# Patient Record
Sex: MALE | Race: WHITE | NOT HISPANIC OR LATINO | Employment: OTHER | URBAN - METROPOLITAN AREA
[De-identification: names, ages, dates, MRNs, and addresses within clinical notes are randomized per-mention and may not be internally consistent; named-entity substitution may affect disease eponyms.]

---

## 2019-01-01 ENCOUNTER — APPOINTMENT (OUTPATIENT)
Dept: GENERAL RADIOLOGY | Facility: HOSPITAL | Age: 84
End: 2019-01-01

## 2019-01-01 ENCOUNTER — ANESTHESIA EVENT (OUTPATIENT)
Dept: PERIOP | Facility: HOSPITAL | Age: 84
End: 2019-01-01

## 2019-01-01 ENCOUNTER — OFFICE VISIT (OUTPATIENT)
Dept: CARDIAC SURGERY | Facility: CLINIC | Age: 84
End: 2019-01-01

## 2019-01-01 ENCOUNTER — READMISSION MANAGEMENT (OUTPATIENT)
Dept: CALL CENTER | Facility: HOSPITAL | Age: 84
End: 2019-01-01

## 2019-01-01 ENCOUNTER — ANESTHESIA (OUTPATIENT)
Dept: CARDIOLOGY | Facility: HOSPITAL | Age: 84
End: 2019-01-01

## 2019-01-01 ENCOUNTER — OFFICE VISIT (OUTPATIENT)
Dept: CARDIOLOGY | Facility: CLINIC | Age: 84
End: 2019-01-01

## 2019-01-01 ENCOUNTER — APPOINTMENT (OUTPATIENT)
Dept: CT IMAGING | Facility: HOSPITAL | Age: 84
End: 2019-01-01

## 2019-01-01 ENCOUNTER — APPOINTMENT (OUTPATIENT)
Dept: CARDIOLOGY | Facility: HOSPITAL | Age: 84
End: 2019-01-01

## 2019-01-01 ENCOUNTER — ANESTHESIA (OUTPATIENT)
Dept: PERIOP | Facility: HOSPITAL | Age: 84
End: 2019-01-01

## 2019-01-01 ENCOUNTER — HOSPITAL ENCOUNTER (INPATIENT)
Facility: HOSPITAL | Age: 84
LOS: 6 days | Discharge: HOME HEALTH | End: 2019-10-02
Attending: EMERGENCY MEDICINE | Admitting: FAMILY MEDICINE

## 2019-01-01 ENCOUNTER — ANCILLARY PROCEDURE (OUTPATIENT)
Dept: SPEECH THERAPY | Facility: HOSPITAL | Age: 84
End: 2019-01-01

## 2019-01-01 ENCOUNTER — ANESTHESIA EVENT (OUTPATIENT)
Dept: CARDIOLOGY | Facility: HOSPITAL | Age: 84
End: 2019-01-01

## 2019-01-01 ENCOUNTER — ANESTHESIA EVENT (OUTPATIENT)
Dept: GASTROENTEROLOGY | Facility: HOSPITAL | Age: 84
End: 2019-01-01

## 2019-01-01 ENCOUNTER — TELEPHONE (OUTPATIENT)
Dept: GASTROENTEROLOGY | Facility: CLINIC | Age: 84
End: 2019-01-01

## 2019-01-01 ENCOUNTER — HOSPITAL ENCOUNTER (EMERGENCY)
Facility: HOSPITAL | Age: 84
Discharge: HOME | End: 2019-10-30
Attending: EMERGENCY MEDICINE | Admitting: EMERGENCY MEDICINE

## 2019-01-01 ENCOUNTER — ANESTHESIA (OUTPATIENT)
Dept: GASTROENTEROLOGY | Facility: HOSPITAL | Age: 84
End: 2019-01-01

## 2019-01-01 ENCOUNTER — TELEPHONE (OUTPATIENT)
Dept: CARDIOLOGY | Facility: CLINIC | Age: 84
End: 2019-01-01

## 2019-01-01 ENCOUNTER — HOSPITAL ENCOUNTER (INPATIENT)
Facility: HOSPITAL | Age: 84
LOS: 13 days | Discharge: HOME | End: 2019-06-20
Attending: EMERGENCY MEDICINE | Admitting: THORACIC SURGERY (CARDIOTHORACIC VASCULAR SURGERY)

## 2019-01-01 ENCOUNTER — LAB REQUISITION (OUTPATIENT)
Dept: LAB | Facility: HOSPITAL | Age: 84
End: 2019-01-01

## 2019-01-01 ENCOUNTER — HOSPITAL ENCOUNTER (INPATIENT)
Facility: HOSPITAL | Age: 84
LOS: 24 days | Discharge: NURSING, REHAB OR LONG-TERM CARE | End: 2019-07-26
Attending: EMERGENCY MEDICINE | Admitting: THORACIC SURGERY (CARDIOTHORACIC VASCULAR SURGERY)

## 2019-01-01 VITALS
OXYGEN SATURATION: 92 % | RESPIRATION RATE: 24 BRPM | WEIGHT: 175 LBS | HEART RATE: 64 BPM | SYSTOLIC BLOOD PRESSURE: 154 MMHG | TEMPERATURE: 97.7 F | DIASTOLIC BLOOD PRESSURE: 98 MMHG | BODY MASS INDEX: 23.7 KG/M2 | HEIGHT: 72 IN

## 2019-01-01 VITALS
OXYGEN SATURATION: 93 % | HEIGHT: 72 IN | DIASTOLIC BLOOD PRESSURE: 62 MMHG | BODY MASS INDEX: 22.67 KG/M2 | RESPIRATION RATE: 17 BRPM | SYSTOLIC BLOOD PRESSURE: 102 MMHG | WEIGHT: 167.4 LBS | TEMPERATURE: 97.5 F | HEART RATE: 82 BPM

## 2019-01-01 VITALS
RESPIRATION RATE: 20 BRPM | HEIGHT: 72 IN | SYSTOLIC BLOOD PRESSURE: 122 MMHG | OXYGEN SATURATION: 82 % | TEMPERATURE: 97.6 F | DIASTOLIC BLOOD PRESSURE: 68 MMHG | BODY MASS INDEX: 23.32 KG/M2 | WEIGHT: 172.2 LBS | HEART RATE: 91 BPM

## 2019-01-01 VITALS
OXYGEN SATURATION: 99 % | HEART RATE: 97 BPM | WEIGHT: 163 LBS | HEIGHT: 72 IN | BODY MASS INDEX: 22.08 KG/M2 | TEMPERATURE: 97.8 F | DIASTOLIC BLOOD PRESSURE: 76 MMHG | SYSTOLIC BLOOD PRESSURE: 116 MMHG

## 2019-01-01 VITALS
BODY MASS INDEX: 23.7 KG/M2 | OXYGEN SATURATION: 94 % | TEMPERATURE: 97.9 F | DIASTOLIC BLOOD PRESSURE: 61 MMHG | HEIGHT: 72 IN | WEIGHT: 175 LBS | SYSTOLIC BLOOD PRESSURE: 104 MMHG | HEART RATE: 62 BPM

## 2019-01-01 VITALS
HEART RATE: 101 BPM | WEIGHT: 167.6 LBS | BODY MASS INDEX: 22.7 KG/M2 | OXYGEN SATURATION: 96 % | SYSTOLIC BLOOD PRESSURE: 111 MMHG | HEIGHT: 72 IN | DIASTOLIC BLOOD PRESSURE: 66 MMHG | RESPIRATION RATE: 16 BRPM | TEMPERATURE: 97.3 F

## 2019-01-01 VITALS
HEART RATE: 82 BPM | WEIGHT: 155.4 LBS | SYSTOLIC BLOOD PRESSURE: 94 MMHG | HEIGHT: 72 IN | DIASTOLIC BLOOD PRESSURE: 58 MMHG | BODY MASS INDEX: 21.05 KG/M2

## 2019-01-01 DIAGNOSIS — I48.0 PAROXYSMAL ATRIAL FIBRILLATION (HCC): ICD-10-CM

## 2019-01-01 DIAGNOSIS — Z95.0 PRESENCE OF CARDIAC PACEMAKER: ICD-10-CM

## 2019-01-01 DIAGNOSIS — J90 PLEURAL EFFUSION: ICD-10-CM

## 2019-01-01 DIAGNOSIS — C34.90 NON-SMALL CELL LUNG CANCER, UNSPECIFIED LATERALITY (HCC): ICD-10-CM

## 2019-01-01 DIAGNOSIS — J18.9 PNEUMONIA OF RIGHT LOWER LOBE DUE TO INFECTIOUS ORGANISM: ICD-10-CM

## 2019-01-01 DIAGNOSIS — R13.12 OROPHARYNGEAL DYSPHAGIA: ICD-10-CM

## 2019-01-01 DIAGNOSIS — Z78.9 IMPAIRED MOBILITY AND ADLS: ICD-10-CM

## 2019-01-01 DIAGNOSIS — I48.4 ATYPICAL ATRIAL FLUTTER (HCC): ICD-10-CM

## 2019-01-01 DIAGNOSIS — I10 ESSENTIAL HYPERTENSION: ICD-10-CM

## 2019-01-01 DIAGNOSIS — T82.9XXA COMPLICATION ASSOCIATED WITH CARDIAC PACEMAKER LEAD, INITIAL ENCOUNTER: ICD-10-CM

## 2019-01-01 DIAGNOSIS — I25.10 CORONARY ARTERY DISEASE INVOLVING NATIVE CORONARY ARTERY OF NATIVE HEART WITHOUT ANGINA PECTORIS: ICD-10-CM

## 2019-01-01 DIAGNOSIS — C34.91 NON-SMALL CELL CANCER OF RIGHT LUNG (HCC): ICD-10-CM

## 2019-01-01 DIAGNOSIS — J98.19 TRAPPED LUNG: ICD-10-CM

## 2019-01-01 DIAGNOSIS — Z99.81 CHRONIC RESPIRATORY FAILURE WITH HYPOXIA, ON HOME OXYGEN THERAPY (HCC): ICD-10-CM

## 2019-01-01 DIAGNOSIS — I50.9 CONGESTIVE HEART FAILURE, UNSPECIFIED HF CHRONICITY, UNSPECIFIED HEART FAILURE TYPE (HCC): ICD-10-CM

## 2019-01-01 DIAGNOSIS — J96.11 CHRONIC RESPIRATORY FAILURE WITH HYPOXIA, ON HOME OXYGEN THERAPY (HCC): ICD-10-CM

## 2019-01-01 DIAGNOSIS — J18.9 PNEUMONIA, UNSPECIFIED ORGANISM: ICD-10-CM

## 2019-01-01 DIAGNOSIS — Z74.09 IMPAIRED FUNCTIONAL MOBILITY, BALANCE, GAIT, AND ENDURANCE: ICD-10-CM

## 2019-01-01 DIAGNOSIS — D64.9 SYMPTOMATIC ANEMIA: Primary | ICD-10-CM

## 2019-01-01 DIAGNOSIS — E86.0 DEHYDRATION: ICD-10-CM

## 2019-01-01 DIAGNOSIS — T82.7XXD INFECTION AND INFLAMMATORY REACTION DUE TO OTHER CARDIAC AND VASCULAR DEVICES, IMPLANTS AND GRAFTS, SUBSEQUENT ENCOUNTER: ICD-10-CM

## 2019-01-01 DIAGNOSIS — R00.0 TACHYCARDIA: ICD-10-CM

## 2019-01-01 DIAGNOSIS — R09.02 HYPOXIA: ICD-10-CM

## 2019-01-01 DIAGNOSIS — I25.10 CORONARY ARTERY DISEASE INVOLVING NATIVE CORONARY ARTERY OF NATIVE HEART WITHOUT ANGINA PECTORIS: Primary | ICD-10-CM

## 2019-01-01 DIAGNOSIS — R79.89 ELEVATED SERUM CREATININE: ICD-10-CM

## 2019-01-01 DIAGNOSIS — J98.19 TRAPPED LUNG: Primary | ICD-10-CM

## 2019-01-01 DIAGNOSIS — I48.91 ATRIAL FIBRILLATION, UNSPECIFIED TYPE (HCC): ICD-10-CM

## 2019-01-01 DIAGNOSIS — Z74.09 IMPAIRED MOBILITY AND ADLS: ICD-10-CM
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
## 2019-01-01 DIAGNOSIS — T85.698A MALFUNCTION OF DEVICE, INITIAL ENCOUNTER: ICD-10-CM

## 2019-01-01 DIAGNOSIS — J90 PLEURAL EFFUSION, NOT ELSEWHERE CLASSIFIED: ICD-10-CM

## 2019-01-01 DIAGNOSIS — R93.89 ABNORMAL CXR: ICD-10-CM

## 2019-01-01 DIAGNOSIS — Z95.0 PRESENCE OF CARDIAC PACEMAKER: Primary | ICD-10-CM

## 2019-01-01 DIAGNOSIS — E78.00 HYPERCHOLESTEROLEMIA: ICD-10-CM

## 2019-01-01 DIAGNOSIS — N28.9 ACUTE RENAL INSUFFICIENCY: ICD-10-CM

## 2019-01-01 DIAGNOSIS — J43.1 PANLOBULAR EMPHYSEMA (HCC): ICD-10-CM

## 2019-01-01 DIAGNOSIS — Z00.00 ROUTINE GENERAL MEDICAL EXAMINATION AT A HEALTH CARE FACILITY: ICD-10-CM

## 2019-01-01 DIAGNOSIS — K26.9 DUODENAL ULCER: ICD-10-CM

## 2019-01-01 DIAGNOSIS — R09.02 HYPOXEMIA: Primary | ICD-10-CM

## 2019-01-01 DIAGNOSIS — D50.0 CHRONIC BLOOD LOSS ANEMIA: ICD-10-CM

## 2019-01-01 DIAGNOSIS — R13.10 DYSPHAGIA, UNSPECIFIED TYPE: ICD-10-CM

## 2019-01-01 DIAGNOSIS — J96.21 ACUTE ON CHRONIC RESPIRATORY FAILURE WITH HYPOXIA (HCC): ICD-10-CM

## 2019-01-01 DIAGNOSIS — D64.9 ANEMIA, UNSPECIFIED TYPE: ICD-10-CM

## 2019-01-01 DIAGNOSIS — I48.91 ATRIAL FIBRILLATION, UNSPECIFIED TYPE (HCC): Primary | ICD-10-CM

## 2019-01-01 DIAGNOSIS — K92.2 UPPER GI BLEED: ICD-10-CM

## 2019-01-01 LAB
ABO + RH BLD: NORMAL
ABO GROUP BLD: NORMAL
ACT BLD: 142 SECONDS (ref 82–152)
ACT BLD: 142 SECONDS (ref 82–152)
ALBUMIN SERPL-MCNC: 2.5 G/DL (ref 3.5–5.2)
ALBUMIN SERPL-MCNC: 2.5 G/DL (ref 3.5–5.2)
ALBUMIN SERPL-MCNC: 2.6 G/DL (ref 3.5–5.2)
ALBUMIN SERPL-MCNC: 2.7 G/DL (ref 3.5–5.2)
ALBUMIN SERPL-MCNC: 2.8 G/DL (ref 3.5–5.2)
ALBUMIN SERPL-MCNC: 2.9 G/DL (ref 3.5–5.2)
ALBUMIN SERPL-MCNC: 2.9 G/DL (ref 3.5–5.2)
ALBUMIN SERPL-MCNC: 3 G/DL (ref 3.5–5.2)
ALBUMIN SERPL-MCNC: 3.1 G/DL (ref 3.5–5.2)
ALBUMIN SERPL-MCNC: 3.1 G/DL (ref 3.5–5.2)
ALBUMIN SERPL-MCNC: 3.6 G/DL (ref 3.5–5.2)
ALBUMIN/GLOB SERPL: 0.8 G/DL
ALBUMIN/GLOB SERPL: 0.9 G/DL
ALBUMIN/GLOB SERPL: 0.9 G/DL
ALBUMIN/GLOB SERPL: 1 G/DL
ALBUMIN/GLOB SERPL: 1.1 G/DL
ALP SERPL-CCNC: 119 U/L (ref 39–117)
ALP SERPL-CCNC: 123 U/L (ref 39–117)
ALP SERPL-CCNC: 125 U/L (ref 39–117)
ALP SERPL-CCNC: 127 U/L (ref 39–117)
ALP SERPL-CCNC: 144 U/L (ref 39–117)
ALP SERPL-CCNC: 77 U/L (ref 39–117)
ALP SERPL-CCNC: 83 U/L (ref 39–117)
ALP SERPL-CCNC: 88 U/L (ref 39–117)
ALP SERPL-CCNC: 92 U/L (ref 39–117)
ALT SERPL W P-5'-P-CCNC: 12 U/L (ref 1–41)
ALT SERPL W P-5'-P-CCNC: 13 U/L (ref 1–41)
ALT SERPL W P-5'-P-CCNC: 16 U/L (ref 1–41)
ALT SERPL W P-5'-P-CCNC: 17 U/L (ref 1–41)
ALT SERPL W P-5'-P-CCNC: 19 U/L (ref 1–41)
ALT SERPL W P-5'-P-CCNC: 19 U/L (ref 1–41)
ALT SERPL W P-5'-P-CCNC: 21 U/L (ref 1–41)
ALT SERPL W P-5'-P-CCNC: 22 U/L (ref 1–41)
ALT SERPL W P-5'-P-CCNC: 5 U/L (ref 1–41)
ANION GAP SERPL CALCULATED.3IONS-SCNC: 10 MMOL/L
ANION GAP SERPL CALCULATED.3IONS-SCNC: 10 MMOL/L
ANION GAP SERPL CALCULATED.3IONS-SCNC: 10 MMOL/L (ref 5–15)
ANION GAP SERPL CALCULATED.3IONS-SCNC: 11 MMOL/L
ANION GAP SERPL CALCULATED.3IONS-SCNC: 11 MMOL/L (ref 5–15)
ANION GAP SERPL CALCULATED.3IONS-SCNC: 12 MMOL/L
ANION GAP SERPL CALCULATED.3IONS-SCNC: 12 MMOL/L
ANION GAP SERPL CALCULATED.3IONS-SCNC: 12 MMOL/L (ref 5–15)
ANION GAP SERPL CALCULATED.3IONS-SCNC: 13 MMOL/L
ANION GAP SERPL CALCULATED.3IONS-SCNC: 13 MMOL/L (ref 5–15)
ANION GAP SERPL CALCULATED.3IONS-SCNC: 14 MMOL/L (ref 5–15)
ANION GAP SERPL CALCULATED.3IONS-SCNC: 7 MMOL/L (ref 5–15)
ANION GAP SERPL CALCULATED.3IONS-SCNC: 7 MMOL/L (ref 5–15)
ANION GAP SERPL CALCULATED.3IONS-SCNC: 8 MMOL/L
ANION GAP SERPL CALCULATED.3IONS-SCNC: 8 MMOL/L (ref 5–15)
ANION GAP SERPL CALCULATED.3IONS-SCNC: 9 MMOL/L (ref 5–15)
APTT PPP: 41.2 SECONDS (ref 24–37)
ARTERIAL PATENCY WRIST A: ABNORMAL
AST SERPL-CCNC: 14 U/L (ref 1–40)
AST SERPL-CCNC: 15 U/L (ref 1–40)
AST SERPL-CCNC: 16 U/L (ref 1–40)
AST SERPL-CCNC: 19 U/L (ref 1–40)
AST SERPL-CCNC: 20 U/L (ref 1–40)
AST SERPL-CCNC: 21 U/L (ref 1–40)
AST SERPL-CCNC: 24 U/L (ref 1–40)
AST SERPL-CCNC: 24 U/L (ref 1–40)
AST SERPL-CCNC: 29 U/L (ref 1–40)
ATMOSPHERIC PRESS: ABNORMAL MMHG
BACTERIA FLD CULT: NORMAL
BACTERIA SPEC AEROBE CULT: NORMAL
BACTERIA SPEC ANAEROBE CULT: NORMAL
BACTERIA SPEC RESP CULT: NORMAL
BACTERIA UR QL AUTO: ABNORMAL /HPF
BASE EXCESS BLDA CALC-SCNC: -2 MMOL/L (ref 0–2)
BASE EXCESS BLDA CALC-SCNC: -3.6 MMOL/L (ref 0–2)
BASE EXCESS BLDA CALC-SCNC: -4 MMOL/L (ref -5–5)
BASE EXCESS BLDA CALC-SCNC: 1.7 MMOL/L (ref 0–2)
BASE EXCESS BLDA CALC-SCNC: 2.4 MMOL/L (ref 0–2)
BASOPHILS # BLD AUTO: 0 10*3/MM3 (ref 0–0.2)
BASOPHILS # BLD AUTO: 0.02 10*3/MM3 (ref 0–0.2)
BASOPHILS # BLD AUTO: 0.03 10*3/MM3 (ref 0–0.2)
BASOPHILS # BLD AUTO: 0.04 10*3/MM3 (ref 0–0.2)
BASOPHILS # BLD AUTO: 0.06 10*3/MM3 (ref 0–0.2)
BASOPHILS # BLD AUTO: 0.08 10*3/MM3 (ref 0–0.2)
BASOPHILS # BLD AUTO: 0.1 10*3/MM3 (ref 0–0.2)
BASOPHILS NFR BLD AUTO: 0 % (ref 0–1.5)
BASOPHILS NFR BLD AUTO: 0.3 % (ref 0–1.5)
BASOPHILS NFR BLD AUTO: 0.3 % (ref 0–1.5)
BASOPHILS NFR BLD AUTO: 0.4 % (ref 0–1.5)
BASOPHILS NFR BLD AUTO: 0.6 % (ref 0–1.5)
BASOPHILS NFR BLD AUTO: 0.7 % (ref 0–1.5)
BASOPHILS NFR BLD AUTO: 0.9 % (ref 0–1.5)
BASOPHILS NFR BLD AUTO: 0.9 % (ref 0–1.5)
BASOPHILS NFR BLD AUTO: 1 % (ref 0–1.5)
BASOPHILS NFR BLD AUTO: 1.1 % (ref 0–1.5)
BASOPHILS NFR BLD AUTO: 1.2 % (ref 0–1.5)
BDY SITE: ABNORMAL
BH BB BLOOD EXPIRATION DATE: NORMAL
BH BB BLOOD TYPE BARCODE: 5100
BH BB DISPENSE STATUS: NORMAL
BH BB PRODUCT CODE: NORMAL
BH BB UNIT NUMBER: NORMAL
BH CV ECHO MEAS - AO MAX PG (FULL): 7.9 MMHG
BH CV ECHO MEAS - AO MAX PG: 9.5 MMHG
BH CV ECHO MEAS - AO MEAN PG (FULL): 4.7 MMHG
BH CV ECHO MEAS - AO MEAN PG: 5.3 MMHG
BH CV ECHO MEAS - AO V2 MAX: 154.4 CM/SEC
BH CV ECHO MEAS - AO V2 MEAN: 105.1 CM/SEC
BH CV ECHO MEAS - AO V2 VTI: 29.2 CM
BH CV ECHO MEAS - BSA(HAYCOCK): 2 M^2
BH CV ECHO MEAS - BSA: 2 M^2
BH CV ECHO MEAS - BZI_BMI: 23.6 KILOGRAMS/M^2
BH CV ECHO MEAS - BZI_METRIC_HEIGHT: 182.9 CM
BH CV ECHO MEAS - BZI_METRIC_WEIGHT: 78.9 KG
BH CV ECHO MEAS - EDV(CUBED): 78.3 ML
BH CV ECHO MEAS - EDV(TEICH): 82.1 ML
BH CV ECHO MEAS - EF(CUBED): 56.5 %
BH CV ECHO MEAS - EF(TEICH): 48.5 %
BH CV ECHO MEAS - ESV(CUBED): 34.1 ML
BH CV ECHO MEAS - ESV(TEICH): 42.3 ML
BH CV ECHO MEAS - FS: 24.2 %
BH CV ECHO MEAS - IVS/LVPW: 0.93
BH CV ECHO MEAS - IVSD: 1 CM
BH CV ECHO MEAS - LA DIMENSION: 4.7 CM
BH CV ECHO MEAS - LAD MAJOR: 6.5 CM
BH CV ECHO MEAS - LAT PEAK E' VEL: 9.4 CM/SEC
BH CV ECHO MEAS - LATERAL E/E' RATIO: 10.5
BH CV ECHO MEAS - LV MASS(C)D: 153 GRAMS
BH CV ECHO MEAS - LV MASS(C)DI: 76.2 GRAMS/M^2
BH CV ECHO MEAS - LV MAX PG: 1.6 MMHG
BH CV ECHO MEAS - LV MEAN PG: 0.64 MMHG
BH CV ECHO MEAS - LV V1 MAX: 63.2 CM/SEC
BH CV ECHO MEAS - LV V1 MEAN: 34.7 CM/SEC
BH CV ECHO MEAS - LV V1 VTI: 10.7 CM
BH CV ECHO MEAS - LVIDD: 4.3 CM
BH CV ECHO MEAS - LVIDS: 3.2 CM
BH CV ECHO MEAS - LVPWD: 1.1 CM
BH CV ECHO MEAS - MED PEAK E' VEL: 11 CM/SEC
BH CV ECHO MEAS - MEDIAL E/E' RATIO: 9
BH CV ECHO MEAS - MV DEC TIME: 0.16 SEC
BH CV ECHO MEAS - MV E MAX VEL: 100.2 CM/SEC
BH CV ECHO MEAS - RAP SYSTOLE: 15 MMHG
BH CV ECHO MEAS - RVSP: 69 MMHG
BH CV ECHO MEAS - SI(CUBED): 22 ML/M^2
BH CV ECHO MEAS - SI(TEICH): 19.8 ML/M^2
BH CV ECHO MEAS - SV(CUBED): 44.2 ML
BH CV ECHO MEAS - SV(TEICH): 39.8 ML
BH CV ECHO MEAS - TAPSE (>1.6): 2.5 CM2
BH CV ECHO MEAS - TR MAX PG: 54 MMHG
BH CV ECHO MEAS - TR MAX VEL: 340.6 CM/SEC
BH CV ECHO MEASUREMENTS AVERAGE E/E' RATIO: 9.82
BH CV UPPER VENOUS LEFT AXILLARY AUGMENT: NORMAL
BH CV UPPER VENOUS LEFT AXILLARY COMPRESS: NORMAL
BH CV UPPER VENOUS LEFT AXILLARY PHASIC: NORMAL
BH CV UPPER VENOUS LEFT AXILLARY SPONT: NORMAL
BH CV UPPER VENOUS LEFT BASILIC FOREARM COMPRESS: NORMAL
BH CV UPPER VENOUS LEFT BASILIC UPPER COMPRESS: NORMAL
BH CV UPPER VENOUS LEFT BRACHIAL COMPRESS: NORMAL
BH CV UPPER VENOUS LEFT CEPHALIC FOREARM COMPRESS: NORMAL
BH CV UPPER VENOUS LEFT CEPHALIC UPPER COMPRESS: NORMAL
BH CV UPPER VENOUS LEFT INTERNAL JUGULAR AUGMENT: NORMAL
BH CV UPPER VENOUS LEFT INTERNAL JUGULAR COMPRESS: NORMAL
BH CV UPPER VENOUS LEFT INTERNAL JUGULAR PHASIC: NORMAL
BH CV UPPER VENOUS LEFT INTERNAL JUGULAR SPONT: NORMAL
BH CV UPPER VENOUS LEFT RADIAL COMPRESS: NORMAL
BH CV UPPER VENOUS LEFT SUBCLAVIAN AUGMENT: NORMAL
BH CV UPPER VENOUS LEFT SUBCLAVIAN COMPRESS: NORMAL
BH CV UPPER VENOUS LEFT SUBCLAVIAN PHASIC: NORMAL
BH CV UPPER VENOUS LEFT SUBCLAVIAN SPONT: NORMAL
BH CV UPPER VENOUS LEFT ULNAR COMPRESS: NORMAL
BH CV XLRA - RV BASE: 4.1 CM
BH CV XLRA - RV LENGTH: 7.1 CM
BH CV XLRA - RV MID: 3.9 CM
BH CV XLRA - TDI S': 14.4 CM/SEC
BILIRUB SERPL-MCNC: 0.4 MG/DL (ref 0.2–1.2)
BILIRUB SERPL-MCNC: 0.5 MG/DL (ref 0.2–1.2)
BILIRUB SERPL-MCNC: 0.6 MG/DL (ref 0.2–1.2)
BILIRUB SERPL-MCNC: 0.7 MG/DL (ref 0.2–1.2)
BILIRUB UR QL STRIP: NEGATIVE
BLD GP AB SCN SERPL QL: NEGATIVE
BODY TEMPERATURE: 37 C
BUN BLD-MCNC: 18 MG/DL (ref 8–23)
BUN BLD-MCNC: 18 MG/DL (ref 8–23)
BUN BLD-MCNC: 19 MG/DL (ref 8–23)
BUN BLD-MCNC: 19 MG/DL (ref 8–23)
BUN BLD-MCNC: 20 MG/DL (ref 8–23)
BUN BLD-MCNC: 21 MG/DL (ref 8–23)
BUN BLD-MCNC: 22 MG/DL (ref 8–23)
BUN BLD-MCNC: 22 MG/DL (ref 8–23)
BUN BLD-MCNC: 24 MG/DL (ref 8–23)
BUN BLD-MCNC: 27 MG/DL (ref 8–23)
BUN BLD-MCNC: 28 MG/DL (ref 8–23)
BUN BLD-MCNC: 29 MG/DL (ref 8–23)
BUN BLD-MCNC: 29 MG/DL (ref 8–23)
BUN BLD-MCNC: 30 MG/DL (ref 8–23)
BUN BLD-MCNC: 31 MG/DL (ref 8–23)
BUN BLD-MCNC: 31 MG/DL (ref 8–23)
BUN BLD-MCNC: 33 MG/DL (ref 8–23)
BUN BLD-MCNC: 33 MG/DL (ref 8–23)
BUN BLD-MCNC: 35 MG/DL (ref 8–23)
BUN BLD-MCNC: 35 MG/DL (ref 8–23)
BUN BLD-MCNC: 36 MG/DL (ref 8–23)
BUN BLD-MCNC: 37 MG/DL (ref 8–23)
BUN BLD-MCNC: 39 MG/DL (ref 8–23)
BUN BLD-MCNC: 39 MG/DL (ref 8–23)
BUN BLD-MCNC: 40 MG/DL (ref 8–23)
BUN BLD-MCNC: 41 MG/DL (ref 8–23)
BUN BLD-MCNC: 42 MG/DL (ref 8–23)
BUN BLD-MCNC: 46 MG/DL (ref 8–23)
BUN BLD-MCNC: 48 MG/DL (ref 8–23)
BUN BLD-MCNC: 49 MG/DL (ref 8–23)
BUN BLD-MCNC: 51 MG/DL (ref 8–23)
BUN/CREAT SERPL: 11.4 (ref 7–25)
BUN/CREAT SERPL: 11.5 (ref 7–25)
BUN/CREAT SERPL: 11.8 (ref 7–25)
BUN/CREAT SERPL: 14.6 (ref 7–25)
BUN/CREAT SERPL: 15 (ref 7–25)
BUN/CREAT SERPL: 15.1 (ref 7–25)
BUN/CREAT SERPL: 15.7 (ref 7–25)
BUN/CREAT SERPL: 15.9 (ref 7–25)
BUN/CREAT SERPL: 16.3 (ref 7–25)
BUN/CREAT SERPL: 16.4 (ref 7–25)
BUN/CREAT SERPL: 16.4 (ref 7–25)
BUN/CREAT SERPL: 16.5 (ref 7–25)
BUN/CREAT SERPL: 16.5 (ref 7–25)
BUN/CREAT SERPL: 16.8 (ref 7–25)
BUN/CREAT SERPL: 18.4 (ref 7–25)
BUN/CREAT SERPL: 19 (ref 7–25)
BUN/CREAT SERPL: 19.2 (ref 7–25)
BUN/CREAT SERPL: 19.2 (ref 7–25)
BUN/CREAT SERPL: 19.7 (ref 7–25)
BUN/CREAT SERPL: 20.3 (ref 7–25)
BUN/CREAT SERPL: 20.5 (ref 7–25)
BUN/CREAT SERPL: 20.8 (ref 7–25)
BUN/CREAT SERPL: 22.2 (ref 7–25)
BUN/CREAT SERPL: 23.3 (ref 7–25)
BUN/CREAT SERPL: 23.9 (ref 7–25)
BUN/CREAT SERPL: 25.5 (ref 7–25)
BUN/CREAT SERPL: 26.3 (ref 7–25)
BUN/CREAT SERPL: 26.4 (ref 7–25)
BUN/CREAT SERPL: 26.5 (ref 7–25)
BUN/CREAT SERPL: 27.4 (ref 7–25)
BUN/CREAT SERPL: 28.4 (ref 7–25)
BUN/CREAT SERPL: 29.1 (ref 7–25)
BUN/CREAT SERPL: 30.4 (ref 7–25)
BUN/CREAT SERPL: 30.6 (ref 7–25)
BUN/CREAT SERPL: 31.5 (ref 7–25)
CA-I BLDA-SCNC: 1.22 MMOL/L (ref 1.2–1.32)
CA-I SERPL ISE-MCNC: 1.28 MMOL/L (ref 1.12–1.32)
CALCIUM SPEC-SCNC: 10 MG/DL (ref 8.6–10.5)
CALCIUM SPEC-SCNC: 8.6 MG/DL (ref 8.6–10.5)
CALCIUM SPEC-SCNC: 8.7 MG/DL (ref 8.6–10.5)
CALCIUM SPEC-SCNC: 8.7 MG/DL (ref 8.6–10.5)
CALCIUM SPEC-SCNC: 8.8 MG/DL (ref 8.6–10.5)
CALCIUM SPEC-SCNC: 8.8 MG/DL (ref 8.6–10.5)
CALCIUM SPEC-SCNC: 8.9 MG/DL (ref 8.6–10.5)
CALCIUM SPEC-SCNC: 9 MG/DL (ref 8.6–10.5)
CALCIUM SPEC-SCNC: 9.1 MG/DL (ref 8.6–10.5)
CALCIUM SPEC-SCNC: 9.2 MG/DL (ref 8.6–10.5)
CALCIUM SPEC-SCNC: 9.3 MG/DL (ref 8.6–10.5)
CALCIUM SPEC-SCNC: 9.3 MG/DL (ref 8.6–10.5)
CALCIUM SPEC-SCNC: 9.4 MG/DL (ref 8.6–10.5)
CALCIUM SPEC-SCNC: 9.5 MG/DL (ref 8.6–10.5)
CALCIUM SPEC-SCNC: 9.6 MG/DL (ref 8.6–10.5)
CALCIUM SPEC-SCNC: 9.7 MG/DL (ref 8.6–10.5)
CALCIUM SPEC-SCNC: 9.9 MG/DL (ref 8.6–10.5)
CHLORIDE SERPL-SCNC: 100 MMOL/L (ref 98–107)
CHLORIDE SERPL-SCNC: 101 MMOL/L (ref 98–107)
CHLORIDE SERPL-SCNC: 102 MMOL/L (ref 98–107)
CHLORIDE SERPL-SCNC: 103 MMOL/L (ref 98–107)
CHLORIDE SERPL-SCNC: 104 MMOL/L (ref 98–107)
CHLORIDE SERPL-SCNC: 105 MMOL/L (ref 98–107)
CHLORIDE SERPL-SCNC: 106 MMOL/L (ref 98–107)
CHLORIDE SERPL-SCNC: 98 MMOL/L (ref 98–107)
CHLORIDE SERPL-SCNC: 99 MMOL/L (ref 98–107)
CK SERPL-CCNC: 41 U/L (ref 20–200)
CLARITY UR: CLEAR
CO2 BLDA-SCNC: 21.6 MMOL/L (ref 23–27)
CO2 BLDA-SCNC: 23 MMOL/L (ref 23–27)
CO2 BLDA-SCNC: 23 MMOL/L (ref 24–29)
CO2 BLDA-SCNC: 26.3 MMOL/L (ref 23–27)
CO2 BLDA-SCNC: 26.9 MMOL/L (ref 23–27)
CO2 SERPL-SCNC: 19 MMOL/L (ref 22–29)
CO2 SERPL-SCNC: 20 MMOL/L (ref 22–29)
CO2 SERPL-SCNC: 20 MMOL/L (ref 22–29)
CO2 SERPL-SCNC: 21 MMOL/L (ref 22–29)
CO2 SERPL-SCNC: 22 MMOL/L (ref 22–29)
CO2 SERPL-SCNC: 23 MMOL/L (ref 22–29)
CO2 SERPL-SCNC: 24 MMOL/L (ref 22–29)
CO2 SERPL-SCNC: 25 MMOL/L (ref 22–29)
CO2 SERPL-SCNC: 26 MMOL/L (ref 22–29)
CO2 SERPL-SCNC: 27 MMOL/L (ref 22–29)
CO2 SERPL-SCNC: 28 MMOL/L (ref 22–29)
CO2 SERPL-SCNC: 28 MMOL/L (ref 22–29)
COHGB MFR BLD: 2.2 % (ref 0–2)
COHGB MFR BLD: 2.2 % (ref 0–2)
COHGB MFR BLD: 2.4 % (ref 0–2)
COHGB MFR BLD: 2.4 % (ref 0–2)
COLOR UR: YELLOW
CORTIS SERPL-MCNC: 22.63 MCG/DL
CREAT BLD-MCNC: 1.08 MG/DL (ref 0.76–1.27)
CREAT BLD-MCNC: 1.1 MG/DL (ref 0.76–1.27)
CREAT BLD-MCNC: 1.13 MG/DL (ref 0.76–1.27)
CREAT BLD-MCNC: 1.13 MG/DL (ref 0.76–1.27)
CREAT BLD-MCNC: 1.22 MG/DL (ref 0.76–1.27)
CREAT BLD-MCNC: 1.26 MG/DL (ref 0.76–1.27)
CREAT BLD-MCNC: 1.3 MG/DL (ref 0.76–1.27)
CREAT BLD-MCNC: 1.32 MG/DL (ref 0.76–1.27)
CREAT BLD-MCNC: 1.33 MG/DL (ref 0.76–1.27)
CREAT BLD-MCNC: 1.34 MG/DL (ref 0.76–1.27)
CREAT BLD-MCNC: 1.35 MG/DL (ref 0.76–1.27)
CREAT BLD-MCNC: 1.4 MG/DL (ref 0.76–1.27)
CREAT BLD-MCNC: 1.41 MG/DL (ref 0.76–1.27)
CREAT BLD-MCNC: 1.42 MG/DL (ref 0.76–1.27)
CREAT BLD-MCNC: 1.42 MG/DL (ref 0.76–1.27)
CREAT BLD-MCNC: 1.43 MG/DL (ref 0.76–1.27)
CREAT BLD-MCNC: 1.45 MG/DL (ref 0.76–1.27)
CREAT BLD-MCNC: 1.46 MG/DL (ref 0.76–1.27)
CREAT BLD-MCNC: 1.51 MG/DL (ref 0.76–1.27)
CREAT BLD-MCNC: 1.56 MG/DL (ref 0.76–1.27)
CREAT BLD-MCNC: 1.58 MG/DL (ref 0.76–1.27)
CREAT BLD-MCNC: 1.59 MG/DL (ref 0.76–1.27)
CREAT BLD-MCNC: 1.6 MG/DL (ref 0.76–1.27)
CREAT BLD-MCNC: 1.62 MG/DL (ref 0.76–1.27)
CREAT BLD-MCNC: 1.63 MG/DL (ref 0.76–1.27)
CREAT BLD-MCNC: 1.74 MG/DL (ref 0.76–1.27)
CREAT BLD-MCNC: 1.76 MG/DL (ref 0.76–1.27)
CREAT BLD-MCNC: 1.77 MG/DL (ref 0.76–1.27)
CREAT BLD-MCNC: 1.79 MG/DL (ref 0.76–1.27)
CREAT BLD-MCNC: 1.8 MG/DL (ref 0.76–1.27)
CREAT BLD-MCNC: 1.82 MG/DL (ref 0.76–1.27)
CREAT BLD-MCNC: 1.83 MG/DL (ref 0.76–1.27)
CREAT BLD-MCNC: 1.85 MG/DL (ref 0.76–1.27)
CREAT BLD-MCNC: 1.98 MG/DL (ref 0.76–1.27)
CREAT BLD-MCNC: 2.04 MG/DL (ref 0.76–1.27)
CRP SERPL-MCNC: 0.5 MG/DL (ref 0–0.5)
CRP SERPL-MCNC: 3.52 MG/DL (ref 0–0.5)
CYTO UR: NORMAL
D-LACTATE SERPL-SCNC: 1.6 MMOL/L (ref 0.5–2)
D-LACTATE SERPL-SCNC: 1.7 MMOL/L (ref 0.5–2)
D-LACTATE SERPL-SCNC: 1.9 MMOL/L (ref 0.5–2)
DEPRECATED RDW RBC AUTO: 61.3 FL (ref 37–54)
DEPRECATED RDW RBC AUTO: 61.4 FL (ref 37–54)
DEPRECATED RDW RBC AUTO: 61.6 FL (ref 37–54)
DEPRECATED RDW RBC AUTO: 62.5 FL (ref 37–54)
DEPRECATED RDW RBC AUTO: 62.7 FL (ref 37–54)
DEPRECATED RDW RBC AUTO: 62.8 FL (ref 37–54)
DEPRECATED RDW RBC AUTO: 63.3 FL (ref 37–54)
DEPRECATED RDW RBC AUTO: 63.4 FL (ref 37–54)
DEPRECATED RDW RBC AUTO: 64.1 FL (ref 37–54)
DEPRECATED RDW RBC AUTO: 64.4 FL (ref 37–54)
DEPRECATED RDW RBC AUTO: 67.2 FL (ref 37–54)
DEPRECATED RDW RBC AUTO: 67.6 FL (ref 37–54)
DEPRECATED RDW RBC AUTO: 69.9 FL (ref 37–54)
DEPRECATED RDW RBC AUTO: 69.9 FL (ref 37–54)
DEPRECATED RDW RBC AUTO: 71 FL (ref 37–54)
DEPRECATED RDW RBC AUTO: 72.7 FL (ref 37–54)
DEPRECATED RDW RBC AUTO: 73.1 FL (ref 37–54)
DEPRECATED RDW RBC AUTO: 74.3 FL (ref 37–54)
DEPRECATED RDW RBC AUTO: 76.9 FL (ref 37–54)
DEPRECATED RDW RBC AUTO: 78.7 FL (ref 37–54)
DEPRECATED RDW RBC AUTO: 82.9 FL (ref 37–54)
DEPRECATED RDW RBC AUTO: 83.1 FL (ref 37–54)
DEPRECATED RDW RBC AUTO: 84.6 FL (ref 37–54)
DEPRECATED RDW RBC AUTO: 85.1 FL (ref 37–54)
DEPRECATED RDW RBC AUTO: 86.3 FL (ref 37–54)
DEPRECATED RDW RBC AUTO: 86.6 FL (ref 37–54)
DEPRECATED RDW RBC AUTO: 86.8 FL (ref 37–54)
DEPRECATED RDW RBC AUTO: 87.1 FL (ref 37–54)
DEPRECATED RDW RBC AUTO: 87.4 FL (ref 37–54)
DEPRECATED RDW RBC AUTO: 88.4 FL (ref 37–54)
DEPRECATED RDW RBC AUTO: 89.1 FL (ref 37–54)
DEPRECATED RDW RBC AUTO: 90.1 FL (ref 37–54)
DEPRECATED RDW RBC AUTO: 90.3 FL (ref 37–54)
DEPRECATED RDW RBC AUTO: 90.5 FL (ref 37–54)
DEVELOPER EXPIRATION DATE: ABNORMAL
DEVELOPER LOT NUMBER: ABNORMAL
DIGOXIN SERPL-MCNC: 0.53 NG/ML (ref 0.6–1.2)
DIGOXIN SERPL-MCNC: 1.93 NG/ML (ref 0.6–1.2)
EOSINOPHIL # BLD AUTO: 0 10*3/MM3 (ref 0–0.4)
EOSINOPHIL # BLD AUTO: 0.03 10*3/MM3 (ref 0–0.4)
EOSINOPHIL # BLD AUTO: 0.06 10*3/MM3 (ref 0–0.4)
EOSINOPHIL # BLD AUTO: 0.11 10*3/MM3 (ref 0–0.4)
EOSINOPHIL # BLD AUTO: 0.12 10*3/MM3 (ref 0–0.4)
EOSINOPHIL # BLD AUTO: 0.13 10*3/MM3 (ref 0–0.4)
EOSINOPHIL # BLD AUTO: 0.16 10*3/MM3 (ref 0–0.4)
EOSINOPHIL # BLD AUTO: 0.19 10*3/MM3 (ref 0–0.4)
EOSINOPHIL # BLD AUTO: 0.23 10*3/MM3 (ref 0–0.4)
EOSINOPHIL # BLD AUTO: 0.25 10*3/MM3 (ref 0–0.4)
EOSINOPHIL # BLD AUTO: 0.35 10*3/MM3 (ref 0–0.4)
EOSINOPHIL NFR BLD AUTO: 0 % (ref 0.3–6.2)
EOSINOPHIL NFR BLD AUTO: 0.3 % (ref 0.3–6.2)
EOSINOPHIL NFR BLD AUTO: 1 % (ref 0.3–6.2)
EOSINOPHIL NFR BLD AUTO: 1.2 % (ref 0.3–6.2)
EOSINOPHIL NFR BLD AUTO: 1.2 % (ref 0.3–6.2)
EOSINOPHIL NFR BLD AUTO: 1.5 % (ref 0.3–6.2)
EOSINOPHIL NFR BLD AUTO: 2 % (ref 0.3–6.2)
EOSINOPHIL NFR BLD AUTO: 2.3 % (ref 0.3–6.2)
EOSINOPHIL NFR BLD AUTO: 2.7 % (ref 0.3–6.2)
EOSINOPHIL NFR BLD AUTO: 3.8 % (ref 0.3–6.2)
EOSINOPHIL NFR BLD AUTO: 3.8 % (ref 0.3–6.2)
EOSINOPHIL NFR BLD AUTO: 4.6 % (ref 0.3–6.2)
EOSINOPHIL NFR BLD AUTO: 5.2 % (ref 0.3–6.2)
EOSINOPHIL SPEC QL MICRO: 0 % EOS/100 CELLS (ref 0–0)
ERYTHROCYTE [DISTWIDTH] IN BLOOD BY AUTOMATED COUNT: 17.1 % (ref 12.3–15.4)
ERYTHROCYTE [DISTWIDTH] IN BLOOD BY AUTOMATED COUNT: 17.3 % (ref 12.3–15.4)
ERYTHROCYTE [DISTWIDTH] IN BLOOD BY AUTOMATED COUNT: 17.8 % (ref 12.3–15.4)
ERYTHROCYTE [DISTWIDTH] IN BLOOD BY AUTOMATED COUNT: 17.8 % (ref 12.3–15.4)
ERYTHROCYTE [DISTWIDTH] IN BLOOD BY AUTOMATED COUNT: 17.9 % (ref 12.3–15.4)
ERYTHROCYTE [DISTWIDTH] IN BLOOD BY AUTOMATED COUNT: 18 % (ref 12.3–15.4)
ERYTHROCYTE [DISTWIDTH] IN BLOOD BY AUTOMATED COUNT: 18 % (ref 12.3–15.4)
ERYTHROCYTE [DISTWIDTH] IN BLOOD BY AUTOMATED COUNT: 18.6 % (ref 12.3–15.4)
ERYTHROCYTE [DISTWIDTH] IN BLOOD BY AUTOMATED COUNT: 19 % (ref 12.3–15.4)
ERYTHROCYTE [DISTWIDTH] IN BLOOD BY AUTOMATED COUNT: 19 % (ref 12.3–15.4)
ERYTHROCYTE [DISTWIDTH] IN BLOOD BY AUTOMATED COUNT: 19.2 % (ref 12.3–15.4)
ERYTHROCYTE [DISTWIDTH] IN BLOOD BY AUTOMATED COUNT: 19.5 % (ref 12.3–15.4)
ERYTHROCYTE [DISTWIDTH] IN BLOOD BY AUTOMATED COUNT: 19.6 % (ref 12.3–15.4)
ERYTHROCYTE [DISTWIDTH] IN BLOOD BY AUTOMATED COUNT: 19.7 % (ref 12.3–15.4)
ERYTHROCYTE [DISTWIDTH] IN BLOOD BY AUTOMATED COUNT: 20.2 % (ref 12.3–15.4)
ERYTHROCYTE [DISTWIDTH] IN BLOOD BY AUTOMATED COUNT: 20.2 % (ref 12.3–15.4)
ERYTHROCYTE [DISTWIDTH] IN BLOOD BY AUTOMATED COUNT: 20.6 % (ref 12.3–15.4)
ERYTHROCYTE [DISTWIDTH] IN BLOOD BY AUTOMATED COUNT: 21.2 % (ref 12.3–15.4)
ERYTHROCYTE [DISTWIDTH] IN BLOOD BY AUTOMATED COUNT: 21.5 % (ref 12.3–15.4)
ERYTHROCYTE [DISTWIDTH] IN BLOOD BY AUTOMATED COUNT: 21.5 % (ref 12.3–15.4)
ERYTHROCYTE [DISTWIDTH] IN BLOOD BY AUTOMATED COUNT: 21.6 % (ref 12.3–15.4)
ERYTHROCYTE [DISTWIDTH] IN BLOOD BY AUTOMATED COUNT: 21.9 % (ref 12.3–15.4)
ERYTHROCYTE [DISTWIDTH] IN BLOOD BY AUTOMATED COUNT: 22.4 % (ref 12.3–15.4)
ERYTHROCYTE [DISTWIDTH] IN BLOOD BY AUTOMATED COUNT: 22.6 % (ref 12.3–15.4)
ERYTHROCYTE [DISTWIDTH] IN BLOOD BY AUTOMATED COUNT: 22.6 % (ref 12.3–15.4)
ERYTHROCYTE [DISTWIDTH] IN BLOOD BY AUTOMATED COUNT: 22.7 % (ref 12.3–15.4)
ERYTHROCYTE [DISTWIDTH] IN BLOOD BY AUTOMATED COUNT: 22.7 % (ref 12.3–15.4)
ERYTHROCYTE [DISTWIDTH] IN BLOOD BY AUTOMATED COUNT: 22.9 % (ref 12.3–15.4)
ERYTHROCYTE [DISTWIDTH] IN BLOOD BY AUTOMATED COUNT: 23 % (ref 12.3–15.4)
ERYTHROCYTE [DISTWIDTH] IN BLOOD BY AUTOMATED COUNT: 23 % (ref 12.3–15.4)
ERYTHROCYTE [DISTWIDTH] IN BLOOD BY AUTOMATED COUNT: 23.1 % (ref 12.3–15.4)
ERYTHROCYTE [DISTWIDTH] IN BLOOD BY AUTOMATED COUNT: 23.2 % (ref 12.3–15.4)
ERYTHROCYTE [SEDIMENTATION RATE] IN BLOOD: 30 MM/HR (ref 0–20)
ERYTHROCYTE [SEDIMENTATION RATE] IN BLOOD: 36 MM/HR (ref 0–20)
EXPIRATION DATE: ABNORMAL
FECAL OCCULT BLOOD SCREEN, POC: POSITIVE
FERRITIN SERPL-MCNC: 63.94 NG/ML (ref 30–400)
FUNGUS WND CULT: NORMAL
GALACTOMANNAN AG SPEC IA-ACNC: 0.05 INDEX (ref 0–0.49)
GFR SERPL CREATININE-BSD FRML MDRD: 31 ML/MIN/1.73
GFR SERPL CREATININE-BSD FRML MDRD: 32 ML/MIN/1.73
GFR SERPL CREATININE-BSD FRML MDRD: 35 ML/MIN/1.73
GFR SERPL CREATININE-BSD FRML MDRD: 35 ML/MIN/1.73
GFR SERPL CREATININE-BSD FRML MDRD: 36 ML/MIN/1.73
GFR SERPL CREATININE-BSD FRML MDRD: 37 ML/MIN/1.73
GFR SERPL CREATININE-BSD FRML MDRD: 37 ML/MIN/1.73
GFR SERPL CREATININE-BSD FRML MDRD: 38 ML/MIN/1.73
GFR SERPL CREATININE-BSD FRML MDRD: 41 ML/MIN/1.73
GFR SERPL CREATININE-BSD FRML MDRD: 42 ML/MIN/1.73
GFR SERPL CREATININE-BSD FRML MDRD: 42 ML/MIN/1.73
GFR SERPL CREATININE-BSD FRML MDRD: 43 ML/MIN/1.73
GFR SERPL CREATININE-BSD FRML MDRD: 44 ML/MIN/1.73
GFR SERPL CREATININE-BSD FRML MDRD: 46 ML/MIN/1.73
GFR SERPL CREATININE-BSD FRML MDRD: 46 ML/MIN/1.73
GFR SERPL CREATININE-BSD FRML MDRD: 47 ML/MIN/1.73
GFR SERPL CREATININE-BSD FRML MDRD: 48 ML/MIN/1.73
GFR SERPL CREATININE-BSD FRML MDRD: 48 ML/MIN/1.73
GFR SERPL CREATININE-BSD FRML MDRD: 50 ML/MIN/1.73
GFR SERPL CREATININE-BSD FRML MDRD: 51 ML/MIN/1.73
GFR SERPL CREATININE-BSD FRML MDRD: 51 ML/MIN/1.73
GFR SERPL CREATININE-BSD FRML MDRD: 52 ML/MIN/1.73
GFR SERPL CREATININE-BSD FRML MDRD: 53 ML/MIN/1.73
GFR SERPL CREATININE-BSD FRML MDRD: 55 ML/MIN/1.73
GFR SERPL CREATININE-BSD FRML MDRD: 57 ML/MIN/1.73
GFR SERPL CREATININE-BSD FRML MDRD: 62 ML/MIN/1.73
GFR SERPL CREATININE-BSD FRML MDRD: 62 ML/MIN/1.73
GFR SERPL CREATININE-BSD FRML MDRD: 64 ML/MIN/1.73
GFR SERPL CREATININE-BSD FRML MDRD: 65 ML/MIN/1.73
GLOBULIN UR ELPH-MCNC: 2.7 GM/DL
GLOBULIN UR ELPH-MCNC: 2.8 GM/DL
GLOBULIN UR ELPH-MCNC: 2.8 GM/DL
GLOBULIN UR ELPH-MCNC: 3 GM/DL
GLOBULIN UR ELPH-MCNC: 3.1 GM/DL
GLOBULIN UR ELPH-MCNC: 3.1 GM/DL
GLOBULIN UR ELPH-MCNC: 3.2 GM/DL
GLOBULIN UR ELPH-MCNC: 3.4 GM/DL
GLOBULIN UR ELPH-MCNC: 3.6 GM/DL
GLUCOSE BLD-MCNC: 101 MG/DL (ref 65–99)
GLUCOSE BLD-MCNC: 103 MG/DL (ref 65–99)
GLUCOSE BLD-MCNC: 103 MG/DL (ref 65–99)
GLUCOSE BLD-MCNC: 104 MG/DL (ref 65–99)
GLUCOSE BLD-MCNC: 106 MG/DL (ref 65–99)
GLUCOSE BLD-MCNC: 108 MG/DL (ref 65–99)
GLUCOSE BLD-MCNC: 109 MG/DL (ref 65–99)
GLUCOSE BLD-MCNC: 114 MG/DL (ref 65–99)
GLUCOSE BLD-MCNC: 115 MG/DL (ref 65–99)
GLUCOSE BLD-MCNC: 116 MG/DL (ref 65–99)
GLUCOSE BLD-MCNC: 124 MG/DL (ref 65–99)
GLUCOSE BLD-MCNC: 127 MG/DL (ref 65–99)
GLUCOSE BLD-MCNC: 129 MG/DL (ref 65–99)
GLUCOSE BLD-MCNC: 133 MG/DL (ref 65–99)
GLUCOSE BLD-MCNC: 82 MG/DL (ref 65–99)
GLUCOSE BLD-MCNC: 82 MG/DL (ref 65–99)
GLUCOSE BLD-MCNC: 83 MG/DL (ref 65–99)
GLUCOSE BLD-MCNC: 85 MG/DL (ref 65–99)
GLUCOSE BLD-MCNC: 86 MG/DL (ref 65–99)
GLUCOSE BLD-MCNC: 88 MG/DL (ref 65–99)
GLUCOSE BLD-MCNC: 89 MG/DL (ref 65–99)
GLUCOSE BLD-MCNC: 91 MG/DL (ref 65–99)
GLUCOSE BLD-MCNC: 92 MG/DL (ref 65–99)
GLUCOSE BLD-MCNC: 93 MG/DL (ref 65–99)
GLUCOSE BLD-MCNC: 94 MG/DL (ref 65–99)
GLUCOSE BLD-MCNC: 98 MG/DL (ref 65–99)
GLUCOSE BLDC GLUCOMTR-MCNC: 100 MG/DL (ref 70–130)
GLUCOSE BLDC GLUCOMTR-MCNC: 107 MG/DL (ref 70–130)
GLUCOSE BLDC GLUCOMTR-MCNC: 115 MG/DL (ref 70–130)
GLUCOSE BLDC GLUCOMTR-MCNC: 120 MG/DL (ref 70–130)
GLUCOSE BLDC GLUCOMTR-MCNC: 128 MG/DL (ref 70–130)
GLUCOSE BLDC GLUCOMTR-MCNC: 134 MG/DL (ref 70–130)
GLUCOSE BLDC GLUCOMTR-MCNC: 134 MG/DL (ref 70–130)
GLUCOSE BLDC GLUCOMTR-MCNC: 142 MG/DL (ref 70–130)
GLUCOSE BLDC GLUCOMTR-MCNC: 142 MG/DL (ref 70–130)
GLUCOSE BLDC GLUCOMTR-MCNC: 143 MG/DL (ref 70–130)
GLUCOSE BLDC GLUCOMTR-MCNC: 144 MG/DL (ref 70–130)
GLUCOSE BLDC GLUCOMTR-MCNC: 153 MG/DL (ref 70–130)
GLUCOSE BLDC GLUCOMTR-MCNC: 61 MG/DL (ref 70–130)
GLUCOSE BLDC GLUCOMTR-MCNC: 70 MG/DL (ref 70–130)
GLUCOSE BLDC GLUCOMTR-MCNC: 87 MG/DL (ref 70–130)
GLUCOSE BLDC GLUCOMTR-MCNC: 91 MG/DL (ref 70–130)
GLUCOSE BLDC GLUCOMTR-MCNC: 96 MG/DL (ref 70–130)
GLUCOSE UR STRIP-MCNC: NEGATIVE MG/DL
GRAM STN SPEC: NORMAL
H CAPSUL AG UR-MCNC: <0.5 NG/ML
H CAPSUL AG UR-MCNC: <0.5 NG/ML
HCO3 BLDA-SCNC: 20.6 MMOL/L (ref 20–26)
HCO3 BLDA-SCNC: 22 MMOL/L (ref 20–26)
HCO3 BLDA-SCNC: 22.1 MMOL/L (ref 22–26)
HCO3 BLDA-SCNC: 25.3 MMOL/L (ref 20–26)
HCO3 BLDA-SCNC: 25.9 MMOL/L (ref 20–26)
HCT VFR BLD AUTO: 22.5 % (ref 37.5–51)
HCT VFR BLD AUTO: 23.2 % (ref 37.5–51)
HCT VFR BLD AUTO: 23.5 % (ref 37.5–51)
HCT VFR BLD AUTO: 23.5 % (ref 37.5–51)
HCT VFR BLD AUTO: 25.4 % (ref 37.5–51)
HCT VFR BLD AUTO: 25.9 % (ref 37.5–51)
HCT VFR BLD AUTO: 25.9 % (ref 37.5–51)
HCT VFR BLD AUTO: 26.1 % (ref 37.5–51)
HCT VFR BLD AUTO: 26.1 % (ref 37.5–51)
HCT VFR BLD AUTO: 26.2 % (ref 37.5–51)
HCT VFR BLD AUTO: 26.3 % (ref 37.5–51)
HCT VFR BLD AUTO: 26.4 % (ref 37.5–51)
HCT VFR BLD AUTO: 26.8 % (ref 37.5–51)
HCT VFR BLD AUTO: 26.9 % (ref 37.5–51)
HCT VFR BLD AUTO: 26.9 % (ref 37.5–51)
HCT VFR BLD AUTO: 27 % (ref 37.5–51)
HCT VFR BLD AUTO: 27.3 % (ref 37.5–51)
HCT VFR BLD AUTO: 27.3 % (ref 37.5–51)
HCT VFR BLD AUTO: 27.4 % (ref 37.5–51)
HCT VFR BLD AUTO: 27.5 % (ref 37.5–51)
HCT VFR BLD AUTO: 27.7 % (ref 37.5–51)
HCT VFR BLD AUTO: 27.9 % (ref 37.5–51)
HCT VFR BLD AUTO: 28 % (ref 37.5–51)
HCT VFR BLD AUTO: 28 % (ref 37.5–51)
HCT VFR BLD AUTO: 28.1 % (ref 37.5–51)
HCT VFR BLD AUTO: 28.2 % (ref 37.5–51)
HCT VFR BLD AUTO: 28.3 % (ref 37.5–51)
HCT VFR BLD AUTO: 28.4 % (ref 37.5–51)
HCT VFR BLD AUTO: 28.9 % (ref 37.5–51)
HCT VFR BLD AUTO: 29 % (ref 37.5–51)
HCT VFR BLD AUTO: 29 % (ref 37.5–51)
HCT VFR BLD AUTO: 29.2 % (ref 37.5–51)
HCT VFR BLD AUTO: 29.3 % (ref 37.5–51)
HCT VFR BLD AUTO: 29.4 % (ref 37.5–51)
HCT VFR BLD AUTO: 29.6 % (ref 37.5–51)
HCT VFR BLD AUTO: 29.8 % (ref 37.5–51)
HCT VFR BLD AUTO: 30.1 % (ref 37.5–51)
HCT VFR BLD AUTO: 30.8 % (ref 37.5–51)
HCT VFR BLD AUTO: 31.1 % (ref 37.5–51)
HCT VFR BLD AUTO: 31.6 % (ref 37.5–51)
HCT VFR BLD AUTO: 32.2 % (ref 37.5–51)
HCT VFR BLD AUTO: 32.6 % (ref 37.5–51)
HCT VFR BLD AUTO: 33.4 % (ref 37.5–51)
HCT VFR BLD CALC: 24.7 %
HCT VFR BLD CALC: 26.7 %
HCT VFR BLD CALC: 27.2 %
HCT VFR BLD CALC: 30.6 %
HCT VFR BLDA CALC: 30 % (ref 38–51)
HEMOCCULT STL QL: POSITIVE
HEMOCCULT STL QL: POSITIVE
HGB BLD-MCNC: 6.7 G/DL (ref 13–17.7)
HGB BLD-MCNC: 6.9 G/DL (ref 13–17.7)
HGB BLD-MCNC: 7 G/DL (ref 13–17.7)
HGB BLD-MCNC: 7 G/DL (ref 13–17.7)
HGB BLD-MCNC: 7.5 G/DL (ref 13–17.7)
HGB BLD-MCNC: 7.5 G/DL (ref 13–17.7)
HGB BLD-MCNC: 7.6 G/DL (ref 13–17.7)
HGB BLD-MCNC: 7.8 G/DL (ref 13–17.7)
HGB BLD-MCNC: 7.8 G/DL (ref 13–17.7)
HGB BLD-MCNC: 7.9 G/DL (ref 13–17.7)
HGB BLD-MCNC: 8 G/DL (ref 13–17.7)
HGB BLD-MCNC: 8.1 G/DL (ref 13–17.7)
HGB BLD-MCNC: 8.2 G/DL (ref 13–17.7)
HGB BLD-MCNC: 8.3 G/DL (ref 13–17.7)
HGB BLD-MCNC: 8.4 G/DL (ref 13–17.7)
HGB BLD-MCNC: 8.5 G/DL (ref 13–17.7)
HGB BLD-MCNC: 8.5 G/DL (ref 13–17.7)
HGB BLD-MCNC: 8.6 G/DL (ref 13–17.7)
HGB BLD-MCNC: 8.7 G/DL (ref 13–17.7)
HGB BLD-MCNC: 9 G/DL (ref 13–17.7)
HGB BLD-MCNC: 9.2 G/DL (ref 13–17.7)
HGB BLD-MCNC: 9.2 G/DL (ref 13–17.7)
HGB BLD-MCNC: 9.5 G/DL (ref 13–17.7)
HGB BLD-MCNC: 9.8 G/DL (ref 13–17.7)
HGB BLD-MCNC: 9.8 G/DL (ref 13–17.7)
HGB BLD-MCNC: 9.9 G/DL (ref 13–17.7)
HGB BLDA-MCNC: 10 G/DL (ref 13.5–17.5)
HGB BLDA-MCNC: 10.2 G/DL (ref 12–17)
HGB BLDA-MCNC: 8.1 G/DL (ref 13.5–17.5)
HGB BLDA-MCNC: 8.7 G/DL (ref 13.5–17.5)
HGB BLDA-MCNC: 8.9 G/DL (ref 13.5–17.5)
HGB UR QL STRIP.AUTO: NEGATIVE
HOLD SPECIMEN: NORMAL
HOROWITZ INDEX BLD+IHG-RTO: 100 %
HOROWITZ INDEX BLD+IHG-RTO: 28 %
HOROWITZ INDEX BLD+IHG-RTO: 40 %
HOROWITZ INDEX BLD+IHG-RTO: 95 %
HYALINE CASTS UR QL AUTO: ABNORMAL /LPF
IMM GRANULOCYTES # BLD AUTO: 0.01 10*3/MM3 (ref 0–0.05)
IMM GRANULOCYTES # BLD AUTO: 0.02 10*3/MM3 (ref 0–0.05)
IMM GRANULOCYTES # BLD AUTO: 0.04 10*3/MM3 (ref 0–0.05)
IMM GRANULOCYTES # BLD AUTO: 0.04 10*3/MM3 (ref 0–0.05)
IMM GRANULOCYTES # BLD AUTO: 0.05 10*3/MM3 (ref 0–0.05)
IMM GRANULOCYTES # BLD AUTO: 0.06 10*3/MM3 (ref 0–0.05)
IMM GRANULOCYTES # BLD AUTO: 0.06 10*3/MM3 (ref 0–0.05)
IMM GRANULOCYTES # BLD AUTO: 0.1 10*3/MM3 (ref 0–0.05)
IMM GRANULOCYTES # BLD AUTO: 0.12 10*3/MM3 (ref 0–0.05)
IMM GRANULOCYTES NFR BLD AUTO: 0.2 % (ref 0–0.5)
IMM GRANULOCYTES NFR BLD AUTO: 0.3 % (ref 0–0.5)
IMM GRANULOCYTES NFR BLD AUTO: 0.5 % (ref 0–0.5)
IMM GRANULOCYTES NFR BLD AUTO: 0.6 % (ref 0–0.5)
IMM GRANULOCYTES NFR BLD AUTO: 0.7 % (ref 0–0.5)
IMM GRANULOCYTES NFR BLD AUTO: 0.7 % (ref 0–0.5)
IMM GRANULOCYTES NFR BLD AUTO: 0.8 % (ref 0–0.5)
IMM GRANULOCYTES NFR BLD AUTO: 1 % (ref 0–0.5)
IMM GRANULOCYTES NFR BLD AUTO: 1.1 % (ref 0–0.5)
IMM GRANULOCYTES NFR BLD AUTO: 1.3 % (ref 0–0.5)
IMM GRANULOCYTES NFR BLD AUTO: 1.4 % (ref 0–0.5)
IMM GRANULOCYTES NFR BLD AUTO: 1.5 % (ref 0–0.5)
IMM GRANULOCYTES NFR BLD AUTO: 1.8 % (ref 0–0.5)
INR PPP: 1.29 (ref 0.85–1.16)
INR PPP: 1.4 (ref 0.85–1.16)
INR PPP: 1.41 (ref 0.85–1.16)
INR PPP: 1.43 (ref 0.85–1.16)
INR PPP: 1.73 (ref 0.85–1.16)
INR PPP: 2.33 (ref 0.85–1.16)
IRON 24H UR-MRATE: 18 MCG/DL (ref 59–158)
IRON SATN MFR SERPL: 5 % (ref 20–50)
KETONES UR QL STRIP: NEGATIVE
L PNEUMO1 AG UR QL IA: NEGATIVE
L PNEUMO1 AG UR QL IA: NEGATIVE
LAB AP CASE REPORT: NORMAL
LAB AP CLINICAL INFORMATION: NORMAL
LAB AP DIAGNOSIS COMMENT: NORMAL
LEUKOCYTE ESTERASE UR QL STRIP.AUTO: NEGATIVE
LYMPHOCYTES # BLD AUTO: 0.68 10*3/MM3 (ref 0.7–3.1)
LYMPHOCYTES # BLD AUTO: 0.76 10*3/MM3 (ref 0.7–3.1)
LYMPHOCYTES # BLD AUTO: 0.79 10*3/MM3 (ref 0.7–3.1)
LYMPHOCYTES # BLD AUTO: 0.79 10*3/MM3 (ref 0.7–3.1)
LYMPHOCYTES # BLD AUTO: 0.91 10*3/MM3 (ref 0.7–3.1)
LYMPHOCYTES # BLD AUTO: 0.94 10*3/MM3 (ref 0.7–3.1)
LYMPHOCYTES # BLD AUTO: 0.96 10*3/MM3 (ref 0.7–3.1)
LYMPHOCYTES # BLD AUTO: 0.96 10*3/MM3 (ref 0.7–3.1)
LYMPHOCYTES # BLD AUTO: 0.97 10*3/MM3 (ref 0.7–3.1)
LYMPHOCYTES # BLD AUTO: 1.17 10*3/MM3 (ref 0.7–3.1)
LYMPHOCYTES # BLD AUTO: 1.18 10*3/MM3 (ref 0.7–3.1)
LYMPHOCYTES # BLD AUTO: 1.2 10*3/MM3 (ref 0.7–3.1)
LYMPHOCYTES # BLD AUTO: 1.28 10*3/MM3 (ref 0.7–3.1)
LYMPHOCYTES NFR BLD AUTO: 10.6 % (ref 19.6–45.3)
LYMPHOCYTES NFR BLD AUTO: 12.6 % (ref 19.6–45.3)
LYMPHOCYTES NFR BLD AUTO: 13 % (ref 19.6–45.3)
LYMPHOCYTES NFR BLD AUTO: 13.8 % (ref 19.6–45.3)
LYMPHOCYTES NFR BLD AUTO: 13.9 % (ref 19.6–45.3)
LYMPHOCYTES NFR BLD AUTO: 15.4 % (ref 19.6–45.3)
LYMPHOCYTES NFR BLD AUTO: 15.8 % (ref 19.6–45.3)
LYMPHOCYTES NFR BLD AUTO: 17.6 % (ref 19.6–45.3)
LYMPHOCYTES NFR BLD AUTO: 18.1 % (ref 19.6–45.3)
LYMPHOCYTES NFR BLD AUTO: 19.1 % (ref 19.6–45.3)
LYMPHOCYTES NFR BLD AUTO: 26.7 % (ref 19.6–45.3)
LYMPHOCYTES NFR BLD AUTO: 8.1 % (ref 19.6–45.3)
LYMPHOCYTES NFR BLD AUTO: 9.3 % (ref 19.6–45.3)
Lab: ABNORMAL
Lab: NORMAL
Lab: NORMAL
MAGNESIUM SERPL-MCNC: 1.7 MG/DL (ref 1.6–2.4)
MAGNESIUM SERPL-MCNC: 1.8 MG/DL (ref 1.6–2.4)
MAGNESIUM SERPL-MCNC: 1.9 MG/DL (ref 1.6–2.4)
MAGNESIUM SERPL-MCNC: 2.2 MG/DL (ref 1.6–2.4)
MAGNESIUM SERPL-MCNC: 2.6 MG/DL (ref 1.6–2.4)
MCH RBC QN AUTO: 27.5 PG (ref 26.6–33)
MCH RBC QN AUTO: 27.6 PG (ref 26.6–33)
MCH RBC QN AUTO: 27.7 PG (ref 26.6–33)
MCH RBC QN AUTO: 28.1 PG (ref 26.6–33)
MCH RBC QN AUTO: 28.1 PG (ref 26.6–33)
MCH RBC QN AUTO: 28.2 PG (ref 26.6–33)
MCH RBC QN AUTO: 28.5 PG (ref 26.6–33)
MCH RBC QN AUTO: 28.7 PG (ref 26.6–33)
MCH RBC QN AUTO: 28.8 PG (ref 26.6–33)
MCH RBC QN AUTO: 29 PG (ref 26.6–33)
MCH RBC QN AUTO: 29.1 PG (ref 26.6–33)
MCH RBC QN AUTO: 29.3 PG (ref 26.6–33)
MCH RBC QN AUTO: 30.2 PG (ref 26.6–33)
MCH RBC QN AUTO: 30.2 PG (ref 26.6–33)
MCH RBC QN AUTO: 30.4 PG (ref 26.6–33)
MCH RBC QN AUTO: 30.4 PG (ref 26.6–33)
MCH RBC QN AUTO: 30.7 PG (ref 26.6–33)
MCH RBC QN AUTO: 30.8 PG (ref 26.6–33)
MCH RBC QN AUTO: 30.8 PG (ref 26.6–33)
MCH RBC QN AUTO: 31 PG (ref 26.6–33)
MCH RBC QN AUTO: 31 PG (ref 26.6–33)
MCH RBC QN AUTO: 31.3 PG (ref 26.6–33)
MCH RBC QN AUTO: 31.4 PG (ref 26.6–33)
MCH RBC QN AUTO: 31.5 PG (ref 26.6–33)
MCH RBC QN AUTO: 31.6 PG (ref 26.6–33)
MCH RBC QN AUTO: 31.6 PG (ref 26.6–33)
MCH RBC QN AUTO: 31.8 PG (ref 26.6–33)
MCH RBC QN AUTO: 31.9 PG (ref 26.6–33)
MCHC RBC AUTO-ENTMCNC: 28.5 G/DL (ref 31.5–35.7)
MCHC RBC AUTO-ENTMCNC: 28.7 G/DL (ref 31.5–35.7)
MCHC RBC AUTO-ENTMCNC: 28.8 G/DL (ref 31.5–35.7)
MCHC RBC AUTO-ENTMCNC: 28.9 G/DL (ref 31.5–35.7)
MCHC RBC AUTO-ENTMCNC: 28.9 G/DL (ref 31.5–35.7)
MCHC RBC AUTO-ENTMCNC: 29 G/DL (ref 31.5–35.7)
MCHC RBC AUTO-ENTMCNC: 29 G/DL (ref 31.5–35.7)
MCHC RBC AUTO-ENTMCNC: 29.1 G/DL (ref 31.5–35.7)
MCHC RBC AUTO-ENTMCNC: 29.2 G/DL (ref 31.5–35.7)
MCHC RBC AUTO-ENTMCNC: 29.3 G/DL (ref 31.5–35.7)
MCHC RBC AUTO-ENTMCNC: 29.5 G/DL (ref 31.5–35.7)
MCHC RBC AUTO-ENTMCNC: 29.6 G/DL (ref 31.5–35.7)
MCHC RBC AUTO-ENTMCNC: 29.9 G/DL (ref 31.5–35.7)
MCHC RBC AUTO-ENTMCNC: 30 G/DL (ref 31.5–35.7)
MCHC RBC AUTO-ENTMCNC: 30.1 G/DL (ref 31.5–35.7)
MCHC RBC AUTO-ENTMCNC: 30.2 G/DL (ref 31.5–35.7)
MCHC RBC AUTO-ENTMCNC: 30.4 G/DL (ref 31.5–35.7)
MCHC RBC AUTO-ENTMCNC: 30.6 G/DL (ref 31.5–35.7)
MCHC RBC AUTO-ENTMCNC: 30.7 G/DL (ref 31.5–35.7)
MCHC RBC AUTO-ENTMCNC: 30.8 G/DL (ref 31.5–35.7)
MCHC RBC AUTO-ENTMCNC: 31.8 G/DL (ref 31.5–35.7)
MCV RBC AUTO: 100.4 FL (ref 79–97)
MCV RBC AUTO: 102.2 FL (ref 79–97)
MCV RBC AUTO: 102.2 FL (ref 79–97)
MCV RBC AUTO: 102.6 FL (ref 79–97)
MCV RBC AUTO: 102.7 FL (ref 79–97)
MCV RBC AUTO: 103.7 FL (ref 79–97)
MCV RBC AUTO: 104.2 FL (ref 79–97)
MCV RBC AUTO: 104.9 FL (ref 79–97)
MCV RBC AUTO: 105 FL (ref 79–97)
MCV RBC AUTO: 105 FL (ref 79–97)
MCV RBC AUTO: 105.2 FL (ref 79–97)
MCV RBC AUTO: 105.6 FL (ref 79–97)
MCV RBC AUTO: 105.8 FL (ref 79–97)
MCV RBC AUTO: 105.8 FL (ref 79–97)
MCV RBC AUTO: 106.2 FL (ref 79–97)
MCV RBC AUTO: 106.6 FL (ref 79–97)
MCV RBC AUTO: 107.7 FL (ref 79–97)
MCV RBC AUTO: 94.3 FL (ref 79–97)
MCV RBC AUTO: 95 FL (ref 79–97)
MCV RBC AUTO: 95.3 FL (ref 79–97)
MCV RBC AUTO: 95.4 FL (ref 79–97)
MCV RBC AUTO: 95.5 FL (ref 79–97)
MCV RBC AUTO: 95.8 FL (ref 79–97)
MCV RBC AUTO: 96.6 FL (ref 79–97)
MCV RBC AUTO: 96.9 FL (ref 79–97)
MCV RBC AUTO: 96.9 FL (ref 79–97)
MCV RBC AUTO: 97.2 FL (ref 79–97)
MCV RBC AUTO: 97.6 FL (ref 79–97)
MCV RBC AUTO: 97.7 FL (ref 79–97)
MCV RBC AUTO: 97.8 FL (ref 79–97)
MCV RBC AUTO: 98.2 FL (ref 79–97)
MCV RBC AUTO: 98.3 FL (ref 79–97)
MCV RBC AUTO: 98.4 FL (ref 79–97)
MCV RBC AUTO: 98.7 FL (ref 79–97)
METHGB BLD QL: 0 % (ref 0–1.5)
METHGB BLD QL: 0.6 % (ref 0–1.5)
METHGB BLD QL: 0.7 % (ref 0–1.5)
METHGB BLD QL: 0.9 % (ref 0–1.5)
MODALITY: ABNORMAL
MONOCYTES # BLD AUTO: 0.22 10*3/MM3 (ref 0.1–0.9)
MONOCYTES # BLD AUTO: 0.3 10*3/MM3 (ref 0.1–0.9)
MONOCYTES # BLD AUTO: 0.31 10*3/MM3 (ref 0.1–0.9)
MONOCYTES # BLD AUTO: 0.34 10*3/MM3 (ref 0.1–0.9)
MONOCYTES # BLD AUTO: 0.44 10*3/MM3 (ref 0.1–0.9)
MONOCYTES # BLD AUTO: 0.48 10*3/MM3 (ref 0.1–0.9)
MONOCYTES # BLD AUTO: 0.54 10*3/MM3 (ref 0.1–0.9)
MONOCYTES # BLD AUTO: 0.57 10*3/MM3 (ref 0.1–0.9)
MONOCYTES # BLD AUTO: 0.59 10*3/MM3 (ref 0.1–0.9)
MONOCYTES # BLD AUTO: 0.66 10*3/MM3 (ref 0.1–0.9)
MONOCYTES # BLD AUTO: 0.72 10*3/MM3 (ref 0.1–0.9)
MONOCYTES NFR BLD AUTO: 4.1 % (ref 5–12)
MONOCYTES NFR BLD AUTO: 6.2 % (ref 5–12)
MONOCYTES NFR BLD AUTO: 6.4 % (ref 5–12)
MONOCYTES NFR BLD AUTO: 6.4 % (ref 5–12)
MONOCYTES NFR BLD AUTO: 6.7 % (ref 5–12)
MONOCYTES NFR BLD AUTO: 6.7 % (ref 5–12)
MONOCYTES NFR BLD AUTO: 6.9 % (ref 5–12)
MONOCYTES NFR BLD AUTO: 6.9 % (ref 5–12)
MONOCYTES NFR BLD AUTO: 7 % (ref 5–12)
MONOCYTES NFR BLD AUTO: 7.2 % (ref 5–12)
MONOCYTES NFR BLD AUTO: 8.1 % (ref 5–12)
MONOCYTES NFR BLD AUTO: 8.8 % (ref 5–12)
MONOCYTES NFR BLD AUTO: 8.9 % (ref 5–12)
MVISTA(R) BLASTOMYCES AG: NORMAL NG/ML
MVISTA(R) BLASTOMYCES AG: NORMAL NG/ML
MYCOBACTERIUM SPEC CULT: NORMAL
NEGATIVE CONTROL: NEGATIVE
NEUTROPHILS # BLD AUTO: 2.82 10*3/MM3 (ref 1.7–7)
NEUTROPHILS # BLD AUTO: 3.53 10*3/MM3 (ref 1.7–7)
NEUTROPHILS # BLD AUTO: 3.58 10*3/MM3 (ref 1.7–7)
NEUTROPHILS # BLD AUTO: 4.31 10*3/MM3 (ref 1.7–7)
NEUTROPHILS # BLD AUTO: 4.45 10*3/MM3 (ref 1.7–7)
NEUTROPHILS # BLD AUTO: 4.55 10*3/MM3 (ref 1.7–7)
NEUTROPHILS # BLD AUTO: 4.55 10*3/MM3 (ref 1.7–7)
NEUTROPHILS # BLD AUTO: 4.62 10*3/MM3 (ref 1.7–7)
NEUTROPHILS # BLD AUTO: 5.24 10*3/MM3 (ref 1.7–7)
NEUTROPHILS # BLD AUTO: 6.4 10*3/MM3 (ref 1.7–7)
NEUTROPHILS # BLD AUTO: 7.1 10*3/MM3 (ref 1.7–7)
NEUTROPHILS # BLD AUTO: 8.23 10*3/MM3 (ref 1.7–7)
NEUTROPHILS # BLD AUTO: 8.41 10*3/MM3 (ref 1.7–7)
NEUTROPHILS NFR BLD AUTO: 62.8 % (ref 42.7–76)
NEUTROPHILS NFR BLD AUTO: 64.2 % (ref 42.7–76)
NEUTROPHILS NFR BLD AUTO: 68.4 % (ref 42.7–76)
NEUTROPHILS NFR BLD AUTO: 69.9 % (ref 42.7–76)
NEUTROPHILS NFR BLD AUTO: 72.5 % (ref 42.7–76)
NEUTROPHILS NFR BLD AUTO: 74.4 % (ref 42.7–76)
NEUTROPHILS NFR BLD AUTO: 75.2 % (ref 42.7–76)
NEUTROPHILS NFR BLD AUTO: 75.4 % (ref 42.7–76)
NEUTROPHILS NFR BLD AUTO: 75.6 % (ref 42.7–76)
NEUTROPHILS NFR BLD AUTO: 79.8 % (ref 42.7–76)
NEUTROPHILS NFR BLD AUTO: 81.5 % (ref 42.7–76)
NEUTROPHILS NFR BLD AUTO: 82.2 % (ref 42.7–76)
NEUTROPHILS NFR BLD AUTO: 84.1 % (ref 42.7–76)
NIGHT BLUE STAIN TISS: NORMAL
NITRITE UR QL STRIP: NEGATIVE
NOTE: ABNORMAL
NRBC BLD AUTO-RTO: 0 /100 WBC (ref 0–0.2)
NRBC BLD AUTO-RTO: 0.3 /100 WBC (ref 0–0.2)
NRBC BLD AUTO-RTO: 0.3 /100 WBC (ref 0–0.2)
NRBC BLD AUTO-RTO: 0.5 /100 WBC (ref 0–0.2)
NT-PROBNP SERPL-MCNC: 1641 PG/ML (ref 5–1800)
NT-PROBNP SERPL-MCNC: 1923 PG/ML (ref 5–1800)
NT-PROBNP SERPL-MCNC: 2107 PG/ML (ref 5–1800)
NT-PROBNP SERPL-MCNC: 2501 PG/ML (ref 5–1800)
NT-PROBNP SERPL-MCNC: 4154 PG/ML (ref 5–1800)
NT-PROBNP SERPL-MCNC: 4375 PG/ML (ref 5–1800)
NT-PROBNP SERPL-MCNC: 4564 PG/ML (ref 5–1800)
OXYHGB MFR BLDV: 83 % (ref 94–99)
OXYHGB MFR BLDV: 85 % (ref 94–99)
OXYHGB MFR BLDV: 93.2 % (ref 94–99)
OXYHGB MFR BLDV: 97.8 % (ref 94–99)
PATH REPORT.ADDENDUM SPEC: NORMAL
PATH REPORT.FINAL DX SPEC: NORMAL
PATH REPORT.GROSS SPEC: NORMAL
PCO2 BLDA: 32.8 MM HG
PCO2 BLDA: 33.2 MM HG
PCO2 BLDA: 34.5 MM HG
PCO2 BLDA: 34.8 MM HG
PCO2 BLDA: 44.4 MM HG (ref 35–45)
PCO2 TEMP ADJ BLD: 32.8 MM HG (ref 35–48)
PCO2 TEMP ADJ BLD: 33.2 MM HG (ref 35–48)
PCO2 TEMP ADJ BLD: 34.5 MM HG (ref 35–48)
PCO2 TEMP ADJ BLD: 34.8 MM HG (ref 35–48)
PH BLDA: 7.3 PH UNITS (ref 7.35–7.6)
PH BLDA: 7.41 PH UNITS (ref 7.35–7.45)
PH BLDA: 7.43 PH UNITS (ref 7.35–7.45)
PH BLDA: 7.47 PH UNITS (ref 7.35–7.45)
PH BLDA: 7.48 PH UNITS (ref 7.35–7.45)
PH UR STRIP.AUTO: <=5 [PH] (ref 5–8)
PH, TEMP CORRECTED: 7.41 PH UNITS
PH, TEMP CORRECTED: 7.43 PH UNITS
PH, TEMP CORRECTED: 7.47 PH UNITS
PH, TEMP CORRECTED: 7.48 PH UNITS
PHOSPHATE SERPL-MCNC: 2.7 MG/DL (ref 2.5–4.5)
PHOSPHATE SERPL-MCNC: 2.9 MG/DL (ref 2.5–4.5)
PHOSPHATE SERPL-MCNC: 2.9 MG/DL (ref 2.5–4.5)
PHOSPHATE SERPL-MCNC: 3 MG/DL (ref 2.5–4.5)
PLATELET # BLD AUTO: 105 10*3/MM3 (ref 140–450)
PLATELET # BLD AUTO: 107 10*3/MM3 (ref 140–450)
PLATELET # BLD AUTO: 123 10*3/MM3 (ref 140–450)
PLATELET # BLD AUTO: 124 10*3/MM3 (ref 140–450)
PLATELET # BLD AUTO: 131 10*3/MM3 (ref 140–450)
PLATELET # BLD AUTO: 131 10*3/MM3 (ref 140–450)
PLATELET # BLD AUTO: 138 10*3/MM3 (ref 140–450)
PLATELET # BLD AUTO: 139 10*3/MM3 (ref 140–450)
PLATELET # BLD AUTO: 142 10*3/MM3 (ref 140–450)
PLATELET # BLD AUTO: 152 10*3/MM3 (ref 140–450)
PLATELET # BLD AUTO: 153 10*3/MM3 (ref 140–450)
PLATELET # BLD AUTO: 156 10*3/MM3 (ref 140–450)
PLATELET # BLD AUTO: 158 10*3/MM3 (ref 140–450)
PLATELET # BLD AUTO: 160 10*3/MM3 (ref 140–450)
PLATELET # BLD AUTO: 160 10*3/MM3 (ref 140–450)
PLATELET # BLD AUTO: 161 10*3/MM3 (ref 140–450)
PLATELET # BLD AUTO: 161 10*3/MM3 (ref 140–450)
PLATELET # BLD AUTO: 163 10*3/MM3 (ref 140–450)
PLATELET # BLD AUTO: 166 10*3/MM3 (ref 140–450)
PLATELET # BLD AUTO: 166 10*3/MM3 (ref 140–450)
PLATELET # BLD AUTO: 190 10*3/MM3 (ref 140–450)
PLATELET # BLD AUTO: 195 10*3/MM3 (ref 140–450)
PLATELET # BLD AUTO: 198 10*3/MM3 (ref 140–450)
PLATELET # BLD AUTO: 214 10*3/MM3 (ref 140–450)
PLATELET # BLD AUTO: 219 10*3/MM3 (ref 140–450)
PLATELET # BLD AUTO: 225 10*3/MM3 (ref 140–450)
PLATELET # BLD AUTO: 246 10*3/MM3 (ref 140–450)
PLATELET # BLD AUTO: 247 10*3/MM3 (ref 140–450)
PLATELET # BLD AUTO: 252 10*3/MM3 (ref 140–450)
PLATELET # BLD AUTO: 282 10*3/MM3 (ref 140–450)
PLATELET # BLD AUTO: 79 10*3/MM3 (ref 140–450)
PLATELET # BLD AUTO: 82 10*3/MM3 (ref 140–450)
PLATELET # BLD AUTO: 87 10*3/MM3 (ref 140–450)
PLATELET # BLD AUTO: 94 10*3/MM3 (ref 140–450)
PMV BLD AUTO: 10 FL (ref 6–12)
PMV BLD AUTO: 10.1 FL (ref 6–12)
PMV BLD AUTO: 10.2 FL (ref 6–12)
PMV BLD AUTO: 10.3 FL (ref 6–12)
PMV BLD AUTO: 10.4 FL (ref 6–12)
PMV BLD AUTO: 10.5 FL (ref 6–12)
PMV BLD AUTO: 10.6 FL (ref 6–12)
PMV BLD AUTO: 10.7 FL (ref 6–12)
PMV BLD AUTO: 10.8 FL (ref 6–12)
PMV BLD AUTO: 10.8 FL (ref 6–12)
PMV BLD AUTO: 11 FL (ref 6–12)
PMV BLD AUTO: 11.4 FL (ref 6–12)
PMV BLD AUTO: 11.5 FL (ref 6–12)
PMV BLD AUTO: 9.4 FL (ref 6–12)
PMV BLD AUTO: 9.4 FL (ref 6–12)
PMV BLD AUTO: 9.5 FL (ref 6–12)
PMV BLD AUTO: 9.5 FL (ref 6–12)
PMV BLD AUTO: 9.6 FL (ref 6–12)
PMV BLD AUTO: 9.6 FL (ref 6–12)
PMV BLD AUTO: 9.7 FL (ref 6–12)
PMV BLD AUTO: 9.8 FL (ref 6–12)
PMV BLD AUTO: 9.9 FL (ref 6–12)
PO2 BLDA: 349 MMHG (ref 80–105)
PO2 BLDA: 393 MM HG (ref 83–108)
PO2 BLDA: 51.2 MM HG (ref 83–108)
PO2 BLDA: 53.2 MM HG (ref 83–108)
PO2 BLDA: 74.3 MM HG (ref 83–108)
PO2 TEMP ADJ BLD: 393 MM HG (ref 83–108)
PO2 TEMP ADJ BLD: 51.2 MM HG (ref 83–108)
PO2 TEMP ADJ BLD: 53.2 MM HG (ref 83–108)
PO2 TEMP ADJ BLD: 74.3 MM HG (ref 83–108)
POSITIVE CONTROL: POSITIVE
POTASSIUM BLD-SCNC: 4.3 MMOL/L (ref 3.5–5.2)
POTASSIUM BLD-SCNC: 4.4 MMOL/L (ref 3.5–5.2)
POTASSIUM BLD-SCNC: 4.4 MMOL/L (ref 3.5–5.2)
POTASSIUM BLD-SCNC: 4.5 MMOL/L (ref 3.5–5.2)
POTASSIUM BLD-SCNC: 4.6 MMOL/L (ref 3.5–5.2)
POTASSIUM BLD-SCNC: 4.7 MMOL/L (ref 3.5–5.2)
POTASSIUM BLD-SCNC: 4.8 MMOL/L (ref 3.5–5.2)
POTASSIUM BLD-SCNC: 4.9 MMOL/L (ref 3.5–5.2)
POTASSIUM BLD-SCNC: 5 MMOL/L (ref 3.5–5.2)
POTASSIUM BLD-SCNC: 5.1 MMOL/L (ref 3.5–5.2)
POTASSIUM BLD-SCNC: 5.2 MMOL/L (ref 3.5–5.2)
POTASSIUM BLD-SCNC: 5.6 MMOL/L (ref 3.5–5.2)
POTASSIUM BLD-SCNC: 5.7 MMOL/L (ref 3.5–5.2)
POTASSIUM BLD-SCNC: 5.9 MMOL/L (ref 3.5–5.2)
POTASSIUM BLD-SCNC: 6 MMOL/L (ref 3.5–5.2)
POTASSIUM BLDA-SCNC: 4.5 MMOL/L (ref 3.5–4.9)
PROCALCITONIN SERPL-MCNC: 0.08 NG/ML (ref 0.1–0.25)
PROCALCITONIN SERPL-MCNC: 0.14 NG/ML (ref 0.1–0.25)
PROT SERPL-MCNC: 5.3 G/DL (ref 6–8.5)
PROT SERPL-MCNC: 5.4 G/DL (ref 6–8.5)
PROT SERPL-MCNC: 5.6 G/DL (ref 6–8.5)
PROT SERPL-MCNC: 5.7 G/DL (ref 6–8.5)
PROT SERPL-MCNC: 5.8 G/DL (ref 6–8.5)
PROT SERPL-MCNC: 6.1 G/DL (ref 6–8.5)
PROT SERPL-MCNC: 6.1 G/DL (ref 6–8.5)
PROT SERPL-MCNC: 6.2 G/DL (ref 6–8.5)
PROT SERPL-MCNC: 7.2 G/DL (ref 6–8.5)
PROT UR QL STRIP: ABNORMAL
PROTHROMBIN TIME: 15.5 SECONDS (ref 11.2–14.3)
PROTHROMBIN TIME: 16.5 SECONDS (ref 11.2–14.3)
PROTHROMBIN TIME: 16.6 SECONDS (ref 11.2–14.3)
PROTHROMBIN TIME: 16.8 SECONDS (ref 11.2–14.3)
PROTHROMBIN TIME: 19.5 SECONDS (ref 11.2–14.3)
PROTHROMBIN TIME: 24.6 SECONDS (ref 11.2–14.3)
RBC # BLD AUTO: 2.27 10*6/MM3 (ref 4.14–5.8)
RBC # BLD AUTO: 2.29 10*6/MM3 (ref 4.14–5.8)
RBC # BLD AUTO: 2.47 10*6/MM3 (ref 4.14–5.8)
RBC # BLD AUTO: 2.57 10*6/MM3 (ref 4.14–5.8)
RBC # BLD AUTO: 2.58 10*6/MM3 (ref 4.14–5.8)
RBC # BLD AUTO: 2.58 10*6/MM3 (ref 4.14–5.8)
RBC # BLD AUTO: 2.6 10*6/MM3 (ref 4.14–5.8)
RBC # BLD AUTO: 2.6 10*6/MM3 (ref 4.14–5.8)
RBC # BLD AUTO: 2.66 10*6/MM3 (ref 4.14–5.8)
RBC # BLD AUTO: 2.7 10*6/MM3 (ref 4.14–5.8)
RBC # BLD AUTO: 2.71 10*6/MM3 (ref 4.14–5.8)
RBC # BLD AUTO: 2.72 10*6/MM3 (ref 4.14–5.8)
RBC # BLD AUTO: 2.74 10*6/MM3 (ref 4.14–5.8)
RBC # BLD AUTO: 2.85 10*6/MM3 (ref 4.14–5.8)
RBC # BLD AUTO: 2.86 10*6/MM3 (ref 4.14–5.8)
RBC # BLD AUTO: 2.86 10*6/MM3 (ref 4.14–5.8)
RBC # BLD AUTO: 2.87 10*6/MM3 (ref 4.14–5.8)
RBC # BLD AUTO: 2.88 10*6/MM3 (ref 4.14–5.8)
RBC # BLD AUTO: 2.89 10*6/MM3 (ref 4.14–5.8)
RBC # BLD AUTO: 2.98 10*6/MM3 (ref 4.14–5.8)
RBC # BLD AUTO: 2.98 10*6/MM3 (ref 4.14–5.8)
RBC # BLD AUTO: 3 10*6/MM3 (ref 4.14–5.8)
RBC # BLD AUTO: 3.03 10*6/MM3 (ref 4.14–5.8)
RBC # BLD AUTO: 3.03 10*6/MM3 (ref 4.14–5.8)
RBC # BLD AUTO: 3.06 10*6/MM3 (ref 4.14–5.8)
RBC # BLD AUTO: 3.12 10*6/MM3 (ref 4.14–5.8)
RBC # BLD AUTO: 3.13 10*6/MM3 (ref 4.14–5.8)
RBC # BLD AUTO: 3.13 10*6/MM3 (ref 4.14–5.8)
RBC # BLD AUTO: 3.15 10*6/MM3 (ref 4.14–5.8)
RBC # BLD AUTO: 3.18 10*6/MM3 (ref 4.14–5.8)
RBC # BLD AUTO: 3.19 10*6/MM3 (ref 4.14–5.8)
RBC # BLD AUTO: 3.35 10*6/MM3 (ref 4.14–5.8)
RBC # UR: ABNORMAL /HPF
REF LAB TEST METHOD: ABNORMAL
RESULT: <31 PG/ML
RH BLD: POSITIVE
SAO2 % BLDA: 100 % (ref 95–98)
SODIUM BLD-SCNC: 130 MMOL/L (ref 136–145)
SODIUM BLD-SCNC: 132 MMOL/L (ref 136–145)
SODIUM BLD-SCNC: 133 MMOL/L (ref 136–145)
SODIUM BLD-SCNC: 133 MMOL/L (ref 136–145)
SODIUM BLD-SCNC: 134 MMOL/L (ref 136–145)
SODIUM BLD-SCNC: 135 MMOL/L (ref 136–145)
SODIUM BLD-SCNC: 136 MMOL/L (ref 136–145)
SODIUM BLD-SCNC: 137 MMOL/L (ref 136–145)
SODIUM BLD-SCNC: 138 MMOL/L (ref 136–145)
SODIUM BLD-SCNC: 139 MMOL/L (ref 136–145)
SODIUM BLD-SCNC: 140 MMOL/L (ref 136–145)
SODIUM BLD-SCNC: 140 MMOL/L (ref 136–145)
SODIUM BLDA-SCNC: 136 MMOL/L (ref 138–146)
SODIUM UR-SCNC: 96 MMOL/L
SP GR UR STRIP: 1.02 (ref 1–1.03)
SPECIMEN SOURCE: NORMAL
SPECIMEN SOURCE: NORMAL
SQUAMOUS #/AREA URNS HPF: ABNORMAL /HPF
T&S EXPIRATION DATE: NORMAL
TIBC SERPL-MCNC: 377 MCG/DL (ref 298–536)
TRANSFERRIN SERPL-MCNC: 253 MG/DL (ref 200–360)
TROPONIN T SERPL-MCNC: 0.01 NG/ML (ref 0–0.03)
TROPONIN T SERPL-MCNC: <0.01 NG/ML (ref 0–0.03)
TSH SERPL DL<=0.05 MIU/L-ACNC: 13.61 MIU/ML (ref 0.27–4.2)
UNIT  ABO: NORMAL
UNIT  RH: NORMAL
UROBILINOGEN UR QL STRIP: ABNORMAL
VANCOMYCIN SERPL-MCNC: 14.4 MCG/ML (ref 5–40)
VENTILATOR MODE: ABNORMAL
WBC NRBC COR # BLD: 10.22 10*3/MM3 (ref 3.4–10.8)
WBC NRBC COR # BLD: 10.31 10*3/MM3 (ref 3.4–10.8)
WBC NRBC COR # BLD: 4.23 10*3/MM3 (ref 3.4–10.8)
WBC NRBC COR # BLD: 4.49 10*3/MM3 (ref 3.4–10.8)
WBC NRBC COR # BLD: 4.85 10*3/MM3 (ref 3.4–10.8)
WBC NRBC COR # BLD: 4.92 10*3/MM3 (ref 3.4–10.8)
WBC NRBC COR # BLD: 4.94 10*3/MM3 (ref 3.4–10.8)
WBC NRBC COR # BLD: 5.04 10*3/MM3 (ref 3.4–10.8)
WBC NRBC COR # BLD: 5.11 10*3/MM3 (ref 3.4–10.8)
WBC NRBC COR # BLD: 5.41 10*3/MM3 (ref 3.4–10.8)
WBC NRBC COR # BLD: 5.55 10*3/MM3 (ref 3.4–10.8)
WBC NRBC COR # BLD: 5.78 10*3/MM3 (ref 3.4–10.8)
WBC NRBC COR # BLD: 5.9 10*3/MM3 (ref 3.4–10.8)
WBC NRBC COR # BLD: 5.97 10*3/MM3 (ref 3.4–10.8)
WBC NRBC COR # BLD: 5.98 10*3/MM3 (ref 3.4–10.8)
WBC NRBC COR # BLD: 6.1 10*3/MM3 (ref 3.4–10.8)
WBC NRBC COR # BLD: 6.1 10*3/MM3 (ref 3.4–10.8)
WBC NRBC COR # BLD: 6.11 10*3/MM3 (ref 3.4–10.8)
WBC NRBC COR # BLD: 6.13 10*3/MM3 (ref 3.4–10.8)
WBC NRBC COR # BLD: 6.32 10*3/MM3 (ref 3.4–10.8)
WBC NRBC COR # BLD: 6.41 10*3/MM3 (ref 3.4–10.8)
WBC NRBC COR # BLD: 6.52 10*3/MM3 (ref 3.4–10.8)
WBC NRBC COR # BLD: 6.65 10*3/MM3 (ref 3.4–10.8)
WBC NRBC COR # BLD: 6.66 10*3/MM3 (ref 3.4–10.8)
WBC NRBC COR # BLD: 6.71 10*3/MM3 (ref 3.4–10.8)
WBC NRBC COR # BLD: 6.94 10*3/MM3 (ref 3.4–10.8)
WBC NRBC COR # BLD: 6.97 10*3/MM3 (ref 3.4–10.8)
WBC NRBC COR # BLD: 7.86 10*3/MM3 (ref 3.4–10.8)
WBC NRBC COR # BLD: 8.47 10*3/MM3 (ref 3.4–10.8)
WBC NRBC COR # BLD: 8.65 10*3/MM3 (ref 3.4–10.8)
WBC NRBC COR # BLD: 8.9 10*3/MM3 (ref 3.4–10.8)
WBC NRBC COR # BLD: 8.92 10*3/MM3 (ref 3.4–10.8)
WBC NRBC COR # BLD: 9.51 10*3/MM3 (ref 3.4–10.8)
WBC NRBC COR # BLD: 9.79 10*3/MM3 (ref 3.4–10.8)
WBC UR QL AUTO: ABNORMAL /HPF
WHOLE BLOOD HOLD SPECIMEN: NORMAL

## 2019-01-01 PROCEDURE — 87385 HISTOPLASMA CAPSUL AG IA: CPT | Performed by: INTERNAL MEDICINE

## 2019-01-01 PROCEDURE — 25010000002 INFLUENZA VAC SUBUNIT QUAD 0.5 ML SUSPENSION PREFILLED SYRINGE: Performed by: INTERNAL MEDICINE

## 2019-01-01 PROCEDURE — 25010000002 HEPARIN (PORCINE) PER 1000 UNITS: Performed by: PHYSICIAN ASSISTANT

## 2019-01-01 PROCEDURE — 99232 SBSQ HOSP IP/OBS MODERATE 35: CPT | Performed by: INTERNAL MEDICINE

## 2019-01-01 PROCEDURE — 33233 REMOVAL OF PM GENERATOR: CPT | Performed by: INTERNAL MEDICINE

## 2019-01-01 PROCEDURE — 71046 X-RAY EXAM CHEST 2 VIEWS: CPT

## 2019-01-01 PROCEDURE — 25010000002 PROPOFOL 10 MG/ML EMULSION: Performed by: ANESTHESIOLOGY

## 2019-01-01 PROCEDURE — 85027 COMPLETE CBC AUTOMATED: CPT | Performed by: PHYSICIAN ASSISTANT

## 2019-01-01 PROCEDURE — 71045 X-RAY EXAM CHEST 1 VIEW: CPT

## 2019-01-01 PROCEDURE — 84484 ASSAY OF TROPONIN QUANT: CPT | Performed by: PHYSICIAN ASSISTANT

## 2019-01-01 PROCEDURE — 97530 THERAPEUTIC ACTIVITIES: CPT

## 2019-01-01 PROCEDURE — 0DB68ZX EXCISION OF STOMACH, VIA NATURAL OR ARTIFICIAL OPENING ENDOSCOPIC, DIAGNOSTIC: ICD-10-PCS | Performed by: INTERNAL MEDICINE

## 2019-01-01 PROCEDURE — 31622 DX BRONCHOSCOPE/WASH: CPT | Performed by: THORACIC SURGERY (CARDIOTHORACIC VASCULAR SURGERY)

## 2019-01-01 PROCEDURE — 25010000002 ERTAPENEM 1 GM/100ML SOLUTION: Performed by: INTERNAL MEDICINE

## 2019-01-01 PROCEDURE — 25010000002 MEROPENEM: Performed by: INTERNAL MEDICINE

## 2019-01-01 PROCEDURE — 80048 BASIC METABOLIC PNL TOTAL CA: CPT | Performed by: PHYSICIAN ASSISTANT

## 2019-01-01 PROCEDURE — 70450 CT HEAD/BRAIN W/O DYE: CPT

## 2019-01-01 PROCEDURE — 99231 SBSQ HOSP IP/OBS SF/LOW 25: CPT | Performed by: THORACIC SURGERY (CARDIOTHORACIC VASCULAR SURGERY)

## 2019-01-01 PROCEDURE — 99232 SBSQ HOSP IP/OBS MODERATE 35: CPT | Performed by: NURSE PRACTITIONER

## 2019-01-01 PROCEDURE — 85652 RBC SED RATE AUTOMATED: CPT | Performed by: EMERGENCY MEDICINE

## 2019-01-01 PROCEDURE — 25010000003 LIDOCAINE 1 % SOLUTION: Performed by: NURSE ANESTHETIST, CERTIFIED REGISTERED

## 2019-01-01 PROCEDURE — 25010000002 ERTAPENEM PER 500 MG: Performed by: INTERNAL MEDICINE

## 2019-01-01 PROCEDURE — 86900 BLOOD TYPING SEROLOGIC ABO: CPT | Performed by: PHYSICIAN ASSISTANT

## 2019-01-01 PROCEDURE — 83880 ASSAY OF NATRIURETIC PEPTIDE: CPT | Performed by: HOSPITALIST

## 2019-01-01 PROCEDURE — 25010000002 DAPTOMYCIN PER 1 MG: Performed by: INTERNAL MEDICINE

## 2019-01-01 PROCEDURE — 93005 ELECTROCARDIOGRAM TRACING: CPT | Performed by: EMERGENCY MEDICINE

## 2019-01-01 PROCEDURE — 99024 POSTOP FOLLOW-UP VISIT: CPT | Performed by: THORACIC SURGERY (CARDIOTHORACIC VASCULAR SURGERY)

## 2019-01-01 PROCEDURE — 99232 SBSQ HOSP IP/OBS MODERATE 35: CPT | Performed by: FAMILY MEDICINE

## 2019-01-01 PROCEDURE — 25010000002 HEPARIN (PORCINE) PER 1000 UNITS: Performed by: INTERNAL MEDICINE

## 2019-01-01 PROCEDURE — 99233 SBSQ HOSP IP/OBS HIGH 50: CPT | Performed by: INTERNAL MEDICINE

## 2019-01-01 PROCEDURE — 92526 ORAL FUNCTION THERAPY: CPT

## 2019-01-01 PROCEDURE — 97110 THERAPEUTIC EXERCISES: CPT

## 2019-01-01 PROCEDURE — 88305 TISSUE EXAM BY PATHOLOGIST: CPT | Performed by: INTERNAL MEDICINE

## 2019-01-01 PROCEDURE — 86923 COMPATIBILITY TEST ELECTRIC: CPT

## 2019-01-01 PROCEDURE — 85027 COMPLETE CBC AUTOMATED: CPT | Performed by: FAMILY MEDICINE

## 2019-01-01 PROCEDURE — A9270 NON-COVERED ITEM OR SERVICE: HCPCS | Performed by: HOSPITALIST

## 2019-01-01 PROCEDURE — 85027 COMPLETE CBC AUTOMATED: CPT | Performed by: INTERNAL MEDICINE

## 2019-01-01 PROCEDURE — 99284 EMERGENCY DEPT VISIT MOD MDM: CPT

## 2019-01-01 PROCEDURE — 33234 REMOVAL OF PACEMAKER SYSTEM: CPT | Performed by: INTERNAL MEDICINE

## 2019-01-01 PROCEDURE — 99285 EMERGENCY DEPT VISIT HI MDM: CPT

## 2019-01-01 PROCEDURE — 83735 ASSAY OF MAGNESIUM: CPT | Performed by: INTERNAL MEDICINE

## 2019-01-01 PROCEDURE — 63710000001 VITAMIN C 500 MG TABLET: Performed by: HOSPITALIST

## 2019-01-01 PROCEDURE — 94799 UNLISTED PULMONARY SVC/PX: CPT

## 2019-01-01 PROCEDURE — 83880 ASSAY OF NATRIURETIC PEPTIDE: CPT | Performed by: NURSE PRACTITIONER

## 2019-01-01 PROCEDURE — 99233 SBSQ HOSP IP/OBS HIGH 50: CPT | Performed by: HOSPITALIST

## 2019-01-01 PROCEDURE — 25010000002 MEROPENEM: Performed by: NURSE PRACTITIONER

## 2019-01-01 PROCEDURE — 25010000002 VITAMIN K1 PER 1 MG: Performed by: THORACIC SURGERY (CARDIOTHORACIC VASCULAR SURGERY)

## 2019-01-01 PROCEDURE — 80048 BASIC METABOLIC PNL TOTAL CA: CPT | Performed by: INTERNAL MEDICINE

## 2019-01-01 PROCEDURE — 93010 ELECTROCARDIOGRAM REPORT: CPT | Performed by: INTERNAL MEDICINE

## 2019-01-01 PROCEDURE — C1729 CATH, DRAINAGE: HCPCS | Performed by: THORACIC SURGERY (CARDIOTHORACIC VASCULAR SURGERY)

## 2019-01-01 PROCEDURE — 0DD68ZX EXTRACTION OF STOMACH, VIA NATURAL OR ARTIFICIAL OPENING ENDOSCOPIC, DIAGNOSTIC: ICD-10-PCS | Performed by: INTERNAL MEDICINE

## 2019-01-01 PROCEDURE — 97164 PT RE-EVAL EST PLAN CARE: CPT

## 2019-01-01 PROCEDURE — 85027 COMPLETE CBC AUTOMATED: CPT | Performed by: NURSE PRACTITIONER

## 2019-01-01 PROCEDURE — 82330 ASSAY OF CALCIUM: CPT | Performed by: PHYSICIAN ASSISTANT

## 2019-01-01 PROCEDURE — 80048 BASIC METABOLIC PNL TOTAL CA: CPT | Performed by: FAMILY MEDICINE

## 2019-01-01 PROCEDURE — 82962 GLUCOSE BLOOD TEST: CPT

## 2019-01-01 PROCEDURE — 80048 BASIC METABOLIC PNL TOTAL CA: CPT | Performed by: NURSE PRACTITIONER

## 2019-01-01 PROCEDURE — 80048 BASIC METABOLIC PNL TOTAL CA: CPT | Performed by: HOSPITALIST

## 2019-01-01 PROCEDURE — 63710000001 DOCUSATE SODIUM 100 MG CAPSULE: Performed by: INTERNAL MEDICINE

## 2019-01-01 PROCEDURE — 25010000002 PROPOFOL 10 MG/ML EMULSION: Performed by: NURSE ANESTHETIST, CERTIFIED REGISTERED

## 2019-01-01 PROCEDURE — 93005 ELECTROCARDIOGRAM TRACING: CPT | Performed by: PHYSICIAN ASSISTANT

## 2019-01-01 PROCEDURE — 97535 SELF CARE MNGMENT TRAINING: CPT

## 2019-01-01 PROCEDURE — 80069 RENAL FUNCTION PANEL: CPT | Performed by: PHYSICIAN ASSISTANT

## 2019-01-01 PROCEDURE — 82272 OCCULT BLD FECES 1-3 TESTS: CPT | Performed by: INTERNAL MEDICINE

## 2019-01-01 PROCEDURE — 99239 HOSP IP/OBS DSCHRG MGMT >30: CPT | Performed by: FAMILY MEDICINE

## 2019-01-01 PROCEDURE — 25010000002 PHENYLEPHRINE PER 1 ML: Performed by: NURSE ANESTHETIST, CERTIFIED REGISTERED

## 2019-01-01 PROCEDURE — 25010000002 MAGNESIUM SULFATE 2 GM/50ML SOLUTION: Performed by: INTERNAL MEDICINE

## 2019-01-01 PROCEDURE — A9270 NON-COVERED ITEM OR SERVICE: HCPCS | Performed by: INTERNAL MEDICINE

## 2019-01-01 PROCEDURE — 99231 SBSQ HOSP IP/OBS SF/LOW 25: CPT | Performed by: PHYSICIAN ASSISTANT

## 2019-01-01 PROCEDURE — 85018 HEMOGLOBIN: CPT | Performed by: PHYSICIAN ASSISTANT

## 2019-01-01 PROCEDURE — 25010000002 VANCOMYCIN

## 2019-01-01 PROCEDURE — 92610 EVALUATE SWALLOWING FUNCTION: CPT

## 2019-01-01 PROCEDURE — 87070 CULTURE OTHR SPECIMN AEROBIC: CPT | Performed by: THORACIC SURGERY (CARDIOTHORACIC VASCULAR SURGERY)

## 2019-01-01 PROCEDURE — 97162 PT EVAL MOD COMPLEX 30 MIN: CPT | Performed by: PHYSICAL THERAPIST

## 2019-01-01 PROCEDURE — 88304 TISSUE EXAM BY PATHOLOGIST: CPT | Performed by: THORACIC SURGERY (CARDIOTHORACIC VASCULAR SURGERY)

## 2019-01-01 PROCEDURE — C2629 INTRO/SHEATH, LASER: HCPCS | Performed by: INTERNAL MEDICINE

## 2019-01-01 PROCEDURE — 33238 REMOVE ELECTRODE/THORACOTOMY: CPT | Performed by: THORACIC SURGERY (CARDIOTHORACIC VASCULAR SURGERY)

## 2019-01-01 PROCEDURE — 92526 ORAL FUNCTION THERAPY: CPT | Performed by: SPEECH-LANGUAGE PATHOLOGIST

## 2019-01-01 PROCEDURE — 63710000001 FERROUS SULFATE 325 (65 FE) MG TABLET: Performed by: HOSPITALIST

## 2019-01-01 PROCEDURE — 94003 VENT MGMT INPAT SUBQ DAY: CPT

## 2019-01-01 PROCEDURE — 87205 SMEAR GRAM STAIN: CPT | Performed by: THORACIC SURGERY (CARDIOTHORACIC VASCULAR SURGERY)

## 2019-01-01 PROCEDURE — 80053 COMPREHEN METABOLIC PANEL: CPT | Performed by: PHYSICIAN ASSISTANT

## 2019-01-01 PROCEDURE — 85610 PROTHROMBIN TIME: CPT | Performed by: INTERNAL MEDICINE

## 2019-01-01 PROCEDURE — 86901 BLOOD TYPING SEROLOGIC RH(D): CPT

## 2019-01-01 PROCEDURE — 87305 ASPERGILLUS AG IA: CPT | Performed by: INTERNAL MEDICINE

## 2019-01-01 PROCEDURE — 86900 BLOOD TYPING SEROLOGIC ABO: CPT

## 2019-01-01 PROCEDURE — 99221 1ST HOSP IP/OBS SF/LOW 40: CPT | Performed by: PHYSICIAN ASSISTANT

## 2019-01-01 PROCEDURE — 71046 X-RAY EXAM CHEST 2 VIEWS: CPT | Performed by: THORACIC SURGERY (CARDIOTHORACIC VASCULAR SURGERY)

## 2019-01-01 PROCEDURE — 92612 ENDOSCOPY SWALLOW (FEES) VID: CPT

## 2019-01-01 PROCEDURE — 25010000002 CEFUROXIME PER 750 MG: Performed by: ANESTHESIOLOGY

## 2019-01-01 PROCEDURE — 71250 CT THORAX DX C-: CPT

## 2019-01-01 PROCEDURE — 86850 RBC ANTIBODY SCREEN: CPT | Performed by: INTERNAL MEDICINE

## 2019-01-01 PROCEDURE — 02PA3MZ REMOVAL OF CARDIAC LEAD FROM HEART, PERCUTANEOUS APPROACH: ICD-10-PCS | Performed by: INTERNAL MEDICINE

## 2019-01-01 PROCEDURE — 97116 GAIT TRAINING THERAPY: CPT

## 2019-01-01 PROCEDURE — 99024 POSTOP FOLLOW-UP VISIT: CPT | Performed by: NURSE PRACTITIONER

## 2019-01-01 PROCEDURE — 36430 TRANSFUSION BLD/BLD COMPNT: CPT

## 2019-01-01 PROCEDURE — 36620 INSERTION CATHETER ARTERY: CPT | Performed by: INTERNAL MEDICINE

## 2019-01-01 PROCEDURE — 63710000001 MELATONIN 5 MG TABLET: Performed by: INTERNAL MEDICINE

## 2019-01-01 PROCEDURE — 93971 EXTREMITY STUDY: CPT | Performed by: INTERNAL MEDICINE

## 2019-01-01 PROCEDURE — 25010000002 ONDANSETRON PER 1 MG: Performed by: INTERNAL MEDICINE

## 2019-01-01 PROCEDURE — 25010000002 FUROSEMIDE PER 20 MG: Performed by: INTERNAL MEDICINE

## 2019-01-01 PROCEDURE — 63710000001 METOPROLOL TARTRATE 12.5 MG TABLET: Performed by: HOSPITALIST

## 2019-01-01 PROCEDURE — 82805 BLOOD GASES W/O2 SATURATION: CPT

## 2019-01-01 PROCEDURE — 99283 EMERGENCY DEPT VISIT LOW MDM: CPT

## 2019-01-01 PROCEDURE — 25010000003 CEFUROXIME SODIUM 1.5 G RECONSTITUTED SOLUTION: Performed by: PHYSICIAN ASSISTANT

## 2019-01-01 PROCEDURE — 87449 NOS EACH ORGANISM AG IA: CPT | Performed by: INTERNAL MEDICINE

## 2019-01-01 PROCEDURE — 80202 ASSAY OF VANCOMYCIN: CPT | Performed by: EMERGENCY MEDICINE

## 2019-01-01 PROCEDURE — 36600 WITHDRAWAL OF ARTERIAL BLOOD: CPT

## 2019-01-01 PROCEDURE — 99232 SBSQ HOSP IP/OBS MODERATE 35: CPT | Performed by: PHYSICIAN ASSISTANT

## 2019-01-01 PROCEDURE — 85014 HEMATOCRIT: CPT | Performed by: PHYSICIAN ASSISTANT

## 2019-01-01 PROCEDURE — 84466 ASSAY OF TRANSFERRIN: CPT | Performed by: HOSPITALIST

## 2019-01-01 PROCEDURE — 80162 ASSAY OF DIGOXIN TOTAL: CPT | Performed by: NURSE PRACTITIONER

## 2019-01-01 PROCEDURE — 25010000002 MEROPENEM: Performed by: EMERGENCY MEDICINE

## 2019-01-01 PROCEDURE — 99223 1ST HOSP IP/OBS HIGH 75: CPT | Performed by: INTERNAL MEDICINE

## 2019-01-01 PROCEDURE — 83880 ASSAY OF NATRIURETIC PEPTIDE: CPT | Performed by: INTERNAL MEDICINE

## 2019-01-01 PROCEDURE — 25010000002 PROPOFOL 1000 MG/ML EMULSION: Performed by: THORACIC SURGERY (CARDIOTHORACIC VASCULAR SURGERY)

## 2019-01-01 PROCEDURE — 86901 BLOOD TYPING SEROLOGIC RH(D): CPT | Performed by: PHYSICIAN ASSISTANT

## 2019-01-01 PROCEDURE — 85652 RBC SED RATE AUTOMATED: CPT | Performed by: INTERNAL MEDICINE

## 2019-01-01 PROCEDURE — 85025 COMPLETE CBC W/AUTO DIFF WBC: CPT | Performed by: PHYSICIAN ASSISTANT

## 2019-01-01 PROCEDURE — 82947 ASSAY GLUCOSE BLOOD QUANT: CPT

## 2019-01-01 PROCEDURE — 84484 ASSAY OF TROPONIN QUANT: CPT | Performed by: EMERGENCY MEDICINE

## 2019-01-01 PROCEDURE — 87205 SMEAR GRAM STAIN: CPT | Performed by: INTERNAL MEDICINE

## 2019-01-01 PROCEDURE — 25010000002 NA FERRIC GLUC CPLX PER 12.5 MG: Performed by: INTERNAL MEDICINE

## 2019-01-01 PROCEDURE — 87116 MYCOBACTERIA CULTURE: CPT | Performed by: THORACIC SURGERY (CARDIOTHORACIC VASCULAR SURGERY)

## 2019-01-01 PROCEDURE — 82803 BLOOD GASES ANY COMBINATION: CPT

## 2019-01-01 PROCEDURE — 84145 PROCALCITONIN (PCT): CPT | Performed by: NURSE PRACTITIONER

## 2019-01-01 PROCEDURE — 84132 ASSAY OF SERUM POTASSIUM: CPT | Performed by: ANESTHESIOLOGY

## 2019-01-01 PROCEDURE — 83605 ASSAY OF LACTIC ACID: CPT | Performed by: EMERGENCY MEDICINE

## 2019-01-01 PROCEDURE — 81001 URINALYSIS AUTO W/SCOPE: CPT | Performed by: HOSPITALIST

## 2019-01-01 PROCEDURE — S0260 H&P FOR SURGERY: HCPCS | Performed by: INTERNAL MEDICINE

## 2019-01-01 PROCEDURE — 84484 ASSAY OF TROPONIN QUANT: CPT | Performed by: NURSE PRACTITIONER

## 2019-01-01 PROCEDURE — 97116 GAIT TRAINING THERAPY: CPT | Performed by: PHYSICAL THERAPIST

## 2019-01-01 PROCEDURE — 25010000002 DIGOXIN PER 500 MCG: Performed by: NURSE PRACTITIONER

## 2019-01-01 PROCEDURE — 92611 MOTION FLUOROSCOPY/SWALLOW: CPT

## 2019-01-01 PROCEDURE — G0463 HOSPITAL OUTPT CLINIC VISIT: HCPCS | Performed by: THORACIC SURGERY (CARDIOTHORACIC VASCULAR SURGERY)

## 2019-01-01 PROCEDURE — 85027 COMPLETE CBC AUTOMATED: CPT | Performed by: HOSPITALIST

## 2019-01-01 PROCEDURE — 25010000002 MIDAZOLAM PER 1 MG: Performed by: ANESTHESIOLOGY

## 2019-01-01 PROCEDURE — 88112 CYTOPATH CELL ENHANCE TECH: CPT | Performed by: THORACIC SURGERY (CARDIOTHORACIC VASCULAR SURGERY)

## 2019-01-01 PROCEDURE — P9041 ALBUMIN (HUMAN),5%, 50ML: HCPCS | Performed by: PHYSICIAN ASSISTANT

## 2019-01-01 PROCEDURE — 99214 OFFICE O/P EST MOD 30 MIN: CPT | Performed by: INTERNAL MEDICINE

## 2019-01-01 PROCEDURE — 87206 SMEAR FLUORESCENT/ACID STAI: CPT | Performed by: THORACIC SURGERY (CARDIOTHORACIC VASCULAR SURGERY)

## 2019-01-01 PROCEDURE — 0W980ZX DRAINAGE OF CHEST WALL, OPEN APPROACH, DIAGNOSTIC: ICD-10-PCS | Performed by: THORACIC SURGERY (CARDIOTHORACIC VASCULAR SURGERY)

## 2019-01-01 PROCEDURE — 83605 ASSAY OF LACTIC ACID: CPT | Performed by: PHYSICIAN ASSISTANT

## 2019-01-01 PROCEDURE — 82270 OCCULT BLOOD FECES: CPT | Performed by: EMERGENCY MEDICINE

## 2019-01-01 PROCEDURE — 99231 SBSQ HOSP IP/OBS SF/LOW 25: CPT | Performed by: INTERNAL MEDICINE

## 2019-01-01 PROCEDURE — 99024 POSTOP FOLLOW-UP VISIT: CPT | Performed by: PHYSICIAN ASSISTANT

## 2019-01-01 PROCEDURE — 85025 COMPLETE CBC W/AUTO DIFF WBC: CPT | Performed by: EMERGENCY MEDICINE

## 2019-01-01 PROCEDURE — 97162 PT EVAL MOD COMPLEX 30 MIN: CPT

## 2019-01-01 PROCEDURE — 86901 BLOOD TYPING SEROLOGIC RH(D): CPT | Performed by: EMERGENCY MEDICINE

## 2019-01-01 PROCEDURE — 94002 VENT MGMT INPAT INIT DAY: CPT

## 2019-01-01 PROCEDURE — 25010000002 FUROSEMIDE PER 20 MG: Performed by: NURSE PRACTITIONER

## 2019-01-01 PROCEDURE — 63710000001 ASPIRIN 81 MG TABLET DELAYED-RELEASE: Performed by: HOSPITALIST

## 2019-01-01 PROCEDURE — 99222 1ST HOSP IP/OBS MODERATE 55: CPT | Performed by: INTERNAL MEDICINE

## 2019-01-01 PROCEDURE — P9016 RBC LEUKOCYTES REDUCED: HCPCS

## 2019-01-01 PROCEDURE — 25010000002 NEOSTIGMINE 10 MG/10ML SOLUTION: Performed by: NURSE ANESTHETIST, CERTIFIED REGISTERED

## 2019-01-01 PROCEDURE — 85025 COMPLETE CBC W/AUTO DIFF WBC: CPT | Performed by: INTERNAL MEDICINE

## 2019-01-01 PROCEDURE — 25010000002 ONDANSETRON PER 1 MG: Performed by: NURSE ANESTHETIST, CERTIFIED REGISTERED

## 2019-01-01 PROCEDURE — 80053 COMPREHEN METABOLIC PANEL: CPT | Performed by: EMERGENCY MEDICINE

## 2019-01-01 PROCEDURE — 86140 C-REACTIVE PROTEIN: CPT | Performed by: EMERGENCY MEDICINE

## 2019-01-01 PROCEDURE — 0B9C8ZZ DRAINAGE OF RIGHT UPPER LUNG LOBE, VIA NATURAL OR ARTIFICIAL OPENING ENDOSCOPIC: ICD-10-PCS | Performed by: THORACIC SURGERY (CARDIOTHORACIC VASCULAR SURGERY)

## 2019-01-01 PROCEDURE — 94660 CPAP INITIATION&MGMT: CPT

## 2019-01-01 PROCEDURE — 88305 TISSUE EXAM BY PATHOLOGIST: CPT | Performed by: THORACIC SURGERY (CARDIOTHORACIC VASCULAR SURGERY)

## 2019-01-01 PROCEDURE — 93971 EXTREMITY STUDY: CPT

## 2019-01-01 PROCEDURE — 85025 COMPLETE CBC W/AUTO DIFF WBC: CPT | Performed by: NURSE PRACTITIONER

## 2019-01-01 PROCEDURE — 83540 ASSAY OF IRON: CPT | Performed by: HOSPITALIST

## 2019-01-01 PROCEDURE — 88342 IMHCHEM/IMCYTCHM 1ST ANTB: CPT | Performed by: INTERNAL MEDICINE

## 2019-01-01 PROCEDURE — 85610 PROTHROMBIN TIME: CPT | Performed by: PHYSICIAN ASSISTANT

## 2019-01-01 PROCEDURE — 86900 BLOOD TYPING SEROLOGIC ABO: CPT | Performed by: EMERGENCY MEDICINE

## 2019-01-01 PROCEDURE — 84145 PROCALCITONIN (PCT): CPT | Performed by: EMERGENCY MEDICINE

## 2019-01-01 PROCEDURE — 25010000002 PHENYLEPHRINE PER 1 ML: Performed by: ANESTHESIOLOGY

## 2019-01-01 PROCEDURE — 86140 C-REACTIVE PROTEIN: CPT | Performed by: INTERNAL MEDICINE

## 2019-01-01 PROCEDURE — 86900 BLOOD TYPING SEROLOGIC ABO: CPT | Performed by: INTERNAL MEDICINE

## 2019-01-01 PROCEDURE — 84443 ASSAY THYROID STIM HORMONE: CPT | Performed by: INTERNAL MEDICINE

## 2019-01-01 PROCEDURE — 87075 CULTR BACTERIA EXCEPT BLOOD: CPT | Performed by: THORACIC SURGERY (CARDIOTHORACIC VASCULAR SURGERY)

## 2019-01-01 PROCEDURE — 25010000002 FENTANYL CITRATE (PF) 100 MCG/2ML SOLUTION: Performed by: ANESTHESIOLOGY

## 2019-01-01 PROCEDURE — 93005 ELECTROCARDIOGRAM TRACING: CPT

## 2019-01-01 PROCEDURE — C1730 CATH, EP, 19 OR FEW ELECT: HCPCS | Performed by: INTERNAL MEDICINE

## 2019-01-01 PROCEDURE — 25010000002 ALBUMIN HUMAN 5% PER 50 ML

## 2019-01-01 PROCEDURE — 80053 COMPREHEN METABOLIC PANEL: CPT | Performed by: INTERNAL MEDICINE

## 2019-01-01 PROCEDURE — 80069 RENAL FUNCTION PANEL: CPT | Performed by: FAMILY MEDICINE

## 2019-01-01 PROCEDURE — 86901 BLOOD TYPING SEROLOGIC RH(D): CPT | Performed by: INTERNAL MEDICINE

## 2019-01-01 PROCEDURE — 25010000002 ALBUMIN HUMAN 5% PER 50 ML: Performed by: PHYSICIAN ASSISTANT

## 2019-01-01 PROCEDURE — 85347 COAGULATION TIME ACTIVATED: CPT

## 2019-01-01 PROCEDURE — 85025 COMPLETE CBC W/AUTO DIFF WBC: CPT

## 2019-01-01 PROCEDURE — 25010000002 DIGOXIN PER 500 MCG: Performed by: INTERNAL MEDICINE

## 2019-01-01 PROCEDURE — 25810000003 DEXTROSE 5 % WITH KCL 20 MEQ 20-5 MEQ/L-% SOLUTION: Performed by: PHYSICIAN ASSISTANT

## 2019-01-01 PROCEDURE — 87899 AGENT NOS ASSAY W/OPTIC: CPT | Performed by: INTERNAL MEDICINE

## 2019-01-01 PROCEDURE — 99239 HOSP IP/OBS DSCHRG MGMT >30: CPT | Performed by: INTERNAL MEDICINE

## 2019-01-01 PROCEDURE — 97165 OT EVAL LOW COMPLEX 30 MIN: CPT

## 2019-01-01 PROCEDURE — 0W9940Z DRAINAGE OF RIGHT PLEURAL CAVITY WITH DRAINAGE DEVICE, PERCUTANEOUS ENDOSCOPIC APPROACH: ICD-10-PCS | Performed by: THORACIC SURGERY (CARDIOTHORACIC VASCULAR SURGERY)

## 2019-01-01 PROCEDURE — 0JPT0PZ REMOVAL OF CARDIAC RHYTHM RELATED DEVICE FROM TRUNK SUBCUTANEOUS TISSUE AND FASCIA, OPEN APPROACH: ICD-10-PCS | Performed by: INTERNAL MEDICINE

## 2019-01-01 PROCEDURE — 25010000002 VANCOMYCIN PER 500 MG: Performed by: NURSE PRACTITIONER

## 2019-01-01 PROCEDURE — 99024 POSTOP FOLLOW-UP VISIT: CPT | Performed by: INTERNAL MEDICINE

## 2019-01-01 PROCEDURE — 85014 HEMATOCRIT: CPT | Performed by: INTERNAL MEDICINE

## 2019-01-01 PROCEDURE — 90674 CCIIV4 VAC NO PRSV 0.5 ML IM: CPT | Performed by: INTERNAL MEDICINE

## 2019-01-01 PROCEDURE — 25010000002 NA FERRIC GLUC CPLX PER 12.5 MG: Performed by: PHYSICIAN ASSISTANT

## 2019-01-01 PROCEDURE — 84300 ASSAY OF URINE SODIUM: CPT | Performed by: INTERNAL MEDICINE

## 2019-01-01 PROCEDURE — 84295 ASSAY OF SERUM SODIUM: CPT

## 2019-01-01 PROCEDURE — 25010000002 DEXAMETHASONE PER 1 MG: Performed by: NURSE ANESTHETIST, CERTIFIED REGISTERED

## 2019-01-01 PROCEDURE — 84484 ASSAY OF TROPONIN QUANT: CPT

## 2019-01-01 PROCEDURE — 87102 FUNGUS ISOLATION CULTURE: CPT | Performed by: THORACIC SURGERY (CARDIOTHORACIC VASCULAR SURGERY)

## 2019-01-01 PROCEDURE — 97166 OT EVAL MOD COMPLEX 45 MIN: CPT | Performed by: OCCUPATIONAL THERAPIST

## 2019-01-01 PROCEDURE — 99239 HOSP IP/OBS DSCHRG MGMT >30: CPT | Performed by: NURSE PRACTITIONER

## 2019-01-01 PROCEDURE — 82533 TOTAL CORTISOL: CPT | Performed by: INTERNAL MEDICINE

## 2019-01-01 PROCEDURE — 63710000001 FAMOTIDINE 20 MG TABLET: Performed by: INTERNAL MEDICINE

## 2019-01-01 PROCEDURE — 83735 ASSAY OF MAGNESIUM: CPT | Performed by: HOSPITALIST

## 2019-01-01 PROCEDURE — P9041 ALBUMIN (HUMAN),5%, 50ML: HCPCS

## 2019-01-01 PROCEDURE — 85025 COMPLETE CBC W/AUTO DIFF WBC: CPT | Performed by: THORACIC SURGERY (CARDIOTHORACIC VASCULAR SURGERY)

## 2019-01-01 PROCEDURE — 83880 ASSAY OF NATRIURETIC PEPTIDE: CPT | Performed by: PHYSICIAN ASSISTANT

## 2019-01-01 PROCEDURE — C1894 INTRO/SHEATH, NON-LASER: HCPCS | Performed by: INTERNAL MEDICINE

## 2019-01-01 PROCEDURE — 82330 ASSAY OF CALCIUM: CPT

## 2019-01-01 PROCEDURE — 87070 CULTURE OTHR SPECIMN AEROBIC: CPT | Performed by: INTERNAL MEDICINE

## 2019-01-01 PROCEDURE — 99214 OFFICE O/P EST MOD 30 MIN: CPT | Performed by: THORACIC SURGERY (CARDIOTHORACIC VASCULAR SURGERY)

## 2019-01-01 PROCEDURE — 86850 RBC ANTIBODY SCREEN: CPT | Performed by: PHYSICIAN ASSISTANT

## 2019-01-01 PROCEDURE — 85018 HEMOGLOBIN: CPT | Performed by: INTERNAL MEDICINE

## 2019-01-01 PROCEDURE — 82550 ASSAY OF CK (CPK): CPT | Performed by: INTERNAL MEDICINE

## 2019-01-01 PROCEDURE — 85018 HEMOGLOBIN: CPT | Performed by: NURSE PRACTITIONER

## 2019-01-01 PROCEDURE — 25010000002 MORPHINE PER 10 MG: Performed by: THORACIC SURGERY (CARDIOTHORACIC VASCULAR SURGERY)

## 2019-01-01 PROCEDURE — 99233 SBSQ HOSP IP/OBS HIGH 50: CPT | Performed by: NURSE PRACTITIONER

## 2019-01-01 PROCEDURE — 83735 ASSAY OF MAGNESIUM: CPT | Performed by: PHYSICIAN ASSISTANT

## 2019-01-01 PROCEDURE — 83880 ASSAY OF NATRIURETIC PEPTIDE: CPT

## 2019-01-01 PROCEDURE — G0008 ADMIN INFLUENZA VIRUS VAC: HCPCS | Performed by: INTERNAL MEDICINE

## 2019-01-01 PROCEDURE — 85730 THROMBOPLASTIN TIME PARTIAL: CPT | Performed by: PHYSICIAN ASSISTANT

## 2019-01-01 PROCEDURE — 87205 SMEAR GRAM STAIN: CPT | Performed by: HOSPITALIST

## 2019-01-01 PROCEDURE — 87176 TISSUE HOMOGENIZATION CULTR: CPT | Performed by: THORACIC SURGERY (CARDIOTHORACIC VASCULAR SURGERY)

## 2019-01-01 PROCEDURE — 84100 ASSAY OF PHOSPHORUS: CPT

## 2019-01-01 PROCEDURE — 84100 ASSAY OF PHOSPHORUS: CPT | Performed by: INTERNAL MEDICINE

## 2019-01-01 PROCEDURE — C1773 RET DEV, INSERTABLE: HCPCS | Performed by: INTERNAL MEDICINE

## 2019-01-01 PROCEDURE — 82728 ASSAY OF FERRITIN: CPT | Performed by: HOSPITALIST

## 2019-01-01 PROCEDURE — 86850 RBC ANTIBODY SCREEN: CPT | Performed by: EMERGENCY MEDICINE

## 2019-01-01 PROCEDURE — 97166 OT EVAL MOD COMPLEX 45 MIN: CPT

## 2019-01-01 PROCEDURE — 82272 OCCULT BLD FECES 1-3 TESTS: CPT | Performed by: NURSE PRACTITIONER

## 2019-01-01 PROCEDURE — 74230 X-RAY XM SWLNG FUNCJ C+: CPT

## 2019-01-01 PROCEDURE — 80053 COMPREHEN METABOLIC PANEL: CPT

## 2019-01-01 PROCEDURE — 99291 CRITICAL CARE FIRST HOUR: CPT | Performed by: PHYSICIAN ASSISTANT

## 2019-01-01 PROCEDURE — 0B9F8ZZ DRAINAGE OF RIGHT LOWER LUNG LOBE, VIA NATURAL OR ARTIFICIAL OPENING ENDOSCOPIC: ICD-10-PCS | Performed by: THORACIC SURGERY (CARDIOTHORACIC VASCULAR SURGERY)

## 2019-01-01 PROCEDURE — 84132 ASSAY OF SERUM POTASSIUM: CPT

## 2019-01-01 PROCEDURE — 99291 CRITICAL CARE FIRST HOUR: CPT | Performed by: INTERNAL MEDICINE

## 2019-01-01 PROCEDURE — 10140 I&D HMTMA SEROMA/FLUID COLLJ: CPT | Performed by: THORACIC SURGERY (CARDIOTHORACIC VASCULAR SURGERY)

## 2019-01-01 PROCEDURE — 97110 THERAPEUTIC EXERCISES: CPT | Performed by: PHYSICAL THERAPIST

## 2019-01-01 PROCEDURE — 87040 BLOOD CULTURE FOR BACTERIA: CPT | Performed by: EMERGENCY MEDICINE

## 2019-01-01 PROCEDURE — 93005 ELECTROCARDIOGRAM TRACING: CPT | Performed by: INTERNAL MEDICINE

## 2019-01-01 PROCEDURE — 97161 PT EVAL LOW COMPLEX 20 MIN: CPT

## 2019-01-01 PROCEDURE — 80162 ASSAY OF DIGOXIN TOTAL: CPT | Performed by: INTERNAL MEDICINE

## 2019-01-01 PROCEDURE — 63710000001 DIPHENHYDRAMINE PER 50 MG: Performed by: PHYSICIAN ASSISTANT

## 2019-01-01 PROCEDURE — 99222 1ST HOSP IP/OBS MODERATE 55: CPT | Performed by: PHYSICIAN ASSISTANT

## 2019-01-01 PROCEDURE — 02PA0MZ REMOVAL OF CARDIAC LEAD FROM HEART, OPEN APPROACH: ICD-10-PCS | Performed by: THORACIC SURGERY (CARDIOTHORACIC VASCULAR SURGERY)

## 2019-01-01 PROCEDURE — 93306 TTE W/DOPPLER COMPLETE: CPT

## 2019-01-01 PROCEDURE — 25010000002 FENTANYL CITRATE (PF) 100 MCG/2ML SOLUTION: Performed by: NURSE ANESTHETIST, CERTIFIED REGISTERED

## 2019-01-01 PROCEDURE — 88300 SURGICAL PATH GROSS: CPT | Performed by: THORACIC SURGERY (CARDIOTHORACIC VASCULAR SURGERY)

## 2019-01-01 PROCEDURE — 85014 HEMATOCRIT: CPT | Performed by: NURSE PRACTITIONER

## 2019-01-01 PROCEDURE — 32653 THORACOSCOPY REMOV FB/FIBRIN: CPT | Performed by: THORACIC SURGERY (CARDIOTHORACIC VASCULAR SURGERY)

## 2019-01-01 PROCEDURE — 93306 TTE W/DOPPLER COMPLETE: CPT | Performed by: INTERNAL MEDICINE

## 2019-01-01 PROCEDURE — 85014 HEMATOCRIT: CPT

## 2019-01-01 PROCEDURE — 87015 SPECIMEN INFECT AGNT CONCNTJ: CPT | Performed by: THORACIC SURGERY (CARDIOTHORACIC VASCULAR SURGERY)

## 2019-01-01 PROCEDURE — 25010000002 CEFUROXIME: Performed by: PHYSICIAN ASSISTANT

## 2019-01-01 PROCEDURE — 83880 ASSAY OF NATRIURETIC PEPTIDE: CPT | Performed by: EMERGENCY MEDICINE

## 2019-01-01 PROCEDURE — 94640 AIRWAY INHALATION TREATMENT: CPT

## 2019-01-01 RX ORDER — METOPROLOL TARTRATE 50 MG/1
50 TABLET, FILM COATED ORAL EVERY 12 HOURS SCHEDULED
Qty: 60 TABLET | Refills: 1 | Status: SHIPPED | OUTPATIENT
Start: 2019-01-01 | End: 2019-01-01 | Stop reason: HOSPADM

## 2019-01-01 RX ORDER — SODIUM CHLORIDE 0.9 % (FLUSH) 0.9 %
3 SYRINGE (ML) INJECTION EVERY 12 HOURS SCHEDULED
Status: DISCONTINUED | OUTPATIENT
Start: 2019-01-01 | End: 2019-01-01 | Stop reason: HOSPADM

## 2019-01-01 RX ORDER — HEPARIN SODIUM 5000 [USP'U]/ML
5000 INJECTION, SOLUTION INTRAVENOUS; SUBCUTANEOUS EVERY 8 HOURS SCHEDULED
Status: COMPLETED | OUTPATIENT
Start: 2019-01-01 | End: 2019-01-01

## 2019-01-01 RX ORDER — MIDAZOLAM HYDROCHLORIDE 1 MG/ML
INJECTION INTRAMUSCULAR; INTRAVENOUS AS NEEDED
Status: DISCONTINUED | OUTPATIENT
Start: 2019-01-01 | End: 2019-01-01 | Stop reason: SURG

## 2019-01-01 RX ORDER — IPRATROPIUM BROMIDE AND ALBUTEROL SULFATE 2.5; .5 MG/3ML; MG/3ML
3 SOLUTION RESPIRATORY (INHALATION) ONCE AS NEEDED
Status: DISCONTINUED | OUTPATIENT
Start: 2019-01-01 | End: 2019-01-01

## 2019-01-01 RX ORDER — RANOLAZINE 500 MG/1
500 TABLET, EXTENDED RELEASE ORAL NIGHTLY
Status: DISCONTINUED | OUTPATIENT
Start: 2019-01-01 | End: 2019-01-01 | Stop reason: HOSPADM

## 2019-01-01 RX ORDER — LIDOCAINE HYDROCHLORIDE 10 MG/ML
0.5 INJECTION, SOLUTION EPIDURAL; INFILTRATION; INTRACAUDAL; PERINEURAL ONCE AS NEEDED
Status: DISCONTINUED | OUTPATIENT
Start: 2019-01-01 | End: 2019-01-01 | Stop reason: HOSPADM

## 2019-01-01 RX ORDER — HYDROMORPHONE HYDROCHLORIDE 1 MG/ML
0.5 INJECTION, SOLUTION INTRAMUSCULAR; INTRAVENOUS; SUBCUTANEOUS
Status: DISCONTINUED | OUTPATIENT
Start: 2019-01-01 | End: 2019-01-01

## 2019-01-01 RX ORDER — ACETAMINOPHEN 325 MG/1
650 TABLET ORAL EVERY 4 HOURS PRN
Status: DISCONTINUED | OUTPATIENT
Start: 2019-01-01 | End: 2019-01-01 | Stop reason: HOSPADM

## 2019-01-01 RX ORDER — ONDANSETRON 2 MG/ML
4 INJECTION INTRAMUSCULAR; INTRAVENOUS ONCE AS NEEDED
Status: DISCONTINUED | OUTPATIENT
Start: 2019-01-01 | End: 2019-01-01 | Stop reason: HOSPADM

## 2019-01-01 RX ORDER — LIDOCAINE HYDROCHLORIDE AND EPINEPHRINE 10; 10 MG/ML; UG/ML
INJECTION, SOLUTION INFILTRATION; PERINEURAL AS NEEDED
Status: DISCONTINUED | OUTPATIENT
Start: 2019-01-01 | End: 2019-01-01 | Stop reason: HOSPADM

## 2019-01-01 RX ORDER — IPRATROPIUM BROMIDE AND ALBUTEROL SULFATE 2.5; .5 MG/3ML; MG/3ML
3 SOLUTION RESPIRATORY (INHALATION) EVERY 4 HOURS PRN
Qty: 360 ML
Start: 2019-01-01 | End: 2019-01-01

## 2019-01-01 RX ORDER — SODIUM CHLORIDE 9 MG/ML
INJECTION, SOLUTION INTRAVENOUS AS NEEDED
Status: DISCONTINUED | OUTPATIENT
Start: 2019-01-01 | End: 2019-01-01 | Stop reason: HOSPADM

## 2019-01-01 RX ORDER — ASCORBIC ACID 500 MG
500 TABLET ORAL 2 TIMES DAILY
Qty: 60 TABLET | Refills: 1 | Status: SHIPPED | OUTPATIENT
Start: 2019-01-01 | End: 2019-01-01

## 2019-01-01 RX ORDER — FAMOTIDINE 10 MG/ML
20 INJECTION, SOLUTION INTRAVENOUS ONCE
Status: COMPLETED | OUTPATIENT
Start: 2019-01-01 | End: 2019-01-01

## 2019-01-01 RX ORDER — MEPERIDINE HYDROCHLORIDE 25 MG/ML
25 INJECTION INTRAMUSCULAR; INTRAVENOUS; SUBCUTANEOUS EVERY 4 HOURS PRN
Status: DISCONTINUED | OUTPATIENT
Start: 2019-01-01 | End: 2019-01-01

## 2019-01-01 RX ORDER — HEPARIN SODIUM 5000 [USP'U]/ML
5000 INJECTION, SOLUTION INTRAVENOUS; SUBCUTANEOUS EVERY 8 HOURS SCHEDULED
Status: DISCONTINUED | OUTPATIENT
Start: 2019-01-01 | End: 2019-01-01

## 2019-01-01 RX ORDER — POTASSIUM CHLORIDE 29.8 MG/ML
20 INJECTION INTRAVENOUS
Status: DISCONTINUED | OUTPATIENT
Start: 2019-01-01 | End: 2019-01-01 | Stop reason: HOSPADM

## 2019-01-01 RX ORDER — TAMSULOSIN HYDROCHLORIDE 0.4 MG/1
0.4 CAPSULE ORAL NIGHTLY
Status: DISCONTINUED | OUTPATIENT
Start: 2019-01-01 | End: 2019-01-01 | Stop reason: HOSPADM

## 2019-01-01 RX ORDER — LIDOCAINE HYDROCHLORIDE 10 MG/ML
0.5 INJECTION, SOLUTION EPIDURAL; INFILTRATION; INTRACAUDAL; PERINEURAL ONCE AS NEEDED
Status: CANCELLED | OUTPATIENT
Start: 2019-01-01

## 2019-01-01 RX ORDER — ATORVASTATIN CALCIUM 20 MG/1
20 TABLET, FILM COATED ORAL DAILY
Status: DISCONTINUED | OUTPATIENT
Start: 2019-01-01 | End: 2019-01-01 | Stop reason: HOSPADM

## 2019-01-01 RX ORDER — SODIUM CHLORIDE 9 MG/ML
30 INJECTION, SOLUTION INTRAVENOUS CONTINUOUS PRN
Status: DISCONTINUED | OUTPATIENT
Start: 2019-01-01 | End: 2019-01-01

## 2019-01-01 RX ORDER — FOLIC ACID 1 MG/1
1 TABLET ORAL NIGHTLY
Status: DISCONTINUED | OUTPATIENT
Start: 2019-01-01 | End: 2019-01-01 | Stop reason: HOSPADM

## 2019-01-01 RX ORDER — FENTANYL CITRATE 50 UG/ML
50 INJECTION, SOLUTION INTRAMUSCULAR; INTRAVENOUS
Status: DISCONTINUED | OUTPATIENT
Start: 2019-01-01 | End: 2019-01-01 | Stop reason: HOSPADM

## 2019-01-01 RX ORDER — PANTOPRAZOLE SODIUM 40 MG/1
40 TABLET, DELAYED RELEASE ORAL
Start: 2019-01-01 | End: 2019-01-01 | Stop reason: ALTCHOICE

## 2019-01-01 RX ORDER — TAMSULOSIN HYDROCHLORIDE 0.4 MG/1
0.4 CAPSULE ORAL DAILY
Status: DISCONTINUED | OUTPATIENT
Start: 2019-01-01 | End: 2019-01-01 | Stop reason: HOSPADM

## 2019-01-01 RX ORDER — ECHINACEA PURPUREA EXTRACT 125 MG
1 TABLET ORAL AS NEEDED
Qty: 44 ML | Refills: 0 | Status: SHIPPED | OUTPATIENT
Start: 2019-01-01 | End: 2019-01-01

## 2019-01-01 RX ORDER — MAGNESIUM SULFATE HEPTAHYDRATE 40 MG/ML
4 INJECTION, SOLUTION INTRAVENOUS AS NEEDED
Status: DISCONTINUED | OUTPATIENT
Start: 2019-01-01 | End: 2019-01-01 | Stop reason: HOSPADM

## 2019-01-01 RX ORDER — FERROUS SULFATE 325(65) MG
325 TABLET ORAL 2 TIMES DAILY
Status: DISCONTINUED | OUTPATIENT
Start: 2019-01-01 | End: 2019-01-01 | Stop reason: HOSPADM

## 2019-01-01 RX ORDER — RANOLAZINE 500 MG/1
1 TABLET, EXTENDED RELEASE ORAL DAILY
COMMUNITY

## 2019-01-01 RX ORDER — SODIUM CHLORIDE 9 MG/ML
100 INJECTION, SOLUTION INTRAVENOUS CONTINUOUS
Status: DISCONTINUED | OUTPATIENT
Start: 2019-01-01 | End: 2019-01-01

## 2019-01-01 RX ORDER — HYDROCODONE BITARTRATE AND ACETAMINOPHEN 7.5; 325 MG/1; MG/1
1 TABLET ORAL EVERY 6 HOURS PRN
Qty: 30 TABLET | Refills: 0 | Status: SHIPPED | OUTPATIENT
Start: 2019-01-01 | End: 2019-01-01

## 2019-01-01 RX ORDER — PHENYLEPHRINE HCL IN 0.9% NACL 0.5 MG/5ML
.5-3 SYRINGE (ML) INTRAVENOUS CONTINUOUS PRN
Status: DISCONTINUED | OUTPATIENT
Start: 2019-01-01 | End: 2019-01-01

## 2019-01-01 RX ORDER — FINASTERIDE 5 MG/1
5 TABLET, FILM COATED ORAL DAILY
Status: DISCONTINUED | OUTPATIENT
Start: 2019-01-01 | End: 2019-01-01 | Stop reason: HOSPADM

## 2019-01-01 RX ORDER — SODIUM CHLORIDE 0.9 % (FLUSH) 0.9 %
3-10 SYRINGE (ML) INJECTION AS NEEDED
Status: CANCELLED | OUTPATIENT
Start: 2019-01-01

## 2019-01-01 RX ORDER — DOXYCYCLINE 100 MG/1
100 CAPSULE ORAL EVERY 12 HOURS SCHEDULED
Status: DISCONTINUED | OUTPATIENT
Start: 2019-01-01 | End: 2019-01-01

## 2019-01-01 RX ORDER — FERROUS SULFATE 325(65) MG
325 TABLET ORAL 2 TIMES DAILY
COMMUNITY
End: 2019-01-01

## 2019-01-01 RX ORDER — ASCORBIC ACID 500 MG
500 TABLET ORAL 2 TIMES DAILY
Status: DISCONTINUED | OUTPATIENT
Start: 2019-01-01 | End: 2019-01-01 | Stop reason: HOSPADM

## 2019-01-01 RX ORDER — SODIUM CHLORIDE 0.9 % (FLUSH) 0.9 %
10 SYRINGE (ML) INJECTION AS NEEDED
Status: DISCONTINUED | OUTPATIENT
Start: 2019-01-01 | End: 2019-01-01

## 2019-01-01 RX ORDER — DUTASTERIDE 0.5 MG/1
0.5 CAPSULE, LIQUID FILLED ORAL NIGHTLY
COMMUNITY

## 2019-01-01 RX ORDER — ACETAMINOPHEN 325 MG/1
650 TABLET ORAL EVERY 4 HOURS PRN
Start: 2019-01-01 | End: 2019-01-01

## 2019-01-01 RX ORDER — IPRATROPIUM BROMIDE AND ALBUTEROL SULFATE 2.5; .5 MG/3ML; MG/3ML
3 SOLUTION RESPIRATORY (INHALATION) EVERY 4 HOURS PRN
Status: DISCONTINUED | OUTPATIENT
Start: 2019-01-01 | End: 2019-01-01 | Stop reason: HOSPADM

## 2019-01-01 RX ORDER — MORPHINE SULFATE 2 MG/ML
2 INJECTION, SOLUTION INTRAMUSCULAR; INTRAVENOUS
Status: DISCONTINUED | OUTPATIENT
Start: 2019-01-01 | End: 2019-01-01 | Stop reason: HOSPADM

## 2019-01-01 RX ORDER — FAMOTIDINE 20 MG/1
20 TABLET, FILM COATED ORAL 2 TIMES DAILY
Status: DISCONTINUED | OUTPATIENT
Start: 2019-01-01 | End: 2019-01-01

## 2019-01-01 RX ORDER — BISACODYL 5 MG/1
10 TABLET, DELAYED RELEASE ORAL DAILY PRN
Status: DISCONTINUED | OUTPATIENT
Start: 2019-01-01 | End: 2019-01-01 | Stop reason: HOSPADM

## 2019-01-01 RX ORDER — METOPROLOL TARTRATE 50 MG/1
50 TABLET, FILM COATED ORAL EVERY 12 HOURS SCHEDULED
Status: DISCONTINUED | OUTPATIENT
Start: 2019-01-01 | End: 2019-01-01

## 2019-01-01 RX ORDER — SODIUM CHLORIDE 0.9 % (FLUSH) 0.9 %
10 SYRINGE (ML) INJECTION EVERY 12 HOURS SCHEDULED
Status: DISCONTINUED | OUTPATIENT
Start: 2019-01-01 | End: 2019-01-01 | Stop reason: HOSPADM

## 2019-01-01 RX ORDER — NOREPINEPHRINE BIT/0.9 % NACL 8 MG/250ML
.02-.3 INFUSION BOTTLE (ML) INTRAVENOUS CONTINUOUS PRN
Status: DISCONTINUED | OUTPATIENT
Start: 2019-01-01 | End: 2019-01-01

## 2019-01-01 RX ORDER — CHLORHEXIDINE GLUCONATE 0.12 MG/ML
15 RINSE ORAL EVERY 12 HOURS SCHEDULED
Status: DISCONTINUED | OUTPATIENT
Start: 2019-01-01 | End: 2019-01-01

## 2019-01-01 RX ORDER — SODIUM CHLORIDE 9 MG/ML
INJECTION, SOLUTION INTRAVENOUS CONTINUOUS PRN
Status: DISCONTINUED | OUTPATIENT
Start: 2019-01-01 | End: 2019-01-01 | Stop reason: SURG

## 2019-01-01 RX ORDER — SODIUM CHLORIDE 0.9 % (FLUSH) 0.9 %
3 SYRINGE (ML) INJECTION EVERY 12 HOURS SCHEDULED
Status: CANCELLED | OUTPATIENT
Start: 2019-01-01

## 2019-01-01 RX ORDER — SODIUM FLUORIDE 0.1 MG/ML
RINSE ORAL 2 TIMES DAILY
COMMUNITY
End: 2019-01-01

## 2019-01-01 RX ORDER — ASCORBIC ACID 500 MG
500 TABLET ORAL DAILY
Status: DISCONTINUED | OUTPATIENT
Start: 2019-01-01 | End: 2019-01-01 | Stop reason: HOSPADM

## 2019-01-01 RX ORDER — SODIUM CHLORIDE, SODIUM LACTATE, POTASSIUM CHLORIDE, CALCIUM CHLORIDE 600; 310; 30; 20 MG/100ML; MG/100ML; MG/100ML; MG/100ML
INJECTION, SOLUTION INTRAVENOUS CONTINUOUS PRN
Status: DISCONTINUED | OUTPATIENT
Start: 2019-01-01 | End: 2019-01-01 | Stop reason: SURG

## 2019-01-01 RX ORDER — MEPERIDINE HYDROCHLORIDE 25 MG/ML
12.5 INJECTION INTRAMUSCULAR; INTRAVENOUS; SUBCUTANEOUS
Status: DISCONTINUED | OUTPATIENT
Start: 2019-01-01 | End: 2019-01-01

## 2019-01-01 RX ORDER — METOPROLOL TARTRATE 50 MG/1
1 TABLET, FILM COATED ORAL 2 TIMES DAILY
COMMUNITY
End: 2020-01-01 | Stop reason: HOSPADM

## 2019-01-01 RX ORDER — PROPOFOL 10 MG/ML
VIAL (ML) INTRAVENOUS AS NEEDED
Status: DISCONTINUED | OUTPATIENT
Start: 2019-01-01 | End: 2019-01-01 | Stop reason: SURG

## 2019-01-01 RX ORDER — DIPHENHYDRAMINE HCL 25 MG
25 CAPSULE ORAL ONCE AS NEEDED
Status: COMPLETED | OUTPATIENT
Start: 2019-01-01 | End: 2019-01-01

## 2019-01-01 RX ORDER — FENTANYL CITRATE 50 UG/ML
25 INJECTION, SOLUTION INTRAMUSCULAR; INTRAVENOUS
Status: DISCONTINUED | OUTPATIENT
Start: 2019-01-01 | End: 2019-01-01

## 2019-01-01 RX ORDER — SODIUM CHLORIDE 0.9 % (FLUSH) 0.9 %
3 SYRINGE (ML) INJECTION EVERY 12 HOURS SCHEDULED
Status: DISCONTINUED | OUTPATIENT
Start: 2019-01-01 | End: 2019-01-01

## 2019-01-01 RX ORDER — RANOLAZINE 500 MG/1
500 TABLET, EXTENDED RELEASE ORAL NIGHTLY
Status: DISCONTINUED | OUTPATIENT
Start: 2019-01-01 | End: 2019-01-01

## 2019-01-01 RX ORDER — DOPAMINE HYDROCHLORIDE 160 MG/100ML
2-20 INJECTION, SOLUTION INTRAVENOUS CONTINUOUS PRN
Status: DISCONTINUED | OUTPATIENT
Start: 2019-01-01 | End: 2019-01-01

## 2019-01-01 RX ORDER — ALBUMIN, HUMAN INJ 5% 5 %
500 SOLUTION INTRAVENOUS AS NEEDED
Status: COMPLETED | OUTPATIENT
Start: 2019-01-01 | End: 2019-01-01

## 2019-01-01 RX ORDER — METOPROLOL TARTRATE 50 MG/1
50 TABLET, FILM COATED ORAL EVERY 12 HOURS SCHEDULED
Status: DISCONTINUED | OUTPATIENT
Start: 2019-01-01 | End: 2019-01-01 | Stop reason: HOSPADM

## 2019-01-01 RX ORDER — UBIDECARENONE 75 MG
100 CAPSULE ORAL DAILY
COMMUNITY

## 2019-01-01 RX ORDER — RANOLAZINE 500 MG/1
500 TABLET, EXTENDED RELEASE ORAL DAILY
COMMUNITY
End: 2019-01-01 | Stop reason: HOSPADM

## 2019-01-01 RX ORDER — FENTANYL CITRATE 50 UG/ML
50 INJECTION, SOLUTION INTRAMUSCULAR; INTRAVENOUS
Status: DISCONTINUED | OUTPATIENT
Start: 2019-01-01 | End: 2019-01-01 | Stop reason: SDUPTHER

## 2019-01-01 RX ORDER — LIDOCAINE HYDROCHLORIDE 10 MG/ML
INJECTION, SOLUTION EPIDURAL; INFILTRATION; INTRACAUDAL; PERINEURAL AS NEEDED
Status: DISCONTINUED | OUTPATIENT
Start: 2019-01-01 | End: 2019-01-01 | Stop reason: SURG

## 2019-01-01 RX ORDER — SODIUM CHLORIDE 9 MG/ML
125 INJECTION, SOLUTION INTRAVENOUS CONTINUOUS
Status: DISCONTINUED | OUTPATIENT
Start: 2019-01-01 | End: 2019-01-01

## 2019-01-01 RX ORDER — POTASSIUM CHLORIDE 1.5 G/1.77G
40 POWDER, FOR SOLUTION ORAL AS NEEDED
Status: DISCONTINUED | OUTPATIENT
Start: 2019-01-01 | End: 2019-01-01 | Stop reason: HOSPADM

## 2019-01-01 RX ORDER — FENTANYL CITRATE 50 UG/ML
INJECTION, SOLUTION INTRAMUSCULAR; INTRAVENOUS AS NEEDED
Status: DISCONTINUED | OUTPATIENT
Start: 2019-01-01 | End: 2019-01-01 | Stop reason: SURG

## 2019-01-01 RX ORDER — CHOLECALCIFEROL (VITAMIN D3) 125 MCG
5 CAPSULE ORAL NIGHTLY
Status: DISCONTINUED | OUTPATIENT
Start: 2019-01-01 | End: 2019-01-01 | Stop reason: HOSPADM

## 2019-01-01 RX ORDER — DIGOXIN 125 MCG
125 TABLET ORAL DAILY
Status: DISCONTINUED | OUTPATIENT
Start: 2019-01-01 | End: 2019-01-01 | Stop reason: HOSPADM

## 2019-01-01 RX ORDER — SENNA AND DOCUSATE SODIUM 50; 8.6 MG/1; MG/1
2 TABLET, FILM COATED ORAL 2 TIMES DAILY
Status: DISCONTINUED | OUTPATIENT
Start: 2019-01-01 | End: 2019-01-01 | Stop reason: HOSPADM

## 2019-01-01 RX ORDER — PANTOPRAZOLE SODIUM 40 MG/1
40 TABLET, DELAYED RELEASE ORAL DAILY
Qty: 30 TABLET | Refills: 0 | Status: SHIPPED | OUTPATIENT
Start: 2019-01-01 | End: 2019-01-01

## 2019-01-01 RX ORDER — OXYCODONE HYDROCHLORIDE AND ACETAMINOPHEN 5; 325 MG/1; MG/1
2 TABLET ORAL EVERY 4 HOURS PRN
Status: DISCONTINUED | OUTPATIENT
Start: 2019-01-01 | End: 2019-01-01 | Stop reason: HOSPADM

## 2019-01-01 RX ORDER — MEPERIDINE HYDROCHLORIDE 50 MG/ML
12.5 INJECTION INTRAMUSCULAR; INTRAVENOUS; SUBCUTANEOUS
Status: ACTIVE | OUTPATIENT
Start: 2019-01-01 | End: 2019-01-01

## 2019-01-01 RX ORDER — FERROUS SULFATE 325(65) MG
325 TABLET ORAL 2 TIMES DAILY WITH MEALS
Status: DISCONTINUED | OUTPATIENT
Start: 2019-01-01 | End: 2019-01-01

## 2019-01-01 RX ORDER — UBIDECARENONE 75 MG
100 CAPSULE ORAL DAILY
Status: DISCONTINUED | OUTPATIENT
Start: 2019-01-01 | End: 2019-01-01 | Stop reason: HOSPADM

## 2019-01-01 RX ORDER — MAGNESIUM SULFATE HEPTAHYDRATE 40 MG/ML
2 INJECTION, SOLUTION INTRAVENOUS AS NEEDED
Status: DISCONTINUED | OUTPATIENT
Start: 2019-01-01 | End: 2019-01-01 | Stop reason: HOSPADM

## 2019-01-01 RX ORDER — PROTAMINE SULFATE 10 MG/ML
50 INJECTION, SOLUTION INTRAVENOUS ONCE
Status: DISCONTINUED | OUTPATIENT
Start: 2019-01-01 | End: 2019-01-01

## 2019-01-01 RX ORDER — SODIUM CHLORIDE, SODIUM LACTATE, POTASSIUM CHLORIDE, CALCIUM CHLORIDE 600; 310; 30; 20 MG/100ML; MG/100ML; MG/100ML; MG/100ML
9 INJECTION, SOLUTION INTRAVENOUS CONTINUOUS
Status: CANCELLED | OUTPATIENT
Start: 2019-01-01

## 2019-01-01 RX ORDER — FAMOTIDINE 20 MG/1
20 TABLET, FILM COATED ORAL 2 TIMES DAILY
COMMUNITY
End: 2019-01-01 | Stop reason: HOSPADM

## 2019-01-01 RX ORDER — MELATONIN
1000 DAILY
Status: DISCONTINUED | OUTPATIENT
Start: 2019-01-01 | End: 2019-01-01 | Stop reason: HOSPADM

## 2019-01-01 RX ORDER — PRAVASTATIN SODIUM 80 MG/1
80 TABLET ORAL DAILY
COMMUNITY

## 2019-01-01 RX ORDER — GLYCOPYRROLATE 0.2 MG/ML
INJECTION INTRAMUSCULAR; INTRAVENOUS AS NEEDED
Status: DISCONTINUED | OUTPATIENT
Start: 2019-01-01 | End: 2019-01-01 | Stop reason: SURG

## 2019-01-01 RX ORDER — FOLIC ACID 1 MG/1
1 TABLET ORAL NIGHTLY
COMMUNITY

## 2019-01-01 RX ORDER — DIGOXIN 0.25 MG/ML
250 INJECTION INTRAMUSCULAR; INTRAVENOUS ONCE
Status: COMPLETED | OUTPATIENT
Start: 2019-01-01 | End: 2019-01-01

## 2019-01-01 RX ORDER — ASPIRIN 81 MG/1
81 TABLET ORAL DAILY
COMMUNITY
End: 2019-01-01 | Stop reason: HOSPADM

## 2019-01-01 RX ORDER — POTASSIUM CHLORIDE 750 MG/1
40 CAPSULE, EXTENDED RELEASE ORAL AS NEEDED
Status: DISCONTINUED | OUTPATIENT
Start: 2019-01-01 | End: 2019-01-01 | Stop reason: HOSPADM

## 2019-01-01 RX ORDER — ATRACURIUM BESYLATE 10 MG/ML
INJECTION, SOLUTION INTRAVENOUS AS NEEDED
Status: DISCONTINUED | OUTPATIENT
Start: 2019-01-01 | End: 2019-01-01 | Stop reason: SURG

## 2019-01-01 RX ORDER — NALOXONE HYDROCHLORIDE 0.4 MG/ML
0.2 INJECTION, SOLUTION INTRAMUSCULAR; INTRAVENOUS; SUBCUTANEOUS AS NEEDED
Status: DISCONTINUED | OUTPATIENT
Start: 2019-01-01 | End: 2019-01-01 | Stop reason: HOSPADM

## 2019-01-01 RX ORDER — PANTOPRAZOLE SODIUM 40 MG/1
40 TABLET, DELAYED RELEASE ORAL DAILY
COMMUNITY
End: 2020-01-01 | Stop reason: HOSPADM

## 2019-01-01 RX ORDER — HYDROCODONE BITARTRATE AND ACETAMINOPHEN 7.5; 325 MG/1; MG/1
1 TABLET ORAL EVERY 6 HOURS PRN
Status: DISCONTINUED | OUTPATIENT
Start: 2019-01-01 | End: 2019-01-01 | Stop reason: HOSPADM

## 2019-01-01 RX ORDER — NOREPINEPHRINE BIT/0.9 % NACL 8 MG/250ML
.02-.3 INFUSION BOTTLE (ML) INTRAVENOUS ONCE
Status: DISCONTINUED | OUTPATIENT
Start: 2019-01-01 | End: 2019-01-01

## 2019-01-01 RX ORDER — MAGNESIUM CARB/ALUMINUM HYDROX 105-160MG
150 TABLET,CHEWABLE ORAL ONCE
Status: COMPLETED | OUTPATIENT
Start: 2019-01-01 | End: 2019-01-01

## 2019-01-01 RX ORDER — PROPOFOL 10 MG/ML
VIAL (ML) INTRAVENOUS CONTINUOUS PRN
Status: DISCONTINUED | OUTPATIENT
Start: 2019-01-01 | End: 2019-01-01 | Stop reason: SURG

## 2019-01-01 RX ORDER — ONDANSETRON 2 MG/ML
4 INJECTION INTRAMUSCULAR; INTRAVENOUS EVERY 6 HOURS PRN
Status: DISCONTINUED | OUTPATIENT
Start: 2019-01-01 | End: 2019-01-01 | Stop reason: HOSPADM

## 2019-01-01 RX ORDER — PANTOPRAZOLE SODIUM 40 MG/1
40 TABLET, DELAYED RELEASE ORAL
Status: DISCONTINUED | OUTPATIENT
Start: 2019-01-01 | End: 2019-01-01 | Stop reason: HOSPADM

## 2019-01-01 RX ORDER — DOCUSATE SODIUM 100 MG/1
100 CAPSULE, LIQUID FILLED ORAL 2 TIMES DAILY PRN
Status: DISCONTINUED | OUTPATIENT
Start: 2019-01-01 | End: 2019-01-01 | Stop reason: HOSPADM

## 2019-01-01 RX ORDER — SODIUM CHLORIDE, SODIUM LACTATE, POTASSIUM CHLORIDE, CALCIUM CHLORIDE 600; 310; 30; 20 MG/100ML; MG/100ML; MG/100ML; MG/100ML
9 INJECTION, SOLUTION INTRAVENOUS CONTINUOUS
Status: DISCONTINUED | OUTPATIENT
Start: 2019-01-01 | End: 2019-01-01

## 2019-01-01 RX ORDER — SODIUM CHLORIDE 0.9 % (FLUSH) 0.9 %
30 SYRINGE (ML) INJECTION ONCE AS NEEDED
Status: DISCONTINUED | OUTPATIENT
Start: 2019-01-01 | End: 2019-01-01

## 2019-01-01 RX ORDER — RANOLAZINE 500 MG/1
500 TABLET, EXTENDED RELEASE ORAL DAILY
COMMUNITY
End: 2019-01-01

## 2019-01-01 RX ORDER — AMIODARONE HYDROCHLORIDE 200 MG/1
200 TABLET ORAL
Status: DISCONTINUED | OUTPATIENT
Start: 2019-01-01 | End: 2019-01-01

## 2019-01-01 RX ORDER — CASTOR OIL AND BALSAM, PERU 788; 87 MG/G; MG/G
OINTMENT TOPICAL EVERY 12 HOURS SCHEDULED
Status: DISCONTINUED | OUTPATIENT
Start: 2019-01-01 | End: 2019-01-01 | Stop reason: HOSPADM

## 2019-01-01 RX ORDER — LIDOCAINE HYDROCHLORIDE 10 MG/ML
INJECTION, SOLUTION INFILTRATION; PERINEURAL AS NEEDED
Status: DISCONTINUED | OUTPATIENT
Start: 2019-01-01 | End: 2019-01-01 | Stop reason: SURG

## 2019-01-01 RX ORDER — SODIUM CHLORIDE 0.9 % (FLUSH) 0.9 %
3-10 SYRINGE (ML) INJECTION AS NEEDED
Status: DISCONTINUED | OUTPATIENT
Start: 2019-01-01 | End: 2019-01-01

## 2019-01-01 RX ORDER — RANOLAZINE 500 MG/1
500 TABLET, EXTENDED RELEASE ORAL DAILY
Status: DISCONTINUED | OUTPATIENT
Start: 2019-01-01 | End: 2019-01-01

## 2019-01-01 RX ORDER — NITROGLYCERIN 20 MG/100ML
5-200 INJECTION INTRAVENOUS CONTINUOUS PRN
Status: DISCONTINUED | OUTPATIENT
Start: 2019-01-01 | End: 2019-01-01 | Stop reason: HOSPADM

## 2019-01-01 RX ORDER — ACETAMINOPHEN 160 MG/5ML
650 SOLUTION ORAL EVERY 4 HOURS PRN
Status: DISCONTINUED | OUTPATIENT
Start: 2019-01-01 | End: 2019-01-01 | Stop reason: HOSPADM

## 2019-01-01 RX ORDER — DOCUSATE SODIUM 100 MG/1
100 CAPSULE, LIQUID FILLED ORAL 2 TIMES DAILY PRN
Status: DISCONTINUED | OUTPATIENT
Start: 2019-01-01 | End: 2019-01-01

## 2019-01-01 RX ORDER — DOBUTAMINE HYDROCHLORIDE 100 MG/100ML
2-20 INJECTION INTRAVENOUS CONTINUOUS PRN
Status: DISCONTINUED | OUTPATIENT
Start: 2019-01-01 | End: 2019-01-01

## 2019-01-01 RX ORDER — SODIUM CHLORIDE 0.9 % (FLUSH) 0.9 %
3-10 SYRINGE (ML) INJECTION AS NEEDED
Status: DISCONTINUED | OUTPATIENT
Start: 2019-01-01 | End: 2019-01-01 | Stop reason: HOSPADM

## 2019-01-01 RX ORDER — IPRATROPIUM BROMIDE AND ALBUTEROL SULFATE 2.5; .5 MG/3ML; MG/3ML
3 SOLUTION RESPIRATORY (INHALATION)
Status: DISCONTINUED | OUTPATIENT
Start: 2019-01-01 | End: 2019-01-01

## 2019-01-01 RX ORDER — SODIUM CHLORIDE 0.9 % (FLUSH) 0.9 %
10 SYRINGE (ML) INJECTION AS NEEDED
Status: DISCONTINUED | OUTPATIENT
Start: 2019-01-01 | End: 2019-01-01 | Stop reason: HOSPADM

## 2019-01-01 RX ORDER — NYSTATIN 100000 [USP'U]/G
POWDER TOPICAL EVERY 12 HOURS SCHEDULED
Status: DISCONTINUED | OUTPATIENT
Start: 2019-01-01 | End: 2019-01-01 | Stop reason: HOSPADM

## 2019-01-01 RX ORDER — IPRATROPIUM BROMIDE AND ALBUTEROL SULFATE 2.5; .5 MG/3ML; MG/3ML
3 SOLUTION RESPIRATORY (INHALATION) ONCE
Status: COMPLETED | OUTPATIENT
Start: 2019-01-01 | End: 2019-01-01

## 2019-01-01 RX ORDER — ONDANSETRON 2 MG/ML
4 INJECTION INTRAMUSCULAR; INTRAVENOUS EVERY 6 HOURS PRN
Status: DISCONTINUED | OUTPATIENT
Start: 2019-01-01 | End: 2019-01-01

## 2019-01-01 RX ORDER — MAGNESIUM HYDROXIDE 1200 MG/15ML
LIQUID ORAL AS NEEDED
Status: DISCONTINUED | OUTPATIENT
Start: 2019-01-01 | End: 2019-01-01 | Stop reason: HOSPADM

## 2019-01-01 RX ORDER — FAMOTIDINE 20 MG/1
20 TABLET, FILM COATED ORAL 2 TIMES DAILY
Status: DISCONTINUED | OUTPATIENT
Start: 2019-01-01 | End: 2019-01-01 | Stop reason: HOSPADM

## 2019-01-01 RX ORDER — PANTOPRAZOLE SODIUM 40 MG/10ML
40 INJECTION, POWDER, LYOPHILIZED, FOR SOLUTION INTRAVENOUS EVERY 12 HOURS SCHEDULED
Status: DISCONTINUED | OUTPATIENT
Start: 2019-01-01 | End: 2019-01-01

## 2019-01-01 RX ORDER — PROMETHAZINE HYDROCHLORIDE 25 MG/1
25 TABLET ORAL ONCE AS NEEDED
Status: DISCONTINUED | OUTPATIENT
Start: 2019-01-01 | End: 2019-01-01

## 2019-01-01 RX ORDER — IPRATROPIUM BROMIDE AND ALBUTEROL SULFATE 2.5; .5 MG/3ML; MG/3ML
3 SOLUTION RESPIRATORY (INHALATION) EVERY 4 HOURS PRN
COMMUNITY

## 2019-01-01 RX ORDER — ESMOLOL HYDROCHLORIDE 10 MG/ML
INJECTION INTRAVENOUS AS NEEDED
Status: DISCONTINUED | OUTPATIENT
Start: 2019-01-01 | End: 2019-01-01 | Stop reason: SURG

## 2019-01-01 RX ORDER — BUPIVACAINE HYDROCHLORIDE 5 MG/ML
INJECTION, SOLUTION EPIDURAL; INTRACAUDAL AS NEEDED
Status: DISCONTINUED | OUTPATIENT
Start: 2019-01-01 | End: 2019-01-01 | Stop reason: HOSPADM

## 2019-01-01 RX ORDER — IPRATROPIUM BROMIDE AND ALBUTEROL SULFATE 2.5; .5 MG/3ML; MG/3ML
3 SOLUTION RESPIRATORY (INHALATION) ONCE AS NEEDED
Status: DISCONTINUED | OUTPATIENT
Start: 2019-01-01 | End: 2019-01-01 | Stop reason: HOSPADM

## 2019-01-01 RX ORDER — LINEZOLID 100 MG/5ML
600 SUSPENSION ORAL EVERY 12 HOURS SCHEDULED
Status: DISCONTINUED | OUTPATIENT
Start: 2019-01-01 | End: 2019-01-01

## 2019-01-01 RX ORDER — TRAMADOL HYDROCHLORIDE 50 MG/1
50 TABLET ORAL EVERY 6 HOURS PRN
Status: ACTIVE | OUTPATIENT
Start: 2019-01-01 | End: 2019-01-01

## 2019-01-01 RX ORDER — FAMOTIDINE 20 MG/1
1 TABLET, FILM COATED ORAL 2 TIMES DAILY
COMMUNITY
End: 2020-01-01

## 2019-01-01 RX ORDER — METOPROLOL SUCCINATE 50 MG/1
100 TABLET, EXTENDED RELEASE ORAL NIGHTLY
COMMUNITY
End: 2019-01-01 | Stop reason: HOSPADM

## 2019-01-01 RX ORDER — ACETAMINOPHEN 650 MG/1
650 SUPPOSITORY RECTAL EVERY 4 HOURS PRN
Status: DISCONTINUED | OUTPATIENT
Start: 2019-01-01 | End: 2019-01-01 | Stop reason: HOSPADM

## 2019-01-01 RX ORDER — FUROSEMIDE 10 MG/ML
40 INJECTION INTRAMUSCULAR; INTRAVENOUS ONCE
Status: COMPLETED | OUTPATIENT
Start: 2019-01-01 | End: 2019-01-01

## 2019-01-01 RX ORDER — ROCURONIUM BROMIDE 10 MG/ML
INJECTION, SOLUTION INTRAVENOUS AS NEEDED
Status: DISCONTINUED | OUTPATIENT
Start: 2019-01-01 | End: 2019-01-01 | Stop reason: SURG

## 2019-01-01 RX ORDER — FAMOTIDINE 20 MG/1
20 TABLET, FILM COATED ORAL
Status: CANCELLED | OUTPATIENT
Start: 2019-01-01

## 2019-01-01 RX ORDER — SENNA AND DOCUSATE SODIUM 50; 8.6 MG/1; MG/1
2 TABLET, FILM COATED ORAL NIGHTLY
Status: DISCONTINUED | OUTPATIENT
Start: 2019-01-01 | End: 2019-01-01 | Stop reason: HOSPADM

## 2019-01-01 RX ORDER — CHOLECALCIFEROL (VITAMIN D3) 125 MCG
5 CAPSULE ORAL NIGHTLY PRN
Status: DISCONTINUED | OUTPATIENT
Start: 2019-01-01 | End: 2019-01-01 | Stop reason: HOSPADM

## 2019-01-01 RX ORDER — HYDRALAZINE HYDROCHLORIDE 20 MG/ML
5 INJECTION INTRAMUSCULAR; INTRAVENOUS
Status: DISCONTINUED | OUTPATIENT
Start: 2019-01-01 | End: 2019-01-01 | Stop reason: HOSPADM

## 2019-01-01 RX ORDER — NOREPINEPHRINE BITARTRATE 1 MG/ML
INJECTION, SOLUTION INTRAVENOUS CONTINUOUS PRN
Status: DISCONTINUED | OUTPATIENT
Start: 2019-01-01 | End: 2019-01-01 | Stop reason: SURG

## 2019-01-01 RX ORDER — ATORVASTATIN CALCIUM 40 MG/1
40 TABLET, FILM COATED ORAL NIGHTLY
Status: DISCONTINUED | OUTPATIENT
Start: 2019-01-01 | End: 2019-01-01 | Stop reason: HOSPADM

## 2019-01-01 RX ORDER — SODIUM CHLORIDE 9 MG/ML
75 INJECTION, SOLUTION INTRAVENOUS ONCE
Status: COMPLETED | OUTPATIENT
Start: 2019-01-01 | End: 2019-01-01

## 2019-01-01 RX ORDER — AMIODARONE HYDROCHLORIDE 200 MG/1
200 TABLET ORAL DAILY
Status: ON HOLD | COMMUNITY
Start: 2019-01-01 | End: 2019-01-01

## 2019-01-01 RX ORDER — PROMETHAZINE HYDROCHLORIDE 25 MG/ML
6.25 INJECTION, SOLUTION INTRAMUSCULAR; INTRAVENOUS ONCE AS NEEDED
Status: DISCONTINUED | OUTPATIENT
Start: 2019-01-01 | End: 2019-01-01

## 2019-01-01 RX ORDER — FAMOTIDINE 20 MG/1
20 TABLET, FILM COATED ORAL ONCE
Status: CANCELLED | OUTPATIENT
Start: 2019-01-01 | End: 2019-01-01

## 2019-01-01 RX ORDER — LANOLIN ALCOHOL/MO/W.PET/CERES
1 CREAM (GRAM) TOPICAL DAILY
Refills: 0 | COMMUNITY
Start: 2019-01-01

## 2019-01-01 RX ORDER — DEXAMETHASONE SODIUM PHOSPHATE 4 MG/ML
INJECTION, SOLUTION INTRA-ARTICULAR; INTRALESIONAL; INTRAMUSCULAR; INTRAVENOUS; SOFT TISSUE AS NEEDED
Status: DISCONTINUED | OUTPATIENT
Start: 2019-01-01 | End: 2019-01-01 | Stop reason: SURG

## 2019-01-01 RX ORDER — ASPIRIN 81 MG/1
81 TABLET ORAL DAILY
Status: DISCONTINUED | OUTPATIENT
Start: 2019-01-01 | End: 2019-01-01 | Stop reason: HOSPADM

## 2019-01-01 RX ORDER — ASCORBIC ACID 500 MG
500 TABLET ORAL DAILY
COMMUNITY

## 2019-01-01 RX ORDER — PHYTONADIONE 2 MG/ML
5 INJECTION, EMULSION INTRAMUSCULAR; INTRAVENOUS; SUBCUTANEOUS ONCE
Status: COMPLETED | OUTPATIENT
Start: 2019-01-01 | End: 2019-01-01

## 2019-01-01 RX ORDER — ONDANSETRON 4 MG/1
4 TABLET, FILM COATED ORAL EVERY 6 HOURS PRN
Status: DISCONTINUED | OUTPATIENT
Start: 2019-01-01 | End: 2019-01-01 | Stop reason: HOSPADM

## 2019-01-01 RX ORDER — METOPROLOL TARTRATE 5 MG/5ML
2.5 INJECTION INTRAVENOUS EVERY 6 HOURS SCHEDULED
Status: DISCONTINUED | OUTPATIENT
Start: 2019-01-01 | End: 2019-01-01

## 2019-01-01 RX ORDER — ALBUTEROL SULFATE 2.5 MG/3ML
2.5 SOLUTION RESPIRATORY (INHALATION) EVERY 4 HOURS PRN
Status: ACTIVE | OUTPATIENT
Start: 2019-01-01 | End: 2019-01-01

## 2019-01-01 RX ORDER — FENTANYL CITRATE 50 UG/ML
50 INJECTION, SOLUTION INTRAMUSCULAR; INTRAVENOUS
Status: DISCONTINUED | OUTPATIENT
Start: 2019-01-01 | End: 2019-01-01

## 2019-01-01 RX ORDER — CHOLECALCIFEROL (VITAMIN D3) 125 MCG
5 CAPSULE ORAL NIGHTLY PRN
Status: DISCONTINUED | OUTPATIENT
Start: 2019-01-01 | End: 2019-01-01

## 2019-01-01 RX ORDER — CALCIUM CARBONATE 500(1250)
500 TABLET ORAL DAILY
Status: DISCONTINUED | OUTPATIENT
Start: 2019-01-01 | End: 2019-01-01 | Stop reason: HOSPADM

## 2019-01-01 RX ORDER — SODIUM CHLORIDE, SODIUM LACTATE, POTASSIUM CHLORIDE, CALCIUM CHLORIDE 600; 310; 30; 20 MG/100ML; MG/100ML; MG/100ML; MG/100ML
9 INJECTION, SOLUTION INTRAVENOUS CONTINUOUS PRN
Status: DISCONTINUED | OUTPATIENT
Start: 2019-01-01 | End: 2019-01-01

## 2019-01-01 RX ORDER — ECHINACEA PURPUREA EXTRACT 125 MG
1 TABLET ORAL AS NEEDED
Status: DISCONTINUED | OUTPATIENT
Start: 2019-01-01 | End: 2019-01-01 | Stop reason: HOSPADM

## 2019-01-01 RX ORDER — TAMSULOSIN HYDROCHLORIDE 0.4 MG/1
1 CAPSULE ORAL NIGHTLY
COMMUNITY

## 2019-01-01 RX ORDER — PROMETHAZINE HYDROCHLORIDE 25 MG/1
25 SUPPOSITORY RECTAL ONCE AS NEEDED
Status: DISCONTINUED | OUTPATIENT
Start: 2019-01-01 | End: 2019-01-01

## 2019-01-01 RX ORDER — POLYETHYLENE GLYCOL 3350 17 G/17G
17 POWDER, FOR SOLUTION ORAL DAILY
COMMUNITY

## 2019-01-01 RX ORDER — RANOLAZINE 500 MG/1
500 TABLET, EXTENDED RELEASE ORAL NIGHTLY
COMMUNITY
End: 2019-01-01 | Stop reason: HOSPADM

## 2019-01-01 RX ORDER — DIGOXIN 125 MCG
125 TABLET ORAL
COMMUNITY
End: 2020-01-01 | Stop reason: HOSPADM

## 2019-01-01 RX ORDER — BISACODYL 10 MG
10 SUPPOSITORY, RECTAL RECTAL DAILY PRN
Status: DISCONTINUED | OUTPATIENT
Start: 2019-01-01 | End: 2019-01-01 | Stop reason: HOSPADM

## 2019-01-01 RX ORDER — HYDROCODONE BITARTRATE AND ACETAMINOPHEN 7.5; 325 MG/1; MG/1
1 TABLET ORAL EVERY 4 HOURS PRN
Status: DISCONTINUED | OUTPATIENT
Start: 2019-01-01 | End: 2019-01-01 | Stop reason: HOSPADM

## 2019-01-01 RX ORDER — ONDANSETRON 2 MG/ML
INJECTION INTRAMUSCULAR; INTRAVENOUS AS NEEDED
Status: DISCONTINUED | OUTPATIENT
Start: 2019-01-01 | End: 2019-01-01 | Stop reason: SURG

## 2019-01-01 RX ORDER — HYDRALAZINE HYDROCHLORIDE 20 MG/ML
5 INJECTION INTRAMUSCULAR; INTRAVENOUS
Status: DISCONTINUED | OUTPATIENT
Start: 2019-01-01 | End: 2019-01-01

## 2019-01-01 RX ORDER — PRAVASTATIN SODIUM 80 MG/1
80 TABLET ORAL NIGHTLY
COMMUNITY
End: 2019-01-01

## 2019-01-01 RX ORDER — POTASSIUM CHLORIDE, DEXTROSE MONOHYDRATE 150; 5 MG/100ML; G/100ML
30 INJECTION, SOLUTION INTRAVENOUS CONTINUOUS
Status: DISCONTINUED | OUTPATIENT
Start: 2019-01-01 | End: 2019-01-01

## 2019-01-01 RX ORDER — LIDOCAINE HYDROCHLORIDE 10 MG/ML
INJECTION, SOLUTION INFILTRATION; PERINEURAL AS NEEDED
Status: DISCONTINUED | OUTPATIENT
Start: 2019-01-01 | End: 2019-01-01 | Stop reason: HOSPADM

## 2019-01-01 RX ORDER — FAMOTIDINE 20 MG/1
20 TABLET, FILM COATED ORAL 2 TIMES DAILY PRN
Status: DISCONTINUED | OUTPATIENT
Start: 2019-01-01 | End: 2019-01-01 | Stop reason: HOSPADM

## 2019-01-01 RX ORDER — MELATONIN
500 DAILY
COMMUNITY

## 2019-01-01 RX ORDER — METOPROLOL TARTRATE 50 MG/1
50 TABLET, FILM COATED ORAL EVERY 12 HOURS SCHEDULED
Qty: 60 TABLET | Refills: 0 | Status: SHIPPED | OUTPATIENT
Start: 2019-01-01 | End: 2019-01-01

## 2019-01-01 RX ORDER — MELATONIN
500 DAILY
Status: DISCONTINUED | OUTPATIENT
Start: 2019-01-01 | End: 2019-01-01 | Stop reason: HOSPADM

## 2019-01-01 RX ORDER — DIGOXIN 0.25 MG/ML
250 INJECTION INTRAMUSCULAR; INTRAVENOUS
Status: DISCONTINUED | OUTPATIENT
Start: 2019-01-01 | End: 2019-01-01

## 2019-01-01 RX ORDER — HYDROMORPHONE HYDROCHLORIDE 1 MG/ML
0.5 INJECTION, SOLUTION INTRAMUSCULAR; INTRAVENOUS; SUBCUTANEOUS
Status: DISCONTINUED | OUTPATIENT
Start: 2019-01-01 | End: 2019-01-01 | Stop reason: HOSPADM

## 2019-01-01 RX ORDER — FAMOTIDINE 20 MG/1
20 TABLET, FILM COATED ORAL DAILY
Status: DISCONTINUED | OUTPATIENT
Start: 2019-01-01 | End: 2019-01-01

## 2019-01-01 RX ORDER — ACETAMINOPHEN 325 MG/1
650 TABLET ORAL EVERY 4 HOURS PRN
Status: DISCONTINUED | OUTPATIENT
Start: 2019-01-01 | End: 2019-01-01

## 2019-01-01 RX ORDER — SODIUM CHLORIDE 9 MG/ML
9 INJECTION, SOLUTION INTRAVENOUS CONTINUOUS
Status: DISCONTINUED | OUTPATIENT
Start: 2019-01-01 | End: 2019-01-01

## 2019-01-01 RX ORDER — LABETALOL HYDROCHLORIDE 5 MG/ML
5 INJECTION, SOLUTION INTRAVENOUS
Status: DISCONTINUED | OUTPATIENT
Start: 2019-01-01 | End: 2019-01-01

## 2019-01-01 RX ORDER — DIGOXIN 125 MCG
125 TABLET ORAL DAILY
Qty: 30 TABLET | Refills: 0 | Status: SHIPPED | OUTPATIENT
Start: 2019-01-01 | End: 2019-01-01

## 2019-01-01 RX ORDER — NEOSTIGMINE METHYLSULFATE 1 MG/ML
INJECTION, SOLUTION INTRAVENOUS AS NEEDED
Status: DISCONTINUED | OUTPATIENT
Start: 2019-01-01 | End: 2019-01-01 | Stop reason: SURG

## 2019-01-01 RX ORDER — PANTOPRAZOLE SODIUM 40 MG/10ML
80 INJECTION, POWDER, LYOPHILIZED, FOR SOLUTION INTRAVENOUS ONCE
Status: COMPLETED | OUTPATIENT
Start: 2019-01-01 | End: 2019-01-01

## 2019-01-01 RX ORDER — ONDANSETRON 4 MG/1
4 TABLET, FILM COATED ORAL EVERY 6 HOURS PRN
Status: DISCONTINUED | OUTPATIENT
Start: 2019-01-01 | End: 2019-01-01

## 2019-01-01 RX ORDER — ALBUMIN, HUMAN INJ 5% 5 %
SOLUTION INTRAVENOUS
Status: COMPLETED
Start: 2019-01-01 | End: 2019-01-01

## 2019-01-01 RX ORDER — SODIUM CHLORIDE 9 MG/ML
30 INJECTION, SOLUTION INTRAVENOUS CONTINUOUS
Status: DISCONTINUED | OUTPATIENT
Start: 2019-01-01 | End: 2019-01-01

## 2019-01-01 RX ORDER — LABETALOL HYDROCHLORIDE 5 MG/ML
5 INJECTION, SOLUTION INTRAVENOUS
Status: DISCONTINUED | OUTPATIENT
Start: 2019-01-01 | End: 2019-01-01 | Stop reason: HOSPADM

## 2019-01-01 RX ORDER — MAGNESIUM SULFATE 1 G/100ML
1 INJECTION INTRAVENOUS AS NEEDED
Status: DISCONTINUED | OUTPATIENT
Start: 2019-01-01 | End: 2019-01-01 | Stop reason: HOSPADM

## 2019-01-01 RX ADMIN — CHLORHEXIDINE GLUCONATE 15 ML: 1.2 RINSE ORAL at 20:00

## 2019-01-01 RX ADMIN — SODIUM CHLORIDE, PRESERVATIVE FREE 3 ML: 5 INJECTION INTRAVENOUS at 09:27

## 2019-01-01 RX ADMIN — OXYCODONE HYDROCHLORIDE AND ACETAMINOPHEN 1 TABLET: 5; 325 TABLET ORAL at 20:58

## 2019-01-01 RX ADMIN — LINEZOLID 600 MG: 100 SUSPENSION ORAL at 12:37

## 2019-01-01 RX ADMIN — METOPROLOL TARTRATE 12.5 MG: 25 TABLET, FILM COATED ORAL at 21:07

## 2019-01-01 RX ADMIN — LINEZOLID 600 MG: 100 SUSPENSION ORAL at 20:27

## 2019-01-01 RX ADMIN — METOPROLOL TARTRATE 12.5 MG: 25 TABLET, FILM COATED ORAL at 21:03

## 2019-01-01 RX ADMIN — FAMOTIDINE 20 MG: 20 TABLET, FILM COATED ORAL at 09:09

## 2019-01-01 RX ADMIN — FAMOTIDINE 20 MG: 20 TABLET, FILM COATED ORAL at 09:31

## 2019-01-01 RX ADMIN — ASPIRIN 81 MG: 81 TABLET, COATED ORAL at 09:21

## 2019-01-01 RX ADMIN — POLYETHYLENE GLYCOL 3350 17 G: 17 POWDER, FOR SOLUTION ORAL at 09:21

## 2019-01-01 RX ADMIN — FAMOTIDINE 20 MG: 20 TABLET ORAL at 20:11

## 2019-01-01 RX ADMIN — MEROPENEM 1 G: 1 INJECTION, POWDER, FOR SOLUTION INTRAVENOUS at 08:58

## 2019-01-01 RX ADMIN — SODIUM CHLORIDE, PRESERVATIVE FREE 3 ML: 5 INJECTION INTRAVENOUS at 21:04

## 2019-01-01 RX ADMIN — CASTOR OIL AND BALSAM, PERU: 788; 87 OINTMENT TOPICAL at 20:09

## 2019-01-01 RX ADMIN — ASPIRIN 81 MG: 81 TABLET, COATED ORAL at 08:31

## 2019-01-01 RX ADMIN — ATORVASTATIN CALCIUM 20 MG: 20 TABLET, FILM COATED ORAL at 21:58

## 2019-01-01 RX ADMIN — HYDROCODONE BITARTRATE AND ACETAMINOPHEN 1 TABLET: 7.5; 325 TABLET ORAL at 21:13

## 2019-01-01 RX ADMIN — MEROPENEM 1 G: 1 INJECTION, POWDER, FOR SOLUTION INTRAVENOUS at 15:49

## 2019-01-01 RX ADMIN — NYSTATIN: 100000 POWDER TOPICAL at 21:44

## 2019-01-01 RX ADMIN — FERROUS SULFATE TAB 325 MG (65 MG ELEMENTAL FE) 325 MG: 325 (65 FE) TAB at 09:50

## 2019-01-01 RX ADMIN — FAMOTIDINE 20 MG: 20 TABLET, FILM COATED ORAL at 20:46

## 2019-01-01 RX ADMIN — FOLIC ACID 1 MG: 1 TABLET ORAL at 21:07

## 2019-01-01 RX ADMIN — MEROPENEM 1 G: 1 INJECTION, POWDER, FOR SOLUTION INTRAVENOUS at 14:17

## 2019-01-01 RX ADMIN — TAMSULOSIN HYDROCHLORIDE 0.4 MG: 0.4 CAPSULE ORAL at 21:35

## 2019-01-01 RX ADMIN — SODIUM CHLORIDE, PRESERVATIVE FREE 3 ML: 5 INJECTION INTRAVENOUS at 20:34

## 2019-01-01 RX ADMIN — HEPARIN SODIUM 5000 UNITS: 5000 INJECTION INTRAVENOUS; SUBCUTANEOUS at 13:06

## 2019-01-01 RX ADMIN — ALBUMIN HUMAN 500 ML: 0.05 INJECTION, SOLUTION INTRAVENOUS at 14:20

## 2019-01-01 RX ADMIN — FOLIC ACID 1 MG: 1 TABLET ORAL at 20:53

## 2019-01-01 RX ADMIN — ATORVASTATIN CALCIUM 20 MG: 20 TABLET, FILM COATED ORAL at 08:47

## 2019-01-01 RX ADMIN — FAMOTIDINE 20 MG: 20 TABLET ORAL at 08:59

## 2019-01-01 RX ADMIN — ERTAPENEM SODIUM 1 G: 1 INJECTION, POWDER, LYOPHILIZED, FOR SOLUTION INTRAMUSCULAR; INTRAVENOUS at 09:43

## 2019-01-01 RX ADMIN — MEROPENEM 1 G: 1 INJECTION, POWDER, FOR SOLUTION INTRAVENOUS at 09:11

## 2019-01-01 RX ADMIN — VITAMIN D, TAB 1000IU (100/BT) 1000 UNITS: 25 TAB at 08:17

## 2019-01-01 RX ADMIN — ERTAPENEM SODIUM 1 G: 1 INJECTION, POWDER, LYOPHILIZED, FOR SOLUTION INTRAMUSCULAR; INTRAVENOUS at 15:48

## 2019-01-01 RX ADMIN — OXYCODONE HYDROCHLORIDE AND ACETAMINOPHEN 500 MG: 500 TABLET ORAL at 08:59

## 2019-01-01 RX ADMIN — DOCUSATE SODIUM AND SENNOSIDES 2 TABLET: 50; 8.6 TABLET ORAL at 08:43

## 2019-01-01 RX ADMIN — SODIUM CHLORIDE 500 ML: 9 INJECTION, SOLUTION INTRAVENOUS at 15:36

## 2019-01-01 RX ADMIN — TAMSULOSIN HYDROCHLORIDE 0.4 MG: 0.4 CAPSULE ORAL at 23:58

## 2019-01-01 RX ADMIN — VITAM B12 100 MCG: 100 TAB at 08:25

## 2019-01-01 RX ADMIN — METOPROLOL TARTRATE 50 MG: 50 TABLET ORAL at 20:41

## 2019-01-01 RX ADMIN — MEROPENEM 1 G: 1 INJECTION, POWDER, FOR SOLUTION INTRAVENOUS at 22:11

## 2019-01-01 RX ADMIN — NYSTATIN: 100000 POWDER TOPICAL at 20:43

## 2019-01-01 RX ADMIN — CASTOR OIL AND BALSAM, PERU: 788; 87 OINTMENT TOPICAL at 21:04

## 2019-01-01 RX ADMIN — VITAM B12 100 MCG: 100 TAB at 09:12

## 2019-01-01 RX ADMIN — SENNOSIDES, DOCUSATE SODIUM 2 TABLET: 50; 8.6 TABLET, FILM COATED ORAL at 21:05

## 2019-01-01 RX ADMIN — VANCOMYCIN HYDROCHLORIDE 500 MG: 500 INJECTION, POWDER, LYOPHILIZED, FOR SOLUTION INTRAVENOUS at 18:11

## 2019-01-01 RX ADMIN — VITAMIN D, TAB 1000IU (100/BT) 500 UNITS: 25 TAB at 08:25

## 2019-01-01 RX ADMIN — FOLIC ACID 1 MG: 1 TABLET ORAL at 20:08

## 2019-01-01 RX ADMIN — FERROUS SULFATE TAB 325 MG (65 MG ELEMENTAL FE) 325 MG: 325 (65 FE) TAB at 20:18

## 2019-01-01 RX ADMIN — CASTOR OIL AND BALSAM, PERU: 788; 87 OINTMENT TOPICAL at 20:34

## 2019-01-01 RX ADMIN — TAMSULOSIN HYDROCHLORIDE 0.4 MG: 0.4 CAPSULE ORAL at 09:08

## 2019-01-01 RX ADMIN — FAMOTIDINE 20 MG: 20 TABLET ORAL at 08:47

## 2019-01-01 RX ADMIN — METOPROLOL TARTRATE 50 MG: 50 TABLET ORAL at 21:13

## 2019-01-01 RX ADMIN — FAMOTIDINE 20 MG: 20 TABLET ORAL at 09:27

## 2019-01-01 RX ADMIN — PHENYLEPHRINE HYDROCHLORIDE 200 MCG: 10 INJECTION INTRAVENOUS at 11:48

## 2019-01-01 RX ADMIN — FERROUS SULFATE TAB 325 MG (65 MG ELEMENTAL FE) 325 MG: 325 (65 FE) TAB at 21:05

## 2019-01-01 RX ADMIN — SODIUM CHLORIDE, PRESERVATIVE FREE 3 ML: 5 INJECTION INTRAVENOUS at 09:51

## 2019-01-01 RX ADMIN — OXYCODONE HYDROCHLORIDE AND ACETAMINOPHEN 500 MG: 500 TABLET ORAL at 21:04

## 2019-01-01 RX ADMIN — TAMSULOSIN HYDROCHLORIDE 0.4 MG: 0.4 CAPSULE ORAL at 21:05

## 2019-01-01 RX ADMIN — FINASTERIDE 5 MG: 5 TABLET, FILM COATED ORAL at 10:04

## 2019-01-01 RX ADMIN — NYSTATIN: 100000 POWDER TOPICAL at 21:07

## 2019-01-01 RX ADMIN — SODIUM CHLORIDE, PRESERVATIVE FREE 3 ML: 5 INJECTION INTRAVENOUS at 08:21

## 2019-01-01 RX ADMIN — SODIUM CHLORIDE, PRESERVATIVE FREE 3 ML: 5 INJECTION INTRAVENOUS at 08:48

## 2019-01-01 RX ADMIN — POLYETHYLENE GLYCOL 3350 17 G: 17 POWDER, FOR SOLUTION ORAL at 09:18

## 2019-01-01 RX ADMIN — FINASTERIDE 5 MG: 5 TABLET, FILM COATED ORAL at 09:07

## 2019-01-01 RX ADMIN — ERTAPENEM SODIUM 1 G: 1 INJECTION, POWDER, LYOPHILIZED, FOR SOLUTION INTRAMUSCULAR; INTRAVENOUS at 15:38

## 2019-01-01 RX ADMIN — FINASTERIDE 5 MG: 5 TABLET, FILM COATED ORAL at 21:44

## 2019-01-01 RX ADMIN — DOCUSATE SODIUM 100 MG: 100 CAPSULE, LIQUID FILLED ORAL at 08:58

## 2019-01-01 RX ADMIN — DAPTOMYCIN 500 MG: 500 INJECTION, POWDER, LYOPHILIZED, FOR SOLUTION INTRAVENOUS at 16:04

## 2019-01-01 RX ADMIN — PROPOFOL 50 MG: 10 INJECTION, EMULSION INTRAVENOUS at 10:19

## 2019-01-01 RX ADMIN — FERROUS SULFATE TAB 325 MG (65 MG ELEMENTAL FE) 325 MG: 325 (65 FE) TAB at 08:18

## 2019-01-01 RX ADMIN — OXYCODONE HYDROCHLORIDE AND ACETAMINOPHEN 500 MG: 500 TABLET ORAL at 08:43

## 2019-01-01 RX ADMIN — DOCUSATE SODIUM AND SENNOSIDES 2 TABLET: 50; 8.6 TABLET ORAL at 08:09

## 2019-01-01 RX ADMIN — Medication 100 MCG: at 09:09

## 2019-01-01 RX ADMIN — OXYCODONE HYDROCHLORIDE AND ACETAMINOPHEN 500 MG: 500 TABLET ORAL at 09:08

## 2019-01-01 RX ADMIN — IPRATROPIUM BROMIDE AND ALBUTEROL SULFATE 3 ML: 2.5; .5 SOLUTION RESPIRATORY (INHALATION) at 19:59

## 2019-01-01 RX ADMIN — DOCUSATE SODIUM 100 MG: 100 CAPSULE, LIQUID FILLED ORAL at 20:11

## 2019-01-01 RX ADMIN — IPRATROPIUM BROMIDE AND ALBUTEROL SULFATE 3 ML: 2.5; .5 SOLUTION RESPIRATORY (INHALATION) at 13:29

## 2019-01-01 RX ADMIN — ASPIRIN 81 MG: 81 TABLET, COATED ORAL at 09:43

## 2019-01-01 RX ADMIN — METOPROLOL TARTRATE 25 MG: 25 TABLET ORAL at 09:21

## 2019-01-01 RX ADMIN — ATORVASTATIN CALCIUM 40 MG: 40 TABLET, FILM COATED ORAL at 20:18

## 2019-01-01 RX ADMIN — LIDOCAINE HYDROCHLORIDE 100 MG: 10 INJECTION, SOLUTION INFILTRATION; PERINEURAL at 10:16

## 2019-01-01 RX ADMIN — FINASTERIDE 5 MG: 5 TABLET, FILM COATED ORAL at 23:58

## 2019-01-01 RX ADMIN — DOXYCYCLINE 100 MG: 100 CAPSULE ORAL at 20:58

## 2019-01-01 RX ADMIN — HYDROCODONE BITARTRATE AND ACETAMINOPHEN 1 TABLET: 7.5; 325 TABLET ORAL at 11:52

## 2019-01-01 RX ADMIN — CASTOR OIL AND BALSAM, PERU: 788; 87 OINTMENT TOPICAL at 21:35

## 2019-01-01 RX ADMIN — PANTOPRAZOLE SODIUM 40 MG: 40 INJECTION, POWDER, FOR SOLUTION INTRAVENOUS at 08:54

## 2019-01-01 RX ADMIN — VITAMIN D, TAB 1000IU (100/BT) 1000 UNITS: 25 TAB at 08:31

## 2019-01-01 RX ADMIN — FENTANYL CITRATE 100 MCG: 50 INJECTION, SOLUTION INTRAMUSCULAR; INTRAVENOUS at 09:35

## 2019-01-01 RX ADMIN — IPRATROPIUM BROMIDE AND ALBUTEROL SULFATE 3 ML: 2.5; .5 SOLUTION RESPIRATORY (INHALATION) at 16:50

## 2019-01-01 RX ADMIN — ATORVASTATIN CALCIUM 20 MG: 20 TABLET, FILM COATED ORAL at 08:46

## 2019-01-01 RX ADMIN — VITAMIN D, TAB 1000IU (100/BT) 1000 UNITS: 25 TAB at 09:08

## 2019-01-01 RX ADMIN — FAMOTIDINE 20 MG: 20 TABLET ORAL at 21:53

## 2019-01-01 RX ADMIN — PROPOFOL 50 MG: 10 INJECTION, EMULSION INTRAVENOUS at 10:16

## 2019-01-01 RX ADMIN — SODIUM CHLORIDE: 9 INJECTION, SOLUTION INTRAVENOUS at 12:14

## 2019-01-01 RX ADMIN — MEROPENEM 1 G: 1 INJECTION, POWDER, FOR SOLUTION INTRAVENOUS at 22:01

## 2019-01-01 RX ADMIN — NYSTATIN: 100000 POWDER TOPICAL at 09:15

## 2019-01-01 RX ADMIN — BISACODYL 10 MG: 5 TABLET, COATED ORAL at 20:42

## 2019-01-01 RX ADMIN — VITAMIN D, TAB 1000IU (100/BT) 1000 UNITS: 25 TAB at 09:09

## 2019-01-01 RX ADMIN — SODIUM CHLORIDE, PRESERVATIVE FREE 3 ML: 5 INJECTION INTRAVENOUS at 09:58

## 2019-01-01 RX ADMIN — FINASTERIDE 5 MG: 5 TABLET, FILM COATED ORAL at 21:34

## 2019-01-01 RX ADMIN — POLYETHYLENE GLYCOL 3350 17 G: 17 POWDER, FOR SOLUTION ORAL at 08:18

## 2019-01-01 RX ADMIN — ERTAPENEM SODIUM 1 G: 1 INJECTION, POWDER, LYOPHILIZED, FOR SOLUTION INTRAMUSCULAR; INTRAVENOUS at 14:00

## 2019-01-01 RX ADMIN — FINASTERIDE 5 MG: 5 TABLET, FILM COATED ORAL at 22:01

## 2019-01-01 RX ADMIN — SODIUM CHLORIDE, PRESERVATIVE FREE 3 ML: 5 INJECTION INTRAVENOUS at 09:01

## 2019-01-01 RX ADMIN — TAMSULOSIN HYDROCHLORIDE 0.4 MG: 0.4 CAPSULE ORAL at 08:43

## 2019-01-01 RX ADMIN — NYSTATIN: 100000 POWDER TOPICAL at 11:59

## 2019-01-01 RX ADMIN — FERROUS SULFATE TAB 325 MG (65 MG ELEMENTAL FE) 325 MG: 325 (65 FE) TAB at 22:19

## 2019-01-01 RX ADMIN — FOLIC ACID 1 MG: 1 TABLET ORAL at 21:05

## 2019-01-01 RX ADMIN — FAMOTIDINE 20 MG: 20 TABLET ORAL at 08:46

## 2019-01-01 RX ADMIN — OXYCODONE HYDROCHLORIDE AND ACETAMINOPHEN 500 MG: 500 TABLET ORAL at 09:12

## 2019-01-01 RX ADMIN — FOLIC ACID 1 MG: 1 TABLET ORAL at 20:44

## 2019-01-01 RX ADMIN — VITAMIN D, TAB 1000IU (100/BT) 1000 UNITS: 25 TAB at 08:55

## 2019-01-01 RX ADMIN — CASTOR OIL AND BALSAM, PERU: 788; 87 OINTMENT TOPICAL at 08:46

## 2019-01-01 RX ADMIN — OXYCODONE HYDROCHLORIDE AND ACETAMINOPHEN 500 MG: 500 TABLET ORAL at 11:58

## 2019-01-01 RX ADMIN — ASPIRIN 81 MG: 81 TABLET, COATED ORAL at 08:43

## 2019-01-01 RX ADMIN — PROPOFOL 100 MG: 10 INJECTION, EMULSION INTRAVENOUS at 09:00

## 2019-01-01 RX ADMIN — VITAM B12 100 MCG: 100 TAB at 08:18

## 2019-01-01 RX ADMIN — ASPIRIN 81 MG: 81 TABLET, COATED ORAL at 08:59

## 2019-01-01 RX ADMIN — OXYCODONE HYDROCHLORIDE AND ACETAMINOPHEN 2 TABLET: 5; 325 TABLET ORAL at 20:47

## 2019-01-01 RX ADMIN — VITAMIN D, TAB 1000IU (100/BT) 1000 UNITS: 25 TAB at 21:57

## 2019-01-01 RX ADMIN — PROPOFOL 50 MCG/KG/MIN: 10 INJECTION, EMULSION INTRAVENOUS at 17:41

## 2019-01-01 RX ADMIN — MEROPENEM 1 G: 1 INJECTION, POWDER, FOR SOLUTION INTRAVENOUS at 09:15

## 2019-01-01 RX ADMIN — TAMSULOSIN HYDROCHLORIDE 0.4 MG: 0.4 CAPSULE ORAL at 21:15

## 2019-01-01 RX ADMIN — METOPROLOL TARTRATE 12.5 MG: 25 TABLET, FILM COATED ORAL at 21:35

## 2019-01-01 RX ADMIN — METOPROLOL TARTRATE 12.5 MG: 25 TABLET, FILM COATED ORAL at 08:59

## 2019-01-01 RX ADMIN — LINEZOLID 600 MG: 100 SUSPENSION ORAL at 21:05

## 2019-01-01 RX ADMIN — FERROUS SULFATE TAB 325 MG (65 MG ELEMENTAL FE) 325 MG: 325 (65 FE) TAB at 08:08

## 2019-01-01 RX ADMIN — DOXYCYCLINE 100 MG: 100 CAPSULE ORAL at 09:20

## 2019-01-01 RX ADMIN — MELATONIN TAB 5 MG 5 MG: 5 TAB at 21:58

## 2019-01-01 RX ADMIN — HYDROCODONE BITARTRATE AND ACETAMINOPHEN 1 TABLET: 7.5; 325 TABLET ORAL at 19:49

## 2019-01-01 RX ADMIN — METOPROLOL TARTRATE 12.5 MG: 25 TABLET, FILM COATED ORAL at 21:30

## 2019-01-01 RX ADMIN — CASTOR OIL AND BALSAM, PERU: 788; 87 OINTMENT TOPICAL at 17:13

## 2019-01-01 RX ADMIN — TAMSULOSIN HYDROCHLORIDE 0.4 MG: 0.4 CAPSULE ORAL at 21:42

## 2019-01-01 RX ADMIN — METOPROLOL TARTRATE 25 MG: 25 TABLET ORAL at 08:46

## 2019-01-01 RX ADMIN — MEROPENEM 1 G: 1 INJECTION, POWDER, FOR SOLUTION INTRAVENOUS at 20:52

## 2019-01-01 RX ADMIN — FAMOTIDINE 20 MG: 20 TABLET ORAL at 22:20

## 2019-01-01 RX ADMIN — TAMSULOSIN HYDROCHLORIDE 0.4 MG: 0.4 CAPSULE ORAL at 08:09

## 2019-01-01 RX ADMIN — METOPROLOL TARTRATE 12.5 MG: 25 TABLET, FILM COATED ORAL at 21:05

## 2019-01-01 RX ADMIN — NYSTATIN: 100000 POWDER TOPICAL at 09:01

## 2019-01-01 RX ADMIN — NYSTATIN: 100000 POWDER TOPICAL at 21:05

## 2019-01-01 RX ADMIN — HYDROCODONE BITARTRATE AND ACETAMINOPHEN 1 TABLET: 7.5; 325 TABLET ORAL at 17:05

## 2019-01-01 RX ADMIN — VITAMIN D, TAB 1000IU (100/BT) 1000 UNITS: 25 TAB at 09:27

## 2019-01-01 RX ADMIN — ASPIRIN 81 MG: 81 TABLET, COATED ORAL at 11:58

## 2019-01-01 RX ADMIN — VITAMIN D, TAB 1000IU (100/BT) 1000 UNITS: 25 TAB at 08:54

## 2019-01-01 RX ADMIN — SODIUM CHLORIDE, PRESERVATIVE FREE 3 ML: 5 INJECTION INTRAVENOUS at 21:05

## 2019-01-01 RX ADMIN — SODIUM CHLORIDE, PRESERVATIVE FREE 3 ML: 5 INJECTION INTRAVENOUS at 21:37

## 2019-01-01 RX ADMIN — FINASTERIDE 5 MG: 5 TABLET, FILM COATED ORAL at 21:36

## 2019-01-01 RX ADMIN — MELATONIN TAB 5 MG 5 MG: 5 TAB at 21:37

## 2019-01-01 RX ADMIN — FERROUS SULFATE TAB 325 MG (65 MG ELEMENTAL FE) 325 MG: 325 (65 FE) TAB at 08:17

## 2019-01-01 RX ADMIN — ERTAPENEM SODIUM 1 G: 1 INJECTION, POWDER, LYOPHILIZED, FOR SOLUTION INTRAMUSCULAR; INTRAVENOUS at 10:15

## 2019-01-01 RX ADMIN — RIVAROXABAN 15 MG: 15 TABLET, FILM COATED ORAL at 22:25

## 2019-01-01 RX ADMIN — FAMOTIDINE 20 MG: 20 TABLET ORAL at 08:51

## 2019-01-01 RX ADMIN — METOPROLOL TARTRATE 25 MG: 25 TABLET ORAL at 23:57

## 2019-01-01 RX ADMIN — OXYCODONE HYDROCHLORIDE AND ACETAMINOPHEN 500 MG: 500 TABLET ORAL at 20:43

## 2019-01-01 RX ADMIN — METOPROLOL TARTRATE 50 MG: 50 TABLET ORAL at 09:11

## 2019-01-01 RX ADMIN — FERROUS SULFATE TAB 325 MG (65 MG ELEMENTAL FE) 325 MG: 325 (65 FE) TAB at 20:41

## 2019-01-01 RX ADMIN — PANTOPRAZOLE SODIUM 40 MG: 40 TABLET, DELAYED RELEASE ORAL at 17:18

## 2019-01-01 RX ADMIN — SODIUM CHLORIDE 75 ML/HR: 9 INJECTION, SOLUTION INTRAVENOUS at 12:16

## 2019-01-01 RX ADMIN — ATORVASTATIN CALCIUM 20 MG: 20 TABLET, FILM COATED ORAL at 08:31

## 2019-01-01 RX ADMIN — Medication 100 MCG: at 08:08

## 2019-01-01 RX ADMIN — VITAM B12 100 MCG: 100 TAB at 09:01

## 2019-01-01 RX ADMIN — IPRATROPIUM BROMIDE AND ALBUTEROL SULFATE 3 ML: 2.5; .5 SOLUTION RESPIRATORY (INHALATION) at 16:39

## 2019-01-01 RX ADMIN — SENNOSIDES, DOCUSATE SODIUM 2 TABLET: 50; 8.6 TABLET, FILM COATED ORAL at 20:44

## 2019-01-01 RX ADMIN — SODIUM CHLORIDE, PRESERVATIVE FREE 10 ML: 5 INJECTION INTRAVENOUS at 20:26

## 2019-01-01 RX ADMIN — SENNOSIDES, DOCUSATE SODIUM 2 TABLET: 50; 8.6 TABLET, FILM COATED ORAL at 20:53

## 2019-01-01 RX ADMIN — MEROPENEM 1 G: 1 INJECTION, POWDER, FOR SOLUTION INTRAVENOUS at 08:59

## 2019-01-01 RX ADMIN — MEROPENEM 1 G: 1 INJECTION, POWDER, FOR SOLUTION INTRAVENOUS at 08:46

## 2019-01-01 RX ADMIN — HEPARIN SODIUM 5000 UNITS: 5000 INJECTION INTRAVENOUS; SUBCUTANEOUS at 16:49

## 2019-01-01 RX ADMIN — ATORVASTATIN CALCIUM 20 MG: 20 TABLET, FILM COATED ORAL at 08:59

## 2019-01-01 RX ADMIN — FOLIC ACID 1 MG: 1 TABLET ORAL at 23:42

## 2019-01-01 RX ADMIN — METOPROLOL TARTRATE 50 MG: 50 TABLET ORAL at 20:17

## 2019-01-01 RX ADMIN — IPRATROPIUM BROMIDE AND ALBUTEROL SULFATE 3 ML: 2.5; .5 SOLUTION RESPIRATORY (INHALATION) at 14:02

## 2019-01-01 RX ADMIN — METOPROLOL TARTRATE 12.5 MG: 25 TABLET, FILM COATED ORAL at 09:16

## 2019-01-01 RX ADMIN — PANTOPRAZOLE SODIUM 40 MG: 40 TABLET, DELAYED RELEASE ORAL at 08:17

## 2019-01-01 RX ADMIN — FINASTERIDE 5 MG: 5 TABLET, FILM COATED ORAL at 08:37

## 2019-01-01 RX ADMIN — FERROUS SULFATE TAB 325 MG (65 MG ELEMENTAL FE) 325 MG: 325 (65 FE) TAB at 20:46

## 2019-01-01 RX ADMIN — ASPIRIN 81 MG: 81 TABLET, COATED ORAL at 21:57

## 2019-01-01 RX ADMIN — TAMSULOSIN HYDROCHLORIDE 0.4 MG: 0.4 CAPSULE ORAL at 22:00

## 2019-01-01 RX ADMIN — FERROUS SULFATE TAB 325 MG (65 MG ELEMENTAL FE) 325 MG: 325 (65 FE) TAB at 21:06

## 2019-01-01 RX ADMIN — FERROUS SULFATE TAB 325 MG (65 MG ELEMENTAL FE) 325 MG: 325 (65 FE) TAB at 08:55

## 2019-01-01 RX ADMIN — METOPROLOL TARTRATE 12.5 MG: 25 TABLET, FILM COATED ORAL at 21:37

## 2019-01-01 RX ADMIN — NYSTATIN: 100000 POWDER TOPICAL at 09:58

## 2019-01-01 RX ADMIN — LINEZOLID 600 MG: 100 SUSPENSION ORAL at 20:57

## 2019-01-01 RX ADMIN — DOCUSATE SODIUM AND SENNOSIDES 2 TABLET: 50; 8.6 TABLET ORAL at 21:04

## 2019-01-01 RX ADMIN — MEROPENEM 1 G: 1 INJECTION, POWDER, FOR SOLUTION INTRAVENOUS at 09:13

## 2019-01-01 RX ADMIN — VITAMIN D, TAB 1000IU (100/BT) 1000 UNITS: 25 TAB at 09:20

## 2019-01-01 RX ADMIN — OXYCODONE HYDROCHLORIDE AND ACETAMINOPHEN 500 MG: 500 TABLET ORAL at 09:57

## 2019-01-01 RX ADMIN — HEPARIN SODIUM 5000 UNITS: 5000 INJECTION INTRAVENOUS; SUBCUTANEOUS at 05:30

## 2019-01-01 RX ADMIN — METOPROLOL TARTRATE 50 MG: 50 TABLET ORAL at 10:09

## 2019-01-01 RX ADMIN — RIVAROXABAN 15 MG: 15 TABLET, FILM COATED ORAL at 21:04

## 2019-01-01 RX ADMIN — FERROUS SULFATE TAB 325 MG (65 MG ELEMENTAL FE) 325 MG: 325 (65 FE) TAB at 20:59

## 2019-01-01 RX ADMIN — RANOLAZINE 500 MG: 500 TABLET, FILM COATED, EXTENDED RELEASE ORAL at 23:58

## 2019-01-01 RX ADMIN — MEROPENEM 1 G: 1 INJECTION, POWDER, FOR SOLUTION INTRAVENOUS at 20:10

## 2019-01-01 RX ADMIN — MEROPENEM 1 G: 1 INJECTION, POWDER, FOR SOLUTION INTRAVENOUS at 08:48

## 2019-01-01 RX ADMIN — VITAM B12 100 MCG: 100 TAB at 13:31

## 2019-01-01 RX ADMIN — MEROPENEM 1 G: 1 INJECTION, POWDER, FOR SOLUTION INTRAVENOUS at 13:06

## 2019-01-01 RX ADMIN — NYSTATIN: 100000 POWDER TOPICAL at 20:51

## 2019-01-01 RX ADMIN — OXYCODONE HYDROCHLORIDE AND ACETAMINOPHEN 500 MG: 500 TABLET ORAL at 08:15

## 2019-01-01 RX ADMIN — NYSTATIN: 100000 POWDER TOPICAL at 09:27

## 2019-01-01 RX ADMIN — METOPROLOL TARTRATE 25 MG: 25 TABLET ORAL at 20:29

## 2019-01-01 RX ADMIN — FAMOTIDINE 20 MG: 20 TABLET ORAL at 21:13

## 2019-01-01 RX ADMIN — PANTOPRAZOLE SODIUM 40 MG: 40 INJECTION, POWDER, FOR SOLUTION INTRAVENOUS at 10:01

## 2019-01-01 RX ADMIN — TAMSULOSIN HYDROCHLORIDE 0.4 MG: 0.4 CAPSULE ORAL at 22:19

## 2019-01-01 RX ADMIN — HYDROCODONE BITARTRATE AND ACETAMINOPHEN 1 TABLET: 7.5; 325 TABLET ORAL at 00:33

## 2019-01-01 RX ADMIN — METOPROLOL TARTRATE 50 MG: 50 TABLET ORAL at 09:00

## 2019-01-01 RX ADMIN — FOLIC ACID 1 MG: 1 TABLET ORAL at 20:51

## 2019-01-01 RX ADMIN — METOPROLOL TARTRATE 25 MG: 25 TABLET ORAL at 08:15

## 2019-01-01 RX ADMIN — CASTOR OIL AND BALSAM, PERU: 788; 87 OINTMENT TOPICAL at 09:13

## 2019-01-01 RX ADMIN — METOPROLOL TARTRATE 12.5 MG: 25 TABLET, FILM COATED ORAL at 08:21

## 2019-01-01 RX ADMIN — OXYCODONE HYDROCHLORIDE AND ACETAMINOPHEN 500 MG: 500 TABLET ORAL at 09:11

## 2019-01-01 RX ADMIN — SODIUM CHLORIDE, PRESERVATIVE FREE 3 ML: 5 INJECTION INTRAVENOUS at 21:14

## 2019-01-01 RX ADMIN — FERROUS SULFATE TAB 325 MG (65 MG ELEMENTAL FE) 325 MG: 325 (65 FE) TAB at 09:57

## 2019-01-01 RX ADMIN — ASPIRIN 81 MG: 81 TABLET, COATED ORAL at 09:02

## 2019-01-01 RX ADMIN — DOXYCYCLINE 100 MG: 100 CAPSULE ORAL at 21:14

## 2019-01-01 RX ADMIN — CASTOR OIL AND BALSAM, PERU: 788; 87 OINTMENT TOPICAL at 20:51

## 2019-01-01 RX ADMIN — PHENYLEPHRINE HYDROCHLORIDE 0.5 MCG/KG/MIN: 10 INJECTION INTRAVENOUS at 13:16

## 2019-01-01 RX ADMIN — MEROPENEM 1 G: 1 INJECTION, POWDER, FOR SOLUTION INTRAVENOUS at 05:48

## 2019-01-01 RX ADMIN — FAMOTIDINE 20 MG: 20 TABLET, FILM COATED ORAL at 09:08

## 2019-01-01 RX ADMIN — Medication 100 MCG: at 15:38

## 2019-01-01 RX ADMIN — FERROUS SULFATE TAB 325 MG (65 MG ELEMENTAL FE) 325 MG: 325 (65 FE) TAB at 09:20

## 2019-01-01 RX ADMIN — TAMSULOSIN HYDROCHLORIDE 0.4 MG: 0.4 CAPSULE ORAL at 21:06

## 2019-01-01 RX ADMIN — METOPROLOL TARTRATE 50 MG: 50 TABLET ORAL at 21:04

## 2019-01-01 RX ADMIN — DOCUSATE SODIUM AND SENNOSIDES 2 TABLET: 50; 8.6 TABLET ORAL at 20:19

## 2019-01-01 RX ADMIN — SODIUM CHLORIDE, PRESERVATIVE FREE 3 ML: 5 INJECTION INTRAVENOUS at 23:45

## 2019-01-01 RX ADMIN — HEPARIN SODIUM 5000 UNITS: 5000 INJECTION INTRAVENOUS; SUBCUTANEOUS at 23:57

## 2019-01-01 RX ADMIN — OXYCODONE HYDROCHLORIDE AND ACETAMINOPHEN 500 MG: 500 TABLET ORAL at 09:51

## 2019-01-01 RX ADMIN — DIPHENHYDRAMINE HYDROCHLORIDE 25 MG: 25 CAPSULE ORAL at 03:13

## 2019-01-01 RX ADMIN — SODIUM CHLORIDE, PRESERVATIVE FREE 3 ML: 5 INJECTION INTRAVENOUS at 09:13

## 2019-01-01 RX ADMIN — NYSTATIN: 100000 POWDER TOPICAL at 21:35

## 2019-01-01 RX ADMIN — SODIUM CHLORIDE, PRESERVATIVE FREE 3 ML: 5 INJECTION INTRAVENOUS at 21:06

## 2019-01-01 RX ADMIN — PHENYLEPHRINE HYDROCHLORIDE 160 MCG: 10 INJECTION INTRAVENOUS at 12:31

## 2019-01-01 RX ADMIN — FERROUS SULFATE TAB 325 MG (65 MG ELEMENTAL FE) 325 MG: 325 (65 FE) TAB at 21:34

## 2019-01-01 RX ADMIN — SODIUM CHLORIDE, PRESERVATIVE FREE 10 ML: 5 INJECTION INTRAVENOUS at 21:54

## 2019-01-01 RX ADMIN — ATORVASTATIN CALCIUM 40 MG: 40 TABLET, FILM COATED ORAL at 20:19

## 2019-01-01 RX ADMIN — METOPROLOL TARTRATE 12.5 MG: 25 TABLET, FILM COATED ORAL at 20:11

## 2019-01-01 RX ADMIN — HEPARIN SODIUM 5000 UNITS: 5000 INJECTION INTRAVENOUS; SUBCUTANEOUS at 20:33

## 2019-01-01 RX ADMIN — ASPIRIN 81 MG: 81 TABLET, COATED ORAL at 08:17

## 2019-01-01 RX ADMIN — FERROUS SULFATE TAB 325 MG (65 MG ELEMENTAL FE) 325 MG: 325 (65 FE) TAB at 09:08

## 2019-01-01 RX ADMIN — ATORVASTATIN CALCIUM 20 MG: 20 TABLET, FILM COATED ORAL at 08:55

## 2019-01-01 RX ADMIN — CASTOR OIL AND BALSAM, PERU: 788; 87 OINTMENT TOPICAL at 09:02

## 2019-01-01 RX ADMIN — HEPARIN SODIUM 5000 UNITS: 5000 INJECTION INTRAVENOUS; SUBCUTANEOUS at 16:12

## 2019-01-01 RX ADMIN — FERROUS SULFATE TAB 325 MG (65 MG ELEMENTAL FE) 325 MG: 325 (65 FE) TAB at 23:39

## 2019-01-01 RX ADMIN — NYSTATIN 1 APPLICATION: 100000 POWDER TOPICAL at 18:29

## 2019-01-01 RX ADMIN — FAMOTIDINE 20 MG: 20 TABLET, FILM COATED ORAL at 20:17

## 2019-01-01 RX ADMIN — FINASTERIDE 5 MG: 5 TABLET, FILM COATED ORAL at 20:53

## 2019-01-01 RX ADMIN — TAMSULOSIN HYDROCHLORIDE 0.4 MG: 0.4 CAPSULE ORAL at 21:52

## 2019-01-01 RX ADMIN — DOXYCYCLINE 100 MG: 100 CAPSULE ORAL at 08:46

## 2019-01-01 RX ADMIN — FUROSEMIDE 40 MG: 10 INJECTION, SOLUTION INTRAMUSCULAR; INTRAVENOUS at 09:56

## 2019-01-01 RX ADMIN — ERTAPENEM SODIUM 1 G: 1 INJECTION, POWDER, LYOPHILIZED, FOR SOLUTION INTRAMUSCULAR; INTRAVENOUS at 16:58

## 2019-01-01 RX ADMIN — ERTAPENEM SODIUM 1 G: 1 INJECTION, POWDER, LYOPHILIZED, FOR SOLUTION INTRAMUSCULAR; INTRAVENOUS at 08:54

## 2019-01-01 RX ADMIN — TAMSULOSIN HYDROCHLORIDE 0.4 MG: 0.4 CAPSULE ORAL at 21:44

## 2019-01-01 RX ADMIN — OXYCODONE HYDROCHLORIDE AND ACETAMINOPHEN 2 TABLET: 5; 325 TABLET ORAL at 11:23

## 2019-01-01 RX ADMIN — PHENYLEPHRINE HYDROCHLORIDE 80 MCG: 10 INJECTION INTRAVENOUS at 08:53

## 2019-01-01 RX ADMIN — SODIUM CHLORIDE 500 ML: 9 INJECTION, SOLUTION INTRAVENOUS at 18:00

## 2019-01-01 RX ADMIN — RIVAROXABAN 15 MG: 15 TABLET, FILM COATED ORAL at 20:41

## 2019-01-01 RX ADMIN — DOCUSATE SODIUM 100 MG: 100 CAPSULE, LIQUID FILLED ORAL at 08:21

## 2019-01-01 RX ADMIN — METOPROLOL TARTRATE 25 MG: 25 TABLET ORAL at 08:26

## 2019-01-01 RX ADMIN — VITAMIN D, TAB 1000IU (100/BT) 1000 UNITS: 25 TAB at 09:50

## 2019-01-01 RX ADMIN — DOCUSATE SODIUM AND SENNOSIDES 2 TABLET: 50; 8.6 TABLET ORAL at 20:00

## 2019-01-01 RX ADMIN — FERROUS SULFATE TAB 325 MG (65 MG ELEMENTAL FE) 325 MG: 325 (65 FE) TAB at 21:13

## 2019-01-01 RX ADMIN — TAMSULOSIN HYDROCHLORIDE 0.4 MG: 0.4 CAPSULE ORAL at 20:58

## 2019-01-01 RX ADMIN — METOPROLOL TARTRATE 12.5 MG: 25 TABLET, FILM COATED ORAL at 20:51

## 2019-01-01 RX ADMIN — OXYCODONE HYDROCHLORIDE AND ACETAMINOPHEN 500 MG: 500 TABLET ORAL at 10:05

## 2019-01-01 RX ADMIN — LINEZOLID 600 MG: 100 SUSPENSION ORAL at 08:58

## 2019-01-01 RX ADMIN — DOCUSATE SODIUM AND SENNOSIDES 2 TABLET: 50; 8.6 TABLET ORAL at 20:56

## 2019-01-01 RX ADMIN — SODIUM CHLORIDE, PRESERVATIVE FREE 10 ML: 5 INJECTION INTRAVENOUS at 08:25

## 2019-01-01 RX ADMIN — MEROPENEM 1 G: 1 INJECTION, POWDER, FOR SOLUTION INTRAVENOUS at 23:41

## 2019-01-01 RX ADMIN — PANTOPRAZOLE SODIUM 40 MG: 40 INJECTION, POWDER, FOR SOLUTION INTRAVENOUS at 21:35

## 2019-01-01 RX ADMIN — TAMSULOSIN HYDROCHLORIDE 0.4 MG: 0.4 CAPSULE ORAL at 20:44

## 2019-01-01 RX ADMIN — MAGNESIUM SULFATE HEPTAHYDRATE 2 G: 40 INJECTION, SOLUTION INTRAVENOUS at 08:31

## 2019-01-01 RX ADMIN — CASTOR OIL AND BALSAM, PERU: 788; 87 OINTMENT TOPICAL at 21:34

## 2019-01-01 RX ADMIN — FOLIC ACID 1 MG: 1 TABLET ORAL at 22:19

## 2019-01-01 RX ADMIN — ATORVASTATIN CALCIUM 20 MG: 20 TABLET, FILM COATED ORAL at 09:01

## 2019-01-01 RX ADMIN — MAGNESIUM SULFATE HEPTAHYDRATE 4 G: 40 INJECTION, SOLUTION INTRAVENOUS at 11:23

## 2019-01-01 RX ADMIN — SODIUM CHLORIDE, PRESERVATIVE FREE 3 ML: 5 INJECTION INTRAVENOUS at 09:22

## 2019-01-01 RX ADMIN — SODIUM CHLORIDE, PRESERVATIVE FREE 3 ML: 5 INJECTION INTRAVENOUS at 08:18

## 2019-01-01 RX ADMIN — SODIUM CHLORIDE, POTASSIUM CHLORIDE, SODIUM LACTATE AND CALCIUM CHLORIDE: 600; 310; 30; 20 INJECTION, SOLUTION INTRAVENOUS at 11:29

## 2019-01-01 RX ADMIN — ATRACURIUM BESYLATE 10 MG: 10 INJECTION, SOLUTION INTRAVENOUS at 09:43

## 2019-01-01 RX ADMIN — SENNOSIDES, DOCUSATE SODIUM 2 TABLET: 50; 8.6 TABLET, FILM COATED ORAL at 21:35

## 2019-01-01 RX ADMIN — OXYCODONE HYDROCHLORIDE AND ACETAMINOPHEN 500 MG: 500 TABLET ORAL at 08:58

## 2019-01-01 RX ADMIN — NEOSTIGMINE METHYLSULFATE 2 MG: 1 INJECTION, SOLUTION INTRAVENOUS at 09:45

## 2019-01-01 RX ADMIN — FINASTERIDE 5 MG: 5 TABLET, FILM COATED ORAL at 21:07

## 2019-01-01 RX ADMIN — FERROUS SULFATE TAB 325 MG (65 MG ELEMENTAL FE) 325 MG: 325 (65 FE) TAB at 21:15

## 2019-01-01 RX ADMIN — RANOLAZINE 500 MG: 500 TABLET, FILM COATED, EXTENDED RELEASE ORAL at 22:13

## 2019-01-01 RX ADMIN — MEROPENEM 1 G: 1 INJECTION, POWDER, FOR SOLUTION INTRAVENOUS at 21:51

## 2019-01-01 RX ADMIN — LINEZOLID 600 MG: 100 SUSPENSION ORAL at 22:01

## 2019-01-01 RX ADMIN — SENNOSIDES, DOCUSATE SODIUM 2 TABLET: 50; 8.6 TABLET, FILM COATED ORAL at 23:58

## 2019-01-01 RX ADMIN — FAMOTIDINE 20 MG: 20 TABLET ORAL at 08:31

## 2019-01-01 RX ADMIN — HEPARIN SODIUM 5000 UNITS: 5000 INJECTION INTRAVENOUS; SUBCUTANEOUS at 06:05

## 2019-01-01 RX ADMIN — METOPROLOL TARTRATE 25 MG: 25 TABLET ORAL at 16:03

## 2019-01-01 RX ADMIN — Medication 100 MCG: at 09:31

## 2019-01-01 RX ADMIN — FOLIC ACID 1 MG: 1 TABLET ORAL at 23:58

## 2019-01-01 RX ADMIN — TAMSULOSIN HYDROCHLORIDE 0.4 MG: 0.4 CAPSULE ORAL at 21:47

## 2019-01-01 RX ADMIN — DOCUSATE SODIUM AND SENNOSIDES 2 TABLET: 50; 8.6 TABLET ORAL at 08:54

## 2019-01-01 RX ADMIN — FAMOTIDINE 20 MG: 20 TABLET ORAL at 09:20

## 2019-01-01 RX ADMIN — RANOLAZINE 500 MG: 500 TABLET, FILM COATED, EXTENDED RELEASE ORAL at 20:17

## 2019-01-01 RX ADMIN — METOPROLOL TARTRATE 12.5 MG: 25 TABLET, FILM COATED ORAL at 08:57

## 2019-01-01 RX ADMIN — BISACODYL 10 MG: 5 TABLET, COATED ORAL at 11:48

## 2019-01-01 RX ADMIN — METOPROLOL TARTRATE 25 MG: 25 TABLET ORAL at 05:41

## 2019-01-01 RX ADMIN — VITAMIN D, TAB 1000IU (100/BT) 1000 UNITS: 25 TAB at 09:14

## 2019-01-01 RX ADMIN — ASPIRIN 81 MG: 81 TABLET, COATED ORAL at 09:14

## 2019-01-01 RX ADMIN — TAMSULOSIN HYDROCHLORIDE 0.4 MG: 0.4 CAPSULE ORAL at 20:09

## 2019-01-01 RX ADMIN — RANOLAZINE 500 MG: 500 TABLET, FILM COATED, EXTENDED RELEASE ORAL at 20:41

## 2019-01-01 RX ADMIN — ERTAPENEM SODIUM 1 G: 1 INJECTION, POWDER, LYOPHILIZED, FOR SOLUTION INTRAMUSCULAR; INTRAVENOUS at 09:36

## 2019-01-01 RX ADMIN — HEPARIN SODIUM 5000 UNITS: 5000 INJECTION INTRAVENOUS; SUBCUTANEOUS at 15:12

## 2019-01-01 RX ADMIN — CASTOR OIL AND BALSAM, PERU: 788; 87 OINTMENT TOPICAL at 21:15

## 2019-01-01 RX ADMIN — FAMOTIDINE 20 MG: 20 TABLET ORAL at 11:59

## 2019-01-01 RX ADMIN — RANOLAZINE 500 MG: 500 TABLET, FILM COATED, EXTENDED RELEASE ORAL at 21:04

## 2019-01-01 RX ADMIN — MEROPENEM 1 G: 1 INJECTION, POWDER, FOR SOLUTION INTRAVENOUS at 21:44

## 2019-01-01 RX ADMIN — VITAM B12 100 MCG: 100 TAB at 09:43

## 2019-01-01 RX ADMIN — NYSTATIN: 100000 POWDER TOPICAL at 20:34

## 2019-01-01 RX ADMIN — POLYETHYLENE GLYCOL 3350 17 G: 17 POWDER, FOR SOLUTION ORAL at 09:57

## 2019-01-01 RX ADMIN — FOLIC ACID 1 MG: 1 TABLET ORAL at 22:00

## 2019-01-01 RX ADMIN — PANTOPRAZOLE SODIUM 40 MG: 40 INJECTION, POWDER, FOR SOLUTION INTRAVENOUS at 21:47

## 2019-01-01 RX ADMIN — NYSTATIN: 100000 POWDER TOPICAL at 21:34

## 2019-01-01 RX ADMIN — CASTOR OIL AND BALSAM, PERU: 788; 87 OINTMENT TOPICAL at 23:59

## 2019-01-01 RX ADMIN — FOLIC ACID 1 MG: 1 TABLET ORAL at 20:01

## 2019-01-01 RX ADMIN — OXYCODONE HYDROCHLORIDE AND ACETAMINOPHEN 500 MG: 500 TABLET ORAL at 09:09

## 2019-01-01 RX ADMIN — SODIUM CHLORIDE, PRESERVATIVE FREE 3 ML: 5 INJECTION INTRAVENOUS at 18:30

## 2019-01-01 RX ADMIN — PROPOFOL 50 MG: 10 INJECTION, EMULSION INTRAVENOUS at 11:35

## 2019-01-01 RX ADMIN — METOPROLOL TARTRATE 2.5 MG: 5 INJECTION INTRAVENOUS at 07:59

## 2019-01-01 RX ADMIN — PHYTONADIONE 5 MG: 10 INJECTION, EMULSION INTRAMUSCULAR; INTRAVENOUS; SUBCUTANEOUS at 11:04

## 2019-01-01 RX ADMIN — FERROUS SULFATE TAB 325 MG (65 MG ELEMENTAL FE) 325 MG: 325 (65 FE) TAB at 09:43

## 2019-01-01 RX ADMIN — VITAM B12 100 MCG: 100 TAB at 08:59

## 2019-01-01 RX ADMIN — MEROPENEM 1 G: 1 INJECTION, POWDER, FOR SOLUTION INTRAVENOUS at 14:35

## 2019-01-01 RX ADMIN — FOLIC ACID 1 MG: 1 TABLET ORAL at 20:29

## 2019-01-01 RX ADMIN — IPRATROPIUM BROMIDE AND ALBUTEROL SULFATE 3 ML: 2.5; .5 SOLUTION RESPIRATORY (INHALATION) at 08:40

## 2019-01-01 RX ADMIN — TAMSULOSIN HYDROCHLORIDE 0.4 MG: 0.4 CAPSULE ORAL at 20:59

## 2019-01-01 RX ADMIN — SODIUM CHLORIDE, PRESERVATIVE FREE 3 ML: 5 INJECTION INTRAVENOUS at 21:53

## 2019-01-01 RX ADMIN — CASTOR OIL AND BALSAM, PERU: 788; 87 OINTMENT TOPICAL at 16:00

## 2019-01-01 RX ADMIN — PANTOPRAZOLE SODIUM 40 MG: 40 INJECTION, POWDER, FOR SOLUTION INTRAVENOUS at 21:15

## 2019-01-01 RX ADMIN — ASPIRIN 81 MG: 81 TABLET, COATED ORAL at 08:21

## 2019-01-01 RX ADMIN — VITAMIN D, TAB 1000IU (100/BT) 1000 UNITS: 25 TAB at 08:59

## 2019-01-01 RX ADMIN — FINASTERIDE 5 MG: 5 TABLET, FILM COATED ORAL at 08:08

## 2019-01-01 RX ADMIN — RIVAROXABAN 15 MG: 15 TABLET, FILM COATED ORAL at 21:13

## 2019-01-01 RX ADMIN — FERROUS SULFATE TAB 325 MG (65 MG ELEMENTAL FE) 325 MG: 325 (65 FE) TAB at 21:42

## 2019-01-01 RX ADMIN — MEROPENEM 1 G: 1 INJECTION, POWDER, FOR SOLUTION INTRAVENOUS at 21:04

## 2019-01-01 RX ADMIN — VITAM B12 100 MCG: 100 TAB at 09:00

## 2019-01-01 RX ADMIN — SODIUM CHLORIDE, PRESERVATIVE FREE 10 ML: 5 INJECTION INTRAVENOUS at 10:09

## 2019-01-01 RX ADMIN — SODIUM CHLORIDE, PRESERVATIVE FREE 3 ML: 5 INJECTION INTRAVENOUS at 09:10

## 2019-01-01 RX ADMIN — Medication 150 ML: at 17:48

## 2019-01-01 RX ADMIN — OXYCODONE HYDROCHLORIDE AND ACETAMINOPHEN 500 MG: 500 TABLET ORAL at 09:14

## 2019-01-01 RX ADMIN — FERROUS SULFATE TAB 325 MG (65 MG ELEMENTAL FE) 325 MG: 325 (65 FE) TAB at 09:16

## 2019-01-01 RX ADMIN — ATORVASTATIN CALCIUM 20 MG: 20 TABLET, FILM COATED ORAL at 08:52

## 2019-01-01 RX ADMIN — SODIUM CHLORIDE, PRESERVATIVE FREE 10 ML: 5 INJECTION INTRAVENOUS at 20:01

## 2019-01-01 RX ADMIN — VITAM B12 100 MCG: 100 TAB at 08:14

## 2019-01-01 RX ADMIN — CASTOR OIL AND BALSAM, PERU: 788; 87 OINTMENT TOPICAL at 23:41

## 2019-01-01 RX ADMIN — NYSTATIN: 100000 POWDER TOPICAL at 21:15

## 2019-01-01 RX ADMIN — VITAM B12 100 MCG: 100 TAB at 09:20

## 2019-01-01 RX ADMIN — SODIUM CHLORIDE, PRESERVATIVE FREE 3 ML: 5 INJECTION INTRAVENOUS at 22:11

## 2019-01-01 RX ADMIN — CASTOR OIL AND BALSAM, PERU: 788; 87 OINTMENT TOPICAL at 22:16

## 2019-01-01 RX ADMIN — CHLORHEXIDINE GLUCONATE 15 ML: 1.2 RINSE ORAL at 20:19

## 2019-01-01 RX ADMIN — ASPIRIN 81 MG: 81 TABLET, COATED ORAL at 08:58

## 2019-01-01 RX ADMIN — FOLIC ACID 1 MG: 1 TABLET ORAL at 21:47

## 2019-01-01 RX ADMIN — LINEZOLID 600 MG: 100 SUSPENSION ORAL at 09:21

## 2019-01-01 RX ADMIN — VITAMIN D, TAB 1000IU (100/BT) 1000 UNITS: 25 TAB at 09:43

## 2019-01-01 RX ADMIN — SALINE NASAL SPRAY 1 SPRAY: 1.5 SOLUTION NASAL at 04:23

## 2019-01-01 RX ADMIN — ATRACURIUM BESYLATE 10 MG: 10 INJECTION, SOLUTION INTRAVENOUS at 10:09

## 2019-01-01 RX ADMIN — ONDANSETRON 4 MG: 2 INJECTION INTRAMUSCULAR; INTRAVENOUS at 09:40

## 2019-01-01 RX ADMIN — FINASTERIDE 5 MG: 5 TABLET, FILM COATED ORAL at 20:34

## 2019-01-01 RX ADMIN — IPRATROPIUM BROMIDE AND ALBUTEROL SULFATE 3 ML: 2.5; .5 SOLUTION RESPIRATORY (INHALATION) at 13:36

## 2019-01-01 RX ADMIN — SENNOSIDES, DOCUSATE SODIUM 2 TABLET: 50; 8.6 TABLET, FILM COATED ORAL at 20:29

## 2019-01-01 RX ADMIN — ATRACURIUM BESYLATE 50 MG: 10 INJECTION, SOLUTION INTRAVENOUS at 09:00

## 2019-01-01 RX ADMIN — MEROPENEM 1 G: 1 INJECTION, POWDER, FOR SOLUTION INTRAVENOUS at 09:14

## 2019-01-01 RX ADMIN — FINASTERIDE 5 MG: 5 TABLET, FILM COATED ORAL at 21:59

## 2019-01-01 RX ADMIN — HEPARIN SODIUM 5000 UNITS: 5000 INJECTION INTRAVENOUS; SUBCUTANEOUS at 21:09

## 2019-01-01 RX ADMIN — FENTANYL CITRATE 100 MCG: 50 INJECTION, SOLUTION INTRAMUSCULAR; INTRAVENOUS at 09:00

## 2019-01-01 RX ADMIN — PHENYLEPHRINE HYDROCHLORIDE 100 MCG: 10 INJECTION INTRAVENOUS at 11:44

## 2019-01-01 RX ADMIN — DEXAMETHASONE SODIUM PHOSPHATE 4 MG: 4 INJECTION, SOLUTION INTRAMUSCULAR; INTRAVENOUS at 09:00

## 2019-01-01 RX ADMIN — METOPROLOL TARTRATE 12.5 MG: 25 TABLET, FILM COATED ORAL at 20:57

## 2019-01-01 RX ADMIN — MEROPENEM 1 G: 1 INJECTION, POWDER, FOR SOLUTION INTRAVENOUS at 05:14

## 2019-01-01 RX ADMIN — LIDOCAINE HYDROCHLORIDE 100 MG: 10 INJECTION, SOLUTION EPIDURAL; INFILTRATION; INTRACAUDAL; PERINEURAL at 11:35

## 2019-01-01 RX ADMIN — ASPIRIN 81 MG: 81 TABLET, COATED ORAL at 08:55

## 2019-01-01 RX ADMIN — PANTOPRAZOLE SODIUM 40 MG: 40 INJECTION, POWDER, FOR SOLUTION INTRAVENOUS at 09:11

## 2019-01-01 RX ADMIN — METOPROLOL TARTRATE 12.5 MG: 25 TABLET, FILM COATED ORAL at 09:14

## 2019-01-01 RX ADMIN — LINEZOLID 600 MG: 100 SUSPENSION ORAL at 09:11

## 2019-01-01 RX ADMIN — PANTOPRAZOLE SODIUM 40 MG: 40 TABLET, DELAYED RELEASE ORAL at 05:17

## 2019-01-01 RX ADMIN — FAMOTIDINE 20 MG: 20 TABLET ORAL at 08:54

## 2019-01-01 RX ADMIN — MEROPENEM 1 G: 1 INJECTION, POWDER, FOR SOLUTION INTRAVENOUS at 21:07

## 2019-01-01 RX ADMIN — ASPIRIN 81 MG: 81 TABLET, COATED ORAL at 08:47

## 2019-01-01 RX ADMIN — FOLIC ACID 1 MG: 1 TABLET ORAL at 21:57

## 2019-01-01 RX ADMIN — FENTANYL CITRATE 250 MCG: 50 INJECTION, SOLUTION INTRAMUSCULAR; INTRAVENOUS at 13:06

## 2019-01-01 RX ADMIN — METOPROLOL TARTRATE 50 MG: 50 TABLET ORAL at 08:08

## 2019-01-01 RX ADMIN — SODIUM CHLORIDE, PRESERVATIVE FREE 3 ML: 5 INJECTION INTRAVENOUS at 08:52

## 2019-01-01 RX ADMIN — FERROUS SULFATE TAB 325 MG (65 MG ELEMENTAL FE) 325 MG: 325 (65 FE) TAB at 09:27

## 2019-01-01 RX ADMIN — SODIUM CHLORIDE 500 ML: 9 INJECTION, SOLUTION INTRAVENOUS at 15:46

## 2019-01-01 RX ADMIN — FINASTERIDE 5 MG: 5 TABLET, FILM COATED ORAL at 20:51

## 2019-01-01 RX ADMIN — FUROSEMIDE 40 MG: 10 INJECTION, SOLUTION INTRAMUSCULAR; INTRAVENOUS at 13:33

## 2019-01-01 RX ADMIN — VITAMIN D, TAB 1000IU (100/BT) 1000 UNITS: 25 TAB at 08:18

## 2019-01-01 RX ADMIN — VITAM B12 100 MCG: 100 TAB at 08:47

## 2019-01-01 RX ADMIN — CASTOR OIL AND BALSAM, PERU: 788; 87 OINTMENT TOPICAL at 08:16

## 2019-01-01 RX ADMIN — METOPROLOL TARTRATE 12.5 MG: 25 TABLET, FILM COATED ORAL at 08:18

## 2019-01-01 RX ADMIN — MEROPENEM 1 G: 1 INJECTION, POWDER, FOR SOLUTION INTRAVENOUS at 21:34

## 2019-01-01 RX ADMIN — ATRACURIUM BESYLATE 10 MG: 10 INJECTION, SOLUTION INTRAVENOUS at 11:31

## 2019-01-01 RX ADMIN — OXYCODONE HYDROCHLORIDE AND ACETAMINOPHEN 500 MG: 500 TABLET ORAL at 21:37

## 2019-01-01 RX ADMIN — PHENYLEPHRINE HYDROCHLORIDE 200 MCG: 10 INJECTION INTRAVENOUS at 11:40

## 2019-01-01 RX ADMIN — METOPROLOL TARTRATE 25 MG: 25 TABLET ORAL at 14:18

## 2019-01-01 RX ADMIN — HYDROCODONE BITARTRATE AND ACETAMINOPHEN 1 TABLET: 7.5; 325 TABLET ORAL at 01:21

## 2019-01-01 RX ADMIN — SALINE NASAL SPRAY 1 SPRAY: 1.5 SOLUTION NASAL at 08:26

## 2019-01-01 RX ADMIN — SODIUM CHLORIDE 100 ML/HR: 9 INJECTION, SOLUTION INTRAVENOUS at 08:46

## 2019-01-01 RX ADMIN — DOXYCYCLINE 100 MG: 100 CAPSULE ORAL at 23:58

## 2019-01-01 RX ADMIN — SODIUM CHLORIDE: 9 INJECTION, SOLUTION INTRAVENOUS at 08:39

## 2019-01-01 RX ADMIN — CASTOR OIL AND BALSAM, PERU: 788; 87 OINTMENT TOPICAL at 21:14

## 2019-01-01 RX ADMIN — NYSTATIN: 100000 POWDER TOPICAL at 09:09

## 2019-01-01 RX ADMIN — FINASTERIDE 5 MG: 5 TABLET, FILM COATED ORAL at 20:09

## 2019-01-01 RX ADMIN — HEPARIN SODIUM 5000 UNITS: 5000 INJECTION INTRAVENOUS; SUBCUTANEOUS at 05:14

## 2019-01-01 RX ADMIN — PATIROMER 1 PACKET: 8.4 POWDER, FOR SUSPENSION ORAL at 17:27

## 2019-01-01 RX ADMIN — FOLIC ACID 1 MG: 1 TABLET ORAL at 20:22

## 2019-01-01 RX ADMIN — HYDROCODONE BITARTRATE AND ACETAMINOPHEN 1 TABLET: 7.5; 325 TABLET ORAL at 00:50

## 2019-01-01 RX ADMIN — SODIUM CHLORIDE, PRESERVATIVE FREE 10 ML: 5 INJECTION INTRAVENOUS at 21:16

## 2019-01-01 RX ADMIN — SENNOSIDES, DOCUSATE SODIUM 2 TABLET: 50; 8.6 TABLET, FILM COATED ORAL at 21:43

## 2019-01-01 RX ADMIN — TAMSULOSIN HYDROCHLORIDE 0.4 MG: 0.4 CAPSULE ORAL at 09:31

## 2019-01-01 RX ADMIN — EPHEDRINE SULFATE 5 MG: 50 INJECTION INTRAMUSCULAR; INTRAVENOUS; SUBCUTANEOUS at 12:31

## 2019-01-01 RX ADMIN — DOCUSATE SODIUM AND SENNOSIDES 2 TABLET: 50; 8.6 TABLET ORAL at 20:17

## 2019-01-01 RX ADMIN — SENNOSIDES, DOCUSATE SODIUM 2 TABLET: 50; 8.6 TABLET, FILM COATED ORAL at 21:04

## 2019-01-01 RX ADMIN — FINASTERIDE 5 MG: 5 TABLET, FILM COATED ORAL at 20:30

## 2019-01-01 RX ADMIN — METOPROLOL TARTRATE 25 MG: 25 TABLET ORAL at 21:52

## 2019-01-01 RX ADMIN — VITAMIN D, TAB 1000IU (100/BT) 1000 UNITS: 25 TAB at 09:07

## 2019-01-01 RX ADMIN — VITAM B12 100 MCG: 100 TAB at 08:17

## 2019-01-01 RX ADMIN — CASTOR OIL AND BALSAM, PERU: 788; 87 OINTMENT TOPICAL at 09:17

## 2019-01-01 RX ADMIN — FERROUS SULFATE TAB 325 MG (65 MG ELEMENTAL FE) 325 MG: 325 (65 FE) TAB at 20:51

## 2019-01-01 RX ADMIN — FERROUS SULFATE TAB 325 MG (65 MG ELEMENTAL FE) 325 MG: 325 (65 FE) TAB at 08:51

## 2019-01-01 RX ADMIN — METOPROLOL TARTRATE 50 MG: 50 TABLET ORAL at 08:37

## 2019-01-01 RX ADMIN — METOPROLOL TARTRATE 12.5 MG: 25 TABLET, FILM COATED ORAL at 09:09

## 2019-01-01 RX ADMIN — PANTOPRAZOLE SODIUM 40 MG: 40 TABLET, DELAYED RELEASE ORAL at 17:15

## 2019-01-01 RX ADMIN — PROPOFOL 50 MCG/KG/MIN: 10 INJECTION, EMULSION INTRAVENOUS at 14:20

## 2019-01-01 RX ADMIN — METOPROLOL TARTRATE 25 MG: 25 TABLET ORAL at 20:00

## 2019-01-01 RX ADMIN — DEXAMETHASONE SODIUM PHOSPHATE 4 MG: 4 INJECTION, SOLUTION INTRAMUSCULAR; INTRAVENOUS at 09:23

## 2019-01-01 RX ADMIN — BISACODYL 10 MG: 10 SUPPOSITORY RECTAL at 15:41

## 2019-01-01 RX ADMIN — CASTOR OIL AND BALSAM, PERU: 788; 87 OINTMENT TOPICAL at 09:01

## 2019-01-01 RX ADMIN — OXYCODONE HYDROCHLORIDE AND ACETAMINOPHEN 500 MG: 500 TABLET ORAL at 08:17

## 2019-01-01 RX ADMIN — SENNOSIDES, DOCUSATE SODIUM 2 TABLET: 50; 8.6 TABLET, FILM COATED ORAL at 21:06

## 2019-01-01 RX ADMIN — FINASTERIDE 5 MG: 5 TABLET, FILM COATED ORAL at 08:15

## 2019-01-01 RX ADMIN — MELATONIN TAB 5 MG 5 MG: 5 TAB at 20:11

## 2019-01-01 RX ADMIN — TAMSULOSIN HYDROCHLORIDE 0.4 MG: 0.4 CAPSULE ORAL at 08:58

## 2019-01-01 RX ADMIN — FERROUS SULFATE TAB 325 MG (65 MG ELEMENTAL FE) 325 MG: 325 (65 FE) TAB at 22:01

## 2019-01-01 RX ADMIN — METOPROLOL TARTRATE 50 MG: 50 TABLET ORAL at 20:59

## 2019-01-01 RX ADMIN — METOPROLOL TARTRATE 50 MG: 50 TABLET ORAL at 08:51

## 2019-01-01 RX ADMIN — RANOLAZINE 500 MG: 500 TABLET, FILM COATED, EXTENDED RELEASE ORAL at 08:14

## 2019-01-01 RX ADMIN — NYSTATIN: 100000 POWDER TOPICAL at 08:18

## 2019-01-01 RX ADMIN — ASPIRIN 81 MG: 81 TABLET, COATED ORAL at 09:50

## 2019-01-01 RX ADMIN — FERROUS SULFATE TAB 325 MG (65 MG ELEMENTAL FE) 325 MG: 325 (65 FE) TAB at 09:09

## 2019-01-01 RX ADMIN — METOPROLOL TARTRATE 12.5 MG: 25 TABLET, FILM COATED ORAL at 21:36

## 2019-01-01 RX ADMIN — MEROPENEM 1 G: 1 INJECTION, POWDER, FOR SOLUTION INTRAVENOUS at 06:37

## 2019-01-01 RX ADMIN — OXYCODONE HYDROCHLORIDE AND ACETAMINOPHEN 500 MG: 500 TABLET ORAL at 08:18

## 2019-01-01 RX ADMIN — VITAMIN D, TAB 1000IU (100/BT) 500 UNITS: 25 TAB at 08:15

## 2019-01-01 RX ADMIN — POLYETHYLENE GLYCOL 3350 17 G: 17 POWDER, FOR SOLUTION ORAL at 09:02

## 2019-01-01 RX ADMIN — MELATONIN TAB 5 MG 5 MG: 5 TAB at 20:22

## 2019-01-01 RX ADMIN — FAMOTIDINE 20 MG: 20 TABLET ORAL at 09:57

## 2019-01-01 RX ADMIN — SODIUM CHLORIDE, PRESERVATIVE FREE 3 ML: 5 INJECTION INTRAVENOUS at 21:19

## 2019-01-01 RX ADMIN — ATORVASTATIN CALCIUM 20 MG: 20 TABLET, FILM COATED ORAL at 09:20

## 2019-01-01 RX ADMIN — CASTOR OIL AND BALSAM, PERU: 788; 87 OINTMENT TOPICAL at 20:43

## 2019-01-01 RX ADMIN — GLYCOPYRROLATE 0.2 MG: 0.2 INJECTION, SOLUTION INTRAMUSCULAR; INTRAVENOUS at 09:45

## 2019-01-01 RX ADMIN — OXYCODONE HYDROCHLORIDE AND ACETAMINOPHEN 500 MG: 500 TABLET ORAL at 08:51

## 2019-01-01 RX ADMIN — FERROUS SULFATE TAB 325 MG (65 MG ELEMENTAL FE) 325 MG: 325 (65 FE) TAB at 21:36

## 2019-01-01 RX ADMIN — FOLIC ACID 1 MG: 1 TABLET ORAL at 21:35

## 2019-01-01 RX ADMIN — VITAM B12 100 MCG: 100 TAB at 09:14

## 2019-01-01 RX ADMIN — SODIUM CHLORIDE 250 MG: 9 INJECTION, SOLUTION INTRAVENOUS at 08:59

## 2019-01-01 RX ADMIN — RIVAROXABAN 15 MG: 15 TABLET, FILM COATED ORAL at 17:20

## 2019-01-01 RX ADMIN — METOPROLOL TARTRATE 12.5 MG: 25 TABLET, FILM COATED ORAL at 10:46

## 2019-01-01 RX ADMIN — FERROUS SULFATE TAB 325 MG (65 MG ELEMENTAL FE) 325 MG: 325 (65 FE) TAB at 08:58

## 2019-01-01 RX ADMIN — CEFUROXIME 1.5 G: 1.5 INJECTION, POWDER, FOR SOLUTION INTRAVENOUS at 09:10

## 2019-01-01 RX ADMIN — MEROPENEM 1 G: 1 INJECTION, POWDER, FOR SOLUTION INTRAVENOUS at 23:57

## 2019-01-01 RX ADMIN — FAMOTIDINE 20 MG: 20 TABLET ORAL at 20:59

## 2019-01-01 RX ADMIN — TAMSULOSIN HYDROCHLORIDE 0.4 MG: 0.4 CAPSULE ORAL at 21:14

## 2019-01-01 RX ADMIN — PANTOPRAZOLE SODIUM 40 MG: 40 INJECTION, POWDER, FOR SOLUTION INTRAVENOUS at 10:04

## 2019-01-01 RX ADMIN — SODIUM CHLORIDE 250 MG: 9 INJECTION, SOLUTION INTRAVENOUS at 15:13

## 2019-01-01 RX ADMIN — METOPROLOL TARTRATE 12.5 MG: 25 TABLET, FILM COATED ORAL at 11:58

## 2019-01-01 RX ADMIN — SODIUM CHLORIDE, POTASSIUM CHLORIDE, SODIUM LACTATE AND CALCIUM CHLORIDE 9 ML/HR: 600; 310; 30; 20 INJECTION, SOLUTION INTRAVENOUS at 10:05

## 2019-01-01 RX ADMIN — HEPARIN SODIUM 5000 UNITS: 5000 INJECTION INTRAVENOUS; SUBCUTANEOUS at 05:22

## 2019-01-01 RX ADMIN — BISACODYL 10 MG: 5 TABLET, COATED ORAL at 10:40

## 2019-01-01 RX ADMIN — CASTOR OIL AND BALSAM, PERU: 788; 87 OINTMENT TOPICAL at 09:09

## 2019-01-01 RX ADMIN — POLYETHYLENE GLYCOL 3350 17 G: 17 POWDER, FOR SOLUTION ORAL at 09:09

## 2019-01-01 RX ADMIN — VITAM B12 100 MCG: 100 TAB at 09:50

## 2019-01-01 RX ADMIN — FERROUS SULFATE TAB 325 MG (65 MG ELEMENTAL FE) 325 MG: 325 (65 FE) TAB at 08:59

## 2019-01-01 RX ADMIN — FERROUS SULFATE TAB 325 MG (65 MG ELEMENTAL FE) 325 MG: 325 (65 FE) TAB at 16:13

## 2019-01-01 RX ADMIN — MEROPENEM 1 G: 1 INJECTION, POWDER, FOR SOLUTION INTRAVENOUS at 08:39

## 2019-01-01 RX ADMIN — LINEZOLID 600 MG: 100 SUSPENSION ORAL at 08:46

## 2019-01-01 RX ADMIN — OXYCODONE HYDROCHLORIDE AND ACETAMINOPHEN 500 MG: 500 TABLET ORAL at 20:11

## 2019-01-01 RX ADMIN — OXYCODONE HYDROCHLORIDE AND ACETAMINOPHEN 500 MG: 500 TABLET ORAL at 09:07

## 2019-01-01 RX ADMIN — SODIUM CHLORIDE, PRESERVATIVE FREE 3 ML: 5 INJECTION INTRAVENOUS at 21:34

## 2019-01-01 RX ADMIN — OXYCODONE HYDROCHLORIDE AND ACETAMINOPHEN 500 MG: 500 TABLET ORAL at 09:43

## 2019-01-01 RX ADMIN — FAMOTIDINE 20 MG: 20 TABLET ORAL at 21:57

## 2019-01-01 RX ADMIN — FERROUS SULFATE TAB 325 MG (65 MG ELEMENTAL FE) 325 MG: 325 (65 FE) TAB at 20:29

## 2019-01-01 RX ADMIN — ESMOLOL HYDROCHLORIDE 20 MG: 10 INJECTION, SOLUTION INTRAVENOUS at 08:53

## 2019-01-01 RX ADMIN — MELATONIN TAB 5 MG 5 MG: 5 TAB at 20:33

## 2019-01-01 RX ADMIN — SODIUM CHLORIDE, PRESERVATIVE FREE 3 ML: 5 INJECTION INTRAVENOUS at 21:35

## 2019-01-01 RX ADMIN — RIVAROXABAN 15 MG: 15 TABLET, FILM COATED ORAL at 20:46

## 2019-01-01 RX ADMIN — HYDROCODONE BITARTRATE AND ACETAMINOPHEN 1 TABLET: 7.5; 325 TABLET ORAL at 20:58

## 2019-01-01 RX ADMIN — ASPIRIN 81 MG: 81 TABLET, COATED ORAL at 09:07

## 2019-01-01 RX ADMIN — HYDROCODONE BITARTRATE AND ACETAMINOPHEN 1 TABLET: 7.5; 325 TABLET ORAL at 23:59

## 2019-01-01 RX ADMIN — OXYCODONE HYDROCHLORIDE AND ACETAMINOPHEN 500 MG: 500 TABLET ORAL at 13:30

## 2019-01-01 RX ADMIN — Medication 100 MCG: at 09:08

## 2019-01-01 RX ADMIN — FINASTERIDE 5 MG: 5 TABLET, FILM COATED ORAL at 13:32

## 2019-01-01 RX ADMIN — CALCIUM 500 MG: 500 TABLET ORAL at 09:14

## 2019-01-01 RX ADMIN — VITAM B12 100 MCG: 100 TAB at 08:51

## 2019-01-01 RX ADMIN — ASPIRIN 81 MG: 81 TABLET, COATED ORAL at 08:54

## 2019-01-01 RX ADMIN — METOPROLOL TARTRATE 12.5 MG: 25 TABLET, FILM COATED ORAL at 09:12

## 2019-01-01 RX ADMIN — ASPIRIN 81 MG: 81 TABLET, COATED ORAL at 08:08

## 2019-01-01 RX ADMIN — FERROUS SULFATE TAB 325 MG (65 MG ELEMENTAL FE) 325 MG: 325 (65 FE) TAB at 23:59

## 2019-01-01 RX ADMIN — OXYCODONE HYDROCHLORIDE AND ACETAMINOPHEN 500 MG: 500 TABLET ORAL at 08:47

## 2019-01-01 RX ADMIN — IPRATROPIUM BROMIDE AND ALBUTEROL SULFATE 3 ML: 2.5; .5 SOLUTION RESPIRATORY (INHALATION) at 08:25

## 2019-01-01 RX ADMIN — METOPROLOL TARTRATE 50 MG: 50 TABLET ORAL at 09:07

## 2019-01-01 RX ADMIN — HEPARIN SODIUM 5000 UNITS: 5000 INJECTION INTRAVENOUS; SUBCUTANEOUS at 21:38

## 2019-01-01 RX ADMIN — IPRATROPIUM BROMIDE AND ALBUTEROL SULFATE 3 ML: 2.5; .5 SOLUTION RESPIRATORY (INHALATION) at 16:45

## 2019-01-01 RX ADMIN — FOLIC ACID 1 MG: 1 TABLET ORAL at 20:41

## 2019-01-01 RX ADMIN — METOPROLOL TARTRATE 25 MG: 25 TABLET ORAL at 08:47

## 2019-01-01 RX ADMIN — ATORVASTATIN CALCIUM 20 MG: 20 TABLET, FILM COATED ORAL at 09:14

## 2019-01-01 RX ADMIN — FAMOTIDINE 20 MG: 20 TABLET ORAL at 09:09

## 2019-01-01 RX ADMIN — SENNOSIDES, DOCUSATE SODIUM 2 TABLET: 50; 8.6 TABLET, FILM COATED ORAL at 20:08

## 2019-01-01 RX ADMIN — PANTOPRAZOLE SODIUM 40 MG: 40 TABLET, DELAYED RELEASE ORAL at 17:16

## 2019-01-01 RX ADMIN — HYDROCODONE BITARTRATE AND ACETAMINOPHEN 1 TABLET: 7.5; 325 TABLET ORAL at 23:39

## 2019-01-01 RX ADMIN — RANOLAZINE 500 MG: 500 TABLET, FILM COATED, EXTENDED RELEASE ORAL at 21:14

## 2019-01-01 RX ADMIN — FOLIC ACID 1 MG: 1 TABLET ORAL at 21:04

## 2019-01-01 RX ADMIN — SODIUM CHLORIDE, PRESERVATIVE FREE 10 ML: 5 INJECTION INTRAVENOUS at 21:08

## 2019-01-01 RX ADMIN — PANTOPRAZOLE SODIUM 40 MG: 40 INJECTION, POWDER, FOR SOLUTION INTRAVENOUS at 20:33

## 2019-01-01 RX ADMIN — MEROPENEM 1 G: 1 INJECTION, POWDER, FOR SOLUTION INTRAVENOUS at 08:21

## 2019-01-01 RX ADMIN — DIGOXIN 125 MCG: 125 TABLET ORAL at 08:26

## 2019-01-01 RX ADMIN — FINASTERIDE 5 MG: 5 TABLET, FILM COATED ORAL at 21:05

## 2019-01-01 RX ADMIN — SODIUM CHLORIDE, PRESERVATIVE FREE 3 ML: 5 INJECTION INTRAVENOUS at 12:00

## 2019-01-01 RX ADMIN — SODIUM CHLORIDE, PRESERVATIVE FREE 3 ML: 5 INJECTION INTRAVENOUS at 21:44

## 2019-01-01 RX ADMIN — OXYCODONE HYDROCHLORIDE AND ACETAMINOPHEN 500 MG: 500 TABLET ORAL at 20:46

## 2019-01-01 RX ADMIN — PROPOFOL 50 MG: 10 INJECTION, EMULSION INTRAVENOUS at 11:37

## 2019-01-01 RX ADMIN — LINEZOLID 600 MG: 100 SUSPENSION ORAL at 22:29

## 2019-01-01 RX ADMIN — NOREPINEPHRINE BITARTRATE 0.02 MCG/KG/MIN: 1 INJECTION, SOLUTION, CONCENTRATE INTRAVENOUS at 13:37

## 2019-01-01 RX ADMIN — OXYCODONE HYDROCHLORIDE AND ACETAMINOPHEN 500 MG: 500 TABLET ORAL at 08:54

## 2019-01-01 RX ADMIN — RIVAROXABAN 15 MG: 15 TABLET, FILM COATED ORAL at 21:58

## 2019-01-01 RX ADMIN — METOPROLOL TARTRATE 50 MG: 50 TABLET ORAL at 09:31

## 2019-01-01 RX ADMIN — NYSTATIN: 100000 POWDER TOPICAL at 09:43

## 2019-01-01 RX ADMIN — CASTOR OIL AND BALSAM, PERU: 788; 87 OINTMENT TOPICAL at 09:22

## 2019-01-01 RX ADMIN — CALCIUM 500 MG: 500 TABLET ORAL at 11:58

## 2019-01-01 RX ADMIN — OXYCODONE HYDROCHLORIDE AND ACETAMINOPHEN 500 MG: 500 TABLET ORAL at 09:21

## 2019-01-01 RX ADMIN — ASPIRIN 81 MG: 81 TABLET, COATED ORAL at 15:12

## 2019-01-01 RX ADMIN — PANTOPRAZOLE SODIUM 40 MG: 40 INJECTION, POWDER, FOR SOLUTION INTRAVENOUS at 09:40

## 2019-01-01 RX ADMIN — LINEZOLID 600 MG: 100 SUSPENSION ORAL at 20:54

## 2019-01-01 RX ADMIN — HYDROCODONE BITARTRATE AND ACETAMINOPHEN 1 TABLET: 7.5; 325 TABLET ORAL at 04:29

## 2019-01-01 RX ADMIN — FERROUS SULFATE TAB 325 MG (65 MG ELEMENTAL FE) 325 MG: 325 (65 FE) TAB at 08:47

## 2019-01-01 RX ADMIN — OXYCODONE HYDROCHLORIDE AND ACETAMINOPHEN 500 MG: 500 TABLET ORAL at 20:18

## 2019-01-01 RX ADMIN — FAMOTIDINE 20 MG: 20 TABLET ORAL at 23:40

## 2019-01-01 RX ADMIN — FINASTERIDE 5 MG: 5 TABLET, FILM COATED ORAL at 20:45

## 2019-01-01 RX ADMIN — MORPHINE SULFATE 2 MG: 2 INJECTION, SOLUTION INTRAMUSCULAR; INTRAVENOUS at 00:49

## 2019-01-01 RX ADMIN — FINASTERIDE 5 MG: 5 TABLET, FILM COATED ORAL at 21:04

## 2019-01-01 RX ADMIN — OXYCODONE HYDROCHLORIDE AND ACETAMINOPHEN 500 MG: 500 TABLET ORAL at 09:31

## 2019-01-01 RX ADMIN — OXYCODONE HYDROCHLORIDE AND ACETAMINOPHEN 500 MG: 500 TABLET ORAL at 08:31

## 2019-01-01 RX ADMIN — POLYETHYLENE GLYCOL 3350 17 G: 17 POWDER, FOR SOLUTION ORAL at 08:54

## 2019-01-01 RX ADMIN — TAMSULOSIN HYDROCHLORIDE 0.4 MG: 0.4 CAPSULE ORAL at 11:02

## 2019-01-01 RX ADMIN — SODIUM CHLORIDE, PRESERVATIVE FREE 3 ML: 5 INJECTION INTRAVENOUS at 22:03

## 2019-01-01 RX ADMIN — SODIUM CHLORIDE, PRESERVATIVE FREE 3 ML: 5 INJECTION INTRAVENOUS at 20:54

## 2019-01-01 RX ADMIN — METOPROLOL TARTRATE 2.5 MG: 5 INJECTION INTRAVENOUS at 11:25

## 2019-01-01 RX ADMIN — HYDROCODONE BITARTRATE AND ACETAMINOPHEN 1 TABLET: 7.5; 325 TABLET ORAL at 22:20

## 2019-01-01 RX ADMIN — VITAMIN D, TAB 1000IU (100/BT) 1000 UNITS: 25 TAB at 15:39

## 2019-01-01 RX ADMIN — FENTANYL CITRATE 250 MCG: 50 INJECTION, SOLUTION INTRAMUSCULAR; INTRAVENOUS at 12:23

## 2019-01-01 RX ADMIN — PANTOPRAZOLE SODIUM 80 MG: 40 INJECTION, POWDER, FOR SOLUTION INTRAVENOUS at 18:59

## 2019-01-01 RX ADMIN — SODIUM CHLORIDE, PRESERVATIVE FREE 3 ML: 5 INJECTION INTRAVENOUS at 09:17

## 2019-01-01 RX ADMIN — METOPROLOL TARTRATE 12.5 MG: 25 TABLET, FILM COATED ORAL at 09:27

## 2019-01-01 RX ADMIN — CASTOR OIL AND BALSAM, PERU: 788; 87 OINTMENT TOPICAL at 22:48

## 2019-01-01 RX ADMIN — MORPHINE SULFATE 2 MG: 2 INJECTION, SOLUTION INTRAMUSCULAR; INTRAVENOUS at 21:00

## 2019-01-01 RX ADMIN — DOXYCYCLINE 100 MG: 100 CAPSULE ORAL at 20:29

## 2019-01-01 RX ADMIN — FOLIC ACID 1 MG: 1 TABLET ORAL at 21:58

## 2019-01-01 RX ADMIN — FINASTERIDE 5 MG: 5 TABLET, FILM COATED ORAL at 21:43

## 2019-01-01 RX ADMIN — PANTOPRAZOLE SODIUM 40 MG: 40 TABLET, DELAYED RELEASE ORAL at 09:14

## 2019-01-01 RX ADMIN — POTASSIUM CHLORIDE AND DEXTROSE MONOHYDRATE 30 ML/HR: 150; 5 INJECTION, SOLUTION INTRAVENOUS at 14:20

## 2019-01-01 RX ADMIN — FAMOTIDINE 20 MG: 20 TABLET ORAL at 21:38

## 2019-01-01 RX ADMIN — SODIUM CHLORIDE, PRESERVATIVE FREE 10 ML: 5 INJECTION INTRAVENOUS at 09:13

## 2019-01-01 RX ADMIN — MELATONIN TAB 5 MG 5 MG: 5 TAB at 22:12

## 2019-01-01 RX ADMIN — METOPROLOL TARTRATE 25 MG: 25 TABLET ORAL at 22:01

## 2019-01-01 RX ADMIN — ATORVASTATIN CALCIUM 20 MG: 20 TABLET, FILM COATED ORAL at 08:18

## 2019-01-01 RX ADMIN — ERTAPENEM SODIUM 1 G: 1 INJECTION, POWDER, LYOPHILIZED, FOR SOLUTION INTRAMUSCULAR; INTRAVENOUS at 16:18

## 2019-01-01 RX ADMIN — OXYCODONE HYDROCHLORIDE AND ACETAMINOPHEN 500 MG: 500 TABLET ORAL at 08:55

## 2019-01-01 RX ADMIN — MEROPENEM 1 G: 1 INJECTION, POWDER, FOR SOLUTION INTRAVENOUS at 20:08

## 2019-01-01 RX ADMIN — OXYCODONE HYDROCHLORIDE AND ACETAMINOPHEN 2 TABLET: 5; 325 TABLET ORAL at 01:54

## 2019-01-01 RX ADMIN — VITAMIN D, TAB 1000IU (100/BT) 1000 UNITS: 25 TAB at 08:51

## 2019-01-01 RX ADMIN — PHENYLEPHRINE HYDROCHLORIDE 200 MCG: 10 INJECTION INTRAVENOUS at 11:38

## 2019-01-01 RX ADMIN — SENNOSIDES, DOCUSATE SODIUM 2 TABLET: 50; 8.6 TABLET, FILM COATED ORAL at 21:52

## 2019-01-01 RX ADMIN — DOCUSATE SODIUM AND SENNOSIDES 2 TABLET: 50; 8.6 TABLET ORAL at 09:31

## 2019-01-01 RX ADMIN — RIVAROXABAN 15 MG: 15 TABLET, FILM COATED ORAL at 17:10

## 2019-01-01 RX ADMIN — HYDROCODONE BITARTRATE AND ACETAMINOPHEN 1 TABLET: 7.5; 325 TABLET ORAL at 13:05

## 2019-01-01 RX ADMIN — METOPROLOL TARTRATE 12.5 MG: 25 TABLET, FILM COATED ORAL at 21:44

## 2019-01-01 RX ADMIN — VANCOMYCIN HYDROCHLORIDE 750 MG: 750 INJECTION, SOLUTION INTRAVENOUS at 06:37

## 2019-01-01 RX ADMIN — DOCUSATE SODIUM AND SENNOSIDES 2 TABLET: 50; 8.6 TABLET ORAL at 20:46

## 2019-01-01 RX ADMIN — FERROUS SULFATE TAB 325 MG (65 MG ELEMENTAL FE) 325 MG: 325 (65 FE) TAB at 21:35

## 2019-01-01 RX ADMIN — SODIUM CHLORIDE, PRESERVATIVE FREE 3 ML: 5 INJECTION INTRAVENOUS at 08:59

## 2019-01-01 RX ADMIN — SODIUM CHLORIDE, PRESERVATIVE FREE 3 ML: 5 INJECTION INTRAVENOUS at 22:12

## 2019-01-01 RX ADMIN — BARIUM SULFATE 20 ML: 400 PASTE ORAL at 11:38

## 2019-01-01 RX ADMIN — MEROPENEM 1 G: 1 INJECTION, POWDER, FOR SOLUTION INTRAVENOUS at 21:37

## 2019-01-01 RX ADMIN — RANOLAZINE 500 MG: 500 TABLET, FILM COATED, EXTENDED RELEASE ORAL at 09:11

## 2019-01-01 RX ADMIN — PROPOFOL 80 MG: 10 INJECTION, EMULSION INTRAVENOUS at 08:53

## 2019-01-01 RX ADMIN — METOPROLOL TARTRATE 25 MG: 25 TABLET ORAL at 08:34

## 2019-01-01 RX ADMIN — VITAM B12 100 MCG: 100 TAB at 11:58

## 2019-01-01 RX ADMIN — FERROUS SULFATE TAB 325 MG (65 MG ELEMENTAL FE) 325 MG: 325 (65 FE) TAB at 08:31

## 2019-01-01 RX ADMIN — PANTOPRAZOLE SODIUM 40 MG: 40 INJECTION, POWDER, FOR SOLUTION INTRAVENOUS at 20:01

## 2019-01-01 RX ADMIN — FENTANYL CITRATE 50 MCG: 50 INJECTION, SOLUTION INTRAMUSCULAR; INTRAVENOUS at 09:15

## 2019-01-01 RX ADMIN — FAMOTIDINE 20 MG: 20 TABLET ORAL at 09:12

## 2019-01-01 RX ADMIN — FINASTERIDE 5 MG: 5 TABLET, FILM COATED ORAL at 09:31

## 2019-01-01 RX ADMIN — CASTOR OIL AND BALSAM, PERU: 788; 87 OINTMENT TOPICAL at 20:31

## 2019-01-01 RX ADMIN — HEPARIN SODIUM 5000 UNITS: 5000 INJECTION INTRAVENOUS; SUBCUTANEOUS at 20:12

## 2019-01-01 RX ADMIN — VITAMIN D, TAB 1000IU (100/BT) 1000 UNITS: 25 TAB at 09:15

## 2019-01-01 RX ADMIN — METOPROLOL TARTRATE 25 MG: 25 TABLET ORAL at 23:42

## 2019-01-01 RX ADMIN — MEROPENEM 1 G: 1 INJECTION, POWDER, FOR SOLUTION INTRAVENOUS at 09:41

## 2019-01-01 RX ADMIN — FERROUS SULFATE TAB 325 MG (65 MG ELEMENTAL FE) 325 MG: 325 (65 FE) TAB at 09:00

## 2019-01-01 RX ADMIN — POLYETHYLENE GLYCOL 3350 17 G: 17 POWDER, FOR SOLUTION ORAL at 09:50

## 2019-01-01 RX ADMIN — VITAM B12 100 MCG: 100 TAB at 22:06

## 2019-01-01 RX ADMIN — LINEZOLID 600 MG: 100 SUSPENSION ORAL at 10:01

## 2019-01-01 RX ADMIN — MEROPENEM 1 G: 1 INJECTION, POWDER, FOR SOLUTION INTRAVENOUS at 21:02

## 2019-01-01 RX ADMIN — ROCURONIUM BROMIDE 30 MG: 10 INJECTION INTRAVENOUS at 13:14

## 2019-01-01 RX ADMIN — OXYCODONE HYDROCHLORIDE AND ACETAMINOPHEN 500 MG: 500 TABLET ORAL at 08:21

## 2019-01-01 RX ADMIN — VITAMIN D, TAB 1000IU (100/BT) 500 UNITS: 25 TAB at 08:37

## 2019-01-01 RX ADMIN — OXYCODONE HYDROCHLORIDE AND ACETAMINOPHEN 500 MG: 500 TABLET ORAL at 20:19

## 2019-01-01 RX ADMIN — ATORVASTATIN CALCIUM 20 MG: 20 TABLET, FILM COATED ORAL at 08:54

## 2019-01-01 RX ADMIN — SODIUM CHLORIDE, PRESERVATIVE FREE 10 ML: 5 INJECTION INTRAVENOUS at 07:55

## 2019-01-01 RX ADMIN — PANTOPRAZOLE SODIUM 40 MG: 40 INJECTION, POWDER, FOR SOLUTION INTRAVENOUS at 08:25

## 2019-01-01 RX ADMIN — FERROUS SULFATE TAB 325 MG (65 MG ELEMENTAL FE) 325 MG: 325 (65 FE) TAB at 08:54

## 2019-01-01 RX ADMIN — FERROUS SULFATE TAB 325 MG (65 MG ELEMENTAL FE) 325 MG: 325 (65 FE) TAB at 09:14

## 2019-01-01 RX ADMIN — SODIUM CHLORIDE, PRESERVATIVE FREE 3 ML: 5 INJECTION INTRAVENOUS at 21:03

## 2019-01-01 RX ADMIN — FINASTERIDE 5 MG: 5 TABLET, FILM COATED ORAL at 08:25

## 2019-01-01 RX ADMIN — TAMSULOSIN HYDROCHLORIDE 0.4 MG: 0.4 CAPSULE ORAL at 20:29

## 2019-01-01 RX ADMIN — PANTOPRAZOLE SODIUM 40 MG: 40 INJECTION, POWDER, FOR SOLUTION INTRAVENOUS at 21:58

## 2019-01-01 RX ADMIN — POLYETHYLENE GLYCOL 3350 17 G: 17 POWDER, FOR SOLUTION ORAL at 09:27

## 2019-01-01 RX ADMIN — ATORVASTATIN CALCIUM 20 MG: 20 TABLET, FILM COATED ORAL at 09:57

## 2019-01-01 RX ADMIN — CASTOR OIL AND BALSAM, PERU: 788; 87 OINTMENT TOPICAL at 08:57

## 2019-01-01 RX ADMIN — FAMOTIDINE 20 MG: 20 TABLET ORAL at 08:55

## 2019-01-01 RX ADMIN — NYSTATIN: 100000 POWDER TOPICAL at 21:43

## 2019-01-01 RX ADMIN — ATORVASTATIN CALCIUM 40 MG: 40 TABLET, FILM COATED ORAL at 20:00

## 2019-01-01 RX ADMIN — BARIUM SULFATE 100 ML: 0.81 POWDER, FOR SUSPENSION ORAL at 11:39

## 2019-01-01 RX ADMIN — SODIUM CHLORIDE 250 MG: 9 INJECTION, SOLUTION INTRAVENOUS at 08:43

## 2019-01-01 RX ADMIN — HEPARIN SODIUM 5000 UNITS: 5000 INJECTION INTRAVENOUS; SUBCUTANEOUS at 06:10

## 2019-01-01 RX ADMIN — VITAMIN D, TAB 1000IU (100/BT) 1000 UNITS: 25 TAB at 09:31

## 2019-01-01 RX ADMIN — MEROPENEM 1 G: 1 INJECTION, POWDER, FOR SOLUTION INTRAVENOUS at 09:21

## 2019-01-01 RX ADMIN — MEROPENEM 1 G: 1 INJECTION, POWDER, FOR SOLUTION INTRAVENOUS at 09:01

## 2019-01-01 RX ADMIN — MIDAZOLAM HYDROCHLORIDE 4 MG: 1 INJECTION, SOLUTION INTRAMUSCULAR; INTRAVENOUS at 12:22

## 2019-01-01 RX ADMIN — LINEZOLID 600 MG: 100 SUSPENSION ORAL at 20:08

## 2019-01-01 RX ADMIN — IPRATROPIUM BROMIDE AND ALBUTEROL SULFATE 3 ML: 2.5; .5 SOLUTION RESPIRATORY (INHALATION) at 19:38

## 2019-01-01 RX ADMIN — CASTOR OIL AND BALSAM, PERU: 788; 87 OINTMENT TOPICAL at 22:21

## 2019-01-01 RX ADMIN — ERTAPENEM SODIUM 1 G: 1 INJECTION, POWDER, LYOPHILIZED, FOR SOLUTION INTRAMUSCULAR; INTRAVENOUS at 12:30

## 2019-01-01 RX ADMIN — ATORVASTATIN CALCIUM 20 MG: 20 TABLET, FILM COATED ORAL at 08:17

## 2019-01-01 RX ADMIN — HEPARIN SODIUM 5000 UNITS: 5000 INJECTION INTRAVENOUS; SUBCUTANEOUS at 15:06

## 2019-01-01 RX ADMIN — FERROUS SULFATE TAB 325 MG (65 MG ELEMENTAL FE) 325 MG: 325 (65 FE) TAB at 08:21

## 2019-01-01 RX ADMIN — MEROPENEM 1 G: 1 INJECTION, POWDER, FOR SOLUTION INTRAVENOUS at 22:12

## 2019-01-01 RX ADMIN — SODIUM CHLORIDE, PRESERVATIVE FREE 3 ML: 5 INJECTION INTRAVENOUS at 09:42

## 2019-01-01 RX ADMIN — SODIUM CHLORIDE 125 MG: 9 INJECTION, SOLUTION INTRAVENOUS at 11:33

## 2019-01-01 RX ADMIN — FOLIC ACID 1 MG: 1 TABLET ORAL at 21:44

## 2019-01-01 RX ADMIN — TAMSULOSIN HYDROCHLORIDE 0.4 MG: 0.4 CAPSULE ORAL at 20:01

## 2019-01-01 RX ADMIN — MEROPENEM 1 G: 1 INJECTION, POWDER, FOR SOLUTION INTRAVENOUS at 09:27

## 2019-01-01 RX ADMIN — BARIUM SULFATE 50 ML: 400 SUSPENSION ORAL at 11:38

## 2019-01-01 RX ADMIN — PANTOPRAZOLE SODIUM 40 MG: 40 INJECTION, POWDER, FOR SOLUTION INTRAVENOUS at 08:15

## 2019-01-01 RX ADMIN — POLYETHYLENE GLYCOL 3350 17 G: 17 POWDER, FOR SOLUTION ORAL at 09:14

## 2019-01-01 RX ADMIN — FOLIC ACID 1 MG: 1 TABLET ORAL at 21:36

## 2019-01-01 RX ADMIN — ASPIRIN 81 MG: 81 TABLET, COATED ORAL at 09:16

## 2019-01-01 RX ADMIN — VITAMIN D, TAB 1000IU (100/BT) 1000 UNITS: 25 TAB at 08:09

## 2019-01-01 RX ADMIN — MEROPENEM 1 G: 1 INJECTION, POWDER, FOR SOLUTION INTRAVENOUS at 22:06

## 2019-01-01 RX ADMIN — FOLIC ACID 1 MG: 1 TABLET ORAL at 20:17

## 2019-01-01 RX ADMIN — CHLORHEXIDINE GLUCONATE 15 ML: 1.2 RINSE ORAL at 08:58

## 2019-01-01 RX ADMIN — HEPARIN SODIUM 5000 UNITS: 5000 INJECTION INTRAVENOUS; SUBCUTANEOUS at 05:35

## 2019-01-01 RX ADMIN — OXYCODONE HYDROCHLORIDE AND ACETAMINOPHEN 500 MG: 500 TABLET ORAL at 08:09

## 2019-01-01 RX ADMIN — FERROUS SULFATE TAB 325 MG (65 MG ELEMENTAL FE) 325 MG: 325 (65 FE) TAB at 20:44

## 2019-01-01 RX ADMIN — METOPROLOL TARTRATE 25 MG: 25 TABLET ORAL at 08:58

## 2019-01-01 RX ADMIN — ERTAPENEM SODIUM 1 G: 1 INJECTION, POWDER, LYOPHILIZED, FOR SOLUTION INTRAMUSCULAR; INTRAVENOUS at 08:20

## 2019-01-01 RX ADMIN — FAMOTIDINE 20 MG: 20 TABLET, FILM COATED ORAL at 08:08

## 2019-01-01 RX ADMIN — OXYCODONE HYDROCHLORIDE AND ACETAMINOPHEN 500 MG: 500 TABLET ORAL at 09:04

## 2019-01-01 RX ADMIN — SODIUM CHLORIDE, PRESERVATIVE FREE 3 ML: 5 INJECTION INTRAVENOUS at 20:11

## 2019-01-01 RX ADMIN — FOLIC ACID 1 MG: 1 TABLET ORAL at 21:15

## 2019-01-01 RX ADMIN — CASTOR OIL AND BALSAM, PERU: 788; 87 OINTMENT TOPICAL at 09:14

## 2019-01-01 RX ADMIN — FAMOTIDINE 20 MG: 20 TABLET ORAL at 23:58

## 2019-01-01 RX ADMIN — FERROUS SULFATE TAB 325 MG (65 MG ELEMENTAL FE) 325 MG: 325 (65 FE) TAB at 09:12

## 2019-01-01 RX ADMIN — FINASTERIDE 5 MG: 5 TABLET, FILM COATED ORAL at 09:12

## 2019-01-01 RX ADMIN — ROCURONIUM BROMIDE 40 MG: 10 SOLUTION INTRAVENOUS at 08:53

## 2019-01-01 RX ADMIN — DIGOXIN 125 MCG: 125 TABLET ORAL at 08:37

## 2019-01-01 RX ADMIN — CEFUROXIME 1.5 G: 90 INJECTION, POWDER, FOR SOLUTION INTRAVENOUS at 12:30

## 2019-01-01 RX ADMIN — RANOLAZINE 500 MG: 500 TABLET, FILM COATED, EXTENDED RELEASE ORAL at 20:58

## 2019-01-01 RX ADMIN — ATORVASTATIN CALCIUM 20 MG: 20 TABLET, FILM COATED ORAL at 09:15

## 2019-01-01 RX ADMIN — DOXYCYCLINE 100 MG: 100 CAPSULE ORAL at 08:59

## 2019-01-01 RX ADMIN — MEROPENEM 1 G: 1 INJECTION, POWDER, FOR SOLUTION INTRAVENOUS at 09:57

## 2019-01-01 RX ADMIN — SENNOSIDES, DOCUSATE SODIUM 2 TABLET: 50; 8.6 TABLET, FILM COATED ORAL at 21:15

## 2019-01-01 RX ADMIN — RANOLAZINE 500 MG: 500 TABLET, FILM COATED, EXTENDED RELEASE ORAL at 19:09

## 2019-01-01 RX ADMIN — SODIUM CHLORIDE, PRESERVATIVE FREE 3 ML: 5 INJECTION INTRAVENOUS at 21:15

## 2019-01-01 RX ADMIN — MORPHINE SULFATE 2 MG: 2 INJECTION, SOLUTION INTRAMUSCULAR; INTRAVENOUS at 11:02

## 2019-01-01 RX ADMIN — METOPROLOL TARTRATE 12.5 MG: 25 TABLET, FILM COATED ORAL at 08:54

## 2019-01-01 RX ADMIN — CASTOR OIL AND BALSAM, PERU: 788; 87 OINTMENT TOPICAL at 21:43

## 2019-01-01 RX ADMIN — POLYETHYLENE GLYCOL 3350 17 G: 17 POWDER, FOR SOLUTION ORAL at 08:55

## 2019-01-01 RX ADMIN — FINASTERIDE 5 MG: 5 TABLET, FILM COATED ORAL at 23:40

## 2019-01-01 RX ADMIN — SODIUM CHLORIDE, PRESERVATIVE FREE 10 ML: 5 INJECTION INTRAVENOUS at 10:05

## 2019-01-01 RX ADMIN — DIGOXIN 250 MCG: 0.25 INJECTION INTRAMUSCULAR; INTRAVENOUS at 13:34

## 2019-01-01 RX ADMIN — FINASTERIDE 5 MG: 5 TABLET, FILM COATED ORAL at 21:15

## 2019-01-01 RX ADMIN — SENNOSIDES, DOCUSATE SODIUM 2 TABLET: 50; 8.6 TABLET, FILM COATED ORAL at 21:36

## 2019-01-01 RX ADMIN — DIGOXIN 250 MCG: 0.25 INJECTION INTRAMUSCULAR; INTRAVENOUS at 11:48

## 2019-01-01 RX ADMIN — FERROUS SULFATE TAB 325 MG (65 MG ELEMENTAL FE) 325 MG: 325 (65 FE) TAB at 17:43

## 2019-01-01 RX ADMIN — DOXYCYCLINE 100 MG: 100 CAPSULE ORAL at 21:52

## 2019-01-01 RX ADMIN — FOLIC ACID 1 MG: 1 TABLET ORAL at 21:06

## 2019-01-01 RX ADMIN — FAMOTIDINE 20 MG: 20 TABLET, FILM COATED ORAL at 21:04

## 2019-01-01 RX ADMIN — METOPROLOL TARTRATE 2.5 MG: 5 INJECTION INTRAVENOUS at 00:23

## 2019-01-01 RX ADMIN — POLYETHYLENE GLYCOL 3350 17 G: 17 POWDER, FOR SOLUTION ORAL at 12:37

## 2019-01-01 RX ADMIN — ATORVASTATIN CALCIUM 40 MG: 40 TABLET, FILM COATED ORAL at 20:41

## 2019-01-01 RX ADMIN — METOPROLOL TARTRATE 12.5 MG: 25 TABLET, FILM COATED ORAL at 15:13

## 2019-01-01 RX ADMIN — FOLIC ACID 1 MG: 1 TABLET ORAL at 21:13

## 2019-01-01 RX ADMIN — CASTOR OIL AND BALSAM, PERU: 788; 87 OINTMENT TOPICAL at 22:03

## 2019-01-01 RX ADMIN — CASTOR OIL AND BALSAM, PERU: 788; 87 OINTMENT TOPICAL at 08:59

## 2019-01-01 RX ADMIN — MEROPENEM 1 G: 1 INJECTION, POWDER, FOR SOLUTION INTRAVENOUS at 05:08

## 2019-01-01 RX ADMIN — FERROUS SULFATE TAB 325 MG (65 MG ELEMENTAL FE) 325 MG: 325 (65 FE) TAB at 21:52

## 2019-01-01 RX ADMIN — FERROUS SULFATE TAB 325 MG (65 MG ELEMENTAL FE) 325 MG: 325 (65 FE) TAB at 17:21

## 2019-01-01 RX ADMIN — PHENYLEPHRINE HYDROCHLORIDE 200 MCG: 10 INJECTION INTRAVENOUS at 11:46

## 2019-01-01 RX ADMIN — VITAM B12 100 MCG: 100 TAB at 09:53

## 2019-01-01 RX ADMIN — ATORVASTATIN CALCIUM 20 MG: 20 TABLET, FILM COATED ORAL at 11:59

## 2019-01-01 RX ADMIN — FAMOTIDINE 20 MG: 10 INJECTION, SOLUTION INTRAVENOUS at 07:54

## 2019-01-01 RX ADMIN — MAGNESIUM HYDROXIDE 10 ML: 2400 SUSPENSION ORAL at 10:40

## 2019-01-01 RX ADMIN — SODIUM CHLORIDE, PRESERVATIVE FREE 3 ML: 5 INJECTION INTRAVENOUS at 09:02

## 2019-01-01 RX ADMIN — VITAM B12 100 MCG: 100 TAB at 09:09

## 2019-01-01 RX ADMIN — VITAM B12 100 MCG: 100 TAB at 09:58

## 2019-01-01 RX ADMIN — FAMOTIDINE 20 MG: 20 TABLET ORAL at 08:18

## 2019-01-01 RX ADMIN — CALCIUM 500 MG: 500 TABLET ORAL at 09:43

## 2019-01-01 RX ADMIN — TAMSULOSIN HYDROCHLORIDE 0.4 MG: 0.4 CAPSULE ORAL at 21:07

## 2019-01-01 RX ADMIN — FERROUS SULFATE TAB 325 MG (65 MG ELEMENTAL FE) 325 MG: 325 (65 FE) TAB at 20:34

## 2019-01-01 RX ADMIN — MEROPENEM 1 G: 1 INJECTION, POWDER, FOR SOLUTION INTRAVENOUS at 20:43

## 2019-01-01 RX ADMIN — FERROUS SULFATE TAB 325 MG (65 MG ELEMENTAL FE) 325 MG: 325 (65 FE) TAB at 20:08

## 2019-01-01 RX ADMIN — FAMOTIDINE 20 MG: 20 TABLET, FILM COATED ORAL at 20:41

## 2019-01-01 RX ADMIN — FOLIC ACID 1 MG: 1 TABLET ORAL at 20:34

## 2019-01-01 RX ADMIN — RIVAROXABAN 15 MG: 15 TABLET, FILM COATED ORAL at 21:34

## 2019-01-01 RX ADMIN — VITAMIN D, TAB 1000IU (100/BT) 500 UNITS: 25 TAB at 13:31

## 2019-01-01 RX ADMIN — LINEZOLID 600 MG: 100 SUSPENSION ORAL at 09:01

## 2019-01-01 RX ADMIN — DIGOXIN 250 MCG: 0.25 INJECTION INTRAMUSCULAR; INTRAVENOUS at 11:04

## 2019-01-01 RX ADMIN — ASPIRIN 81 MG: 81 TABLET, COATED ORAL at 09:27

## 2019-01-01 RX ADMIN — VITAMIN D, TAB 1000IU (100/BT) 1000 UNITS: 25 TAB at 09:12

## 2019-01-01 RX ADMIN — VITAM B12 100 MCG: 100 TAB at 08:37

## 2019-01-01 RX ADMIN — LINEZOLID 600 MG: 100 SUSPENSION ORAL at 23:57

## 2019-01-01 RX ADMIN — MELATONIN TAB 5 MG 5 MG: 5 TAB at 21:41

## 2019-01-01 RX ADMIN — METOPROLOL TARTRATE 12.5 MG: 25 TABLET, FILM COATED ORAL at 22:12

## 2019-01-01 RX ADMIN — HYDROCODONE BITARTRATE AND ACETAMINOPHEN 1 TABLET: 7.5; 325 TABLET ORAL at 08:47

## 2019-01-01 RX ADMIN — ASPIRIN 81 MG: 81 TABLET, COATED ORAL at 09:12

## 2019-01-01 RX ADMIN — CASTOR OIL AND BALSAM, PERU: 788; 87 OINTMENT TOPICAL at 21:07

## 2019-01-01 RX ADMIN — FINASTERIDE 5 MG: 5 TABLET, FILM COATED ORAL at 21:13

## 2019-01-01 RX ADMIN — METOPROLOL TARTRATE 50 MG: 50 TABLET ORAL at 09:09

## 2019-01-01 RX ADMIN — RIVAROXABAN 15 MG: 15 TABLET, FILM COATED ORAL at 20:18

## 2019-01-01 RX ADMIN — PANTOPRAZOLE SODIUM 40 MG: 40 INJECTION, POWDER, FOR SOLUTION INTRAVENOUS at 09:09

## 2019-01-01 RX ADMIN — OXYCODONE HYDROCHLORIDE AND ACETAMINOPHEN 500 MG: 500 TABLET ORAL at 08:25

## 2019-01-01 RX ADMIN — RANOLAZINE 500 MG: 500 TABLET, FILM COATED, EXTENDED RELEASE ORAL at 10:04

## 2019-01-01 RX ADMIN — FINASTERIDE 5 MG: 5 TABLET, FILM COATED ORAL at 20:58

## 2019-01-01 RX ADMIN — ERTAPENEM SODIUM 1 G: 1 INJECTION, POWDER, LYOPHILIZED, FOR SOLUTION INTRAMUSCULAR; INTRAVENOUS at 16:26

## 2019-01-01 RX ADMIN — ASPIRIN 81 MG: 81 TABLET, COATED ORAL at 08:18

## 2019-01-01 RX ADMIN — DOXYCYCLINE 100 MG: 100 CAPSULE ORAL at 08:47

## 2019-01-01 RX ADMIN — DOCUSATE SODIUM 100 MG: 100 CAPSULE, LIQUID FILLED ORAL at 22:17

## 2019-01-01 RX ADMIN — POLYETHYLENE GLYCOL 3350 17 G: 17 POWDER, FOR SOLUTION ORAL at 09:43

## 2019-01-01 RX ADMIN — LINEZOLID 600 MG: 100 SUSPENSION ORAL at 23:58

## 2019-01-01 RX ADMIN — MEROPENEM 1 G: 1 INJECTION, POWDER, FOR SOLUTION INTRAVENOUS at 21:09

## 2019-01-01 RX ADMIN — OXYCODONE HYDROCHLORIDE AND ACETAMINOPHEN 500 MG: 500 TABLET ORAL at 09:16

## 2019-01-01 RX ADMIN — ASPIRIN 81 MG: 81 TABLET, COATED ORAL at 09:57

## 2019-01-01 RX ADMIN — SODIUM CHLORIDE, PRESERVATIVE FREE 3 ML: 5 INJECTION INTRAVENOUS at 21:36

## 2019-01-01 RX ADMIN — ALBUMIN HUMAN 500 ML: 0.05 INJECTION, SOLUTION INTRAVENOUS at 17:29

## 2019-01-01 RX ADMIN — FERROUS SULFATE TAB 325 MG (65 MG ELEMENTAL FE) 325 MG: 325 (65 FE) TAB at 09:13

## 2019-01-01 RX ADMIN — FINASTERIDE 5 MG: 5 TABLET, FILM COATED ORAL at 21:35

## 2019-01-01 RX ADMIN — DOXYCYCLINE 100 MG: 100 CAPSULE ORAL at 09:51

## 2019-01-01 RX ADMIN — FAMOTIDINE 20 MG: 20 TABLET ORAL at 09:02

## 2019-01-01 RX ADMIN — SODIUM CHLORIDE, PRESERVATIVE FREE 3 ML: 5 INJECTION INTRAVENOUS at 20:09

## 2019-01-01 RX ADMIN — FAMOTIDINE 20 MG: 20 TABLET ORAL at 20:33

## 2019-01-01 RX ADMIN — SODIUM CHLORIDE, PRESERVATIVE FREE 3 ML: 5 INJECTION INTRAVENOUS at 20:51

## 2019-01-01 RX ADMIN — SENNOSIDES, DOCUSATE SODIUM 2 TABLET: 50; 8.6 TABLET, FILM COATED ORAL at 20:51

## 2019-01-01 RX ADMIN — FOLIC ACID 1 MG: 1 TABLET ORAL at 21:52

## 2019-01-01 RX ADMIN — EPHEDRINE SULFATE 5 MG: 50 INJECTION INTRAMUSCULAR; INTRAVENOUS; SUBCUTANEOUS at 13:38

## 2019-01-01 RX ADMIN — NYSTATIN: 100000 POWDER TOPICAL at 09:13

## 2019-01-01 RX ADMIN — METOPROLOL TARTRATE 25 MG: 25 TABLET ORAL at 21:15

## 2019-01-01 RX ADMIN — HYDROCODONE BITARTRATE AND ACETAMINOPHEN 1 TABLET: 7.5; 325 TABLET ORAL at 08:54

## 2019-01-01 RX ADMIN — METOPROLOL TARTRATE 50 MG: 50 TABLET ORAL at 08:31

## 2019-01-01 RX ADMIN — FOLIC ACID 1 MG: 1 TABLET ORAL at 20:46

## 2019-01-01 RX ADMIN — CHLORHEXIDINE GLUCONATE 15 ML: 1.2 RINSE ORAL at 20:57

## 2019-01-01 RX ADMIN — OXYCODONE HYDROCHLORIDE AND ACETAMINOPHEN 500 MG: 500 TABLET ORAL at 08:37

## 2019-01-01 RX ADMIN — DOCUSATE SODIUM AND SENNOSIDES 2 TABLET: 50; 8.6 TABLET ORAL at 20:41

## 2019-01-01 RX ADMIN — TAMSULOSIN HYDROCHLORIDE 0.4 MG: 0.4 CAPSULE ORAL at 21:57

## 2019-01-01 RX ADMIN — OXYCODONE HYDROCHLORIDE AND ACETAMINOPHEN 500 MG: 500 TABLET ORAL at 22:12

## 2019-01-01 RX ADMIN — CASTOR OIL AND BALSAM, PERU: 788; 87 OINTMENT TOPICAL at 09:58

## 2019-01-01 RX ADMIN — PROPOFOL 25 MCG/KG/MIN: 10 INJECTION, EMULSION INTRAVENOUS at 13:27

## 2019-01-01 RX ADMIN — CASTOR OIL AND BALSAM, PERU: 788; 87 OINTMENT TOPICAL at 09:43

## 2019-01-01 RX ADMIN — MEROPENEM 1 G: 1 INJECTION, POWDER, FOR SOLUTION INTRAVENOUS at 13:04

## 2019-01-01 RX ADMIN — SODIUM CHLORIDE 250 MG: 9 INJECTION, SOLUTION INTRAVENOUS at 10:39

## 2019-01-01 RX ADMIN — IPRATROPIUM BROMIDE AND ALBUTEROL SULFATE 3 ML: 2.5; .5 SOLUTION RESPIRATORY (INHALATION) at 19:39

## 2019-01-01 RX ADMIN — ASPIRIN 81 MG: 81 TABLET, COATED ORAL at 09:31

## 2019-01-01 RX ADMIN — TAMSULOSIN HYDROCHLORIDE 0.4 MG: 0.4 CAPSULE ORAL at 20:22

## 2019-01-01 RX ADMIN — ERTAPENEM SODIUM 1 G: 1 INJECTION, POWDER, LYOPHILIZED, FOR SOLUTION INTRAMUSCULAR; INTRAVENOUS at 08:17

## 2019-01-01 RX ADMIN — LIDOCAINE HYDROCHLORIDE 60 MG: 10 INJECTION, SOLUTION EPIDURAL; INFILTRATION; INTRACAUDAL; PERINEURAL at 09:00

## 2019-01-01 RX ADMIN — POLYETHYLENE GLYCOL 3350 17 G: 17 POWDER, FOR SOLUTION ORAL at 09:10

## 2019-01-01 RX ADMIN — VITAM B12 100 MCG: 100 TAB at 09:27

## 2019-01-01 RX ADMIN — FERROUS SULFATE TAB 325 MG (65 MG ELEMENTAL FE) 325 MG: 325 (65 FE) TAB at 08:46

## 2019-01-01 RX ADMIN — METOPROLOL TARTRATE 12.5 MG: 25 TABLET, FILM COATED ORAL at 20:33

## 2019-01-01 RX ADMIN — SODIUM CHLORIDE, PRESERVATIVE FREE 3 ML: 5 INJECTION INTRAVENOUS at 20:43

## 2019-01-01 RX ADMIN — FINASTERIDE 5 MG: 5 TABLET, FILM COATED ORAL at 09:08

## 2019-01-01 RX ADMIN — FERROUS SULFATE TAB 325 MG (65 MG ELEMENTAL FE) 325 MG: 325 (65 FE) TAB at 22:02

## 2019-01-01 RX ADMIN — ONDANSETRON 4 MG: 2 INJECTION INTRAMUSCULAR; INTRAVENOUS at 21:14

## 2019-01-01 RX ADMIN — FERROUS SULFATE TAB 325 MG (65 MG ELEMENTAL FE) 325 MG: 325 (65 FE) TAB at 21:04

## 2019-01-01 RX ADMIN — FAMOTIDINE 20 MG: 20 TABLET ORAL at 09:15

## 2019-01-01 RX ADMIN — MEROPENEM 1 G: 1 INJECTION, POWDER, FOR SOLUTION INTRAVENOUS at 16:21

## 2019-01-01 RX ADMIN — HEPARIN SODIUM 5000 UNITS: 5000 INJECTION INTRAVENOUS; SUBCUTANEOUS at 21:03

## 2019-01-01 RX ADMIN — FERROUS SULFATE TAB 325 MG (65 MG ELEMENTAL FE) 325 MG: 325 (65 FE) TAB at 11:58

## 2019-01-01 RX ADMIN — CASTOR OIL AND BALSAM, PERU: 788; 87 OINTMENT TOPICAL at 09:30

## 2019-01-01 RX ADMIN — VITAMIN D, TAB 1000IU (100/BT) 1000 UNITS: 25 TAB at 09:01

## 2019-01-01 RX ADMIN — LINEZOLID 600 MG: 100 SUSPENSION ORAL at 21:52

## 2019-01-01 RX ADMIN — ACETAMINOPHEN 650 MG: 325 TABLET, FILM COATED ORAL at 11:58

## 2019-01-01 RX ADMIN — MEROPENEM 1 G: 1 INJECTION, POWDER, FOR SOLUTION INTRAVENOUS at 14:50

## 2019-01-01 RX ADMIN — OXYCODONE HYDROCHLORIDE AND ACETAMINOPHEN 500 MG: 500 TABLET ORAL at 20:56

## 2019-01-01 RX ADMIN — CASTOR OIL AND BALSAM, PERU: 788; 87 OINTMENT TOPICAL at 09:53

## 2019-01-01 RX ADMIN — VITAMIN D, TAB 1000IU (100/BT) 1000 UNITS: 25 TAB at 08:47

## 2019-01-01 RX ADMIN — CASTOR OIL AND BALSAM, PERU: 788; 87 OINTMENT TOPICAL at 21:00

## 2019-01-01 RX ADMIN — INFLUENZA A VIRUS A/IDAHO/07/2018 (H1N1) ANTIGEN (MDCK CELL DERIVED, PROPIOLACTONE INACTIVATED, INFLUENZA A VIRUS A/INDIANA/08/2018 (H3N2) ANTIGEN (MDCK CELL DERIVED, PROPIOLACTONE INACTIVATED), INFLUENZA B VIRUS B/SINGAPORE/INFTT-16-0610/2016 ANTIGEN (MDCK CELL DERIVED, PROPIOLACTONE INACTIVATED), INFLUENZA B VIRUS B/IOWA/06/2017 ANTIGEN (MDCK CELL DERIVED, PROPIOLACTONE INACTIVATED) 0.5 ML: 15; 15; 15; 15 INJECTION, SUSPENSION INTRAMUSCULAR at 12:02

## 2019-01-01 RX ADMIN — FAMOTIDINE 20 MG: 20 TABLET ORAL at 09:51

## 2019-01-01 RX ADMIN — CASTOR OIL AND BALSAM, PERU: 788; 87 OINTMENT TOPICAL at 10:01

## 2019-01-01 RX ADMIN — DIGOXIN 250 MCG: 0.25 INJECTION INTRAMUSCULAR; INTRAVENOUS at 14:49

## 2019-01-01 RX ADMIN — RANOLAZINE 500 MG: 500 TABLET, FILM COATED, EXTENDED RELEASE ORAL at 20:46

## 2019-01-01 RX ADMIN — POLYETHYLENE GLYCOL 3350 17 G: 17 POWDER, FOR SOLUTION ORAL at 11:57

## 2019-01-01 RX ADMIN — TAMSULOSIN HYDROCHLORIDE 0.4 MG: 0.4 CAPSULE ORAL at 21:58

## 2019-01-01 RX ADMIN — HEPARIN SODIUM 5000 UNITS: 5000 INJECTION INTRAVENOUS; SUBCUTANEOUS at 11:54

## 2019-01-01 RX ADMIN — SODIUM CHLORIDE, PRESERVATIVE FREE 10 ML: 5 INJECTION INTRAVENOUS at 22:07

## 2019-01-01 RX ADMIN — VITAM B12 100 MCG: 100 TAB at 08:54

## 2019-01-01 RX ADMIN — POLYETHYLENE GLYCOL 3350 17 G: 17 POWDER, FOR SOLUTION ORAL at 09:11

## 2019-01-01 RX ADMIN — NYSTATIN: 100000 POWDER TOPICAL at 08:55

## 2019-01-01 RX ADMIN — FERROUS SULFATE TAB 325 MG (65 MG ELEMENTAL FE) 325 MG: 325 (65 FE) TAB at 09:21

## 2019-01-01 RX ADMIN — SODIUM CHLORIDE: 9 INJECTION, SOLUTION INTRAVENOUS at 08:10

## 2019-01-01 RX ADMIN — ATORVASTATIN CALCIUM 40 MG: 40 TABLET, FILM COATED ORAL at 20:56

## 2019-01-01 RX ADMIN — METOPROLOL TARTRATE 12.5 MG: 25 TABLET, FILM COATED ORAL at 09:21

## 2019-01-01 RX ADMIN — FINASTERIDE 5 MG: 5 TABLET, FILM COATED ORAL at 21:52

## 2019-01-01 RX ADMIN — FERROUS SULFATE TAB 325 MG (65 MG ELEMENTAL FE) 325 MG: 325 (65 FE) TAB at 09:02

## 2019-01-01 RX ADMIN — METOPROLOL TARTRATE 12.5 MG: 25 TABLET, FILM COATED ORAL at 20:44

## 2019-01-01 RX ADMIN — ATRACURIUM BESYLATE 20 MG: 10 INJECTION, SOLUTION INTRAVENOUS at 11:38

## 2019-01-01 RX ADMIN — VITAM B12 100 MCG: 100 TAB at 10:02

## 2019-01-01 RX ADMIN — ROCURONIUM BROMIDE 10 MG: 10 SOLUTION INTRAVENOUS at 09:24

## 2019-01-01 RX ADMIN — LIDOCAINE HYDROCHLORIDE 50 MG: 10 INJECTION, SOLUTION INFILTRATION; PERINEURAL at 08:53

## 2019-01-01 RX ADMIN — SODIUM CHLORIDE 1.5 G: 900 INJECTION INTRAVENOUS at 16:03

## 2019-01-01 RX ADMIN — METOPROLOL TARTRATE 12.5 MG: 25 TABLET, FILM COATED ORAL at 09:57

## 2019-01-01 RX ADMIN — METOPROLOL TARTRATE 12.5 MG: 25 TABLET, FILM COATED ORAL at 09:02

## 2019-01-01 RX ADMIN — FOLIC ACID 1 MG: 1 TABLET ORAL at 20:58

## 2019-01-01 RX ADMIN — MEROPENEM 1 G: 1 INJECTION, POWDER, FOR SOLUTION INTRAVENOUS at 20:33

## 2019-01-01 RX ADMIN — FUROSEMIDE 40 MG: 10 INJECTION, SOLUTION INTRAMUSCULAR; INTRAVENOUS at 18:12

## 2019-01-01 RX ADMIN — METOPROLOL TARTRATE 25 MG: 25 TABLET ORAL at 05:09

## 2019-01-01 RX ADMIN — ASPIRIN 81 MG: 81 TABLET, COATED ORAL at 09:09

## 2019-01-01 RX ADMIN — FERROUS SULFATE TAB 325 MG (65 MG ELEMENTAL FE) 325 MG: 325 (65 FE) TAB at 21:43

## 2019-01-01 RX ADMIN — METOPROLOL TARTRATE 50 MG: 50 TABLET ORAL at 20:46

## 2019-01-01 RX ADMIN — PHENYLEPHRINE HYDROCHLORIDE 0.5 MCG/KG/MIN: 10 INJECTION INTRAVENOUS at 09:15

## 2019-01-01 RX ADMIN — ATORVASTATIN CALCIUM 20 MG: 20 TABLET, FILM COATED ORAL at 09:12

## 2019-01-01 RX ADMIN — FINASTERIDE 5 MG: 5 TABLET, FILM COATED ORAL at 22:13

## 2019-01-01 RX ADMIN — HYDROCODONE BITARTRATE AND ACETAMINOPHEN 1 TABLET: 7.5; 325 TABLET ORAL at 17:31

## 2019-01-01 RX ADMIN — VITAMIN D, TAB 1000IU (100/BT) 1000 UNITS: 25 TAB at 11:59

## 2019-01-01 RX ADMIN — OXYCODONE HYDROCHLORIDE AND ACETAMINOPHEN 500 MG: 500 TABLET ORAL at 09:27

## 2019-01-01 RX ADMIN — METOPROLOL TARTRATE 50 MG: 50 TABLET ORAL at 22:13

## 2019-01-01 RX ADMIN — SODIUM CHLORIDE, PRESERVATIVE FREE 3 ML: 5 INJECTION INTRAVENOUS at 09:14

## 2019-01-01 RX ADMIN — ERTAPENEM SODIUM 1 G: 1 INJECTION, POWDER, LYOPHILIZED, FOR SOLUTION INTRAMUSCULAR; INTRAVENOUS at 16:08

## 2019-01-01 RX ADMIN — FOLIC ACID 1 MG: 1 TABLET ORAL at 21:43

## 2019-01-01 RX ADMIN — CASTOR OIL AND BALSAM, PERU: 788; 87 OINTMENT TOPICAL at 08:54

## 2019-01-01 RX ADMIN — SODIUM CHLORIDE 1.5 G: 900 INJECTION INTRAVENOUS at 02:00

## 2019-01-01 RX ADMIN — ATRACURIUM BESYLATE 10 MG: 10 INJECTION, SOLUTION INTRAVENOUS at 10:39

## 2019-01-01 RX ADMIN — ATORVASTATIN CALCIUM 40 MG: 40 TABLET, FILM COATED ORAL at 21:04

## 2019-01-01 RX ADMIN — MEROPENEM 1 G: 1 INJECTION, POWDER, FOR SOLUTION INTRAVENOUS at 06:43

## 2019-01-01 RX ADMIN — CALCIUM 500 MG: 500 TABLET ORAL at 08:17

## 2019-01-01 RX ADMIN — ATORVASTATIN CALCIUM 20 MG: 20 TABLET, FILM COATED ORAL at 09:50

## 2019-01-01 RX ADMIN — DOCUSATE SODIUM AND SENNOSIDES 2 TABLET: 50; 8.6 TABLET ORAL at 08:58

## 2019-01-01 RX ADMIN — HEPARIN SODIUM 5000 UNITS: 5000 INJECTION INTRAVENOUS; SUBCUTANEOUS at 13:35

## 2019-01-01 RX ADMIN — NYSTATIN: 100000 POWDER TOPICAL at 08:16

## 2019-01-01 RX ADMIN — DOCUSATE SODIUM 100 MG: 100 CAPSULE, LIQUID FILLED ORAL at 10:40

## 2019-01-01 RX ADMIN — HEPARIN SODIUM 5000 UNITS: 5000 INJECTION INTRAVENOUS; SUBCUTANEOUS at 20:28

## 2019-01-01 RX ADMIN — FINASTERIDE 5 MG: 5 TABLET, FILM COATED ORAL at 15:39

## 2019-01-01 RX ADMIN — SENNOSIDES, DOCUSATE SODIUM 2 TABLET: 50; 8.6 TABLET, FILM COATED ORAL at 20:59

## 2019-01-01 RX ADMIN — FAMOTIDINE 20 MG: 20 TABLET ORAL at 20:30

## 2019-01-01 RX ADMIN — FAMOTIDINE 20 MG: 20 TABLET ORAL at 21:41

## 2019-01-01 RX ADMIN — VITAMIN D, TAB 1000IU (100/BT) 500 UNITS: 25 TAB at 09:12

## 2019-01-01 RX ADMIN — ATORVASTATIN CALCIUM 20 MG: 20 TABLET, FILM COATED ORAL at 09:43

## 2019-01-01 RX ADMIN — PANTOPRAZOLE SODIUM 40 MG: 40 TABLET, DELAYED RELEASE ORAL at 09:43

## 2019-01-01 RX ADMIN — SENNOSIDES, DOCUSATE SODIUM 2 TABLET: 50; 8.6 TABLET, FILM COATED ORAL at 20:33

## 2019-01-01 RX ADMIN — FERROUS SULFATE TAB 325 MG (65 MG ELEMENTAL FE) 325 MG: 325 (65 FE) TAB at 09:31

## 2019-01-01 RX ADMIN — MEROPENEM 1 G: 1 INJECTION, POWDER, FOR SOLUTION INTRAVENOUS at 09:40

## 2019-01-01 RX ADMIN — RIVAROXABAN 15 MG: 15 TABLET, FILM COATED ORAL at 18:24

## 2019-01-01 RX ADMIN — NYSTATIN: 100000 POWDER TOPICAL at 09:14

## 2019-01-01 RX ADMIN — VITAMIN D, TAB 1000IU (100/BT) 500 UNITS: 25 TAB at 10:02

## 2019-01-01 RX ADMIN — OXYCODONE HYDROCHLORIDE AND ACETAMINOPHEN 500 MG: 500 TABLET ORAL at 09:01

## 2019-01-01 RX ADMIN — TAMSULOSIN HYDROCHLORIDE 0.4 MG: 0.4 CAPSULE ORAL at 20:33

## 2019-01-01 RX ADMIN — FAMOTIDINE 20 MG: 20 TABLET ORAL at 09:14

## 2019-01-01 RX ADMIN — METOPROLOL TARTRATE 25 MG: 25 TABLET ORAL at 08:43

## 2019-01-01 RX ADMIN — VITAMIN D, TAB 1000IU (100/BT) 1000 UNITS: 25 TAB at 09:57

## 2019-01-01 RX ADMIN — CASTOR OIL AND BALSAM, PERU: 788; 87 OINTMENT TOPICAL at 08:56

## 2019-01-01 RX ADMIN — CASTOR OIL AND BALSAM, PERU: 788; 87 OINTMENT TOPICAL at 21:05

## 2019-01-01 RX ADMIN — ATORVASTATIN CALCIUM 20 MG: 20 TABLET, FILM COATED ORAL at 09:09

## 2019-01-01 RX ADMIN — IPRATROPIUM BROMIDE AND ALBUTEROL SULFATE 3 ML: 2.5; .5 SOLUTION RESPIRATORY (INHALATION) at 17:44

## 2019-01-01 RX ADMIN — AMIODARONE HYDROCHLORIDE 200 MG: 200 TABLET ORAL at 15:18

## 2019-01-01 RX ADMIN — SODIUM CHLORIDE 125 ML/HR: 9 INJECTION, SOLUTION INTRAVENOUS at 16:11

## 2019-01-01 RX ADMIN — VITAM B12 100 MCG: 100 TAB at 09:16

## 2019-01-01 RX ADMIN — ERTAPENEM SODIUM 1 G: 1 INJECTION, POWDER, LYOPHILIZED, FOR SOLUTION INTRAMUSCULAR; INTRAVENOUS at 15:08

## 2019-01-01 RX ADMIN — DIGOXIN 250 MCG: 0.25 INJECTION INTRAMUSCULAR; INTRAVENOUS at 12:01

## 2019-01-01 RX ADMIN — METOPROLOL TARTRATE 12.5 MG: 25 TABLET, FILM COATED ORAL at 21:42

## 2019-01-01 RX ADMIN — RIVAROXABAN 15 MG: 15 TABLET, FILM COATED ORAL at 17:03

## 2019-01-01 RX ADMIN — SENNOSIDES, DOCUSATE SODIUM 2 TABLET: 50; 8.6 TABLET, FILM COATED ORAL at 22:00

## 2019-01-01 RX ADMIN — MEROPENEM 1 G: 1 INJECTION, POWDER, FOR SOLUTION INTRAVENOUS at 21:14

## 2019-01-01 RX ADMIN — SODIUM CHLORIDE 30 ML/HR: 9 INJECTION, SOLUTION INTRAVENOUS at 11:54

## 2019-01-01 RX ADMIN — SODIUM CHLORIDE, PRESERVATIVE FREE 3 ML: 5 INJECTION INTRAVENOUS at 09:21

## 2019-01-01 RX ADMIN — RANOLAZINE 500 MG: 500 TABLET, FILM COATED, EXTENDED RELEASE ORAL at 21:59

## 2019-01-01 RX ADMIN — OXYCODONE HYDROCHLORIDE AND ACETAMINOPHEN 500 MG: 500 TABLET ORAL at 20:00

## 2019-01-01 RX ADMIN — ROCURONIUM BROMIDE 50 MG: 10 INJECTION INTRAVENOUS at 12:22

## 2019-01-01 RX ADMIN — POLYETHYLENE GLYCOL 3350 17 G: 17 POWDER, FOR SOLUTION ORAL at 08:48

## 2019-01-01 RX ADMIN — TAMSULOSIN HYDROCHLORIDE 0.4 MG: 0.4 CAPSULE ORAL at 23:39

## 2019-01-01 RX ADMIN — LINEZOLID 600 MG: 100 SUSPENSION ORAL at 09:00

## 2019-01-01 RX ADMIN — MEROPENEM 1 G: 1 INJECTION, POWDER, FOR SOLUTION INTRAVENOUS at 21:03

## 2019-01-01 RX ADMIN — PATIROMER 2 PACKET: 8.4 POWDER, FOR SUSPENSION ORAL at 12:33

## 2019-01-01 RX ADMIN — ATORVASTATIN CALCIUM 20 MG: 20 TABLET, FILM COATED ORAL at 09:27

## 2019-01-01 RX ADMIN — OXYCODONE HYDROCHLORIDE AND ACETAMINOPHEN 500 MG: 500 TABLET ORAL at 21:59

## 2019-01-01 RX ADMIN — MEROPENEM 1 G: 1 INJECTION, POWDER, FOR SOLUTION INTRAVENOUS at 09:02

## 2019-01-01 RX ADMIN — TAMSULOSIN HYDROCHLORIDE 0.4 MG: 0.4 CAPSULE ORAL at 20:51

## 2019-01-01 RX ADMIN — ATORVASTATIN CALCIUM 40 MG: 40 TABLET, FILM COATED ORAL at 20:46

## 2019-01-01 RX ADMIN — FERROUS SULFATE TAB 325 MG (65 MG ELEMENTAL FE) 325 MG: 325 (65 FE) TAB at 18:22

## 2019-01-01 RX ADMIN — ASPIRIN 81 MG: 81 TABLET, COATED ORAL at 08:51

## 2019-06-07 PROBLEM — Z90.2 HISTORY OF LOBECTOMY OF LUNG: Status: ACTIVE | Noted: 2019-01-01

## 2019-06-07 PROBLEM — C34.90 NON-SMALL CELL LUNG CANCER (NSCLC) (HCC): Status: ACTIVE | Noted: 2019-01-01

## 2019-06-07 PROBLEM — N18.30 CKD (CHRONIC KIDNEY DISEASE) STAGE 3, GFR 30-59 ML/MIN (HCC): Status: ACTIVE | Noted: 2019-01-01

## 2019-06-07 PROBLEM — Z95.0 PRESENCE OF CARDIAC PACEMAKER: Status: ACTIVE | Noted: 2019-01-01

## 2019-06-07 PROBLEM — R09.02 HYPOXIA: Status: ACTIVE | Noted: 2019-01-01

## 2019-06-07 PROBLEM — A49.8 SERRATIA INFECTION: Status: ACTIVE | Noted: 2019-01-01

## 2019-06-07 PROBLEM — R91.8 LUNG INFILTRATE: Status: ACTIVE | Noted: 2019-01-01

## 2019-06-07 NOTE — PLAN OF CARE
Problem: Pneumonia (Adult)  Goal: Signs and Symptoms of Listed Potential Problems Will be Absent, Minimized or Managed (Pneumonia)  Outcome: Ongoing (interventions implemented as appropriate)   06/07/19 3886   Goal/Outcome Evaluation   Problems Assessed (Pneumonia) all   Problems Present (Pneumonia) respiratory compromise;infection progression

## 2019-06-07 NOTE — ED PROVIDER NOTES
Subjective   Rodney Looney is a 84 y.o. male who presents to the emergency department with complaints of shortness of breath that started today. The patient reports that he had a right lower lobectomy on May 14,2019 at [facility]. The patient had a follow up appointment today where they informed him that his oxygen saturation was low. The patient also mentions that the stitches from his incision came apart and he had some bleeding on his clothing. The patient denies any fever, cough, chest pain, vomiting, diarrhea, myalgias, and any other acute symptoms at this time.         History provided by:  Patient  Shortness of Breath   Severity:  Moderate  Onset quality:  Sudden  Duration:  1 day  Timing:  Constant  Progression:  Unchanged  Chronicity:  New  Relieved by:  None tried  Worsened by:  Nothing  Ineffective treatments:  None tried  Associated symptoms: no chest pain, no cough, no fever and no vomiting    Risk factors: recent surgery        Review of Systems   Constitutional: Negative for fever.   Respiratory: Positive for shortness of breath. Negative for cough.    Cardiovascular: Negative for chest pain.   Gastrointestinal: Negative for diarrhea and vomiting.   Musculoskeletal: Negative for myalgias.   All other systems reviewed and are negative.      History reviewed. No pertinent past medical history.    No Known Allergies    No past surgical history on file.    History reviewed. No pertinent family history.    Social History     Socioeconomic History   • Marital status:      Spouse name: Not on file   • Number of children: Not on file   • Years of education: Not on file   • Highest education level: Not on file         Objective   Physical Exam   Constitutional: He is oriented to person, place, and time. He appears well-developed and well-nourished. No distress.   HENT:   Head: Normocephalic and atraumatic.   Eyes: Conjunctivae are normal. No scleral icterus.   Neck: Normal range of motion. Neck supple.    Cardiovascular: Normal rate, regular rhythm and normal heart sounds.   Pulmonary/Chest: Effort normal and breath sounds normal. No respiratory distress. He has no wheezes. He has no rales.   Abdominal: Soft. There is no tenderness. There is no guarding.   Musculoskeletal: Normal range of motion.   Neurological: He is alert and oriented to person, place, and time.   Skin: Skin is warm and dry. He is not diaphoretic.   The patient has a right upper chest Groshong catheter. The patient has old appearing bruising to the right flank. The patient has a right upper back lobectomy scar with a tiny area of wound dehiscence.    Psychiatric: He has a normal mood and affect. His behavior is normal.   Nursing note and vitals reviewed.      Procedures         ED Course     Reviewed ID notes from office visit.  Spoke with Dr Leavitt.  Concern given recent Serratia bacteremia and there is some concern of possible pacemaker infection.  Labs benign here, CXR is RLL pneumonia vs hemorrhage.  IV Merrem per ID recs.  Patient stable on serial rechecks.  Discussed exam findings, test results so far and concerns in detail at the bedside.  Discussed need for admission for further evaluation and treatment.                  MDM  Number of Diagnoses or Management Options  Hypoxemia:   Pneumonia of right lower lobe due to infectious organism (CMS/HCC):      Amount and/or Complexity of Data Reviewed  Clinical lab tests: ordered and reviewed  Tests in the radiology section of CPT®: ordered and reviewed  Decide to obtain previous medical records or to obtain history from someone other than the patient: yes  Obtain history from someone other than the patient: yes  Discuss the patient with other providers: yes  Independent visualization of images, tracings, or specimens: yes        Final diagnoses:   Hypoxemia   Pneumonia of right lower lobe due to infectious organism (CMS/HCC)       Documentation assistance provided by ajit Aldridge.   Information recorded by the scribe was done at my direction and has been verified and validated by me.     Roxann Aldridge  06/07/19 1412       Leopoldo Rogers MD  06/07/19 8319

## 2019-06-07 NOTE — H&P
[facility] Medicine Services  HISTORY AND PHYSICAL    Patient Name: Rodney Looney  : 1934  MRN: [id number]  Primary Care Physician: Dana Isidro DO  Date of admission: 2019      Subjective   Subjective     Chief Complaint:  Generalized weakness and hypoxia    HPI:  Rodney Looney is a 84 y.o. male who reports past medical history of non-small cell lung cancer with recent lobectomy in May at [facility], recent diagnosis of Serratia infection and reported concern for pacemaker infection, reports history of stage III chronic kidney disease presents to the hospital emergency department from infectious disease clinic with complaint of moderate persistent generalized weakness present for several weeks and low oxygen levels noted at infectious disease clinic reportedly in the 80s.  Upon arrival to the emergency department he received chest x-ray that was concerning for right-sided infiltrates possible pneumonia.  CT scan again showed right-sided infiltrates.  Infectious disease team recommends meropenem and plan to evaluate him in the morning.    Review of Systems   Constitutional: Positive for chills. Negative for fever.   HENT: Negative for sinus pressure and sinus pain.    Eyes: Negative.  Negative for visual disturbance.   Respiratory: Positive for cough and shortness of breath.    Cardiovascular: Negative for chest pain and palpitations.   Gastrointestinal: Negative for diarrhea, nausea and vomiting.   Endocrine: Negative.  Negative for cold intolerance and heat intolerance.   Genitourinary: Negative.  Negative for dysuria and hematuria.   Musculoskeletal: Negative.    Skin: Negative for rash and wound.   Allergic/Immunologic: Negative.    Neurological: Negative for light-headedness and headaches.   Hematological: Negative.  Negative for adenopathy. Does not bruise/bleed easily.   Psychiatric/Behavioral: Negative for behavioral problems and confusion.             Personal History     Past medical history: Cardiac pacemaker, recent lung cancer with recent lobectomy, recent Serratia infection, chronic kidney disease    Surgical history: Recent lobectomy    Family History: pertinent FHx was reviewed and unremarkable.     Social History:    Social History     Social History Narrative   • Not on file       Medications:    Available home medication information reviewed.      No Known Allergies    Objective   Objective     Vital Signs:   Temp:  [97.5 °F (36.4 °C)] 97.5 °F (36.4 °C)  Heart Rate:  [78] 78  Resp:  [20] 20  BP: (110)/(60) 110/60        Physical Exam   Constitutional: Awake, alert  Eyes: Pupils equal, sclerae anicteric, no conjunctival injection  HENT: NCAT, mucous membranes moist  Neck: Supple, no thyromegaly, no lymphadenopathy, trachea midline  Respiratory: Right-sided rhonchi and rales noted, mild tachypnea, nonlabored breathing on nasal cannula oxygen   Cardiovascular: RRR, no murmurs, rubs, or gallops, palpable pedal pulses bilaterally  Gastrointestinal: Positive bowel sounds, soft, nontender, nondistended  Musculoskeletal: Elderly and debilitated in appearance, trace bilateral lower extremity edema, BMI 24   Psychiatric: Poor historian, appropriate affect, cooperative  Neurologic: Oriented x 3, strength symmetric in all extremities, Cranial Nerves grossly intact to confrontation, speech clear  Skin: No rashes      Results Reviewed:  I have personally reviewed current lab, radiology, and data and agree.    Results from last 7 days   Lab Units 06/07/19  1223   WBC 10*3/mm3 6.10   HEMOGLOBIN g/dL 8.6*   HEMATOCRIT % 29.8*   PLATELETS 10*3/mm3 282     Results from last 7 days   Lab Units 06/07/19  1435 06/07/19  1223   SODIUM mmol/L  --  134*   POTASSIUM mmol/L  --  4.8   CHLORIDE mmol/L  --  99   CO2 mmol/L  --  22.0   BUN mg/dL  --  21   CREATININE mg/dL  --  1.85*   GLUCOSE mg/dL  --  103*   CALCIUM mg/dL  --  9.4   ALT (SGPT) U/L  --  13   AST (SGOT) U/L   --  15   TROPONIN T ng/mL  --  <0.010   PROBNP pg/mL  --  2,107.0*   LACTATE mmol/L 1.6  --    PROCALCITONIN ng/mL  --  0.14     Estimated Creatinine Clearance: 34.3 mL/min (A) (by C-G formula based on SCr of 1.85 mg/dL (H)).  Brief Urine Lab Results     None        Imaging Results (last 24 hours)     Procedure Component Value Units Date/Time    CT Chest Without Contrast [[id number]] Collected:  06/07/19 1453     Updated:  06/07/19 1455    Narrative:       EXAMINATION: CT CHEST WO CONTRAST-      INDICATION: hypoxemia after recent lobectomy, bleeding from posterior  incision, recent bacteremia but no fever now; R09.02-Hypoxemia;  J18.1-Lobar pneumonia, unspecified organism lobar pneumonia     TECHNIQUE: Multiple axial CT imaging is obtained of the chest without  the ministration of intravenous contrast.     The radiation dose reduction device was turned on for each scan per the  ALARA (As Low as Reasonably Achievable) protocol.     COMPARISON: NONE     FINDINGS: There is a small amount of pleural fluid identified along the  right lateral chest wall with a fracture involving the right posterior  sixth rib. Findings are likely postoperative. There is consolidation  posteriorly within the right lung base concerning for infiltrate. There  is some chronic interstitial changes identified likely postoperative.  Superimposed infection is likely within the right lung base. Severe  emphysematous changes seen within the lung fields bilaterally with  bronchiectatic changes centrally. Severe emphysematous changes seen  within the upper lung fields. Old healed granulomatous disease in the  chest. The cardiac chambers within normal limits. No pericardial  effusion. No bulky hilar or axillary lymphadenopathy. Visualized upper  abdomen is unremarkable.             Impression:       Findings concerning for dense consolidation and infiltrate  superimposed on chronic and emphysematous change within the right lung.  There is a fracture  involving the right posterior sixth rib likely  postsurgical with some pleural thickening and small pleural fluid seen  on the right.          XR Chest 1 View [[id number]] Collected:  06/07/19 1306     Updated:  06/07/19 1423    Narrative:       EXAMINATION: XR CHEST 1 VW- 06/07/2019      INDICATION: SOA triage protocol      COMPARISON: NONE     FINDINGS: Portable chest reveals heart to be enlarged. Ill-defined  opacification seen at the right lung base with small right pleural  effusion. Deep line catheter on the right tip in the SVC. Cardiac pacer  leads in satisfactory position.       Impression:       Patchy ill-defined opacification seen within the right mid  and lower lung field with small right pleural effusion. Left lung is  clear.     D:  06/07/2019  E:  06/07/2019     This report was finalized on 6/7/2019 2:20 PM by Dr. Jo Ann Galarza MD.                Assessment/Plan   Assessment / Plan     Active Hospital Problems    Diagnosis POA   • **Lung infiltrate [R91.8] Yes   • Serratia infection [A49.8] Yes   • Presence of cardiac pacemaker [Z95.0] Yes   • Non-small cell lung cancer and reports right lower lobe lobectomy in May at [facility] [C34.90] Unknown   • History of lobectomy of lung [Z90.2] Not Applicable   • Hypoxia [R09.02] Yes   • CKD (chronic kidney disease) stage 3, GFR 30-59 ml/min (CMS/[facility]) [N18.3] Yes     84-year-old male with history of non-small cell lung cancer reported and recent lobectomy in May at [facility] with recent reported Serratia bacteremia and concern reportedly for a cardiac pacemaker infection presents from infectious disease clinic with hypoxia in the 80s on vital signs and was found in the emergency room to have right lung infiltrate concerning for pneumonia.    Right lung infiltrate, possible right lung pneumonia:  Infectious disease recommends Merrem.  Blood cultures.    Hypoxia:  Likely due to lobectomy and possible pneumonia.  Plan for nasal cannula oxygen as  needed and wean as tolerated.    Serratia infection:  Reportedly had recent Serratia bacteremia.  Trying to get outside records.  Infectious disease recommends Merrem.  Repeat blood cultures.    Reported concern for pacemaker infection:  Plan to follow-up blood cultures and ID recommendations.  Monitor on telemetry.  Merrem as per above    Elevated BNP:  Probably has chronic heart failure.  Does not appear to decompensated at this point.  Plan to get outside records.    Stage III chronic kidney disease:  Reports he sees Dr. Courtney outpatient.  Unsure of his baseline.  Plan to get outside records.    Anemia:   Notably patient had recent surgery.  Baseline hemoglobin unknown.  Denies recent bleeding.  Plan to monitor.    Nursing staff currently working to get home medication list.  Chest x-ray images reviewed right-sided infiltrate noted, left lung is clear.  CT scan results reviewed also concerning for right-sided pneumonia.  Unclear how much of this could be postoperative findings.  Plan to repeat laboratory studies tomorrow.  Monitor renal function carefully.  Attempting to get outside records.    DVT prophylaxis: Subcutaneous heparin    CODE STATUS:    Code Status and Medical Interventions:   Ordered at: 06/07/19 1509     Code Status:    CPR     Medical Interventions (Level of Support Prior to Arrest):    Full       Admission Status:  I believe this patient meets INPATIENT status due to the need for care which can only be reasonably provided in an hospital setting with hypoxia, recent Serratia bacteremia needing IV antibiotics and possible pacemaker infection.  In such, I feel patient’s risk for adverse outcomes and need for care warrant INPATIENT evaluation and predict the patient’s care encounter to likely last beyond 2 midnights.      Electronically signed by Elan Chen MD, 06/07/19, 3:09 PM.

## 2019-06-07 NOTE — CONSULTS
Pharmacy - renal dosing per Pharmacy & Therapeutics committee    Patient is 85 yo male    Ht = 182.9 cm    Wt = 81.6 kg   IBW = 77.6 kg       SCr = 1.85   Est CrCl = 34.3    Changed merrem 1 gm q8h to 1 gm over 3 hours q12h.    Marilyn Mario [facility]  6/7/2019  17:03

## 2019-06-08 NOTE — THERAPY EVALUATION
Acute Care - Physical Therapy Initial Evaluation  [facility]     Patient Name: Rodney Looney  : 1934  MRN: [id number]  Today's Date: 2019   Onset of Illness/Injury or Date of Surgery: 19  Date of Referral to PT: 19  Referring Physician: Elan Chen MD      Admit Date: 2019    Visit Dx:     ICD-10-CM ICD-9-CM   1. Hypoxemia R09.02 799.02   2. Pneumonia of right lower lobe due to infectious organism (CMS/HCC) J18.1 486   3. Impaired functional mobility, balance, gait, and endurance Z74.09 V49.89     Patient Active Problem List   Diagnosis   • Lung infiltrate   • Serratia infection   • Presence of cardiac pacemaker   • Non-small cell lung cancer and reports right lower lobe lobectomy in May at [facility]   • History of lobectomy of lung   • Hypoxia   • CKD (chronic kidney disease) stage 3, GFR 30-59 ml/min (CMS/HCC)     Past Medical History:   Diagnosis Date   • Cancer (CMS/HCC)    • Coronary artery disease    • Elevated cholesterol    • GERD (gastroesophageal reflux disease)    • History of BPH 2 yrs   • Hypertension    • Kidney disease    • Smoking      Past Surgical History:   Procedure Laterality Date   • CARDIAC SURGERY     • HERNIA REPAIR          PT ASSESSMENT (last 12 hours)      Physical Therapy Evaluation     Row Name 19 1052          PT Evaluation Time/Intention    Subjective Information  complains of;weakness  -LM     Document Type  evaluation  -LM     Mode of Treatment  individual therapy;physical therapy  -LM     Patient Effort  good  -LM     Symptoms Noted During/After Treatment  none  -LM     Row Name 19 1055          General Information    Patient Profile Reviewed?  yes  -LM     Onset of Illness/Injury or Date of Surgery  19  -LM     Referring Physician  Elan Chen MD  -LM     Patient Observations  alert;cooperative;agree to therapy  -LM     Patient/Family Observations  Spouse came in while pt ambulating in the hallway.  -LM     General  Observations of Patient  Pt lying in bed.  R Port + O2 intact.  Pleasant and agreeable to PT eval.  -LM     Prior Level of Function  independent:;all household mobility;gait;min assist:;bathing;dressing SB - assist w/ washing back; Assist w/ tying shoelaces  -LM     Equipment Currently Used at Home  walker, rolling;grab bar States he will have a shower chair by the time he gets home  -LM     Pertinent History of Current Functional Problem  Presents with generalized weakness and hypoxia.  Pt with PMH of non-small cell lung CA with recent lobectomy on 5/14 at [facility].  Dx: R Lung Infiltrate - poss PNA; Serratia Infection;  Reported concern for pacemaker infection  -LM     Existing Precautions/Restrictions  fall;oxygen therapy device and L/min;other (see comments)  (Significant)  Gait Belt Placement due to recent R lobectomy  -LM     Risks Reviewed  patient:;spouse/S.O.:;LOB;increased discomfort  -LM     Benefits Reviewed  patient:;spouse/S.O.:;improve function;increase independence;increase strength;increase balance  -LM     Barriers to Rehab  none identified  -LM     Row Name 06/08/19 1055          Relationship/Environment    Lives With  spouse  -LM     Row Name 06/08/19 1055          Resource/Environmental Concerns    Current Living Arrangements  home/apartment/condo  -LM     Row Name 06/08/19 1055          Home Main Entrance    Number of Stairs, Main Entrance  four  -LM     Stairs Comment, Main Entrance  States he doesn't have handrails but has metal holdings he can hold onto  -LM     Row Name 06/08/19 1055          Stairs Within Home, Primary    Stairs, Within Home, Primary  13 to his den  -LM     Stair Railings, Within Home, Primary  railing on left side (ascending)  -LM     Stairs Comment, Within Home, Primary  States he spends most of his time in the den  -LM     Row Name 06/08/19 1055          Cognitive Assessment/Interventions    Additional Documentation  Cognitive Assessment/Intervention (Group)  -LM      Row Name 06/08/19 1055          Cognitive Assessment/Intervention- PT/OT    Affect/Mental Status (Cognitive)  WFL  -LM     Orientation Status (Cognition)  oriented x 4  -LM     Follows Commands (Cognition)  WFL  -LM     Cognitive Function (Cognitive)  safety deficit  -LM     Safety Deficit (Cognitive)  mild deficit;safety precautions awareness;safety precautions follow-through/compliance  -LM     Personal Safety Interventions  fall prevention program maintained;gait belt;muscle strengthening facilitated;nonskid shoes/slippers when out of bed  -LM     Row Name 06/08/19 1055          Bed Mobility Assessment/Treatment    Bed Mobility Assessment/Treatment  supine-sit  -LM     Supine-Sit [place] (Bed Mobility)  conditional independence  -LM     Assistive Device (Bed Mobility)  bed rails;head of bed elevated  -LM     Row Name 06/08/19 1055          Transfer Assessment/Treatment    Transfer Assessment/Treatment  sit-stand transfer;stand-sit transfer  -LM     Comment (Transfers)  Vc's for hand placement  -LM     Sit-Stand [place] (Transfers)  contact guard;verbal cues  -LM     Stand-Sit [place] (Transfers)  contact guard;verbal cues  -LM     Row Name 06/08/19 1055          Sit-Stand Transfer    Assistive Device (Sit-Stand Transfers)  walker, front-wheeled  -LM     Row Name 06/08/19 1055          Stand-Sit Transfer    Assistive Device (Stand-Sit Transfers)  walker, front-wheeled  -LM     Row Name 06/08/19 1055          Gait/Stairs Assessment/Training    Gait/Stairs Assessment/Training  gait/ambulation independence;gait/ambulation assistive device;distance ambulated  -LM     [place] Level (Gait)  contact guard  -LM     Assistive Device (Gait)  walker, front-wheeled  -LM     Distance in Feet (Gait)  160 feet  -LM     Deviations/Abnormal Patterns (Gait)  reaz decreased;other (see comments) Dec step length  -LM     Bilateral Gait Deviations  heel strike decreased  -LM     Comment (Gait/Stairs)  Vc's to  stay inside walker.  HR elevated to 138 with ambulation but decreased back to normal with sitting.   -LM     Row Name 06/08/19 1055          General ROM    GENERAL ROM COMMENTS  BLE AROM WFL with exception of B ankle DF  -LM     Row Name 06/08/19 1055          MMT (Manual Muscle Testing)    General MMT Comments  BLEs - Hip flex - 4+/5; Knee flex/ext - 4+/5; Ankle DF - 2+/5  -     Row Name 06/08/19 1055          Motor Assessment/Intervention    Additional Documentation  Therapeutic Exercise (Group)  -     Row Name 06/08/19 1055          Therapeutic Exercise    Therapeutic Exercise  supine, lower extremities  -     Additional Documentation  Therapeutic Exercise (Row)  -     47740 - PT Therapeutic Exercise Minutes  6  -     Row Name 06/08/19 1055          Lower Extremity Supine Therapeutic Exercise    Performed, Supine Lower Extremity (Therapeutic Exercise)  hip abduction/adduction;SLR (straight leg raise);ankle pumps;heel slides  -     Exercise Type, Supine Lower Extremity (Therapeutic Exercise)  AROM (active range of motion)  -     Sets/Reps Detail, Supine Lower Extremity (Therapeutic Exercise)  x10 bilaterally  -     Comment, Supine Lower Extremity (Therapeutic Exercise)  Completed reclined in chair  -     Row Name 06/08/19 1055          Sensory Assessment/Intervention    Sensory General Assessment  light touch sensation deficits identified  -     Row Name 06/08/19 1055          Light Touch Sensation Assessment    Left Lower Extremity: Light Touch Sensation Assessment  mild impairment, 75% or more correct responses  -LM     Comment, Left Lower Extremity: Light Touch Sensation Assessment  Difficulty feeling light touch to B toes  -LM     Right Lower Extremity: Light Touch Sensation Assessment  mild impairment, 75% or more correct responses  -     Comment, Right Lower Extremity: Light Touch Sensation Assessment  Difficulty feeling light touch to B toes  -LM     Row Name 06/08/19 1055           Vision Assessment/Intervention    Visual Impairment/Limitations  corrective lenses for reading  -LM     Row Name 06/08/19 1055          Pain Assessment    Additional Documentation  Pain Scale: Numbers Pre/Post-Treatment (Group)  -LM     Row Name 06/08/19 1055          Pain Scale: Numbers Pre/Post-Treatment    Pain Scale: Numbers, Pretreatment  0/10 - no pain  -LM     Pain Scale: Numbers, Post-Treatment  0/10 - no pain  -LM     Row Name             Wound 06/07/19 1735 Right thoracic spine incision;surgical    Wound - Properties Group Date first assessed: 06/07/19  -TL Time first assessed: 1735  -TL Side: Right  -TL Location: thoracic spine  -TL Type: incision;surgical  -TL    Row Name 06/08/19 1055          Plan of Care Review    Plan of Care Reviewed With  patient;spouse  -LM     Row Name 06/08/19 1055          Physical Therapy Clinical Impression    Date of Referral to PT  06/07/19  -     PT Diagnosis (PT Clinical Impression)  Impaired functional mobility, balance, gait, and endurance  -LM     Criteria for Skilled Interventions Met (PT Clinical Impression)  yes;treatment indicated  -LM     Rehab Potential (PT Clinical Summary)  good, to achieve stated therapy goals  -LM     Care Plan Review (PT)  evaluation/treatment results reviewed;care plan/treatment goals reviewed;risks/benefits reviewed;current/potential barriers reviewed;patient/other agree to care plan  -     Row Name 06/08/19 1056          Vital Signs    Pre Systolic BP Rehab  105  -LM     Pre Treatment Diastolic BP  68  -LM     Post Systolic BP Rehab  112  -LM     Post Treatment Diastolic BP  71  -LM     Pretreatment Heart Rate (beats/min)  88  -LM     Posttreatment Heart Rate (beats/min)  90  -LM     Pre SpO2 (%)  93  -LM     O2 Delivery Pre Treatment  supplemental O2  -LM     Pre Patient Position  Supine  -LM     Intra Patient Position  Standing  -LM     Post Patient Position  Sitting  -LM     Row Name 06/08/19 1056          Physical Therapy Goals     Transfer Goal Selection (PT)  transfer, PT goal 1  -LM     Gait Training Goal Selection (PT)  gait training, PT goal 1  -LM     Stairs Goal Selection (PT)  stairs, PT goal 1  -LM     Additional Documentation  Stairs Goal Selection (PT) (Row)  -LM     Row Name 06/08/19 1055          Transfer Goal 1 (PT)    Activity/Assistive Device (Transfer Goal 1, PT)  bed-to-chair/chair-to-bed  -LM     [place] Level/Cues Needed (Transfer Goal 1, PT)  conditional independence  -LM     Time Frame (Transfer Goal 1, PT)  long term goal (LTG);2 weeks  -LM     Row Name 06/08/19 1055          Gait Training Goal 1 (PT)    Activity/Assistive Device (Gait Training Goal 1, PT)  gait (walking locomotion);assistive device use  -LM     [place] Level (Gait Training Goal 1, PT)  conditional independence  -LM     Distance (Gait Goal 1, PT)  200 feet  -LM     Time Frame (Gait Training Goal 1, PT)  long term goal (LTG);2 weeks  -LM     Row Name 06/08/19 1055          Stairs Goal 1 (PT)    Activity/Assistive Device (Stairs Goal 1, PT)  ascending stairs;descending stairs;using handrail, left  -LM     [place] Level/Cues Needed (Stairs Goal 1, PT)  supervision required  -LM     Number of Stairs (Stairs Goal 1, PT)  12  -LM     Time Frame (Stairs Goal 1, PT)  long term goal (LTG);2 weeks  -LM     Row Name 06/08/19 1055          Positioning and Restraints    Pre-Treatment Position  in bed  -LM     Post Treatment Position  chair  -LM     In Chair  reclined;call light within reach;encouraged to call for assist;with family/caregiver;notified nsg  -LM     Row Name 06/08/19 1055          Living Environment    Home Accessibility  stairs to enter home;stairs within home;tub/shower is not walk in  -LM       User Key  (r) = Recorded By, (t) = Taken By, (c) = Cosigned By    Initials Name Provider Type    LM Aria Gong, PT Physical Therapist    MARÍA DIXON, Wesley Alston RN Registered Nurse        Physical Therapy Education     Title: PT OT SLP Therapies  (In Progress)     Topic: Physical Therapy (In Progress)     Point: Mobility training (In Progress)     Learning Progress Summary           Patient Acceptance, E, NR by LM at 6/8/2019 11:54 AM    Comment:  Educated pt on completing AP, HS, hip abd, and SLR twice throughout the day (one more time tonight).                   Point: Home exercise program (In Progress)     Learning Progress Summary           Patient Acceptance, E, NR by LM at 6/8/2019 11:54 AM    Comment:  Educated pt on completing AP, HS, hip abd, and SLR twice throughout the day (one more time tonight).                   Point: Precautions (In Progress)     Learning Progress Summary           Patient Acceptance, E, NR by LM at 6/8/2019 11:54 AM    Comment:  Educated pt on completing AP, HS, hip abd, and SLR twice throughout the day (one more time tonight).                               User Key     Initials Effective Dates Name Provider Type Discipline     06/15/16 -  Aria Gong, PT Physical Therapist PT              PT Recommendation and Plan  Anticipated Discharge Disposition (PT): home with assist  Planned Therapy Interventions (PT Eval): balance training, bed mobility training, gait training, home exercise program, neuromuscular re-education, patient/family education, postural re-education, ROM (range of motion), stair training, strengthening, stretching, transfer training  Therapy Frequency (PT Clinical Impression): daily  Outcome Summary/Treatment Plan (PT)  Anticipated Discharge Disposition (PT): home with assist  Plan of Care Reviewed With: patient, spouse  Outcome Summary: PT evaluation completed on this date.  Pt transferred supine-->sit modified independently, stood with CGA, and ambulated 160 feet using rw with CGAx1.  Pt tolerated BLE ther ex without complaint.  Skilled PT Services warranted to improve mobility, endurance, and safety.  Recommend d/c home with assist.  Outcome Measures     Row Name 06/08/19 9977             How much  help from another person do you currently need...    Turning from your back to your side while in flat bed without using bedrails?  4  -LM      Moving from lying on back to sitting on the side of a flat bed without bedrails?  4  -LM      Moving to and from a bed to a chair (including a wheelchair)?  3  -LM      Standing up from a chair using your arms (e.g., wheelchair, bedside chair)?  3  -LM      Climbing 3-5 steps with a railing?  3  -LM      To walk in hospital room?  3  -LM      AM-PAC 6 Clicks Score  20  -LM         Functional Assessment    Outcome Measure Options  AM-PAC 6 Clicks Basic Mobility (PT)  -LM        User Key  (r) = Recorded By, (t) = Taken By, (c) = Cosigned By    Initials Name Provider Type    Aria Staley, PT Physical Therapist         Time Calculation:   PT Charges     Row Name 06/08/19 1055             Time Calculation    Start Time  1055  -LM      PT Received On  06/08/19  -LM      PT Goal Re-Cert Due Date  06/18/19  -LM         Timed Charges    30087 - PT Therapeutic Exercise Minutes  6  -LM        User Key  (r) = Recorded By, (t) = Taken By, (c) = Cosigned By    Initials Name Provider Type    Aria Staley, BRYAN Physical Therapist        Therapy Charges for Today     Code Description Service Date Service Provider Modifiers Qty    [id number]  PT EVAL MOD COMPLEXITY 4 6/8/2019 Aria Gong, PT GP 1          PT G-Codes  Outcome Measure Options: AM-PAC 6 Clicks Basic Mobility (PT)  AM-PAC 6 Clicks Score: 20      Aria Gong PT  6/8/2019

## 2019-06-08 NOTE — PLAN OF CARE
Problem: Patient Care Overview  Goal: Plan of Care Review  Outcome: Ongoing (interventions implemented as appropriate)   06/07/19 2000 06/08/19 0358   Plan of Care Review   Progress --  no change   Coping/Psychosocial   Plan of Care Reviewed With patient;son --      Goal: Discharge Needs Assessment  Outcome: Ongoing (interventions implemented as appropriate)   06/07/19 1640 06/07/19 1642 06/08/19 0358   Discharge Needs Assessment   Readmission Within the Last 30 Days --  --  no previous admission in last 30 days   Concerns to be Addressed --  --  discharge planning   Patient/Family Anticipates Transition to home;home with family;home with help/services --  --    Patient/Family Anticipated Services at Transition --  outpatient care;complementary therapies  (uses [employer] [facility]) --    Transportation Anticipated family or friend will provide --  --    Anticipated Changes Related to Illness --  --  none   Equipment Needed After Discharge --  --  none       Problem: Pneumonia (Adult)  Goal: Signs and Symptoms of Listed Potential Problems Will be Absent, Minimized or Managed (Pneumonia)  Outcome: Ongoing (interventions implemented as appropriate)   06/08/19 0358   Goal/Outcome Evaluation   Problems Assessed (Pneumonia) all   Problems Present (Pneumonia) respiratory compromise;infection progression       Problem: Fall Risk (Adult)  Goal: Identify Related Risk Factors and Signs and Symptoms  Outcome: Outcome(s) achieved Date Met: 06/08/19 06/08/19 0358   Fall Risk (Adult)   Related Risk Factors (Fall Risk) age-related changes;environment unfamiliar   Signs and Symptoms (Fall Risk) presence of risk factors     Goal: Absence of Fall  Outcome: Ongoing (interventions implemented as appropriate)   06/08/19 0358   Fall Risk (Adult)   Absence of Fall making progress toward outcome

## 2019-06-08 NOTE — CONSULTS
INFECTIOUS DISEASE CONSULT/INITIAL HOSPITAL VISIT    Rodney Looney  1934  [id number]    Date of Consult: 6/8/2019    Admission Date: 6/7/2019      Requesting Provider: No ref. provider found  Evaluating Physician: Rafael Leavitt MD    Reason for Consultation: Serratia bacteremia/pacemaker lead infection    History of present illness:    6/8/2019: Mr. Looney is an 84-year-old white male who is seen today for evaluation of Serratia bacteremia/pacemaker lead infection in the setting of a recent diagnosis of non-small cell lung cancer for which he underwent a right lower lobectomy at [facility] on 5/14.  His course at [facility] was complicated by Serratia bacteremia with 2 sets of positive blood cultures on 5/21 and 1 out of 2 sets positive on 5/23.  Subsequent blood cultures on 5/26 were negative.  A ZI revealed a mobile mass on the pacemaker lead, consistent with a pacemaker lead infection/endocarditis.  He was treated with intravenous Zosyn therapy and subsequently discharged on outpatient intravenous Zosyn.  Consideration was given for referral back to  for pacemaker extraction-his pacemaker was placed at .  Due to his underlying lung cancer and advanced age, he is being given consideration for chronic antibiotic suppression rather than pacemaker lead extraction.  He presented for follow-up in our office yesterday and was noted to have dyspnea with hypoxemia.  His O2 saturations were in the mid 80s while sitting.  He was not interested in readmission to [facility] and requested readmission to [facility].  He was evaluated in the emergency room where he was noted to have a right basilar pulmonary infiltrate by chest x-ray and chest CT scan.  His antibiotic therapy was switched from Zosyn to Merrem.  He has remained afebrile overnight.  He has a minimal cough with minimal sputum production.  He denies nausea, vomiting, and diarrhea.  He denies  severe chest pain.    Past Medical History:   Diagnosis Date   • Cancer (CMS/HCC)    • Coronary artery disease    • Elevated cholesterol    • GERD (gastroesophageal reflux disease)    • History of BPH 2 yrs   • Hypertension    • Kidney disease    • Smoking        Past Surgical History:   Procedure Laterality Date   • CARDIAC SURGERY     • HERNIA REPAIR         History reviewed. No pertinent family history.    Social History     Socioeconomic History   • Marital status:      Spouse name: Not on file   • Number of children: Not on file   • Years of education: Not on file   • Highest education level: Not on file   Tobacco Use   • Smoking status: Former Smoker     Types: Cigarettes   • Smokeless tobacco: Never Used       No Known Allergies      Medication:    Current Facility-Administered Medications:   •  acetaminophen (TYLENOL) tablet 650 mg, 650 mg, Oral, Q4H PRN, Elan Chen MD  •  docusate sodium (COLACE) capsule 100 mg, 100 mg, Oral, BID PRN, Elan Chen MD  •  famotidine (PEPCID) tablet 20 mg, 20 mg, Oral, BID PRN, Elan Chen MD  •  heparin (porcine) 5000 UNIT/ML injection 5,000 Units, 5,000 Units, Subcutaneous, Q8H, Elan Chen MD, 5,000 Units at 06/08/19 0535  •  melatonin tablet 5 mg, 5 mg, Oral, Nightly PRN, Elan Chen MD  •  meropenem (MERREM) 1 g/100 mL 0.9% NS VTB (mbp), 1 g, Intravenous, Q12H, Elan Chen MD, Stopped at 06/08/19 0422  •  ondansetron (ZOFRAN) tablet 4 mg, 4 mg, Oral, Q6H PRN **OR** ondansetron (ZOFRAN) injection 4 mg, 4 mg, Intravenous, Q6H PRN, Elan Chen MD, 4 mg at 06/07/19 2114  •  sodium chloride 0.9 % flush 3 mL, 3 mL, Intravenous, Q12H, Elan Chen MD  •  sodium chloride 0.9 % flush 3-10 mL, 3-10 mL, Intravenous, PRN, Elan Chen MD  •  traMADol (ULTRAM) tablet 50 mg, 50 mg, Oral, Q6H PRN, Elan Chen MD    Antibiotics:  Anti-Infectives (From  admission, onward)    Ordered     Dose/Rate Route Frequency Start Stop    19 1654  meropenem (MERREM) 1 g/100 mL 0.9% NS VTB (mbp)     Comments:  Changed from q8h per extended infusion guidelines   Ordering Provider:  Elan Chen MD    1 g  over 3 Hours Intravenous Every 12 Hours Scheduled 19 2100 19 2059    19 1434  meropenem (MERREM) 1 g/100 mL 0.9% NS VTB (mbp)     Ordering Provider:  Leopoldo Rogers MD    1 g  over 30 Minutes Intravenous Once 19 1436 19 1617            Review of Systems:  Constitutional-- No Fever, chills or sweats.  He complains of some fatigue and malaise  HEENT-- No new vision, hearing or throat complaints.  No epistaxis or oral sores.  Denies odynophagia or dysphagia. No headache  CV--he has a history of coronary artery disease and atrial fibrillation  Resp--he complains of dyspnea with mild right-sided chest discomfort.  He has a mild cough with minimal sputum production.  GI- No nausea, vomiting, or diarrhea.  No hematochezia, melena, or hematemesis.   -- No dysuria, hematuria, or flank pain.  He has a history of stage II-3 chronic kidney disease  Lymph- no swollen lymph nodes in neck/axilla or groin.   Heme-he had some bleeding from his right thoracotomy wound yesterday..  MS-- no swelling or pain in the bones or joints of arms/legs.  No new back pain.  Neuro-- No acute focal weakness or numbness in the arms or legs.  No seizures.  Skin--No rashes or lesions      Physical Exam:   Vital Signs  Temp (24hrs), Av.5 °F (36.4 °C), Min:96.7 °F (35.9 °C), Max:97.9 °F (36.6 °C)    Temp  Min: 96.7 °F (35.9 °C)  Max: 97.9 °F (36.6 °C)  BP  Min: 110/60  Max: 134/85  Pulse  Min: 75  Max: 94  Resp  Min: 16  Max: 20  SpO2  Min: 94 %  Max: 95 %    GENERAL: Awake and alert, in no acute distress.   HEENT: Normocephalic, atraumatic.  PERRL. EOMI. No conjunctival injection. No icterus. Oropharynx clear without evidence of thrush or exudateNECK:  Supple without nuchal rigidity.   LYMPH: No cervical, axillary or inguinal lymphadenopathy.  HEART: RRR; No murmur, rubs, gallops.   LUNGS: He has right basilar crackles.  ABDOMEN: Soft, nontender, nondistended. Positive bowel sounds. No rebound or guarding. No mass or HSM.  EXT:  No cyanosis, clubbing or edema. No cord.  : Genitalia generally unremarkable.  Without Asher catheter.  MSK: FROM without joint effusions noted arms/legs.    SKIN: Warm and dry without cutaneous eruptions on Inspection/palpation.    NEURO: Oriented to PPT. No focal deficits on motor/sensory exam at arms/legs.  PSYCHIATRIC: Normal insight and judgement. Cooperative with PE  Chest wall: There is a left chest wall pacemaker pocket with no erythema and he also has a right anterior chest wall tunneled Groshong catheter with no exit site erythema    Laboratory Data    Results from last 7 days   Lab Units 06/08/19  0526 06/07/19  1223   WBC 10*3/mm3 6.11 6.10   HEMOGLOBIN g/dL 8.2* 8.6*   HEMATOCRIT % 28.3* 29.8*   PLATELETS 10*3/mm3 247 282     Results from last 7 days   Lab Units 06/08/19  0526   SODIUM mmol/L 134*   POTASSIUM mmol/L 4.3   CHLORIDE mmol/L 100   CO2 mmol/L 24.0   BUN mg/dL 21   CREATININE mg/dL 1.83*   GLUCOSE mg/dL 83   CALCIUM mg/dL 9.5     Results from last 7 days   Lab Units 06/08/19  0526   ALK PHOS U/L 88   BILIRUBIN mg/dL 0.5   ALT (SGPT) U/L 12   AST (SGOT) U/L 16     Results from last 7 days   Lab Units 06/07/19  1223   SED RATE mm/hr 36*     Results from last 7 days   Lab Units 06/07/19  1223   CRP mg/dL 3.52*     Results from last 7 days   Lab Units 06/07/19  1435   LACTATE mmol/L 1.6             Estimated Creatinine Clearance: 33.6 mL/min (A) (by C-G formula based on SCr of 1.83 mg/dL (H)).      Microbiology:                                Radiology:  Imaging Results (last 72 hours)     Procedure Component Value Units Date/Time    CT Chest Without Contrast [[id number]] Collected:  06/07/19 2617     Updated:   06/07/19 1610    Narrative:       EXAMINATION: CT CHEST WO CONTRAST-06/07/2019:      INDICATION: Hypoxemia after recent lobectomy, bleeding from posterior  incision, recent bacteremia but no fever now; R09.02-Hypoxemia;  J18.1-Lobar pneumonia, unspecified organism, lobar pneumonia.     TECHNIQUE: Multiple axial CT imaging was obtained of the chest without  the administration of intravenous contrast.     The radiation dose reduction device was turned on for each scan per the  ALARA (As Low as Reasonably Achievable) protocol.     COMPARISON: NONE.     FINDINGS: There is a small amount of pleural fluid identified along the  right lateral chest wall with a fracture involving the right posterior  sixth rib. Findings are likely postoperative. There is consolidation  posteriorly within the right lung base concerning for infiltrate. There  is some chronic interstitial changes identified likely postoperative.  Superimposed infection is likely within the right lung base. Severe  emphysematous changes seen within the lung fields bilaterally with  bronchiectatic changes centrally. Severe emphysematous changes seen  within the upper lung fields. Old healed granulomatous disease seen  within the chest. The cardiac chambers are within normal limits. No  pericardial effusion. No bulky hilar or axillary lymphadenopathy. The  visualized upper abdomen is unremarkable.       Impression:       Findings concerning for dense consolidation and infiltrate  superimposed on chronic and emphysematous change within the right lung.  There is a fracture involving the right posterior sixth rib likely  postsurgical with some pleural thickening and small pleural fluid seen  on the right.     D:  06/07/2019  E:  06/07/2019     This report was finalized on 6/7/2019 4:07 PM by Dr. Jo Ann Galarza MD.       XR Chest 1 View [[id number]] Collected:  06/07/19 1306     Updated:  06/07/19 1423    Narrative:       EXAMINATION: XR CHEST 1 VW- 06/07/2019       INDICATION: SOA triage protocol      COMPARISON: NONE     FINDINGS: Portable chest reveals heart to be enlarged. Ill-defined  opacification seen at the right lung base with small right pleural  effusion. Deep line catheter on the right tip in the SVC. Cardiac pacer  leads in satisfactory position.       Impression:       Patchy ill-defined opacification seen within the right mid  and lower lung field with small right pleural effusion. Left lung is  clear.     D:  06/07/2019  E:  06/07/2019     This report was finalized on 6/7/2019 2:20 PM by Dr. Jo Ann Galarza MD.               Impression:   1.  Serratia bacteremia/Pacemaker lead infection- he has Serratia bacteremia with 2+ blood cultures of 5/21 and 1 out of 2 sets positive on 5/23 with subsequent blood cultures on 5/26- at [facility].  He had a mobile mass attached to the pacemaker lead by ZI at [facility] on 5/28.  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  He would require pacemaker lead extraction to cure this process but due to his advanced age and underlying lung cancer, consideration is being given for chronic suppression instead of a higher risk of lead extraction.  Dr. Zayas has been following him at [facility] and I will confer with him regarding this issue when he returns on Monday.  In the meantime, we will leave him on intravenous Merrem.  2.  Right basilar infiltrate- this does not appear particularly different when compared to his chest x-ray at [facility] of 5/30.  3.  Non-small cell lung cancer-status post right lower lobe lobectomy, 5/14/2019  4.  Acute hypoxic respiratory failure-if he clinically worsens, we may need to assess for pulmonary embolism.  5.  Stage II-III chronic kidney disease.  His antibiotic therapy will need to be dose adjusted for his renal dysfunction.  6.  History of atrial fibrillation  7.  Coronary artery disease  8.  Status post pacemaker implantation-this  was performed at   9.  Blood loss anemia    PLAN/RECOMMENDATIONS:   1.  Merrem 1 g IV every 12 hours  2.  Blood cultures-pending  3.  Consider pacemaker lead extraction       Rafael Leavitt MD  6/8/2019  8:21 AM

## 2019-06-08 NOTE — PLAN OF CARE
Problem: Patient Care Overview  Goal: Plan of Care Review  Outcome: Ongoing (interventions implemented as appropriate)   06/08/19 1120   OTHER   Outcome Summary PT evaluation completed on this date. Pt transferred supine-->sit modified independently, stood with CGA, and ambulated 160 feet using rw with CGAx1. Pt tolerated BLE ther ex without complaint. Skilled PT Services warranted to improve mobility, endurance, and safety. Recommend d/c home with assist.   Coping/Psychosocial   Plan of Care Reviewed With patient;spouse

## 2019-06-08 NOTE — THERAPY EVALUATION
Acute Care - Occupational Therapy Initial Evaluation  Psychiatric     Patient Name: Rodney Looney  : 1934  MRN: [id number]  Today's Date: 2019  Onset of Illness/Injury or Date of Surgery: 19  Date of Referral to OT: 19  Referring Physician: MD Gustavo    Admit Date: 2019       ICD-10-CM ICD-9-CM   1. Hypoxemia R09.02 799.02   2. Pneumonia of right lower lobe due to infectious organism (CMS/HCC) J18.1 486   3. Impaired functional mobility, balance, gait, and endurance Z74.09 V49.89   4. Impaired mobility and ADLs Z74.09 799.89     Patient Active Problem List   Diagnosis   • Lung infiltrate   • Serratia infection   • Presence of cardiac pacemaker   • Non-small cell lung cancer and reports right lower lobe lobectomy in May at [facility]   • History of lobectomy of lung   • Hypoxia   • CKD (chronic kidney disease) stage 3, GFR 30-59 ml/min (CMS/HCC)     Past Medical History:   Diagnosis Date   • Cancer (CMS/HCC)    • Coronary artery disease    • Elevated cholesterol    • GERD (gastroesophageal reflux disease)    • History of BPH 2 yrs   • Hypertension    • Kidney disease    • Smoking      Past Surgical History:   Procedure Laterality Date   • CARDIAC SURGERY     • HERNIA REPAIR            OT ASSESSMENT FLOWSHEET (last 12 hours)      Occupational Therapy Evaluation     Row Name 19 1405                   OT Evaluation Time/Intention    Subjective Information  no complaints  -AN        Document Type  evaluation  -AN        Mode of Treatment  occupational therapy  -AN        Patient Effort  good  -AN        Symptoms Noted During/After Treatment  none  -AN           General Information    Patient Profile Reviewed?  yes  -AN        Onset of Illness/Injury or Date of Surgery  19  -AN        Referring Physician  MD Gustavo  -AN        Patient Observations  alert;cooperative;agree to therapy  -AN        Patient/Family Observations  spouse and son present  -AN        General  Observations of Patient  Pt reclined in chair  -AN        Prior Level of Function  independent:;all household mobility;gait;transfer;feeding;grooming;min assist:;dressing;bathing assist with shoes, washing back  -AN        Equipment Currently Used at Home  walker, rolling;grab bar reacher; has borrowed a TTB  -AN        Pertinent History of Current Functional Problem  Pt to ED with weakness and hypoxia. Recent lobectomy OSH on 5/14. Pt currently with possible PNA, mass on PPM   -AN        Existing Precautions/Restrictions  fall;other (see comments) precautions for R chest incision  -AN        Risks Reviewed  patient:;spouse/S.O.:;LOB;dizziness;increased discomfort;change in vital signs  -AN        Benefits Reviewed  patient:;spouse/S.O.:;improve function;increase independence;increase strength  -AN        Barriers to Rehab  none identified  -AN           Relationship/Environment    Lives With  spouse  -AN        Family Caregiver if Needed  spouse;child(maureen), adult  -AN           Resource/Environmental Concerns    Current Living Arrangements  correctional facility  -AN           Home Main Entrance    Number of Stairs, Main Entrance  four  -AN           Stairs Within Home, Primary    Stairs, Within Home, Primary  13 to den  -AN        Stair Railings, Within Home, Primary  railing on left side (ascending)  -AN           Stairs Within Home, Secondary    Stairs, Within Home, Secondary  1 to bathroom  -AN           Cognitive Assessment/Interventions    Additional Documentation  Cognitive Assessment/Intervention (Group)  -AN           Cognitive Assessment/Intervention- PT/OT    Affect/Mental Status (Cognitive)  WFL  -AN        Orientation Status (Cognition)  oriented x 4  -AN        Follows Commands (Cognition)  WFL  -AN        Cognitive Function (Cognitive)  safety deficit  -AN        Personal Safety Interventions  fall prevention program maintained  -AN           Bed Mobility Assessment/Treatment    Comment (Bed  Mobility)  pt up in chair  -AN           Sit-Stand Transfer    Sit-Stand [place] (Transfers)  contact guard  -AN           Stand-Sit Transfer    Stand-Sit [place] (Transfers)  contact guard  -AN           ADL Assessment/Intervention    BADL Assessment/Intervention  lower body dressing;bathing;feeding  -AN           Bathing Assessment/Intervention    Comment (Bathing)  offered long sponge to assist, pt declined, demonstrates set up from chair  -AN           Lower Body Dressing Assessment/Training    Lower Body Dressing [place] Level  don;doff;socks;supervision;set up  -AN           Self-Feeding Assessment/Training    [place] Level (Feeding)  independent;feeding skills  -AN           General ROM    GENERAL ROM COMMENTS  Limited R ROM due to incision, otherwise, WFL  -AN           MMT (Manual Muscle Testing)    General MMT Comments  unable to to test R UE and  limited due  to incision, hands wFL  -AN           Motor Assessment/Interventions    Additional Documentation  Balance (Group);Gross Motor Coordination (Group);Therapeutic Exercise (Group)  -AN           Gross Motor Coordination    Gross Motor Skill, Impairments Detail  WFL UE  -AN           Sensory Assessment/Intervention    Sensory General Assessment  no sensation deficits identified  -AN        Additional Documentation  Vision Assessment/Intervention (Group)  -AN           Vision Assessment/Intervention    Visual Impairment/Limitations  WFL;corrective lenses for reading  -AN           Positioning and Restraints    Pre-Treatment Position  sitting in chair/recliner  -AN        Post Treatment Position  chair  -AN        In Chair  reclined;call light within reach;encouraged to call for assist  -AN           Pain Scale: Numbers Pre/Post-Treatment    Pain Scale: Numbers, Pretreatment  0/10 - no pain  -AN        Pain Scale: Numbers, Post-Treatment  0/10 - no pain  -AN           Wound 06/07/19 7405 Right thoracic spine incision;surgical    Wound -  Properties Group Date first assessed: 06/07/19  -TL Time first assessed: 1735  -TL Side: Right  -TL Location: thoracic spine  -TL Type: incision;surgical  -TL       Plan of Care Review    Plan of Care Reviewed With  patient;spouse  -AN           Clinical Impression (OT)    Date of Referral to OT  06/07/19  -AN        OT Diagnosis  Impaired ADLs  -AN        Patient/Family Goals Statement (OT Eval)  agreeable to OT  -AN        Criteria for Skilled Therapeutic Interventions Met (OT Eval)  yes;treatment indicated  -AN        Rehab Potential (OT Eval)  good, to achieve stated therapy goals  -AN        Therapy Frequency (OT Eval)  daily  -AN        Anticipated Discharge Disposition (OT)  home with home health;home with assist  -AN           Vital Signs    Pre Systolic BP Rehab  112  -AN        Pre Treatment Diastolic BP  71  -AN        Pre SpO2 (%)  93  -AN        O2 Delivery Pre Treatment  room air  -AN        Post SpO2 (%)  93  -AN        O2 Delivery Post Treatment  room air  -AN           OT Goals    Transfer Goal Selection (OT)  transfer, OT goal 1  -AN        Activity Tolerance Goal Selection (OT)  activity tolerance, OT goal 1  -AN        Precaution Goal Selection (OT)  precaution, OT goal 1  -AN        Additional Documentation  Activity Tolerance Goal Selection (OT) (Row);Precaution Goal Selection (OT) (Row)  -AN           Transfer Goal 1 (OT)    Activity/Assistive Device (Transfer Goal 1, OT)  transfers, all;tub bench with AD as needed  -AN        [place] Level/Cues Needed (Transfer Goal 1, OT)  supervision required  -AN        Time Frame (Transfer Goal 1, OT)  10 days  -AN        Progress/Outcome (Transfer Goal 1, OT)  goal ongoing  -AN            Activity Tolerance Goal 1 (OT)    Activity Tolerance Goal 1 (OT)  t will complete 10 minutes therapeutic ex/activity with  1 rest break to increase endurance for ADLS/  -AN        Time Frame (Activity Tolerance Goal 1, OT)  10 days  -AN        Progress/Outcome  (Activity Tolerance Goal 1, OT)  goal ongoing  -AN           Precaution Goal 1 (OT)    Activity (Precaution Goal 1, OT)  lifting restrictions;during ADLs  -AN        [place] Level/Cues Needed (Precautions Goal 1, OT)  with minimum;verbal cues/redirection  -AN        Time Frame (Precaution Goal 1, OT)  10 days  -AN        Progress/Outcome (Precaution Goal 1, OT)  goal ongoing  -AN           Living Environment    Home Accessibility  stairs to enter home;tub/shower is not walk in;stairs within home  -AN          User Key  (r) = Recorded By, (t) = Taken By, (c) = Cosigned By    Initials Name Effective Dates    AN Sydney Montes OT 06/22/15 -     TL L, Wesley Alston RN 08/07/17 -          Occupational Therapy Education     Title: PT OT SLP Therapies (In Progress)     Topic: Occupational Therapy (In Progress)     Point: ADL training (Done)     Description: Instruct learner(s) on proper safety adaptation and remediation techniques during self care or transfers.   Instruct in proper use of assistive devices.    Learning Progress Summary           Patient Acceptance, E,D, VU,NR by AN at 6/8/2019  2:05 PM                               User Key     Initials Effective Dates Name Provider Type Discipline    AN 06/22/15 -  Sydney Montes, OT Occupational Therapist OT                  OT Recommendation and Plan  Outcome Summary/Treatment Plan (OT)  Anticipated Discharge Disposition (OT): home with home health, home with assist  Therapy Frequency (OT Eval): daily  Plan of Care Review  Plan of Care Reviewed With: patient, spouse  Plan of Care Reviewed With: patient, spouse  Outcome Summary: Pt with PMH and current evolving symptoms that have decreased Iwith ADLs. Pt will need home health upon discharge.     Outcome Measures     Row Name 06/08/19 1405 06/08/19 1055          How much help from another person do you currently need...    Turning from your back to your side while in flat bed without using bedrails?  --  4  -LM      Moving from lying on back to sitting on the side of a flat bed without bedrails?  --  4  -LM     Moving to and from a bed to a chair (including a wheelchair)?  --  3  -LM     Standing up from a chair using your arms (e.g., wheelchair, bedside chair)?  --  3  -LM     Climbing 3-5 steps with a railing?  --  3  -LM     To walk in hospital room?  --  3  -LM     AM-PAC 6 Clicks Score  --  20  -LM        How much help from another is currently needed...    Putting on and taking off regular lower body clothing?  3  -AN  --     Bathing (including washing, rinsing, and drying)  3  -AN  --     Toileting (which includes using toilet bed pan or urinal)  3  -AN  --     Putting on and taking off regular upper body clothing  3  -AN  --     Taking care of personal grooming (such as brushing teeth)  3  -AN  --     Eating meals  4  -AN  --     Score  19  -AN  --        Functional Assessment    Outcome Measure Options  AM-PAC 6 Clicks Daily Activity (OT)  -AN  AM-PAC 6 Clicks Basic Mobility (PT)  -LM       User Key  (r) = Recorded By, (t) = Taken By, (c) = Cosigned By    Initials Name Provider Type    Sydney Ochoa OT Occupational Therapist    Aria Staley PT Physical Therapist          Time Calculation:   Time Calculation- OT     Row Name 06/08/19 1452             Time Calculation- OT    OT Start Time  1405  -AN      Total Timed Code Minutes- OT  0 minute(s)  -AN      OT Received On  06/08/19  -AN      OT Goal Re-Cert Due Date  06/18/19  -AN        User Key  (r) = Recorded By, (t) = Taken By, (c) = Cosigned By    Initials Name Provider Type    Sydney Ochoa OT Occupational Therapist        Therapy Charges for Today     Code Description Service Date Service Provider Modifiers Qty    [id number]  OT EVAL LOW COMPLEXITY 4 6/8/2019 Sydney Montes OT GO 1               Sydney Montes OT  6/8/2019

## 2019-06-08 NOTE — PROGRESS NOTES
[facility] Medicine Services  PROGRESS NOTE    Patient Name: Rodney Looney  : 1934  MRN: [id number]    Date of Admission: 2019  Length of Stay: 1  Primary Care Physician: Dana Isidro DO    Subjective   Subjective     CC:  Shortness of Breath    HPI:  Wife in room today.  Not on oxygen at home per patient.      Review of Systems    Gen- No fevers, chills  CV- No chest pain, palpitations  Resp- No cough, dyspnea  GI- No N/V/D, abd pain    Otherwise ROS is negative except as mentioned in the HPI.    Objective   Objective     Vital Signs:   Temp:  [96.7 °F (35.9 °C)-97.9 °F (36.6 °C)] 97.7 °F (36.5 °C)  Heart Rate:  [75-94] 85  Resp:  [16-20] 16  BP: (112-132)/(57-88) 112/66     Physical Exam:    Constitutional: No acute distress, awake, alert  HENT: NCAT, mucous membranes moist  Respiratory: Clear to auscultation bilaterally, respiratory effort normal   Cardiovascular: RRR, s1 and s2  Gastrointestinal: Positive bowel sounds, soft, nontender, nondistended  Musculoskeletal: No bilateral ankle edema  Psychiatric: Appropriate affect, cooperative  Neurologic: Oriented x 3, strength symmetric in all extremities, Cranial Nerves grossly intact to confrontation, speech clear  Skin: No rashes      Results Reviewed:  I have personally reviewed current lab, radiology, and data and agree.    Results from last 7 days   Lab Units 19  0526 19  1223   WBC 10*3/mm3 6.11 6.10   HEMOGLOBIN g/dL 8.2* 8.6*   HEMATOCRIT % 28.3* 29.8*   PLATELETS 10*3/mm3 247 282   PROCALCITONIN ng/mL  --  0.14     Results from last 7 days   Lab Units 19  0526 19  1223   SODIUM mmol/L 134* 134*   POTASSIUM mmol/L 4.3 4.8   CHLORIDE mmol/L 100 99   CO2 mmol/L 24.0 22.0   BUN mg/dL 21 21   CREATININE mg/dL 1.83* 1.85*   GLUCOSE mg/dL 83 103*   CALCIUM mg/dL 9.5 9.4   ALT (SGPT) U/L 12 13   AST (SGOT) U/L 16 15   TROPONIN T ng/mL  --  <0.010   PROBNP pg/mL  --  2,107.0*     Estimated  Creatinine Clearance: 33.6 mL/min (A) (by C-G formula based on SCr of 1.83 mg/dL (H)).    Microbiology Results Abnormal     Procedure Component Value - Date/Time    Blood Culture - Blood, Arm, Right [[id number]] Collected:  06/07/19 1425    Lab Status:  Preliminary result Specimen:  Blood from Arm, Right Updated:  06/08/19 1500     Blood Culture No growth at 24 hours    Blood Culture - Blood, Arm, Left [[id number]] Collected:  06/07/19 1430    Lab Status:  Preliminary result Specimen:  Blood from Arm, Left Updated:  06/08/19 1500     Blood Culture No growth at 24 hours          Imaging Results (last 24 hours)     Procedure Component Value Units Date/Time    CT Chest Without Contrast [[id number]] Collected:  06/07/19 1453     Updated:  06/07/19 1610    Narrative:       EXAMINATION: CT CHEST WO CONTRAST-06/07/2019:      INDICATION: Hypoxemia after recent lobectomy, bleeding from posterior  incision, recent bacteremia but no fever now; R09.02-Hypoxemia;  J18.1-Lobar pneumonia, unspecified organism, lobar pneumonia.     TECHNIQUE: Multiple axial CT imaging was obtained of the chest without  the administration of intravenous contrast.     The radiation dose reduction device was turned on for each scan per the  ALARA (As Low as Reasonably Achievable) protocol.     COMPARISON: NONE.     FINDINGS: There is a small amount of pleural fluid identified along the  right lateral chest wall with a fracture involving the right posterior  sixth rib. Findings are likely postoperative. There is consolidation  posteriorly within the right lung base concerning for infiltrate. There  is some chronic interstitial changes identified likely postoperative.  Superimposed infection is likely within the right lung base. Severe  emphysematous changes seen within the lung fields bilaterally with  bronchiectatic changes centrally. Severe emphysematous changes seen  within the upper lung fields. Old healed granulomatous disease seen  within the chest.  The cardiac chambers are within normal limits. No  pericardial effusion. No bulky hilar or axillary lymphadenopathy. The  visualized upper abdomen is unremarkable.       Impression:       Findings concerning for dense consolidation and infiltrate  superimposed on chronic and emphysematous change within the right lung.  There is a fracture involving the right posterior sixth rib likely  postsurgical with some pleural thickening and small pleural fluid seen  on the right.     D:  06/07/2019  E:  06/07/2019     This report was finalized on 6/7/2019 4:07 PM by Dr. Jo Ann Galarza MD.           I have reviewed the medications:  Scheduled Meds:  heparin (porcine) 5,000 Units Subcutaneous Q8H   meropenem 1 g Intravenous Q12H   sodium chloride 3 mL Intravenous Q12H     Continuous Infusions:   PRN Meds:.•  acetaminophen  •  docusate sodium  •  famotidine  •  melatonin  •  ondansetron **OR** ondansetron  •  sodium chloride  •  traMADol      Assessment/Plan   Assessment / Plan     Active Hospital Problems    Diagnosis POA   • **Lung infiltrate [R91.8] Yes   • Serratia infection [A49.8] Yes   • Presence of cardiac pacemaker [Z95.0] Yes   • Non-small cell lung cancer and reports right lower lobe lobectomy in May at [facility] [C34.90] Unknown   • History of lobectomy of lung [Z90.2] Not Applicable   • Hypoxia [R09.02] Yes   • CKD (chronic kidney disease) stage 3, GFR 30-59 ml/min (CMS/[facility]) [N18.3] Yes          Brief Hospital Course to date:  Rodney Looney is a 84 y.o. male with history of non-small cell lung cancer reported and recent lobectomy in May at [facility] with recent reported Serratia bacteremia and concern reportedly for a cardiac pacemaker infection presents from infectious disease clinic with hypoxia in the 80s on vital signs and was found in the emergency room to have right lung infiltrate concerning for pneumonia.     Right lung infiltrate, possible right lung pneumonia:  Infectious disease  recommends Merrem.  Blood cultures.     Hypoxia:  Likely due to lobectomy and possible pneumonia.  Plan for nasal cannula oxygen as needed and wean as tolerated.     Serratia infection:  Reportedly had recent Serratia bacteremia.  Trying to get outside records.  Infectious disease recommends Merrem.  Repeat blood cultures.     Reported concern for pacemaker infection:  Plan to follow-up blood cultures and ID recommendations.  Monitor on telemetry.  Merrem as per above     Elevated BNP:  Probably has chronic heart failure.  Does not appear to decompensated at this point.  Plan to get outside records.     Stage III chronic kidney disease:  Reports he sees Dr. Courtney outpatient.  Unsure of his baseline.  Plan to get outside records.     Anemia:   Notably patient had recent surgery.  Baseline hemoglobin unknown.  Denies recent bleeding.  Plan to monitor    Iron / ferritin  Pacemaker extraction  Echo    DVT Prophylaxis:  Heparin SC    Disposition: I expect the patient to be discharged TBD    CODE STATUS:   Code Status and Medical Interventions:   Ordered at: 06/07/19 1509     Code Status:    CPR     Medical Interventions (Level of Support Prior to Arrest):    Full         Electronically signed by Chino Taylor MD, 06/08/19, 3:54 PM.

## 2019-06-08 NOTE — PLAN OF CARE
Problem: Patient Care Overview  Goal: Plan of Care Review  Outcome: Ongoing (interventions implemented as appropriate)   06/08/19 2483   OTHER   Outcome Summary Pt with PMH and current evolving symptoms that have decreased Iwith ADLs. Pt will need home health upon discharge.    Coping/Psychosocial   Plan of Care Reviewed With patient;spouse

## 2019-06-09 NOTE — PLAN OF CARE
Problem: Patient Care Overview  Goal: Plan of Care Review  Outcome: Ongoing (interventions implemented as appropriate)   06/09/19 0913   Coping/Psychosocial   Plan of Care Reviewed With patient   SLP evaluation completed. Continue regular diet and thin liquids with general aspiration precautions. Will continue to address with MBS tomorrow. Please see note for further details and recommendations.

## 2019-06-09 NOTE — THERAPY EVALUATION
Acute Care - Speech Language Pathology   Swallow Initial Evaluation [facility]   Clinical Swallow Evaluation     Patient Name: Rodney Looney  : 1934  MRN: [id number]  Today's Date: 2019  Onset of Illness/Injury or Date of Surgery: 19     Referring Physician: MD Gustavo      Admit Date: 2019    Visit Dx:     ICD-10-CM ICD-9-CM   1. Hypoxemia R09.02 799.02   2. Pneumonia of right lower lobe due to infectious organism (CMS/HCC) J18.1 486   3. Impaired functional mobility, balance, gait, and endurance Z74.09 V49.89   4. Impaired mobility and ADLs Z74.09 799.89     Patient Active Problem List   Diagnosis   • Lung infiltrate   • Serratia infection   • Presence of cardiac pacemaker   • Non-small cell lung cancer and reports right lower lobe lobectomy in May at [facility]   • History of lobectomy of lung   • Hypoxia   • CKD (chronic kidney disease) stage 3, GFR 30-59 ml/min (CMS/HCC)     Past Medical History:   Diagnosis Date   • Cancer (CMS/HCC)    • Coronary artery disease    • Elevated cholesterol    • GERD (gastroesophageal reflux disease)    • History of BPH 2 yrs   • Hypertension    • Kidney disease    • Smoking      Past Surgical History:   Procedure Laterality Date   • CARDIAC SURGERY     • HERNIA REPAIR          SWALLOW EVALUATION (last 72 hours)      [facility] Adult Swallow Evaluation     Row Name 19 0920                   Rehab Evaluation    Document Type  evaluation  -SM        Subjective Information  no complaints  -SM        Patient Observations  alert;cooperative  -SM           General Information    Patient Profile Reviewed  yes  -SM        Pertinent History Of Current Problem  Recent R lobectomy, in with ? PNA  -SM        Current Method of Nutrition  regular textures;thin liquids  -SM        Prior Level of Function-Communication  WFL  -SM        Prior Level of Function-Swallowing  other (see comments) has noticed occ issues since surgery  -SM        Plans/Goals Discussed with   patient;agreed upon  -        Barriers to Rehab  none identified  -        Patient's Goals for Discharge  eat/drink without coughing/choking  -           Pain Assessment    Additional Documentation  Pain Scale: Numbers Pre/Post-Treatment (Group)  -           Pain Scale: Numbers Pre/Post-Treatment    Pain Scale: Numbers, Pretreatment  0/10 - no pain  -SM        Pain Scale: Numbers, Post-Treatment  0/10 - no pain  -SM           Oral Musculature and Cranial Nerve Assessment    Oral Motor General Assessment  WFL  -SM           Clinical Swallow Eval    Oral Prep Phase  WFL  -SM        Oral Transit  WFL  -SM        Oral Residue  WFL  -SM        Pharyngeal Phase  --  -SM           Pharyngeal Phase Concerns    Pharyngeal Phase Concerns  other (see comments)  -        Pharyngeal Phase Concerns, Comment  No overt s/s aspiration, even with several 3 oz water trials. Throat clear x1 towards end, appeared more vocal clearing than anything but cannot fully rule out. Pt does report occ issues with liquids and dry foods since surgery. Agrees to MBS to ensure no difficulties and determine if/how strict precautions/compensations needs to be  -           Clinical Impression    SLP Swallowing Diagnosis  other (see comments) further assess pharyngeal function  -        Functional Impact  risk of aspiration/pneumonia  -           Recommendations    SLP Diet Recommendation  regular textures;thin liquids  -        Recommended Diagnostics  VFSS (MBS) tomorrow  -        Recommended Precautions and Strategies  upright posture during/after eating;small bites of food and sips of liquid;other (see comments) respiratory breaks prn. General aspiration precautions  -        SLP Rec. for Method of Medication Administration  as tolerated  -          User Key  (r) = Recorded By, (t) = Taken By, (c) = Cosigned By    Initials Name Effective Dates     Libby Talbert MS CCC-SLP 06/22/15 -           EDUCATION  The patient  has been educated in the following areas:   Dysphagia (Swallowing Impairment) Modified Diet Instruction.    SLP Recommendation and Plan  SLP Swallowing Diagnosis: other (see comments)(further assess pharyngeal function)  SLP Diet Recommendation: regular textures, thin liquids  Recommended Precautions and Strategies: upright posture during/after eating, small bites of food and sips of liquid, other (see comments)(respiratory breaks prn. General aspiration precautions)  SLP Rec. for Method of Medication Administration: as tolerated        Recommended Diagnostics: VFSS ([facility]tomorrow)                      Plan of Care Reviewed With: patient  Plan of Care Review  Plan of Care Reviewed With: patient           Time Calculation:   Time Calculation- SLP     Row Name 06/09/19 0952             Time Calculation- SLP    SLP Start Time  0920  -SM      SLP Received On  06/09/19  -        User Key  (r) = Recorded By, (t) = Taken By, (c) = Cosigned By    Initials Name Provider Type    Libby Moralez MS CCC-SLP Speech and Language Pathologist          Therapy Charges for Today     Code Description Service Date Service Provider Modifiers Qty    [id number] HC ST EVAL ORAL PHARYNG SWALLOW 3 6/9/2019 Libby Talbert MS CCC-SLP GN 1               Libby Talbert MS CCC-SLP  6/9/2019

## 2019-06-09 NOTE — PROGRESS NOTES
INFECTIOUS DISEASE  Progress Note    Rodney Looney  1934  [id number]      Admission Date: 6/7/2019      Requesting Provider: No ref. provider found  Evaluating Physician: Rafael Leavitt MD    Reason for Consultation: Serratia bacteremia/pacemaker lead infection    History of present illness:    6/8/2019: Mr. Looney is an 84-year-old white male who is seen today for evaluation of Serratia bacteremia/pacemaker lead infection in the setting of a recent diagnosis of non-small cell lung cancer for which he underwent a right lower lobectomy at [facility] on 5/14.  His course at [facility] was complicated by Serratia bacteremia with 2 sets of positive blood cultures on 5/21 and 1 out of 2 sets positive on 5/23.  Subsequent blood cultures on 5/26 were negative.  A ZI revealed a mobile mass on the pacemaker lead, consistent with a pacemaker lead infection/endocarditis.  He was treated with intravenous Zosyn therapy and subsequently discharged on outpatient intravenous Zosyn.  Consideration was given for referral back to  for pacemaker extraction-his pacemaker was placed at .  Due to his underlying lung cancer and advanced age, he is being given consideration for chronic antibiotic suppression rather than pacemaker lead extraction.  He presented for follow-up in our office yesterday and was noted to have dyspnea with hypoxemia.  His O2 saturations were in the mid 80s while sitting.  He was not interested in readmission to [facility] and requested readmission to [facility].  He was evaluated in the emergency room where he was noted to have a right basilar pulmonary infiltrate by chest x-ray and chest CT scan.  His antibiotic therapy was switched from Zosyn to Merrem.  He has remained afebrile overnight.  He has a minimal cough with minimal sputum production.  He denies nausea, vomiting, and diarrhea.  He denies severe chest pain.  6/9/19: He remains  afebrile. He is less short of breath today.  No increased sputum production.  No diarrhea.     Past Medical History:   Diagnosis Date   • Cancer (CMS/HCC)    • Coronary artery disease    • Elevated cholesterol    • GERD (gastroesophageal reflux disease)    • History of BPH 2 yrs   • Hypertension    • Kidney disease    • Smoking        Past Surgical History:   Procedure Laterality Date   • CARDIAC SURGERY     • HERNIA REPAIR         History reviewed. No pertinent family history.    Social History     Socioeconomic History   • Marital status:      Spouse name: Not on file   • Number of children: Not on file   • Years of education: Not on file   • Highest education level: Not on file   Tobacco Use   • Smoking status: Former Smoker     Types: Cigarettes   • Smokeless tobacco: Never Used       No Known Allergies      Medication:    Current Facility-Administered Medications:   •  acetaminophen (TYLENOL) tablet 650 mg, 650 mg, Oral, Q4H PRN, Elan Chen MD  •  docusate sodium (COLACE) capsule 100 mg, 100 mg, Oral, BID PRN, Elan Chen MD  •  famotidine (PEPCID) tablet 20 mg, 20 mg, Oral, BID PRN, Elan Chen MD, 20 mg at 06/08/19 2033  •  heparin (porcine) 5000 UNIT/ML injection 5,000 Units, 5,000 Units, Subcutaneous, Q8H, Elan Chen MD, 5,000 Units at 06/09/19 0610  •  melatonin tablet 5 mg, 5 mg, Oral, Nightly PRN, Elan Chen MD, 5 mg at 06/08/19 2033  •  meropenem (MERREM) 1 g/100 mL 0.9% NS VTB (mbp), 1 g, Intravenous, Q12H, Elan Chen MD, Last Rate: 0 mL/hr at 06/09/19 0135, 1 g at 06/09/19 0913  •  ondansetron (ZOFRAN) tablet 4 mg, 4 mg, Oral, Q6H PRN **OR** ondansetron (ZOFRAN) injection 4 mg, 4 mg, Intravenous, Q6H PRN, Elan Chen MD, 4 mg at 06/07/19 2114  •  sodium chloride 0.9 % flush 3 mL, 3 mL, Intravenous, Q12H, Elan Chen MD, 3 mL at 06/09/19 0917  •  sodium chloride 0.9 % flush 3-10 mL, 3-10  mL, Intravenous, PRN, Elan Chen MD  •  traMADol (ULTRAM) tablet 50 mg, 50 mg, Oral, Q6H PRN, Elan Chen MD    Antibiotics:  Anti-Infectives (From admission, onward)    Ordered     Dose/Rate Route Frequency Start Stop    19 1654  meropenem (MERREM) 1 g/100 mL 0.9% NS VTB (mbp)     Comments:  Changed from q8h per extended infusion guidelines   Ordering Provider:  Elan Chen MD    1 g  over 3 Hours Intravenous Every 12 Hours Scheduled 19 2100 19 2059    19 1434  meropenem (MERREM) 1 g/100 mL 0.9% NS VTB (mbp)     Ordering Provider:  Leopoldo Rogers MD    1 g  over 30 Minutes Intravenous Once 19 1436 19 1617                Physical Exam:   Vital Signs  Temp (24hrs), Av.7 °F (36.5 °C), Min:97.7 °F (36.5 °C), Max:97.7 °F (36.5 °C)    Temp  Min: 97.7 °F (36.5 °C)  Max: 97.7 °F (36.5 °C)  BP  Min: 112/66  Max: 148/86  Pulse  Min: 83  Max: 86  Resp  Min: 16  Max: 18  No Data Recorded    GENERAL: Awake and alert, in no acute distress.   HEENT: Normocephalic, atraumatic.  PERRL. EOMI. No conjunctival injection. No icterus. Oropharynx clear without evidence of thrush or exudate  LYMPH: No cervical, axillary or inguinal lymphadenopathy.  HEART: RRR; No murmur, rubs, gallops.   LUNGS: He has right basilar crackles.  ABDOMEN: Soft, nontender, nondistended. Positive bowel sounds. No rebound or guarding. No mass or HSM.  EXT:  No cyanosis, clubbing or edema. No cord.  :  Without Asher catheter.  MSK: FROM without joint effusions noted arms/legs.    SKIN: Warm and dry, thoracotomy incision with small amount of serous drainage  NEURO: Oriented to PPT. No focal deficits on motor/sensory exam at arms/legs.  PSYCHIATRIC: Normal insight and judgement. Cooperative with PE  Chest wall: There is a left chest wall pacemaker pocket with no erythema and he also has a right anterior chest wall tunneled Groshong catheter with no exit site  erythema    Laboratory Data    Results from last 7 days   Lab Units 06/09/19  0450 06/08/19  0526 06/07/19  1223   WBC 10*3/mm3 6.32 6.11 6.10   HEMOGLOBIN g/dL 8.4* 8.2* 8.6*   HEMATOCRIT % 29.3* 28.3* 29.8*   PLATELETS 10*3/mm3 246 247 282     Results from last 7 days   Lab Units 06/09/19  0450   SODIUM mmol/L 137   POTASSIUM mmol/L 4.9   CHLORIDE mmol/L 103   CO2 mmol/L 24.0   BUN mg/dL 24*   CREATININE mg/dL 2.04*   GLUCOSE mg/dL 101*   CALCIUM mg/dL 9.1     Results from last 7 days   Lab Units 06/08/19  0526   ALK PHOS U/L 88   BILIRUBIN mg/dL 0.5   ALT (SGPT) U/L 12   AST (SGOT) U/L 16     Results from last 7 days   Lab Units 06/07/19  1223   SED RATE mm/hr 36*     Results from last 7 days   Lab Units 06/07/19  1223   CRP mg/dL 3.52*     Results from last 7 days   Lab Units 06/07/19  1435   LACTATE mmol/L 1.6             Estimated Creatinine Clearance: 30.1 mL/min (A) (by C-G formula based on SCr of 2.04 mg/dL (H)).      Microbiology:      6/7:  BC no growth                           Radiology:  Imaging Results (last 72 hours)     Procedure Component Value Units Date/Time    CT Chest Without Contrast [[id number]] Collected:  06/07/19 1453     Updated:  06/07/19 1610    Narrative:       EXAMINATION: CT CHEST WO CONTRAST-06/07/2019:      INDICATION: Hypoxemia after recent lobectomy, bleeding from posterior  incision, recent bacteremia but no fever now; R09.02-Hypoxemia;  J18.1-Lobar pneumonia, unspecified organism, lobar pneumonia.     TECHNIQUE: Multiple axial CT imaging was obtained of the chest without  the administration of intravenous contrast.     The radiation dose reduction device was turned on for each scan per the  ALARA (As Low as Reasonably Achievable) protocol.     COMPARISON: NONE.     FINDINGS: There is a small amount of pleural fluid identified along the  right lateral chest wall with a fracture involving the right posterior  sixth rib. Findings are likely postoperative. There is  consolidation  posteriorly within the right lung base concerning for infiltrate. There  is some chronic interstitial changes identified likely postoperative.  Superimposed infection is likely within the right lung base. Severe  emphysematous changes seen within the lung fields bilaterally with  bronchiectatic changes centrally. Severe emphysematous changes seen  within the upper lung fields. Old healed granulomatous disease seen  within the chest. The cardiac chambers are within normal limits. No  pericardial effusion. No bulky hilar or axillary lymphadenopathy. The  visualized upper abdomen is unremarkable.       Impression:       Findings concerning for dense consolidation and infiltrate  superimposed on chronic and emphysematous change within the right lung.  There is a fracture involving the right posterior sixth rib likely  postsurgical with some pleural thickening and small pleural fluid seen  on the right.     D:  06/07/2019  E:  06/07/2019     This report was finalized on 6/7/2019 4:07 PM by Dr. Jo Ann Galarza MD.       XR Chest 1 View [[id number]] Collected:  06/07/19 1306     Updated:  06/07/19 1423    Narrative:       EXAMINATION: XR CHEST 1 VW- 06/07/2019      INDICATION: SOA triage protocol      COMPARISON: NONE     FINDINGS: Portable chest reveals heart to be enlarged. Ill-defined  opacification seen at the right lung base with small right pleural  effusion. Deep line catheter on the right tip in the SVC. Cardiac pacer  leads in satisfactory position.       Impression:       Patchy ill-defined opacification seen within the right mid  and lower lung field with small right pleural effusion. Left lung is  clear.     D:  06/07/2019  E:  06/07/2019     This report was finalized on 6/7/2019 2:20 PM by Dr. Jo Ann Galarza MD.               Impression:   1.  Serratia bacteremia/Pacemaker lead infection- he has Serratia bacteremia with 2+ blood cultures of 5/21 and 1 out of 2 sets positive on 5/23 with  subsequent blood cultures on 5/26- at [facility].  He had a mobile mass attached to the pacemaker lead by ZI at [facility] on 5/28.  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  He would require pacemaker lead extraction to cure this process but due to his advanced age and underlying lung cancer, consideration is being given for chronic suppression instead of a higher risk of lead extraction.  Dr. Zayas has been following him at [facility] and I will confer with him regarding this issue when he returns on Tuesday.  In the meantime, we will leave him on intravenous Merrem.  2.  Right basilar infiltrate- this does not appear particularly different when compared to his chest x-ray at [facility] of 5/30.  3.  Non-small cell lung cancer-status post right lower lobe lobectomy, 5/14/2019  4.  Acute hypoxic respiratory failure-if he clinically worsens, we may need to assess for pulmonary embolism.  5.  Stage II-III chronic kidney disease.  His antibiotic therapy will need to be dose adjusted for his renal dysfunction.  6.  History of atrial fibrillation  7.  Coronary artery disease  8.  Status post pacemaker implantation-this was performed at   9.  Blood loss anemia    PLAN/RECOMMENDATIONS:   1.  Merrem 1 g IV every 12 hours  2.  Blood cultures-pending  3.  Consider pacemaker lead extraction     Dr. Leavitt has obtained the history, performed the physical exam and formulated the above treatment plan.     I discussed this very complex situation in detail with him and his wife today.      Rafael Leavitt MD  6/9/2019  12:52 PM

## 2019-06-09 NOTE — PROGRESS NOTES
[facility] Medicine Services  PROGRESS NOTE    Patient Name: Rodney Looney  : 1934  MRN: [id number]    Date of Admission: 2019  Length of Stay: 1  Primary Care Physician: Dana Isidro DO    Subjective   Subjective     CC:  Shortness of Breath    HPI:  No overnight events.  Suspected lead infection.  Called by on call cardiology today.  Will make NPO for ZI Monday     Review of Systems    Gen- No fevers, chills  CV- No chest pain, palpitations  Resp- No cough, dyspnea  GI- No N/V/D, abd pain    Otherwise ROS is negative except as mentioned in the HPI.    Objective   Objective     Vital Signs:   Temp:  [97.7 °F (36.5 °C)] 97.7 °F (36.5 °C)  Heart Rate:  [83-86] 83  Resp:  [16-18] 16  BP: (112-148)/(66-86) 148/86     Physical Exam:    Constitutional: No acute distress, awake, alert  HENT: NCAT, mucous membranes moist  Respiratory: Clear to auscultation bilaterally, respiratory effort normal   Cardiovascular: RRR, s1 and s2  Gastrointestinal: Positive bowel sounds, soft, nontender, nondistended  Musculoskeletal: No bilateral ankle edema  Psychiatric: Appropriate affect, cooperative  Neurologic: Oriented x 3, strength symmetric in all extremities, Cranial Nerves grossly intact to confrontation, speech clear  Skin: No rashes      Results Reviewed:  I have personally reviewed current lab, radiology, and data and agree.    Results from last 7 days   Lab Units 19  04519  0519  1223   WBC 10*3/mm3 6.32 6.11 6.10   HEMOGLOBIN g/dL 8.4* 8.2* 8.6*   HEMATOCRIT % 29.3* 28.3* 29.8*   PLATELETS 10*3/mm3 246 247 282   PROCALCITONIN ng/mL  --   --  0.14     Results from last 7 days   Lab Units 19  0450 19  0519  1223   SODIUM mmol/L 137 134* 134*   POTASSIUM mmol/L 4.9 4.3 4.8   CHLORIDE mmol/L 103 100 99   CO2 mmol/L 24.0 24.0 22.0   BUN mg/dL 24* 21 21   CREATININE mg/dL 2.04* 1.83* 1.85*   GLUCOSE mg/dL 101* 83 103*   CALCIUM mg/dL 9.1  9.5 9.4   ALT (SGPT) U/L  --  12 13   AST (SGOT) U/L  --  16 15   TROPONIN T ng/mL  --   --  <0.010   PROBNP pg/mL 1,923.0*  --  2,107.0*     Estimated Creatinine Clearance: 30.1 mL/min (A) (by C-G formula based on SCr of 2.04 mg/dL (H)).    Microbiology Results Abnormal     Procedure Component Value - Date/Time    Blood Culture - Blood, Arm, Right [[id number]] Collected:  06/07/19 1425    Lab Status:  Preliminary result Specimen:  Blood from Arm, Right Updated:  06/08/19 1500     Blood Culture No growth at 24 hours    Blood Culture - Blood, Arm, Left [[id number]] Collected:  06/07/19 1430    Lab Status:  Preliminary result Specimen:  Blood from Arm, Left Updated:  06/08/19 1500     Blood Culture No growth at 24 hours          Imaging Results (last 24 hours)     ** No results found for the last 24 hours. **        I have reviewed the medications:  Scheduled Meds:    heparin (porcine) 5,000 Units Subcutaneous Q8H   meropenem 1 g Intravenous Q12H   sodium chloride 3 mL Intravenous Q12H     Continuous Infusions:   PRN Meds:.•  acetaminophen  •  docusate sodium  •  famotidine  •  melatonin  •  ondansetron **OR** ondansetron  •  sodium chloride  •  traMADol      Assessment/Plan   Assessment / Plan     Active Hospital Problems    Diagnosis POA   • **Lung infiltrate [R91.8] Yes   • Serratia infection [A49.8] Yes   • Presence of cardiac pacemaker [Z95.0] Yes   • Non-small cell lung cancer and reports right lower lobe lobectomy in May at [facility] [C34.90] Unknown   • History of lobectomy of lung [Z90.2] Not Applicable   • Hypoxia [R09.02] Yes   • CKD (chronic kidney disease) stage 3, GFR 30-59 ml/min (CMS/HCC) [N18.3] Yes          Brief Hospital Course to date:  Rodney Looney is a 84 y.o. male with history of non-small cell lung cancer reported and recent lobectomy in May at [facility] with recent reported Serratia bacteremia and concern reportedly for a cardiac pacemaker infection presents from infectious disease  clinic with hypoxia in the 80s on vital signs and was found in the emergency room to have right lung infiltrate concerning for pneumonia.     Right lung infiltrate, possible right lung pneumonia:  Infectious disease recommends Merrem.  Blood cultures.     Hypoxia:  Likely due to lobectomy and possible pneumonia.  Plan for nasal cannula oxygen as needed and wean as tolerated.     Serratia infection:  Reportedly had recent Serratia bacteremia.  Trying to get outside records.  Infectious disease recommends Merrem.  Repeat blood cultures.     Reported concern for pacemaker infection:  Plan to follow-up blood cultures and ID recommendations.  Monitor on telemetry.  Merrem as per above     Elevated BNP:  Probably has chronic heart failure.  Does not appear to decompensated at this point.  Plan to get outside records.     Stage III chronic kidney disease:  Reports he sees Dr. Courtney outpatient.  Unsure of his baseline.  Plan to get outside records.     Anemia:   Notably patient had recent surgery.  Baseline hemoglobin unknown.  Denies recent bleeding.  Plan to monitor    Lead infection  ZI on Monday  EP consult    DVT Prophylaxis:  Heparin SC    Disposition: I expect the patient to be discharged TBD    CODE STATUS:   Code Status and Medical Interventions:   Ordered at: 06/07/19 1509     Code Status:    CPR     Medical Interventions (Level of Support Prior to Arrest):    Full         Electronically signed by Chino Taylor MD, 06/09/19, 1:16 PM.

## 2019-06-09 NOTE — PROGRESS NOTES
Discharge Planning Assessment  [facility]     Patient Name: Rodney Looney  MRN: [id number]  Today's Date: 6/9/2019    Admit Date: 6/7/2019    Discharge Needs Assessment     Row Name 06/09/19 1634       Living Environment    Lives With  spouse    Name(s) of Who Lives With Patient  Bhargavi Harding (spouse) [phone]    Current Living Arrangements  home/apartment/condo    Primary Care Provided by  self    Provides Primary Care For  no one    Family Caregiver if Needed  spouse    Family Caregiver Names  Bhargavi Harding (spouse) [phone]    Quality of Family Relationships  helpful;involved;supportive       Resource/Environmental Concerns    Resource/Environmental Concerns  home accessibility    Home Accessibility Concerns  stairs to access bedroom or bathroom 13 stairs to bedroom and bathroom       Transition Planning    Patient/Family Anticipates Transition to  home with family;home with help/services    Patient/Family Anticipated Services at Transition  ;home health care;outpatient care    Transportation Anticipated  family or friend will provide       Discharge Needs Assessment    Readmission Within the Last 30 Days  no previous admission in last 30 days    Concerns to be Addressed  discharge planning    Equipment Currently Used at Home  walker, rolling;cane, straight;wheelchair;shower chair    Anticipated Changes Related to Illness  none    Equipment Needed After Discharge  none    Outpatient/Agency/Support Group Needs  homecare agency    Discharge Facility/Level of Care Needs  home with home health    Offered/Gave Vendor List  no    Patient's Choice of Community Agency(s)  [facility]        Discharge Plan     Row Name 06/09/19 1637       Plan    Plan  Home with home health    Patient/Family in Agreement with Plan  yes    Plan Comments  Spoke with patient and wife at bedside.  Patient lives in [place] with [place].  Patient states that he is independent with cane at home.  He has in  addition to his cane, a rolling walker, wheelchair, and shower seat.  Patient states that he has [facility] for skilled nursing and PT and OT.  He has been getting outpatient IV antibiotics for 2 weeks with [facility] for sepsis.  Patient will likely continue with IV antibiotics and home health at discharge.  Please call [facility] on Monday 6/10 to confirm HH services.  Patient states PCP is Dana Isidro D.O. and insurance provider [employer] Medicare replacement.  Patient states that he can afford his medications.  Plans for discharge home when medically ready with wife.  Will need [facility] HH orders at discharge.  Wife will transport home.      Final Discharge Disposition Code  06 - home with home health care        Destination      No service coordination in this encounter.      Durable Medical Equipment      No service coordination in this encounter.      Dialysis/Infusion      No service coordination in this encounter.      Home Medical Care - Selection Complete      Service Provider Request Status Selected Services Address Phone Number Fax Number    [facility] Selected [facility] Services [address] [address] [phone]      Therapy      No service coordination in this encounter.      Community Resources      No service coordination in this encounter.          Demographic Summary     Row Name 06/09/19 1633       General Information    Admission Type  inpatient    Arrived From  home    Referral Source  admission list    Reason for Consult  discharge planning    Preferred Language  English     Used During This Interaction  no    General Information Comments  PCP:  Dana Isidro D.O. confirmed with patient        Functional Status     Row Name 06/09/19 1633       Functional Status    Usual Activity Tolerance  good    Current Activity Tolerance  good       Functional Status, IADL    Medications  independent    Meal Preparation  independent    Housekeeping   independent    Laundry  independent    Shopping  independent        Psychosocial    No documentation.       Abuse/Neglect    No documentation.       Legal    No documentation.       Substance Abuse    No documentation.       Patient Forms    No documentation.           Shy Alexandra RN

## 2019-06-09 NOTE — PLAN OF CARE
Problem: Patient Care Overview  Goal: Plan of Care Review  Outcome: Ongoing (interventions implemented as appropriate)   06/08/19 0358 06/08/19 2000   Plan of Care Review   Progress no change --    Coping/Psychosocial   Plan of Care Reviewed With --  patient     Goal: Discharge Needs Assessment  Outcome: Ongoing (interventions implemented as appropriate)   06/07/19 1636 06/07/19 1640 06/07/19 1642   Discharge Needs Assessment   Readmission Within the Last 30 Days --  --  --    Concerns to be Addressed --  --  --    Patient/Family Anticipates Transition to --  home;home with family;home with help/services --    Patient/Family Anticipated Services at Transition --  --  outpatient care;complementary therapies  (uses [facility])   Transportation Anticipated --  family or friend will provide --    Anticipated Changes Related to Illness --  --  --    Equipment Needed After Discharge --  --  --    Disability   Equipment Currently Used at Home bath bench;walker, rolling --  --     06/08/19 0358   Discharge Needs Assessment   Readmission Within the Last 30 Days no previous admission in last 30 days   Concerns to be Addressed discharge planning   Patient/Family Anticipates Transition to --    Patient/Family Anticipated Services at Transition --    Transportation Anticipated --    Anticipated Changes Related to Illness none   Equipment Needed After Discharge none   Disability   Equipment Currently Used at Home --        Problem: Pneumonia (Adult)  Goal: Signs and Symptoms of Listed Potential Problems Will be Absent, Minimized or Managed (Pneumonia)  Outcome: Ongoing (interventions implemented as appropriate)   06/08/19 0358   Goal/Outcome Evaluation   Problems Assessed (Pneumonia) all   Problems Present (Pneumonia) respiratory compromise;infection progression       Problem: Fall Risk (Adult)  Goal: Absence of Fall  Outcome: Ongoing (interventions implemented as appropriate)   06/09/19 0308   Fall Risk (Adult)    Absence of Fall making progress toward outcome

## 2019-06-10 NOTE — SIGNIFICANT NOTE
06/10/19 1548   SLP Deferred Reason   SLP Deferred Reason Patient unavailable for evaluation  (Pt requested deferral of MBS this PM. Plan for MBS tomorrow as appropriate. Defer study for today. )

## 2019-06-10 NOTE — PROGRESS NOTES
INFECTIOUS DISEASE  Progress Note    Rodney Looney  1934  [id number]      Admission Date: 6/7/2019      Requesting Provider: No ref. provider found  Evaluating Physician: Rafael Leavitt MD    Reason for Consultation: Serratia bacteremia/pacemaker lead infection    History of present illness:    6/8/2019: Mr. Looney is an 84-year-old white male who is seen today for evaluation of Serratia bacteremia/pacemaker lead infection in the setting of a recent diagnosis of non-small cell lung cancer for which he underwent a right lower lobectomy at [facility] on 5/14.  His course at [facility] was complicated by Serratia bacteremia with 2 sets of positive blood cultures on 5/21 and 1 out of 2 sets positive on 5/23.  Subsequent blood cultures on 5/26 were negative.  A ZI revealed a mobile mass on the pacemaker lead, consistent with a pacemaker lead infection/endocarditis.  He was treated with intravenous Zosyn therapy and subsequently discharged on outpatient intravenous Zosyn.  Consideration was given for referral back to  for pacemaker extraction-his pacemaker was placed at .  Due to his underlying lung cancer and advanced age, he is being given consideration for chronic antibiotic suppression rather than pacemaker lead extraction.  He presented for follow-up in our office yesterday and was noted to have dyspnea with hypoxemia.  His O2 saturations were in the mid 80s while sitting.  He was not interested in readmission to [facility] and requested readmission to [facility].  He was evaluated in the emergency room where he was noted to have a right basilar pulmonary infiltrate by chest x-ray and chest CT scan.  His antibiotic therapy was switched from Zosyn to Merrem.  He has remained afebrile overnight.  He has a minimal cough with minimal sputum production.  He denies nausea, vomiting, and diarrhea.  He denies severe chest pain.  6/9/19: He remains  afebrile. He is less short of breath today.  No increased sputum production.  No diarrhea.   6/10/19: Scheduled for MBS today. He still complains of shortness of breath.  No sputum production with cough.  He remains afebrile.     Past Medical History:   Diagnosis Date   • Cancer (CMS/HCC)    • Coronary artery disease    • Elevated cholesterol    • GERD (gastroesophageal reflux disease)    • History of BPH 2 yrs   • Hypertension    • Kidney disease    • Smoking        Past Surgical History:   Procedure Laterality Date   • CARDIAC SURGERY     • HERNIA REPAIR         History reviewed. No pertinent family history.    Social History     Socioeconomic History   • Marital status:      Spouse name: Not on file   • Number of children: Not on file   • Years of education: Not on file   • Highest education level: Not on file   Tobacco Use   • Smoking status: Former Smoker     Types: Cigarettes   • Smokeless tobacco: Never Used       No Known Allergies      Medication:    Current Facility-Administered Medications:   •  acetaminophen (TYLENOL) tablet 650 mg, 650 mg, Oral, Q4H PRN, Elan Chen MD  •  aspirin EC tablet 81 mg, 81 mg, Oral, Daily, Chino Taylor MD, 81 mg at 06/10/19 0858  •  docusate sodium (COLACE) capsule 100 mg, 100 mg, Oral, BID PRN, Elan Chen MD, 100 mg at 06/10/19 0858  •  famotidine (PEPCID) tablet 20 mg, 20 mg, Oral, BID PRN, Elan Chen MD, 20 mg at 06/09/19 2141  •  ferrous sulfate tablet 325 mg, 325 mg, Oral, BID With Meals, Chino Taylor MD, 325 mg at 06/10/19 0858  •  heparin (porcine) 5000 UNIT/ML injection 5,000 Units, 5,000 Units, Subcutaneous, Q8H, Elan Chen MD, 5,000 Units at 06/10/19 1154  •  melatonin tablet 5 mg, 5 mg, Oral, Nightly PRN, Elan Chen MD, 5 mg at 06/09/19 2141  •  meropenem (MERREM) 1 g/100 mL 0.9% NS VTB (mbp), 1 g, Intravenous, Q12H, Elan Chen MD, Stopped at 06/10/19 1130  •  metoprolol  tartrate (LOPRESSOR) half tablet 12.5 mg, 12.5 mg, Oral, Q12H, Chino Taylor MD, 12.5 mg at 06/10/19 0857  •  ondansetron (ZOFRAN) tablet 4 mg, 4 mg, Oral, Q6H PRN **OR** ondansetron (ZOFRAN) injection 4 mg, 4 mg, Intravenous, Q6H PRN, Elan Chen MD, 4 mg at 19 2114  •  sodium chloride 0.9 % flush 3 mL, 3 mL, Intravenous, Q12H, Elan Chen MD, 3 mL at 19 2103  •  sodium chloride 0.9 % flush 3-10 mL, 3-10 mL, Intravenous, PRN, Elan Chen MD  •  traMADol (ULTRAM) tablet 50 mg, 50 mg, Oral, Q6H PRN, Elan Chen MD  •  vitamin C (ASCORBIC ACID) tablet 500 mg, 500 mg, Oral, BID, Chino Taylor MD, 500 mg at 06/10/19 0858    Antibiotics:  Anti-Infectives (From admission, onward)    Ordered     Dose/Rate Route Frequency Start Stop    19 1654  meropenem (MERREM) 1 g/100 mL 0.9% NS VTB (mbp)     Comments:  Changed from q8h per extended infusion guidelines   Ordering Provider:  Elan Chen MD    1 g  over 3 Hours Intravenous Every 12 Hours Scheduled 19 2100 19 1434  meropenem (MERREM) 1 g/100 mL 0.9% NS VTB (mbp)     Ordering Provider:  Leopoldo Rogers MD    1 g  over 30 Minutes Intravenous Once 19 1436 19 1617                Physical Exam:   Vital Signs  Temp (24hrs), Av.9 °F (36.6 °C), Min:97.7 °F (36.5 °C), Max:98 °F (36.7 °C)    Temp  Min: 97.7 °F (36.5 °C)  Max: 98 °F (36.7 °C)  BP  Min: 117/78  Max: 130/77  Pulse  Min: 81  Max: 89  Resp  Min: 18  Max: 18  SpO2  Min: 94 %  Max: 100 %    GENERAL: Awake and alert, in no acute distress.   HEENT: Normocephalic, atraumatic.  PERRL. EOMI. No conjunctival injection. No icterus. Oropharynx clear without evidence of thrush or exudate  LYMPH: No cervical, axillary or inguinal lymphadenopathy.  HEART: RRR; No murmur, rubs, gallops.   LUNGS: He has expiratory wheezes on right   ABDOMEN: Soft, nontender, nondistended. Positive bowel sounds. No  rebound or guarding. No mass or HSM.  EXT:  No cyanosis, clubbing or edema. No cord.  :  Without Asher catheter.  MSK: FROM without joint effusions noted arms/legs.    SKIN: Warm and dry, thoracotomy incision with small amount of serous drainage  NEURO: Oriented to PPT. No focal deficits on motor/sensory exam at arms/legs.  PSYCHIATRIC: Normal insight and judgement. Cooperative with PE  Chest wall: There is a left chest wall pacemaker pocket with no erythema and he also has a right anterior chest wall tunneled Groshong catheter with no exit site erythema    Laboratory Data    Results from last 7 days   Lab Units 06/09/19  0450 06/08/19  0526 06/07/19  1223   WBC 10*3/mm3 6.32 6.11 6.10   HEMOGLOBIN g/dL 8.4* 8.2* 8.6*   HEMATOCRIT % 29.3* 28.3* 29.8*   PLATELETS 10*3/mm3 246 247 282     Results from last 7 days   Lab Units 06/09/19  0450   SODIUM mmol/L 137   POTASSIUM mmol/L 4.9   CHLORIDE mmol/L 103   CO2 mmol/L 24.0   BUN mg/dL 24*   CREATININE mg/dL 2.04*   GLUCOSE mg/dL 101*   CALCIUM mg/dL 9.1     Results from last 7 days   Lab Units 06/08/19  0526   ALK PHOS U/L 88   BILIRUBIN mg/dL 0.5   ALT (SGPT) U/L 12   AST (SGOT) U/L 16     Results from last 7 days   Lab Units 06/07/19  1223   SED RATE mm/hr 36*     Results from last 7 days   Lab Units 06/07/19  1223   CRP mg/dL 3.52*     Results from last 7 days   Lab Units 06/07/19  1435   LACTATE mmol/L 1.6             Estimated Creatinine Clearance: 30 mL/min (A) (by C-G formula based on SCr of 2.04 mg/dL (H)).      Microbiology:      6/7:  BC no growth                           Radiology:  Imaging Results (last 72 hours)     Procedure Component Value Units Date/Time    CT Chest Without Contrast [[id number]] Collected:  06/07/19 1453     Updated:  06/07/19 1610    Narrative:       EXAMINATION: CT CHEST WO CONTRAST-06/07/2019:      INDICATION: Hypoxemia after recent lobectomy, bleeding from posterior  incision, recent bacteremia but no fever now;  R09.02-Hypoxemia;  J18.1-Lobar pneumonia, unspecified organism, lobar pneumonia.     TECHNIQUE: Multiple axial CT imaging was obtained of the chest without  the administration of intravenous contrast.     The radiation dose reduction device was turned on for each scan per the  ALARA (As Low as Reasonably Achievable) protocol.     COMPARISON: NONE.     FINDINGS: There is a small amount of pleural fluid identified along the  right lateral chest wall with a fracture involving the right posterior  sixth rib. Findings are likely postoperative. There is consolidation  posteriorly within the right lung base concerning for infiltrate. There  is some chronic interstitial changes identified likely postoperative.  Superimposed infection is likely within the right lung base. Severe  emphysematous changes seen within the lung fields bilaterally with  bronchiectatic changes centrally. Severe emphysematous changes seen  within the upper lung fields. Old healed granulomatous disease seen  within the chest. The cardiac chambers are within normal limits. No  pericardial effusion. No bulky hilar or axillary lymphadenopathy. The  visualized upper abdomen is unremarkable.       Impression:       Findings concerning for dense consolidation and infiltrate  superimposed on chronic and emphysematous change within the right lung.  There is a fracture involving the right posterior sixth rib likely  postsurgical with some pleural thickening and small pleural fluid seen  on the right.     D:  06/07/2019  E:  06/07/2019     This report was finalized on 6/7/2019 4:07 PM by Dr. Jo Ann Galarza MD.       XR Chest 1 View [[id number]] Collected:  06/07/19 1306     Updated:  06/07/19 1423    Narrative:       EXAMINATION: XR CHEST 1 VW- 06/07/2019      INDICATION: SOA triage protocol      COMPARISON: NONE     FINDINGS: Portable chest reveals heart to be enlarged. Ill-defined  opacification seen at the right lung base with small right  pleural  effusion. Deep line catheter on the right tip in the SVC. Cardiac pacer  leads in satisfactory position.       Impression:       Patchy ill-defined opacification seen within the right mid  and lower lung field with small right pleural effusion. Left lung is  clear.     D:  06/07/2019  E:  06/07/2019     This report was finalized on 6/7/2019 2:20 PM by Dr. Jo Ann Galarza MD.               Impression:   1.  Serratia bacteremia/Pacemaker lead infection- he has Serratia bacteremia with 2+ blood cultures of 5/21 and 1 out of 2 sets positive on 5/23 with subsequent blood cultures on 5/26- at [facility].  He had a mobile mass attached to the pacemaker lead by ZI at [facility] on 5/28.  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  He would require pacemaker lead extraction to cure this process but due to his advanced age and underlying lung cancer, consideration is being given for chronic suppression instead of a higher risk of lead extraction.  Dr. Zayas has been following him at [facility] and I will confer with him regarding this issue when he returns on Tuesday.  In the meantime, we will leave him on intravenous Merrem.  2.  Right basilar infiltrate- this does not appear particularly different when compared to his chest x-ray at [facility] of 5/30.  3.  Non-small cell lung cancer-status post right lower lobe lobectomy, 5/14/2019  4.  Acute hypoxic respiratory failure-if he clinically worsens, we may need to assess for pulmonary embolism.  5.  Stage II-III chronic kidney disease.  His antibiotic therapy will need to be dose adjusted for his renal dysfunction.  6.  History of atrial fibrillation  7.  Coronary artery disease  8.  Status post pacemaker implantation-this was performed at   9.  Blood loss anemia    PLAN/RECOMMENDATIONS:   1.  Merrem 1 g IV every 12 hours  2.  Blood cultures-pending  3.  Consider pacemaker lead extraction  4.  MBS today       Dr. Leavitt has obtained the history, performed the physical exam and formulated the above treatment plan.     Dr. Zayas will see him tomorrow.      Rafael Leavitt MD  6/10/2019  5:04 PM

## 2019-06-10 NOTE — PROGRESS NOTES
Continued Stay Note  [facility]     Patient Name: Rodney Looney  MRN: [id number]  Today's Date: 6/10/2019    Admit Date: 6/7/2019    Discharge Plan     Row Name 06/10/19 1111       Plan    Plan Comments  Pt reports he plans on returning home at discharge. CM has confirmed he is followed by [facility], [phone], for Skilled/OT/PT. He will need a resume HH order at discharge.         Discharge Codes    No documentation.             Chary Alexander RN

## 2019-06-10 NOTE — PLAN OF CARE
Problem: Patient Care Overview  Goal: Plan of Care Review  Outcome: Ongoing (interventions implemented as appropriate)   06/10/19 1428   Plan of Care Review   Progress no change   OTHER   Outcome Summary VSS. Tachycardic when OOB. Tolerates oxygen @ 2L. NPO since MN for ZI today. Denies pain.   Coping/Psychosocial   Plan of Care Reviewed With patient       Problem: Pneumonia (Adult)  Goal: Signs and Symptoms of Listed Potential Problems Will be Absent, Minimized or Managed (Pneumonia)  Outcome: Ongoing (interventions implemented as appropriate)   06/10/19 9269   Goal/Outcome Evaluation   Problems Assessed (Pneumonia) all   Problems Present (Pneumonia) respiratory compromise;infection progression       Problem: Fall Risk (Adult)  Goal: Absence of Fall  Outcome: Ongoing (interventions implemented as appropriate)   06/10/19 7696   Fall Risk (Adult)   Absence of Fall making progress toward outcome

## 2019-06-10 NOTE — PROGRESS NOTES
[facility]  PROGRESS NOTE    Patient Name: Rodney Looney  : 1934  MRN: [id number]    Date of Admission: 2019  Length of Stay: 3  Primary Care Physician: Dana Isidro DO    Subjective   Subjective     CC:  Shortness of Breath    HPI:  Was NPO overnight.   Says he needs something to cushion his bottom.  Wife in room today.  Asking about procedure today.    Review of Systems    Gen- No fevers, chills  CV- No chest pain, palpitations  Resp- No cough, dyspnea  GI- No N/V/D, abd pain    Otherwise ROS is negative except as mentioned in the HPI.    Objective   Objective     Vital Signs:   Temp:  [97.7 °F (36.5 °C)-98 °F (36.7 °C)] 97.7 °F (36.5 °C)  Heart Rate:  [81-89] 85  Resp:  [18] 18  BP: (117-130)/(77-78) 130/77     Physical Exam:    Constitutional: No acute distress, awake, alert  HENT: NCAT, mucous membranes moist  Respiratory: Clear to auscultation bilaterally, respiratory effort normal   Cardiovascular: RRR, s1 and s2  Gastrointestinal: Positive bowel sounds, soft, nontender, nondistended  Musculoskeletal: No bilateral ankle edema  Psychiatric: Appropriate affect, cooperative  Neurologic: Oriented x 3, strength symmetric in all extremities, Cranial Nerves grossly intact to confrontation, speech clear  Skin: No rashes      Results Reviewed:  I have personally reviewed current lab, radiology, and data and agree.    Results from last 7 days   Lab Units 19  04519  0519  1223   WBC 10*3/mm3 6.32 6.11 6.10   HEMOGLOBIN g/dL 8.4* 8.2* 8.6*   HEMATOCRIT % 29.3* 28.3* 29.8*   PLATELETS 10*3/mm3 246 247 282   PROCALCITONIN ng/mL  --   --  0.14     Results from last 7 days   Lab Units 19  0450 19  0519  1223   SODIUM mmol/L 137 134* 134*   POTASSIUM mmol/L 4.9 4.3 4.8   CHLORIDE mmol/L 103 100 99   CO2 mmol/L 24.0 24.0 22.0   BUN mg/dL 24* 21 21   CREATININE mg/dL 2.04* 1.83* 1.85*   GLUCOSE mg/dL 101* 83 103*    CALCIUM mg/dL 9.1 9.5 9.4   ALT (SGPT) U/L  --  12 13   AST (SGOT) U/L  --  16 15   TROPONIN T ng/mL  --   --  <0.010   PROBNP pg/mL 1,923.0*  --  2,107.0*     Estimated Creatinine Clearance: 30 mL/min (A) (by C-G formula based on SCr of 2.04 mg/dL (H)).    Microbiology Results Abnormal     Procedure Component Value - Date/Time    Blood Culture - Blood, Arm, Right [[id number]] Collected:  06/07/19 1425    Lab Status:  Preliminary result Specimen:  Blood from Arm, Right Updated:  06/10/19 1501     Blood Culture No growth at 3 days    Blood Culture - Blood, Arm, Left [[id number]] Collected:  06/07/19 1430    Lab Status:  Preliminary result Specimen:  Blood from Arm, Left Updated:  06/10/19 1501     Blood Culture No growth at 3 days          Imaging Results (last 24 hours)     ** No results found for the last 24 hours. **        I have reviewed the medications:  Scheduled Meds:    aspirin 81 mg Oral Daily   ferrous sulfate 325 mg Oral BID With Meals   heparin (porcine) 5,000 Units Subcutaneous Q8H   meropenem 1 g Intravenous Q12H   metoprolol tartrate 12.5 mg Oral Q12H   sodium chloride 3 mL Intravenous Q12H   vitamin C 500 mg Oral BID     Continuous Infusions:   PRN Meds:.•  acetaminophen  •  docusate sodium  •  famotidine  •  melatonin  •  ondansetron **OR** ondansetron  •  sodium chloride  •  traMADol    Assessment/Plan   Assessment / Plan     Active Hospital Problems    Diagnosis POA   • **Lung infiltrate [R91.8] Yes   • Serratia infection [A49.8] Yes   • Presence of cardiac pacemaker [Z95.0] Yes   • Non-small cell lung cancer and reports right lower lobe lobectomy in May at [facility] [C34.90] Unknown   • History of lobectomy of lung [Z90.2] Not Applicable   • Hypoxia [R09.02] Yes   • CKD (chronic kidney disease) stage 3, GFR 30-59 ml/min (CMS/HCC) [N18.3] Yes          Brief Hospital Course to date:  Rodney Looney is a 84 y.o. male with history of non-small cell lung cancer reported and recent lobectomy in May  at [facility] with recent reported Serratia bacteremia and concern reportedly for a cardiac pacemaker infection presents from infectious disease clinic with hypoxia in the 80s on vital signs and was found in the emergency room to have right lung infiltrate concerning for pneumonia.     Right lung infiltrate, possible right lung pneumonia:  Infectious disease recommends Merrem.  Blood cultures.     Hypoxia:  Likely due to lobectomy and possible pneumonia.  Plan for nasal cannula oxygen as needed and wean as tolerated.     Serratia infection:  Reportedly had recent Serratia bacteremia.  Trying to get outside records.  Infectious disease recommends Merrem.  Repeat blood cultures.     Reported concern for pacemaker infection:  Plan to follow-up blood cultures and ID recommendations.  Monitor on telemetry.  Merrem as per above     Elevated BNP:  Probably has chronic heart failure.  Does not appear to decompensated at this point.  Plan to get outside records.     Stage III chronic kidney disease:  Reports he sees Dr. Courtney outpatient.  Unsure of his baseline.  Plan to get outside records.     Anemia:   Notably patient had recent surgery.  Baseline hemoglobin unknown.  Denies recent bleeding.  Plan to monitor    Lead infection  ZI planned - per Cardiology  EP consult pending    Xarelto on hold for Cardiology evaluation - resume Xarelto when ok with Cardiology    DVT Prophylaxis:  Heparin SC    Disposition: I expect the patient to be discharged TBD    CODE STATUS:   Code Status and Medical Interventions:   Ordered at: 06/07/19 1509     Code Status:    CPR     Medical Interventions (Level of Support Prior to Arrest):    Full         Electronically signed by Chino Taylor MD, 06/10/19, 3:52 PM.

## 2019-06-10 NOTE — CONSULTS
[facility] at [facility]   Consult     Rodney Looney  1934    There is no work phone number on file.      06/10/19    DATE OF ADMISSION: 6/7/2019  [facility] Dana Khan, DO  [address] / [facility] [address]    Chief Complaint/Reason for Consultation: Pacemaker lead infection    Problem List:  1. Bradycardia  a. S/p BSC PPM implant, [facility], 2007  b. Generator change, 2017, RA lead capped - data deficit  2. Pacemaker lead infection  a. Serratia bacteremia, 05/2019, treated at [facility]  b. ZI, [facility], 05/2019: Pacer wire noted, with mobile mass attached, cannot rule out vegetation, moderate TR  c. TTE, [facility], 05/2019: EF 55 +/- 5%, mild LVH, normal diastolic function, severe TR, mild AI, cannot rule out vegetation on pacer wire  3. Atrial fibrillation  a. CHADSVASc = 3, on Xarelto  b. Initiated on amiodarone, 05/2019, [facility]  c. Echocardiogram 6/8/19: EF 61-65%, LA size mild to moderately dilated, mild AI, moderate to severe TR, thin fibrinous mobile echogenic structures possibly attached to the RA lead versus prominent Chiari network  4. Hypertension  5. Hyperlipidemia  6. CKD, stage III  7. NSCLC  a. S/p RLL lobectomy, 05/2019  8. GERD  9. BPH  10. Surgical history  a. PPM implant  b. Hernia repair      History of Present Illness:   Patient is an 84-year-old  male with the above-noted past medical history who EP has been asked to see today in regards to possible pacemaker lead infection.  He recently was diagnosed with non-small cell lung cancer and underwent a right lower lobectomy at [facility] on 5/14.  He developed Serratia bacteremia and underwent ZI which demonstrated a mobile mass on the pacemaker lead consistent with pacemaker lead infection/endocarditis.  He was discharged on IV Zosyn.  He was being seen in the ID office on Friday for evaluation regarding consideration for chronic antibiotic suppression rather than pacemaker  lead extraction but was noted to be dyspneic and hypoxic with O2 sats in the 80s so subsequently was admitted to [facility] for further management.  ID is currently following.  They are awaiting to hear back from Dr. Zayas at [facility] who has been following the patient.  Currently on IV antibiotics.  He denies any fevers or chills.  Does have mild fatigue and dyspnea on exertion.  Currently in atrial fibrillation per telemetry but asymptomatic.  Denies any palpitations.  Was started on amiodarone at [facility] after his procedure.  On Xarelto for stroke prophylaxis which has been held since admission in case of potential need for PM extraction.    No Known Allergies    Prior to Admission Medications     Prescriptions Last Dose Informant Patient Reported? Taking?    amiodarone (PACERONE) 200 MG tablet 6/7/2019  Yes Yes    Take 200 mg by mouth Daily.    aspirin 81 MG EC tablet 6/7/2019  Yes Yes    Take 81 mg by mouth Daily.    Calcium Carbonate (CALCIUM 600 PO) 6/7/2019  Yes Yes    Take 1 tablet by mouth Daily.    cholecalciferol (VITAMIN D3) 1000 units tablet 6/7/2019  Yes Yes    Take 1,000 Units by mouth Daily.    dutasteride (AVODART) 0.5 MG capsule 6/6/2019  Yes Yes    Take 0.5 mg by mouth Every Night.    famotidine (PEPCID) 20 MG tablet 6/6/2019  Yes Yes    Take 20 mg by mouth 2 (Two) Times a Day.    ferrous sulfate 325 (65 FE) MG tablet 6/7/2019  Yes Yes    Take 325 mg by mouth 2 (Two) Times a Day.    folic acid (FOLVITE) 1 MG tablet 6/6/2019  Yes Yes    Take 1 mg by mouth Every Night.    metoprolol succinate XL (TOPROL-XL) 50 MG 24 hr tablet 6/6/2019  Yes Yes    Take 100 mg by mouth Every Night. 2 tabs (100mg total) nightly    pravastatin (PRAVACHOL) 80 MG tablet 6/6/2019  Yes Yes    Take 80 mg by mouth Every Night.    ranolazine (RANEXA) 500 MG 12 hr tablet 6/6/2019  Yes Yes    Take 500 mg by mouth Every Night.    rivaroxaban (XARELTO) 15 MG tablet 6/6/2019  Yes Yes    Take 15 mg by mouth Every Night.    tamsulosin  (FLOMAX) 0.4 MG capsule 24 hr capsule 6/6/2019  Yes Yes    Take 1 capsule by mouth Every Night.    vitamin B-12 (CYANOCOBALAMIN) 100 MCG tablet 6/7/2019  Yes Yes    Take 100 mcg by mouth Daily.            Current Facility-Administered Medications:   •  acetaminophen (TYLENOL) tablet 650 mg, 650 mg, Oral, Q4H PRN, Elan Chen MD  •  aspirin EC tablet 81 mg, 81 mg, Oral, Daily, Chino Taylor MD, 81 mg at 06/10/19 0858  •  docusate sodium (COLACE) capsule 100 mg, 100 mg, Oral, BID PRN, Elan Chen MD, 100 mg at 06/10/19 0858  •  famotidine (PEPCID) tablet 20 mg, 20 mg, Oral, BID PRN, Elan Chen MD, 20 mg at 06/09/19 2141  •  ferrous sulfate tablet 325 mg, 325 mg, Oral, BID With Meals, Chino Taylor MD, 325 mg at 06/10/19 0858  •  heparin (porcine) 5000 UNIT/ML injection 5,000 Units, 5,000 Units, Subcutaneous, Q8H, Elan Chen MD, 5,000 Units at 06/10/19 0522  •  melatonin tablet 5 mg, 5 mg, Oral, Nightly PRN, Elan Chen MD, 5 mg at 06/09/19 2141  •  meropenem (MERREM) 1 g/100 mL 0.9% NS VTB (mbp), 1 g, Intravenous, Q12H, Elan Chen MD, Last Rate: 0 mL/hr at 06/09/19 0135, 1 g at 06/10/19 0858  •  metoprolol tartrate (LOPRESSOR) half tablet 12.5 mg, 12.5 mg, Oral, Q12H, Chino Taylor MD, 12.5 mg at 06/10/19 0857  •  ondansetron (ZOFRAN) tablet 4 mg, 4 mg, Oral, Q6H PRN **OR** ondansetron (ZOFRAN) injection 4 mg, 4 mg, Intravenous, Q6H PRN, Elan Chen MD, 4 mg at 06/07/19 2114  •  sodium chloride 0.9 % flush 3 mL, 3 mL, Intravenous, Q12H, Elan Chen MD, 3 mL at 06/09/19 2103  •  sodium chloride 0.9 % flush 3-10 mL, 3-10 mL, Intravenous, PRN, Elan Chen MD  •  traMADol (ULTRAM) tablet 50 mg, 50 mg, Oral, Q6H PRN, Elan Chen MD  •  vitamin C (ASCORBIC ACID) tablet 500 mg, 500 mg, Oral, BID, Chino Taylor MD, 500 mg at 06/10/19 0858    Social History     Socioeconomic History   •  Marital status:      Spouse name: Not on file   • Number of children: Not on file   • Years of education: Not on file   • Highest education level: Not on file   Tobacco Use   • Smoking status: Former Smoker     Types: Cigarettes   • Smokeless tobacco: Never Used       History reviewed. No pertinent family history.    REVIEW OF SYSTEMS:   CONST:  No weight loss, fever, chills, weakness + fatigue.   HEENT:  No visual loss, blurred vision, double vision, yellow sclerae.                   No hearing loss, congestion, sore throat.   SKIN:      No rashes, urticaria, ulcers, sores.     RESP:     + shortness of breath, no hemoptysis, + cough, no sputum.   GI:           No anorexia, nausea, vomiting, diarrhea. No abdominal pain, melena.   :         No burning on urination, hematuria or increased frequency.  ENDO:    No diaphoresis, cold or heat intolerance. No polyuria or polydipsia.   NEURO:  No headache, dizziness, syncope, paralysis, ataxia, or parasthesias.                  No change in bowel or bladder control. No history of CVA/TIA  MUSC:    No muscle, back pain, joint pain or stiffness.   HEME:    No anemia, bleeding, bruising. No history of DVT/PE.  PSYCH:  No history of depression, anxiety    OBJECTIVE:    Vitals:    06/10/19 0300 06/10/19 0400 06/10/19 0522 06/10/19 0728   BP:    130/77   BP Location:    Left arm   Patient Position:    Lying   Pulse: 87 86  85   Resp:    18   Temp:    97.7 °F (36.5 °C)   TempSrc:    Oral   SpO2: 97% 99%  97%   Weight:   78.8 kg (173 lb 11.2 oz)    Height:             Vital Sign Min/Max for last 24 hours  Temp  Min: 97.7 °F (36.5 °C)  Max: 98 °F (36.7 °C)   BP  Min: 114/80  Max: 130/77   Pulse  Min: 79  Max: 89   Resp  Min: 18  Max: 18   SpO2  Min: 94 %  Max: 100 %   Flow (L/min)  Min: 2  Max: 2      Intake/Output Summary (Last 24 hours) at 6/10/2019 1111  Last data filed at 6/10/2019 0642  Gross per 24 hour   Intake 100 ml   Output 1075 ml   Net -975 ml              Physical Exam:  GEN: Well nourished, well-developed, no acute distress  HEENT: Normocephalic, atraumatic, PERRLA, moist mucous membranes  NECK: Supple, No JVD, no thyromegaly, no lymphadenopathy  CARD: S1S2, irreg irreg, no murmur, gallop, or rub  CHEST WALL: Right sided groshong catheter, left sided PM with no s/s infection  LUNGS: Clear to auscultation, normal respiratory effort  ABDOMEN: Soft, nontender, normal bowel sounds  EXTREMITIES: No gross deformities, no clubbing, cyanosis, or edema  SKIN: Warm, dry, right sided thoracotomy incision well healed  NEURO: No focal deficits, alert and oriented x 3  PSYCHIATRIC: Normal affect and mood      Data:   Results from last 7 days   Lab Units 06/09/19  0450 06/08/19  0526 06/07/19  1223   WBC 10*3/mm3 6.32 6.11 6.10   HEMOGLOBIN g/dL 8.4* 8.2* 8.6*   HEMATOCRIT % 29.3* 28.3* 29.8*   PLATELETS 10*3/mm3 246 247 282     Results from last 7 days   Lab Units 06/09/19  0450 06/08/19  0526 06/07/19  1223   SODIUM mmol/L 137 134* 134*   POTASSIUM mmol/L 4.9 4.3 4.8   CHLORIDE mmol/L 103 100 99   CO2 mmol/L 24.0 24.0 22.0   BUN mg/dL 24* 21 21   CREATININE mg/dL 2.04* 1.83* 1.85*   GLUCOSE mg/dL 101* 83 103*              Results from last 7 days   Lab Units 06/09/19  0450   PROBNP pg/mL 1,923.0*         Results from last 7 days   Lab Units 06/07/19  1223   TROPONIN T ng/mL <0.010         Results from last 7 days   Lab Units 06/09/19  0450   PROBNP pg/mL 1,923.0*       Intake/Output Summary (Last 24 hours) at 6/10/2019 1111  Last data filed at 6/10/2019 0642  Gross per 24 hour   Intake 100 ml   Output 1075 ml   Net -975 ml       Chest X-Ray:  Imaging Results (last 24 hours)     ** No results found for the last 24 hours. **          Telemetry: Atrial fibrillation, HR 79-89 bpm  EKG: None for review today    Assessment and Plan:   1. Pacemaker lead infection:  - Recent serratia bacteremia with ZI at OSH demonstrating mobile mass on PM lead consistent with PM lead  infection/endocarditis.  Echogenic structure also noted on TTE this admission.  Would defer repeat ZI at this time.  - Given age, comorbidities with recent NSCLC, and length of time leads have been implanted, more favorable option would be chronic suppression versus proceeding with PPM extraction.  If we did proceed with PPM extraction, he would be at higher risk.  Would favor at least 4-6 weeks of IV antibiotics and repeat ZI at that time.     - Currently rates in the 80's in atrial fibrillation.  Only pacing 2% per device interrogation.    2. Persistent atrial fibrillation:  - Initiated on amiodarone 05/2019 at [place].  Overall asymptomatic and rate controlled  - Would favor discontinuation of amiodarone with focus on rate control  - CHADSVASc = 3, on Xarelto, held on admission in case of possible PPM extraction.      3. Bacteremia:  - Serratia positive blood cultures at OSH  - ID following, awaiting to discuss case further with Dr. Zayas from [facility] tomorrow    4. NSCLC:  - S/p RLL resection, [place]    Electronically signed by LINDA Ruano, 06/10/19, 11:11 AM.    I have personally performed a face to face diagnostic evaluation on this patient.  I have reviewed and agree with the care plan.  History and Exam by me shows:  83 yo with Lung CA, A. Fib and recurrent bacteremia secondary to possible vegetation on pacemaker lead    Physical Exam:  Vitals:    06/10/19 2137   BP: 132/71   Pulse: 103   Resp:    Temp:    SpO2:      General-Well Nourished, Well developed  Eyes - PERRLA  Neck- supple, No mass  CV- irregular rate and rhythm, no MRG, No edema  Lung- clear bilaterally  Abd- soft, +BS  Musc/skel - Norm strength and range of motion  Skin- warm and dry  Neuro - Alert & Oriented x 3, appropriate mood.     A/P:  1. Pacemaker lead infection - Will discuss with ID. Extraction is possible but high risk based on Age, other co-morbidities, and age of pacemaker leads. He is no pacemaker dependent.  2.  ARLET Mccormick Perst to Perm in nature - Plan for long term rate control.    Alpesh Oneal M.D., TYREEC, F.H.R.S.  Cardiology/Electrophysiology  6/10/2019  10:17 PM

## 2019-06-11 NOTE — PROGRESS NOTES
INFECTIOUS DISEASE  Progress Note    Rodney Looney  1934  [id number]      Admission Date: 6/7/2019      Requesting Provider: No ref. provider found  Evaluating Physician: George Zayas MD    Reason for Consultation: Serratia bacteremia/pacemaker lead infection    History of present illness:    6/8/2019: Mr. Looney is an 84-year-old white male who is seen today for evaluation of Serratia bacteremia/pacemaker lead infection in the setting of a recent diagnosis of non-small cell lung cancer for which he underwent a right lower lobectomy at [facility] on 5/14.  His course at [facility] was complicated by Serratia bacteremia with 2 sets of positive blood cultures on 5/21 and 1 out of 2 sets positive on 5/23.  Subsequent blood cultures on 5/26 were negative.  A ZI revealed a mobile mass on the pacemaker lead, consistent with a pacemaker lead infection/endocarditis.  He was treated with intravenous Zosyn therapy and subsequently discharged on outpatient intravenous Zosyn.  Consideration was given for referral back to  for pacemaker extraction-his pacemaker was placed at .  Due to his underlying lung cancer and advanced age, he is being given consideration for chronic antibiotic suppression rather than pacemaker lead extraction.  He presented for follow-up in our office yesterday and was noted to have dyspnea with hypoxemia.  His O2 saturations were in the mid 80s while sitting.  He was not interested in readmission to [facility] and requested readmission to [facility].  He was evaluated in the emergency room where he was noted to have a right basilar pulmonary infiltrate by chest x-ray and chest CT scan.  His antibiotic therapy was switched from Zosyn to Merrem.  He has remained afebrile overnight.  He has a minimal cough with minimal sputum production.  He denies nausea, vomiting, and diarrhea.  He denies severe chest pain.  6/9/19: He remains  afebrile. He is less short of breath today.  No increased sputum production.  No diarrhea.   6/10/19: Scheduled for MBS today. He still complains of shortness of breath.  No sputum production with cough.  He remains afebrile.     6/11/19; doing well; no events overnight; no fever, rash, sore throat on oxygen    Past Medical History:   Diagnosis Date   • Cancer (CMS/HCC)    • Coronary artery disease    • Elevated cholesterol    • GERD (gastroesophageal reflux disease)    • History of BPH 2 yrs   • Hypertension    • Kidney disease    • Smoking        Past Surgical History:   Procedure Laterality Date   • CARDIAC SURGERY     • HERNIA REPAIR         History reviewed. No pertinent family history.    Social History     Socioeconomic History   • Marital status:      Spouse name: Not on file   • Number of children: Not on file   • Years of education: Not on file   • Highest education level: Not on file   Tobacco Use   • Smoking status: Former Smoker     Types: Cigarettes   • Smokeless tobacco: Never Used       No Known Allergies      Medication:    Current Facility-Administered Medications:   •  acetaminophen (TYLENOL) tablet 650 mg, 650 mg, Oral, Q4H PRN, Elan Chen MD  •  aspirin EC tablet 81 mg, 81 mg, Oral, Daily, Chino Taylor MD, 81 mg at 06/11/19 0821  •  docusate sodium (COLACE) capsule 100 mg, 100 mg, Oral, BID PRN, Elan Chen MD, 100 mg at 06/11/19 0821  •  famotidine (PEPCID) tablet 20 mg, 20 mg, Oral, BID PRN, Elan Chen MD, 20 mg at 06/10/19 2138  •  ferrous sulfate tablet 325 mg, 325 mg, Oral, BID With Meals, Chino Taylor MD, 325 mg at 06/11/19 0821  •  heparin (porcine) 5000 UNIT/ML injection 5,000 Units, 5,000 Units, Subcutaneous, Q8H, Elan Chen MD, 5,000 Units at 06/11/19 1335  •  melatonin tablet 5 mg, 5 mg, Oral, Nightly PRN, Elan Chen MD, 5 mg at 06/10/19 2137  •  meropenem (MERREM) 1 g/100 mL 0.9% NS VTB (mbp), 1 g,  Intravenous, Q12H, Elan Chen MD, Last Rate: 0 mL/hr at 06/10/19 1130, 1 g at 19 0821  •  metoprolol tartrate (LOPRESSOR) half tablet 12.5 mg, 12.5 mg, Oral, Q12H, Chino Taylor MD, 12.5 mg at 19 0821  •  ondansetron (ZOFRAN) tablet 4 mg, 4 mg, Oral, Q6H PRN **OR** ondansetron (ZOFRAN) injection 4 mg, 4 mg, Intravenous, Q6H PRN, Elan Chen MD, 4 mg at 19 2114  •  sodium chloride 0.9 % flush 3 mL, 3 mL, Intravenous, Q12H, Elan Chen MD, 3 mL at 19 0821  •  sodium chloride 0.9 % flush 3-10 mL, 3-10 mL, Intravenous, PRN, Elan Chen MD  •  traMADol (ULTRAM) tablet 50 mg, 50 mg, Oral, Q6H PRN, Elan Chen MD  •  vitamin C (ASCORBIC ACID) tablet 500 mg, 500 mg, Oral, BID, Chino Taylor MD, 500 mg at 19 0821    Antibiotics:  Anti-Infectives (From admission, onward)    Ordered     Dose/Rate Route Frequency Start Stop    19 1654  meropenem (MERREM) 1 g/100 mL 0.9% NS VTB (mbp)     Comments:  Changed from q8h per extended infusion guidelines   Ordering Provider:  Elan Chen MD    1 g  over 3 Hours Intravenous Every 12 Hours Scheduled 19 2100 19 2059    19 1434  meropenem (MERREM) 1 g/100 mL 0.9% NS VTB (mbp)     Ordering Provider:  Leopoldo Rogers MD    1 g  over 30 Minutes Intravenous Once 19 1436 19 1617                Physical Exam:   Vital Signs  Temp (24hrs), Av.7 °F (36.5 °C), Min:97.7 °F (36.5 °C), Max:97.7 °F (36.5 °C)    Temp  Min: 97.7 °F (36.5 °C)  Max: 97.7 °F (36.5 °C)  BP  Min: 132/71  Max: 133/81  Pulse  Min: 72  Max: 104  Resp  Min: 18  Max: 18  SpO2  Min: 92 %  Max: 100 %    GENERAL: Awake and alert, in no acute distress.   HEENT: Normocephalic, atraumatic.  PERRL. EOMI. No conjunctival injection. No icterus. Oropharynx clear without evidence of thrush or exudate    HEART: RRR; No murmur, rubs, gallops.   LUNGS: He has expiratory wheezes on right    ABDOMEN: Soft, nontender, nondistended. Positive bowel sounds. No rebound or guarding. No mass or HSM.  EXT:  No cyanosis, clubbing or edema. No cord.    MSK: FROM without joint effusions noted arms/legs.    SKIN: Warm and dry, thoracotomy incision with small amount of serous drainage  NEURO: Oriented to PPT. No focal deficits on motor/sensory exam at arms/legs.  PSYCHIATRIC: Normal insight and judgement. Cooperative with PE  Chest wall: There is a left chest wall pacemaker pocket with no erythema and he also has a right anterior chest wall tunneled Groshong catheter with no exit site erythema    Laboratory Data    Results from last 7 days   Lab Units 06/09/19  0450 06/08/19  0526 06/07/19  1223   WBC 10*3/mm3 6.32 6.11 6.10   HEMOGLOBIN g/dL 8.4* 8.2* 8.6*   HEMATOCRIT % 29.3* 28.3* 29.8*   PLATELETS 10*3/mm3 246 247 282     Results from last 7 days   Lab Units 06/09/19  0450   SODIUM mmol/L 137   POTASSIUM mmol/L 4.9   CHLORIDE mmol/L 103   CO2 mmol/L 24.0   BUN mg/dL 24*   CREATININE mg/dL 2.04*   GLUCOSE mg/dL 101*   CALCIUM mg/dL 9.1     Results from last 7 days   Lab Units 06/08/19  0526   ALK PHOS U/L 88   BILIRUBIN mg/dL 0.5   ALT (SGPT) U/L 12   AST (SGOT) U/L 16     Results from last 7 days   Lab Units 06/07/19  1223   SED RATE mm/hr 36*     Results from last 7 days   Lab Units 06/07/19  1223   CRP mg/dL 3.52*     Results from last 7 days   Lab Units 06/07/19  1435   LACTATE mmol/L 1.6             Estimated Creatinine Clearance: 29.9 mL/min (A) (by C-G formula based on SCr of 2.04 mg/dL (H)).      Microbiology:      6/7:  BC no growth                           Radiology:  Imaging Results (last 72 hours)     Procedure Component Value Units Date/Time    CT Chest Without Contrast [[id number]] Collected:  06/07/19 1453     Updated:  06/07/19 1610    Narrative:       EXAMINATION: CT CHEST WO CONTRAST-06/07/2019:      INDICATION: Hypoxemia after recent lobectomy, bleeding from posterior  incision, recent  bacteremia but no fever now; R09.02-Hypoxemia;  J18.1-Lobar pneumonia, unspecified organism, lobar pneumonia.     TECHNIQUE: Multiple axial CT imaging was obtained of the chest without  the administration of intravenous contrast.     The radiation dose reduction device was turned on for each scan per the  ALARA (As Low as Reasonably Achievable) protocol.     COMPARISON: NONE.     FINDINGS: There is a small amount of pleural fluid identified along the  right lateral chest wall with a fracture involving the right posterior  sixth rib. Findings are likely postoperative. There is consolidation  posteriorly within the right lung base concerning for infiltrate. There  is some chronic interstitial changes identified likely postoperative.  Superimposed infection is likely within the right lung base. Severe  emphysematous changes seen within the lung fields bilaterally with  bronchiectatic changes centrally. Severe emphysematous changes seen  within the upper lung fields. Old healed granulomatous disease seen  within the chest. The cardiac chambers are within normal limits. No  pericardial effusion. No bulky hilar or axillary lymphadenopathy. The  visualized upper abdomen is unremarkable.       Impression:       Findings concerning for dense consolidation and infiltrate  superimposed on chronic and emphysematous change within the right lung.  There is a fracture involving the right posterior sixth rib likely  postsurgical with some pleural thickening and small pleural fluid seen  on the right.     D:  06/07/2019  E:  06/07/2019     This report was finalized on 6/7/2019 4:07 PM by Dr. Jo Ann Galarza MD.       XR Chest 1 View [[id number]] Collected:  06/07/19 1306     Updated:  06/07/19 1423    Narrative:       EXAMINATION: XR CHEST 1 VW- 06/07/2019      INDICATION: SOA triage protocol      COMPARISON: NONE     FINDINGS: Portable chest reveals heart to be enlarged. Ill-defined  opacification seen at the right lung base  with small right pleural  effusion. Deep line catheter on the right tip in the SVC. Cardiac pacer  leads in satisfactory position.       Impression:       Patchy ill-defined opacification seen within the right mid  and lower lung field with small right pleural effusion. Left lung is  clear.     D:  06/07/2019  E:  06/07/2019     This report was finalized on 6/7/2019 2:20 PM by Dr. Jo Ann Galarza MD.               Impression:   1.  Serratia bacteremia/Pacemaker lead infection- he has Serratia bacteremia with 2+ blood cultures of 5/21 and 1 out of 2 sets positive on 5/23 with subsequent blood cultures on 5/26- at [facility].  He had a mobile mass attached to the pacemaker lead by ZI at [facility] on 5/28.  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  He would require pacemaker lead extraction to cure this process but due to his advanced age and underlying lung cancer, consideration is being given for chronic suppression instead of a higher risk of lead extraction.  Dr. Zayas has been following him at [facility] and I will confer with him regarding this issue when he returns on Tuesday.  In the meantime, we will leave him on intravenous Merrem.  2.  Right basilar infiltrate- this does not appear particularly different when compared to his chest x-ray at [facility] of 5/30.  3.  Non-small cell lung cancer-status post right lower lobe lobectomy, 5/14/2019  4.  Acute hypoxic respiratory failure-if he clinically worsens, we may need to assess for pulmonary embolism.  5.  Stage II-III chronic kidney disease.  His antibiotic therapy will need to be dose adjusted for his renal dysfunction.  6.  History of atrial fibrillation  7.  Coronary artery disease  8.  Status post pacemaker implantation-this was performed at   9.  Blood loss anemia    PLAN/RECOMMENDATIONS:   1.  Merrem 1 g IV every 12 hours  2.  Blood cultures-pending  3.  Consider pacemaker lead  extraction  4.  MBS today        Patient's lobectomy was potentially curative.    Continue meropenem    Would be in favor of removing pacemaker this hospitalization;    D/w EP, family    George Zayas MD  6/11/2019  4:12 PM

## 2019-06-11 NOTE — DISCHARGE INSTR - DIET
MBS/VFSS 6/11/19  Reason for Referral  Patient was referred for a MBS to assess the efficiency of his/her swallow function, rule out aspiration and make recommendations regarding safe dietary consistencies, effective compensatory strategies, and safe eating environment.             Recommendations/Treatment  SLP Swallowing Diagnosis: mild, oral dysfunction, pharyngeal dysfunction  Functional Impact: risk of aspiration/pneumonia  Rehab Potential/Prognosis, Swallowing: good, to achieve stated therapy goals  Swallow Criteria for Skilled Therapeutic Interventions Met: demonstrates skilled criteria  Therapy Frequency (Swallow): 5 days per week  Predicted Duration Therapy Intervention (Days): until discharge  SLP Diet Recommendation: regular textures, thin liquids  Recommended Precautions and Strategies: upright posture during/after eating, alternate between small bites of food and sips of liquid, other (see comments)(general aspiration precautions; oral care BID/PRN)  SLP Rec. for Method of Medication Administration: meds whole, with pudding or applesauce, as tolerated  Monitor for Signs of Aspiration: yes, notify SLP if any concerns  Anticipated Dischage Disposition: home with home health, home with OP services, anticipate therapy at next level of care    Instrumental Set-up  Utensils Used: spoon, cup, straw  Consistencies Trialed: thin liquids, pudding thick, regular textures    Oral Preparation/ Oral Phase  Oral Prep Phase: impaired oral phase of swallowing  Oral Transit Phase: impaired  Oral Residue: impaired  Oral Preparatory Phase: prolonged manipulation  Prolonged Manipulation: regular textures  Oral Preparatory Phase, Comment: prolonge but adequate mastication of regular solid  Impaired Oral Transit Phase: increased A-P transit time  Impaired Oral Residue: diffuse residue throughout oral cavity  Diffuse Residue throughout Oral Cavity: pudding/puree, regular textures, secondary to reduced lingual strength,  secondary to reduced lingual range of motion  Response to Oral Residue: cleared residue, with liquid wash    Pharyngeal Phase  Initiation of Pharyngeal Swallow: bolus in pyriform sinuses  Pharyngeal Phase: impaired pharyngeal phase of swallowing  Penetration During the Swallow: thin liquids, secondary to delayed swallow initiation or mistiming, secondary to reduced laryngeal elevation, secondary to reduced vestibular closure, other (see comments)(cleared upon completion of the swallow)  Response to Penetration: no response, transient  Pharyngeal Residue: all consistencies tested, diffuse within pharynx, secondary to reduced base of tongue retraction, secondary to reduced posterior pharyngeal wall stripping, secondary to reduced laryngeal elevation, secondary to reduced hyolaryngeal excursion  Response to Residue: unable to clear residue  Attempted Compensatory Maneuvers: bolus size, bolus presentation style, alternative liquids/solids, multiple swallows  Response to Attempted Compensatory Maneuvers: reduced residue  Pharyngeal Phase, Comment: Mild pharyngeal dysphagia. Penetration occurred during the swallow w/ thin liquids 2' delay and reduced vestibular closure. Penetration cleared upon completion of the swallow. Mild-mod diffuse pharyngeal residue w/all consistencies 2' diffuse generalized pharyngeal weakness. Pt noted w/ throat clearing at the end of eval, suspect sensation of pharyngeal residue. Pt agreeable to generalized pharyngeal strengthening to decrease aspiration risk. Residue reduced when alternating bites/sips.     Cervical Esophageal Phase  Esophageal Phase: see radiology report for further details

## 2019-06-11 NOTE — PLAN OF CARE
Problem: Patient Care Overview  Goal: Plan of Care Review  Outcome: Ongoing (interventions implemented as appropriate)   06/11/19 1115   Coping/Psychosocial   Plan of Care Reviewed With patient   SLP MBS evaluation completed. Will address mild oropharyngeal dysphagia during treatment for general oropharyngeal strengthening. Please see note for further details and recommendations.

## 2019-06-11 NOTE — PLAN OF CARE
Problem: Patient Care Overview  Goal: Plan of Care Review  Outcome: Ongoing (interventions implemented as appropriate)   06/11/19 0414   Plan of Care Review   Progress no change   OTHER   Outcome Summary Vitals stable at rest. HR goes up to 140's whenever patient gets out of bed. SOB with exertion. Patient slept most of the shift. Pt denies pain. MBS to be done today.   Coping/Psychosocial   Plan of Care Reviewed With patient       Problem: Pneumonia (Adult)  Goal: Signs and Symptoms of Listed Potential Problems Will be Absent, Minimized or Managed (Pneumonia)  Outcome: Ongoing (interventions implemented as appropriate)   06/11/19 0414   Goal/Outcome Evaluation   Problems Assessed (Pneumonia) all   Problems Present (Pneumonia) respiratory compromise;infection progression       Problem: Fall Risk (Adult)  Goal: Absence of Fall  Outcome: Ongoing (interventions implemented as appropriate)   06/11/19 0414   Fall Risk (Adult)   Absence of Fall making progress toward outcome

## 2019-06-11 NOTE — PROGRESS NOTES
[facility] Medicine Services  PROGRESS NOTE    Patient Name: Rodney Looney  : 1934  MRN: [id number]    Date of Admission: 2019  Length of Stay: 4  Primary Care Physician: Dana Isidro DO    Subjective   Subjective     CC:  Shortness of Breath    HPI:  Wife in room today.  They said they are waiting to talk to cardiology.  No fevers reported.      Review of Systems    Gen- No fevers, chills  CV- No chest pain, palpitations  Resp- No cough, dyspnea  GI- No N/V/D, abd pain    Otherwise ROS is negative except as mentioned in the HPI.    Objective   Objective     Vital Signs:   Temp:  [97.7 °F (36.5 °C)] 97.7 °F (36.5 °C)  Heart Rate:  [] 92  Resp:  [18] 18  BP: (132-133)/(71-81) 133/81     Physical Exam:    Constitutional: No acute distress, awake, alert  HENT: NCAT, mucous membranes moist  Respiratory: Clear to auscultation bilaterally, respiratory effort normal   Cardiovascular: RRR, s1 and s2  Gastrointestinal: Positive bowel sounds, soft, nontender, nondistended  Musculoskeletal: No bilateral ankle edema  Psychiatric: Appropriate affect, cooperative  Neurologic: Oriented x 3, strength symmetric in all extremities, Cranial Nerves grossly intact to confrontation, speech clear  Skin: No rashes      Results Reviewed:  I have personally reviewed current lab, radiology, and data and agree.    Results from last 7 days   Lab Units 19  04519  0519  1223   WBC 10*3/mm3 6.32 6.11 6.10   HEMOGLOBIN g/dL 8.4* 8.2* 8.6*   HEMATOCRIT % 29.3* 28.3* 29.8*   PLATELETS 10*3/mm3 246 247 282   PROCALCITONIN ng/mL  --   --  0.14     Results from last 7 days   Lab Units 19  04519  0519  1223   SODIUM mmol/L 137 134* 134*   POTASSIUM mmol/L 4.9 4.3 4.8   CHLORIDE mmol/L 103 100 99   CO2 mmol/L 24.0 24.0 22.0   BUN mg/dL 24* 21 21   CREATININE mg/dL 2.04* 1.83* 1.85*   GLUCOSE mg/dL 101* 83 103*   CALCIUM mg/dL 9.1 9.5 9.4   ALT (SGPT)  U/L  --  12 13   AST (SGOT) U/L  --  16 15   TROPONIN T ng/mL  --   --  <0.010   PROBNP pg/mL 1,923.0*  --  2,107.0*     Estimated Creatinine Clearance: 29.9 mL/min (A) (by C-G formula based on SCr of 2.04 mg/dL (H)).    Microbiology Results Abnormal     Procedure Component Value - Date/Time    Blood Culture - Blood, Arm, Right [[id number]] Collected:  06/07/19 1425    Lab Status:  Preliminary result Specimen:  Blood from Arm, Right Updated:  06/11/19 1501     Blood Culture No growth at 4 days    Blood Culture - Blood, Arm, Left [[id number]] Collected:  06/07/19 1430    Lab Status:  Preliminary result Specimen:  Blood from Arm, Left Updated:  06/11/19 1501     Blood Culture No growth at 4 days          Imaging Results (last 24 hours)     Procedure Component Value Units Date/Time    FL Video Swallow With Speech [[id number]] Collected:  06/11/19 1136     Updated:  06/11/19 1637    Narrative:       EXAMINATION: FL VIDEO SWALLOW W SPEECH-     INDICATION: dysphagia; R09.02-Hypoxemia; J18.1-Lobar pneumonia,  unspecified organism; Z74.09-Other reduced mobility; Z74.09-Other  reduced mobility     TECHNIQUE: 48 seconds of fluoroscopic time was used for this exam. 1  associated image was saved. The patient was evaluated in the seated  lateral position while taking a variety of consistencies of barium by  mouth under the direction of speech pathology.     COMPARISON: NONE     FINDINGS: There was no penetration and no aspiration with any of the  media attempted.          Impression:       Fluoroscopy provided for a modified barium swallow. Please  see speech therapy report for full details and recommendations.         This report was finalized on 6/11/2019 4:33 PM by Dr. Wesley Santamaria MD.           I have reviewed the medications:  Scheduled Meds:    aspirin 81 mg Oral Daily   ferrous sulfate 325 mg Oral BID With Meals   heparin (porcine) 5,000 Units Subcutaneous Q8H   meropenem 1 g Intravenous Q12H   metoprolol tartrate 12.5 mg  Oral Q12H   sodium chloride 3 mL Intravenous Q12H   vitamin C 500 mg Oral BID     Continuous Infusions:   PRN Meds:.•  acetaminophen  •  docusate sodium  •  famotidine  •  melatonin  •  ondansetron **OR** ondansetron  •  sodium chloride  •  traMADol    Assessment/Plan   Assessment / Plan     Active Hospital Problems    Diagnosis POA   • **Lung infiltrate [R91.8] Yes   • Serratia infection [A49.8] Yes   • Presence of cardiac pacemaker [Z95.0] Yes   • Non-small cell lung cancer and reports right lower lobe lobectomy in May at [facility] [C34.90] Unknown   • History of lobectomy of lung [Z90.2] Not Applicable   • Hypoxia [R09.02] Yes   • CKD (chronic kidney disease) stage 3, GFR 30-59 ml/min (CMS/[facility]) [N18.3] Yes          Brief Hospital Course to date:  Rodney Looney is a 84 y.o. male with history of non-small cell lung cancer reported and recent lobectomy in May at [facility] with recent reported Serratia bacteremia and concern reportedly for a cardiac pacemaker infection presents from infectious disease clinic with hypoxia in the 80s on vital signs and was found in the emergency room to have right lung infiltrate concerning for pneumonia.     Right lung infiltrate, possible right lung pneumonia:  Infectious disease recommends Merrem.  Blood cultures.     Hypoxia:  Likely due to lobectomy and possible pneumonia.  Plan for nasal cannula oxygen as needed and wean as tolerated.     Serratia infection:  Reportedly had recent Serratia bacteremia.  Trying to get outside records.  Infectious disease recommends Merrem.  Repeat blood cultures.     Reported concern for pacemaker infection:  Plan to follow-up blood cultures and ID recommendations.  Monitor on telemetry.  Merrem as per above     Elevated BNP:  Probably has chronic heart failure.  Does not appear to decompensated at this point.  Plan to get outside records.     Stage III chronic kidney disease:  Reports he sees Dr. Courtney outpatient.  Unsure of his  baseline.  Plan to get outside records.     Anemia:   Notably patient had recent surgery.  Baseline hemoglobin unknown.  Denies recent bleeding.  Plan to monitor    Lead infection  ZI was planned - per Cardiology    Xarelto on hold for Cardiology evaluation - resume Xarelto when ok with Cardiology    ID discussing with EP    DVT Prophylaxis:  Heparin SC    Disposition: I expect the patient to be discharged TBD    CODE STATUS:   Code Status and Medical Interventions:   Ordered at: 06/07/19 1509     Code Status:    CPR     Medical Interventions (Level of Support Prior to Arrest):    Full         Electronically signed by Chino Taylor MD, 06/11/19, 5:28 PM.

## 2019-06-11 NOTE — MBS/VFSS/FEES
Acute Care - Speech Language Pathology   Swallow Initial Evaluation  [place]   Modified Barium Swallow Study (MBS)     Patient Name: Rodney Looney  : 1934  MRN: [id number]  Today's Date: 2019  Onset of Illness/Injury or Date of Surgery: 19     Referring Physician: MD Gustavo      Admit Date: 2019    Visit Dx:     ICD-10-CM ICD-9-CM   1. Hypoxemia R09.02 799.02   2. Pneumonia of right lower lobe due to infectious organism (CMS/HCC) J18.1 486   3. Impaired functional mobility, balance, gait, and endurance Z74.09 V49.89   4. Impaired mobility and ADLs Z74.09 799.89   5. Oropharyngeal dysphagia R13.12 787.22     Patient Active Problem List   Diagnosis   • Lung infiltrate   • Serratia infection   • Presence of cardiac pacemaker   • Non-small cell lung cancer and reports right lower lobe lobectomy in May at [facility]   • History of lobectomy of lung   • Hypoxia   • CKD (chronic kidney disease) stage 3, GFR 30-59 ml/min (CMS/HCC)     Past Medical History:   Diagnosis Date   • Cancer (CMS/HCC)    • Coronary artery disease    • Elevated cholesterol    • GERD (gastroesophageal reflux disease)    • History of BPH 2 yrs   • Hypertension    • Kidney disease    • Smoking      Past Surgical History:   Procedure Laterality Date   • CARDIAC SURGERY     • HERNIA REPAIR          SWALLOW EVALUATION (last 72 hours)      [facility] Adult Swallow Evaluation     Row Name 19 1115                Rehab Evaluation    Document Type  evaluation  -RD       Subjective Information  no complaints  -RD       Patient Observations  alert;cooperative;agree to therapy  -RD       Patient/Family Observations  No family present  -RD       Patient Effort  good  -RD          General Information    Patient Profile Reviewed  yes  -RD       Pertinent History Of Current Problem  Lung infiltrate. Bacterial infection. Small cell lung cancern w/ recent R lobectomy. Concern for PNA. Lead infection.   -RD       Current Method of Nutrition   regular textures;thin liquids  -RD       Precautions/Limitations, Vision  WFL with corrective lenses  -RD       Precautions/Limitations, Hearing  WFL;for purposes of eval  -RD       Prior Level of Function-Communication  WFL  -RD       Prior Level of Function-Swallowing  no diet consistency restrictions;other (see comments) pt c/o of globus sensation since surgery  -RD       Plans/Goals Discussed with  patient;agreed upon  -RD       Barriers to Rehab  none identified  -RD       Patient's Goals for Discharge  eat/drink without coughing/choking  -RD          Pain Assessment    Additional Documentation  Pain Scale: Numbers Pre/Post-Treatment (Group)  -RD          Pain Scale: Numbers Pre/Post-Treatment    Pain Scale: Numbers, Pretreatment  0/10 - no pain  -RD       Pain Scale: Numbers, Post-Treatment  0/10 - no pain  -RD          MBS/VFSS    Utensils Used  spoon;cup;straw  -RD       Consistencies Trialed  thin liquids;pudding thick;regular textures  -RD          MBS/VFSS Interpretation    Oral Prep Phase  impaired oral phase of swallowing  -RD       Oral Transit Phase  impaired  -RD       Oral Residue  impaired  -RD       Oral Phase, Comment  Mild oral dysphagia. Prolonged but adequate mastication of regular solid. Incr'd A-P transit w/ all consistencies tested. Mild oral residue w/ pudding/solids that cleared w/ liquid wash.   -RD          Oral Preparatory Phase    Oral Preparatory Phase  prolonged manipulation  -RD       Prolonged Manipulation  regular textures  -RD       Oral Preparatory Phase, Comment  prolonge but adequate mastication of regular solid  -RD          Oral Transit Phase    Impaired Oral Transit Phase  increased A-P transit time  -RD       Increased A-P Transit Time  thin liquids;pudding/puree;regular textures;secondary to reduced lingual control  -RD          Oral Residue    Impaired Oral Residue  diffuse residue throughout oral cavity  -RD       Diffuse Residue throughout Oral Cavity   pudding/puree;regular textures;secondary to reduced lingual strength;secondary to reduced lingual range of motion  -RD       Response to Oral Residue  cleared residue;with liquid wash  -RD          Initiation of Pharyngeal Swallow    Initiation of Pharyngeal Swallow  bolus in pyriform sinuses  -RD       Pharyngeal Phase  impaired pharyngeal phase of swallowing  -RD       Penetration During the Swallow  thin liquids;secondary to delayed swallow initiation or mistiming;secondary to reduced laryngeal elevation;secondary to reduced vestibular closure;other (see comments) cleared upon completion of the swallow  -RD       Response to Penetration  no response;transient  -RD       Rosenbek's Scale  thin:;2--->level 2;pudding/puree:;regular textures:;1--->level 1  -RD       Pharyngeal Residue  all consistencies tested;diffuse within pharynx;secondary to reduced base of tongue retraction;secondary to reduced posterior pharyngeal wall stripping;secondary to reduced laryngeal elevation;secondary to reduced hyolaryngeal excursion  -RD       Response to Residue  unable to clear residue  -RD       Attempted Compensatory Maneuvers  bolus size;bolus presentation style;alternative liquids/solids;multiple swallows  -RD       Response to Attempted Compensatory Maneuvers  reduced residue  -RD       Pharyngeal Phase, Comment  Mild pharyngeal dysphagia. Penetration occurred during the swallow w/ thin liquids 2' delay and reduced vestibular closure. Penetration cleared upon completion of the swallow. Mild-mod diffuse pharyngeal residue w/all consistencies 2' diffuse generalized pharyngeal weakness. Pt noted w/ throat clearing at the end of eval, suspect sensation of pharyngeal residue. Pt agreeable to generalized pharyngeal strengthening to decrease aspiration risk. Residue reduced when alternating bites/sips.   -RD          Esophageal Phase    Esophageal Phase  see radiology report for further details  -RD          Clinical Impression     SLP Swallowing Diagnosis  mild;oral dysfunction;pharyngeal dysfunction  -RD       Functional Impact  risk of aspiration/pneumonia  -RD       Rehab Potential/Prognosis, Swallowing  good, to achieve stated therapy goals  -RD       Swallow Criteria for Skilled Therapeutic Interventions Met  demonstrates skilled criteria  -RD          Recommendations    Therapy Frequency (Swallow)  5 days per week  -RD       Predicted Duration Therapy Intervention (Days)  until discharge  -RD       SLP Diet Recommendation  regular textures;thin liquids  -RD       Recommended Diagnostics  --       Recommended Precautions and Strategies  upright posture during/after eating;alternate between small bites of food and sips of liquid;other (see comments) general aspiration precautions; oral care BID/PRN  -RD       SLP Rec. for Method of Medication Administration  meds whole;with pudding or applesauce;as tolerated  -RD       Monitor for Signs of Aspiration  yes;notify SLP if any concerns  -RD       Anticipated Dischage Disposition  home with home health;home with OP services;anticipate therapy at next level of care  -RD         User Key  (r) = Recorded By, (t) = Taken By, (c) = Cosigned By    Initials Name Effective Dates    Libby Moralez, MS CCC-SLP 06/22/15 -     Faina Betancourt, MS CCC-SLP 04/03/18 -           EDUCATION  The patient has been educated in the following areas:   Dysphagia (Swallowing Impairment) Oral Care/Hydration Modified Diet Instruction.    SLP Recommendation and Plan  SLP Swallowing Diagnosis: mild, oral dysfunction, pharyngeal dysfunction  SLP Diet Recommendation: regular textures, thin liquids  Recommended Precautions and Strategies: upright posture during/after eating, alternate between small bites of food and sips of liquid, other (see comments)(general aspiration precautions; oral care BID/PRN)  SLP Rec. for Method of Medication Administration: meds whole, with pudding or applesauce, as tolerated      Monitor for Signs of Aspiration: yes, notify SLP if any concerns     Swallow Criteria for Skilled Therapeutic Interventions Met: demonstrates skilled criteria  Anticipated Dischage Disposition: home with home health, home with OP services, anticipate therapy at next level of care  Rehab Potential/Prognosis, Swallowing: good, to achieve stated therapy goals  Therapy Frequency (Swallow): 5 days per week  Predicted Duration Therapy Intervention (Days): until discharge       Plan of Care Reviewed With: patient  Plan of Care Review  Plan of Care Reviewed With: patient    SLP GOALS     Row Name 06/11/19 1115             Oral Nutrition/Hydration Goal 1 (SLP)    Oral Nutrition/Hydration Goal 1, SLP  LTG: Pt will tolerate regular diet and thin liquids w/ no overt s/s of aspiration w/ 100% accuracy w/o cues.   -RD      Time Frame (Oral Nutrition/Hydration Goal 1, SLP)  by discharge  -RD         Lingual Strengthening Goal 1 (SLP)    Activity (Lingual Strengthening Goal 1, SLP)  increase lingual tone/sensation/control/coordination/movement  -RD      Increase Lingual Tone/Sensation/Control/Coordination/Movement  lingual movement exercises  -RD      [place]/Accuracy (Lingual Strengthening Goal 1, SLP)  with minimal cues (75-90% accuracy)  -RD      Time Frame (Lingual Strengthening Goal 1, SLP)  short term goal (STG);by discharge  -RD         Pharyngeal Strengthening Exercise Goal 1 (SLP)    Activity (Pharyngeal Strengthening Goal 1, SLP)  increase timing;increase superior movement of the hyolaryngeal complex;increase anterior movement of the hyolaryngeal complex;increase closure at entrance to airway/closure of airway at glottis;increase squeeze/positive pressure generation;increase tongue base retraction  -RD      Increase Timing  prepping - 3 second prep or suck swallow or 3-step swallow  -RD      Increase Superior Movement of the Hyolaryngeal Complex  Mendelsohn  -RD      Increase Anterior Movement of the Hyolaryngeal  Complex  chin tuck against resistance (CTAR)  -RD      Increase Closure at Entrance to Airway/Closure of Airway at Glottis  super-supraglottic swallow  -RD      Increase Squeeze/Positive Pressure Generation  effortful pitch glide (falsetto + pharyngeal squeeze)  -RD      Increase Tongue Base Retraction  hard effortful swallow  -RD      [place]/Accuracy (Pharyngeal Strengthening Goal 1, SLP)  independently (over 90% accuracy)  -RD      Time Frame (Pharyngeal Strengthening Goal 1, SLP)  short term goal (STG);by discharge  -RD         Swallow Compensatory Strategies Goal 1 (SLP)    Activity (Swallow Compensatory Strategies/Techniques Goal 1, SLP)  compensatory strategies;postural techniques;during meal intake;alternate food/liquid intake  -RD      [place]/Accuracy (Swallow Compensatory Strategies/Techniques Goal 1, SLP)  independently (over 90% accuracy)  -RD      Time Frame (Swallow Compensatory Strategies/Techniques Goal 1, SLP)  short term goal (STG);by discharge  -RD        User Key  (r) = Recorded By, (t) = Taken By, (c) = Cosigned By    Initials Name Provider Type    Faina Betancourt MS CCC-SLP Speech and Language Pathologist             Time Calculation:   Time Calculation- SLP     Row Name 06/11/19 1356             Time Calculation- SLP    SLP Start Time  1115  -RD      SLP Received On  06/11/19  -RD        User Key  (r) = Recorded By, (t) = Taken By, (c) = Cosigned By    Initials Name Provider Type    Faina Betancourt MS CCC-SLP Speech and Language Pathologist          Therapy Charges for Today     Code Description Service Date Service Provider Modifiers Qty    29228110472 HC ST MOTION FLUORO EVAL SWALLOW 4 6/11/2019 Faina Richardson MS CCC-SLP GN 1               MS ASUNCION [place]  6/11/2019

## 2019-06-11 NOTE — THERAPY TREATMENT NOTE
Acute Care - Physical Therapy Treatment Note  [facility]     Patient Name: Rodney Looney  : 1934  MRN: [id number]  Today's Date: 2019  Onset of Illness/Injury or Date of Surgery: 19  Date of Referral to PT: 19  Referring Physician: MD Gustavo    Admit Date: 2019    Visit Dx:    ICD-10-CM ICD-9-CM   1. Hypoxemia R09.02 799.02   2. Pneumonia of right lower lobe due to infectious organism (CMS/[facility]) J18.1 486   3. Impaired functional mobility, balance, gait, and endurance Z74.09 V49.89   4. Impaired mobility and ADLs Z74.09 799.89   5. Oropharyngeal dysphagia R13.12 787.22     Patient Active Problem List   Diagnosis   • Lung infiltrate   • Serratia infection   • Presence of cardiac pacemaker   • Non-small cell lung cancer and reports right lower lobe lobectomy in May at [facility]   • History of lobectomy of lung   • Hypoxia   • CKD (chronic kidney disease) stage 3, GFR 30-59 ml/min (CMS/[facility])       Therapy Treatment    Rehabilitation Treatment Summary     Row Name 19 1334             Treatment Time/Intention    Discipline  physical therapist  -LM      Document Type  therapy note (daily note)  -LM      Subjective Information  no complaints  -LM      Mode of Treatment  individual therapy;physical therapy  -LM      Patient/Family Observations  Pt lying in bed.  IV + O2 intact.  Very pleasant and agreeable to PT tx.  Spouse present throughout tx.  -LM      Care Plan Review  care plan/treatment goals reviewed;risks/benefits reviewed;patient/other agree to care plan  -LM      Patient Effort  good  -LM      Existing Precautions/Restrictions  fall;oxygen therapy device and L/min;other (see comments)  (Significant)  Gait Belt Placement due to recent lobectomy  -LM      Recorded by [LM] Aria Gong, PT 19 1412      Row Name 19 1334             Vital Signs    Pre Systolic BP Rehab  -- VSS - RN cleared for tx  -LM      Pre Patient Position  Supine  -LM      Intra Patient  Position  Standing  -LM      Post Patient Position  Supine  -LM      Recorded by [LM] Aria Gong, PT 06/11/19 1412      Row Name 06/11/19 1334             Cognitive Assessment/Intervention    Additional Documentation  Cognitive Assessment/Intervention (Group)  -LM      Recorded by [LM] Aria Gong, PT 06/11/19 1412      Row Name 06/11/19 1334             Cognitive Assessment/Intervention- PT/OT    Affect/Mental Status (Cognitive)  WFL  -LM      Orientation Status (Cognition)  oriented x 4  -LM      Follows Commands (Cognition)  WFL  -LM      Safety Deficit (Cognitive)  mild deficit;safety precautions awareness;safety precautions follow-through/compliance  -LM      Personal Safety Interventions  fall prevention program maintained;gait belt;muscle strengthening facilitated;nonskid shoes/slippers when out of bed  -LM      Recorded by [LM] Aria Gong, PT 06/11/19 1412      Row Name 06/11/19 1334             Bed Mobility Assessment/Treatment    Bed Mobility Assessment/Treatment  supine-sit;sit-supine  -LM      Supine-Sit Baxter (Bed Mobility)  conditional independence  -LM2      Sit-Supine Baxter (Bed Mobility)  independent  -LM2      Assistive Device (Bed Mobility)  bed rails;head of bed elevated  -LM      Recorded by [LM] Aria Gong, PT 06/11/19 1417  [LM2] Aria Gong, PT 06/11/19 1418      Row Name 06/11/19 1334             Transfer Assessment/Treatment    Transfer Assessment/Treatment  sit-stand transfer;stand-sit transfer  -LM      Comment (Transfers)  Stood x 2.  Vc's for hand placement.  -LM      Recorded by [LM] Aria Gong, PT 06/11/19 1417      Row Name 06/11/19 1334             Sit-Stand Transfer    Sit-Stand Baxter (Transfers)  contact guard;verbal cues  -LM      Assistive Device (Sit-Stand Transfers)  walker, front-wheeled  -LM      Recorded by [LM] Aria Gong, PT 06/11/19 1417      Row Name 06/11/19 1334             Stand-Sit Transfer    Stand-Sit Baxter  (Transfers)  contact guard;verbal cues  -LM      Assistive Device (Stand-Sit Transfers)  walker, front-wheeled  -LM      Recorded by [LM] Aria Gong, PT 06/11/19 1417      Row Name 06/11/19 1334             Gait/Stairs Assessment/Training    00796 - Gait Training Minutes   20  -LM      Gait/Stairs Assessment/Training  gait/ambulation independence;gait/ambulation assistive device;distance ambulated  -LM      [place] Level (Gait)  contact guard  -LM      Assistive Device (Gait)  walker, front-wheeled  -LM      Distance in Feet (Gait)  190' x 2 with sitting rest break in between each  -LM      Deviations/Abnormal Patterns (Gait)  reza decreased  -LM      Bilateral Gait Deviations  forward flexed posture;heel strike decreased  -LM      Comment (Gait/Stairs)  Vc's for upright posture.  -LM      Recorded by [LM] Aria Gong, PT 06/11/19 1417      Row Name 06/11/19 1334             Therapeutic Exercise    74991 - PT Therapeutic Exercise Minutes  12  -LM      Recorded by [LM] Aria Gong, PT 06/11/19 1417      Row Name 06/11/19 1335             Lower Extremity Supine Therapeutic Exercise    Performed, Supine Lower Extremity (Therapeutic Exercise)  hip abduction/adduction;SLR (straight leg raise);ankle pumps;heel slides;quadriceps sets  -LM      Exercise Type, Supine Lower Extremity (Therapeutic Exercise)  AROM (active range of motion)  -LM      Sets/Reps Detail, Supine Lower Extremity (Therapeutic Exercise)  x12 bilaterally  -LM      Recorded by [LM] Aria Gong, PT 06/11/19 1417      Row Name 06/11/19 1333             Positioning and Restraints    Pre-Treatment Position  in bed  -LM      Post Treatment Position  bed  -LM      In Bed  supine;call light within reach;encouraged to call for assist;with family/caregiver;notified nsg  -LM      Recorded by [LM] Aria Gong, PT 06/11/19 1417      Row Name 06/11/19 9764             Pain Scale: Numbers Pre/Post-Treatment    Pain Scale: Numbers, Pretreatment  0/10  - no pain  -LM      Pain Scale: Numbers, Post-Treatment  0/10 - no pain  -LM      Recorded by [LM] Aria Gong, PT 06/11/19 1417      Row Name                Wound 06/07/19 1735 Right thoracic spine incision;surgical    Wound - Properties Group Date first assessed: 06/07/19 [TL] Time first assessed: 1735 [TL] Side: Right [TL] Location: thoracic spine [TL2] Type: incision;surgical [TL] Recorded by:  [TL] Wesley DIXON RN 06/07/19 1735 [TL2] Wesley DIXON RN 06/07/19 1736    Row Name 06/11/19 1334             Plan of Care Review    Plan of Care Reviewed With  patient  -LM      Recorded by [LM] Aria Gong, PT 06/11/19 1417      Row Name 06/11/19 1334             Outcome Summary/Treatment Plan (PT)    Daily Summary of Progress (PT)  progress toward functional goals is good  -LM      Recorded by [LM] Aria Gong, PT 06/11/19 1417        User Key  (r) = Recorded By, (t) = Taken By, (c) = Cosigned By    Initials Name Effective Dates Discipline    LM Aria Gong, PT 06/15/16 -  PT    TL Wesley DIXON RN 08/07/17 -  Nurse          Wound Right lateral incision;surgical (Active)   Closure Sutures 6/11/2019  7:33 AM   Periwound ecchymotic 6/11/2019  7:33 AM   Periwound Temperature warm 6/11/2019  7:33 AM   Periwound Skin Turgor soft 6/11/2019  7:33 AM   Edges irregular 6/11/2019  7:33 AM   Drainage Amount none 6/11/2019  7:33 AM   Dressing Care, Wound open to air 6/11/2019  7:33 AM       Wound 06/07/19 1735 Right thoracic spine incision;surgical (Active)   Dressing Appearance dry;intact 6/11/2019  7:33 AM   Closure None 6/11/2019  7:33 AM   Base dressing in place, unable to visualize 6/11/2019  7:33 AM   Periwound ecchymotic;edematous 6/11/2019  7:33 AM   Periwound Temperature warm 6/11/2019  7:33 AM   Periwound Skin Turgor soft 6/11/2019  7:33 AM   Drainage Characteristics/Odor serosanguineous 6/11/2019  7:33 AM   Drainage Amount none 6/11/2019  7:33 AM   Dressing Care, Wound gauze 6/11/2019  7:33 AM       Rehab  Goal Summary     Row Name 06/11/19 1115             Swallow Goals (SLP)    Oral Nutrition/Hydration Goal Selection (SLP)  oral nutrition/hydration, SLP goal 1  -RD      Lingual Strengthening Goal Selection (SLP)  lingual strengthening, SLP goal 1  -RD      Pharyngeal Strengthening Exercise Goal Selection (SLP)  pharyngeal strengthening exercise, SLP goal 1  -RD      Swallow Compensatory Strategies Goal Selection (SLP)  swallow compensatory strategies, SLP goal 1  -RD      Additional Documentation  lingual strengthening goal selection (SLP);pharyngeal strengthening exercise goal selection (SLP);swallow compensatory strategies goal selection (SLP)  -RD         Oral Nutrition/Hydration Goal 1 (SLP)    Oral Nutrition/Hydration Goal 1, SLP  LTG: Pt will tolerate regular diet and thin liquids w/ no overt s/s of aspiration w/ 100% accuracy w/o cues.   -RD      Time Frame (Oral Nutrition/Hydration Goal 1, SLP)  by discharge  -RD         Lingual Strengthening Goal 1 (SLP)    Activity (Lingual Strengthening Goal 1, SLP)  increase lingual tone/sensation/control/coordination/movement  -RD      Increase Lingual Tone/Sensation/Control/Coordination/Movement  lingual movement exercises  -RD      Gambier/Accuracy (Lingual Strengthening Goal 1, SLP)  with minimal cues (75-90% accuracy)  -RD      Time Frame (Lingual Strengthening Goal 1, SLP)  short term goal (STG);by discharge  -RD         Pharyngeal Strengthening Exercise Goal 1 (SLP)    Activity (Pharyngeal Strengthening Goal 1, SLP)  increase timing;increase superior movement of the hyolaryngeal complex;increase anterior movement of the hyolaryngeal complex;increase closure at entrance to airway/closure of airway at glottis;increase squeeze/positive pressure generation;increase tongue base retraction  -RD      Increase Timing  prepping - 3 second prep or suck swallow or 3-step swallow  -RD      Increase Superior Movement of the Hyolaryngeal Complex  Mendelsohn  -RD       Increase Anterior Movement of the Hyolaryngeal Complex  chin tuck against resistance (CTAR)  -RD      Increase Closure at Entrance to Airway/Closure of Airway at Glottis  super-supraglottic swallow  -RD      Increase Squeeze/Positive Pressure Generation  effortful pitch glide (falsetto + pharyngeal squeeze)  -RD      Increase Tongue Base Retraction  hard effortful swallow  -RD      Pender/Accuracy (Pharyngeal Strengthening Goal 1, SLP)  independently (over 90% accuracy)  -RD      Time Frame (Pharyngeal Strengthening Goal 1, SLP)  short term goal (STG);by discharge  -RD         Swallow Compensatory Strategies Goal 1 (SLP)    Activity (Swallow Compensatory Strategies/Techniques Goal 1, SLP)  compensatory strategies;postural techniques;during meal intake;alternate food/liquid intake  -RD      Pender/Accuracy (Swallow Compensatory Strategies/Techniques Goal 1, SLP)  independently (over 90% accuracy)  -RD      Time Frame (Swallow Compensatory Strategies/Techniques Goal 1, SLP)  short term goal (STG);by discharge  -RD        User Key  (r) = Recorded By, (t) = Taken By, (c) = Cosigned By    Initials Name Provider Type Discipline    Faina Betancourt MS CCC-SLP Speech and Language Pathologist SLP          Physical Therapy Education     Title: PT OT SLP Therapies (In Progress)     Topic: Physical Therapy (In Progress)     Point: Mobility training (In Progress)     Learning Progress Summary           Patient Acceptance, E, NR by LM at 6/8/2019 11:54 AM    Comment:  Educated pt on completing AP, HS, hip abd, and SLR twice throughout the day (one more time tonight).                   Point: Home exercise program (In Progress)     Learning Progress Summary           Patient Acceptance, E, NR by LM at 6/8/2019 11:54 AM    Comment:  Educated pt on completing AP, HS, hip abd, and SLR twice throughout the day (one more time tonight).                   Point: Precautions (In Progress)     Learning Progress Summary            Patient Acceptance, E, NR by BHASKAR at 6/8/2019 11:54 AM    Comment:  Educated pt on completing AP, HS, hip abd, and SLR twice throughout the day (one more time tonight).                               User Key     Initials Effective Dates Name Provider Type Discipline     06/15/16 -  Aria Gong, PT Physical Therapist PT                PT Recommendation and Plan  Anticipated Discharge Disposition (PT): home with assist  Planned Therapy Interventions (PT Eval): balance training, bed mobility training, gait training, home exercise program, neuromuscular re-education, patient/family education, postural re-education, ROM (range of motion), stair training, strengthening, stretching, transfer training  Therapy Frequency (PT Clinical Impression): daily  Outcome Summary/Treatment Plan (PT)  Daily Summary of Progress (PT): progress toward functional goals is good  Anticipated Discharge Disposition (PT): home with assist  Plan of Care Reviewed With: patient  Outcome Summary: Pt progressing well towards skilled PT goals.  Pt transferred supine<-->sit modified independently, stood with CGA, and ambulated 190 feet' x 2 using rw with CGA.  Pt tolerated BLE ther ex well but fatigues quickly with 12 reps.  Continue with skilled PT to improve mobility and safety.  Outcome Measures     Row Name 06/11/19 0154             How much help from another person do you currently need...    Turning from your back to your side while in flat bed without using bedrails?  4  -LM      Moving from lying on back to sitting on the side of a flat bed without bedrails?  4  -LM      Moving to and from a bed to a chair (including a wheelchair)?  3  -LM      Standing up from a chair using your arms (e.g., wheelchair, bedside chair)?  3  -LM      Climbing 3-5 steps with a railing?  3  -LM      To walk in hospital room?  3  -LM      AM-PAC 6 Clicks Score  20  -LM         Functional Assessment    Outcome Measure Options  AM-PAC 6 Clicks Basic  Mobility (PT)  -LM        User Key  (r) = Recorded By, (t) = Taken By, (c) = Cosigned By    Initials Name Provider Type    LM Aria Gong, PT Physical Therapist         Time Calculation:   PT Charges     Row Name 06/11/19 1334             Time Calculation    Start Time  1334  -LM      PT Received On  06/11/19  -LM      PT Goal Re-Cert Due Date  06/18/19  -LM         Timed Charges    62392 - PT Therapeutic Exercise Minutes  12  -LM      74578 - Gait Training Minutes   20  -LM        User Key  (r) = Recorded By, (t) = Taken By, (c) = Cosigned By    Initials Name Provider Type    Aria Staley, PT Physical Therapist        Therapy Charges for Today     Code Description Service Date Service Provider Modifiers Qty    21890248890 HC GAIT TRAINING EA 15 MIN 6/11/2019 Aria Gong, PT GP 1    87277028727 HC PT THER PROC EA 15 MIN 6/11/2019 Aria Gong, PT GP 1          PT G-Codes  Outcome Measure Options: AM-PAC 6 Clicks Basic Mobility (PT)  AM-PAC 6 Clicks Score: 20  Score: 19    Aria Gong PT  6/11/2019

## 2019-06-11 NOTE — PLAN OF CARE
Problem: Patient Care Overview  Goal: Plan of Care Review  Outcome: Ongoing (interventions implemented as appropriate)   06/11/19 8422   OTHER   Outcome Summary Pt progressing well towards skilled PT goals. Pt transferred supine<-->sit modified independently, stood with CGA, and ambulated 190 feet' x 2 using rw with CGA. Pt tolerated BLE ther ex well but fatigues quickly with 12 reps. Continue with skilled PT to improve mobility and safety.   Coping/Psychosocial   Plan of Care Reviewed With patient

## 2019-06-12 NOTE — PROGRESS NOTES
Continued Stay Note  [facility]     Patient Name: Rodney Looney  MRN: [id number]  Today's Date: 6/12/2019    Admit Date: 6/7/2019    Discharge Plan     Row Name 06/12/19 1605       Plan    Plan  discharge plan    Patient/Family in Agreement with Plan  yes    Plan Comments  Plan is home with family at discharge and resume HH with [facility].  Pt has resume HH order in [facility] and will need to fax order and notify [facility] at discharge. CM will cont to follow        Discharge Codes    No documentation.       Expected Discharge Date and Time     Expected Discharge Date Expected Discharge Time    Kash 15, 2019             Xochitl Ruvalcaba RN

## 2019-06-12 NOTE — PLAN OF CARE
Problem: Patient Care Overview  Goal: Plan of Care Review  Outcome: Ongoing (interventions implemented as appropriate)   06/12/19 1421   Plan of Care Review   Progress improving   OTHER   Outcome Summary Pt supervision to don socks, supervison STS w/ RW, CGA to simulate lateral tub shower transfer. Pt with oT sats dropping in 80's with activity and recovering to 90's at rest ~ 1min. Pt is limited by decreased activity tolerance.,    Coping/Psychosocial   Plan of Care Reviewed With patient

## 2019-06-12 NOTE — PROGRESS NOTES
[facility] Medicine Services  PROGRESS NOTE    Patient Name: Rodney Looney  : 1934  MRN: [id number]    Date of Admission: 2019  Length of Stay: 5  Primary Care Physician: Dana Isidro DO    Subjective   Subjective     CC:  Shortness of Breath    HPI:  No issues overnight. Anxious to have pacemaker leads removed so he can go home    Review of Systems    Gen- No fevers, chills  CV- No chest pain, palpitations  Resp- No cough, dyspnea  GI- No N/V/D, abd pain    Otherwise ROS is negative except as mentioned in the HPI.    Objective   Objective     Vital Signs:   Temp:  [97.9 °F (36.6 °C)-98.5 °F (36.9 °C)] 97.9 °F (36.6 °C)  Heart Rate:  [76-95] 89  Resp:  [18] 18  BP: (115-138)/(82-84) 138/82     Physical Exam:    Constitutional: No acute distress, awake, alert  HENT: NCAT, mucous membranes moist  Respiratory: Clear to auscultation bilaterally, respiratory effort normal   Cardiovascular: RRR, s1 and s2  Gastrointestinal: Positive bowel sounds, soft, nontender, nondistended  Musculoskeletal: No bilateral ankle edema  Psychiatric: Appropriate affect, cooperative  Neurologic: Oriented x 3, strength symmetric in all extremities, Cranial Nerves grossly intact to confrontation, speech clear  Skin: No rashes      Results Reviewed:  I have personally reviewed current lab, radiology, and data and agree.    Results from last 7 days   Lab Units 19  04519  0519  1223   WBC 10*3/mm3 6.32 6.11 6.10   HEMOGLOBIN g/dL 8.4* 8.2* 8.6*   HEMATOCRIT % 29.3* 28.3* 29.8*   PLATELETS 10*3/mm3 246 247 282   PROCALCITONIN ng/mL  --   --  0.14     Results from last 7 days   Lab Units 19  0450 19  0526 19  1223   SODIUM mmol/L 137 134* 134*   POTASSIUM mmol/L 4.9 4.3 4.8   CHLORIDE mmol/L 103 100 99   CO2 mmol/L 24.0 24.0 22.0   BUN mg/dL 24* 21 21   CREATININE mg/dL 2.04* 1.83* 1.85*   GLUCOSE mg/dL 101* 83 103*   CALCIUM mg/dL 9.1 9.5 9.4   ALT (SGPT)  U/L  --  12 13   AST (SGOT) U/L  --  16 15   TROPONIN T ng/mL  --   --  <0.010   PROBNP pg/mL 1,923.0*  --  2,107.0*     Estimated Creatinine Clearance: 29.6 mL/min (A) (by C-G formula based on SCr of 2.04 mg/dL (H)).    Microbiology Results Abnormal     Procedure Component Value - Date/Time    Blood Culture - Blood, Arm, Right [[id number]] Collected:  06/07/19 1425    Lab Status:  Preliminary result Specimen:  Blood from Arm, Right Updated:  06/11/19 1501     Blood Culture No growth at 4 days    Blood Culture - Blood, Arm, Left [[id number]] Collected:  06/07/19 1430    Lab Status:  Preliminary result Specimen:  Blood from Arm, Left Updated:  06/11/19 1501     Blood Culture No growth at 4 days          Imaging Results (last 24 hours)     Procedure Component Value Units Date/Time    FL Video Swallow With Speech [[id number]] Collected:  06/11/19 1136     Updated:  06/11/19 1637    Narrative:       EXAMINATION: FL VIDEO SWALLOW W SPEECH-     INDICATION: dysphagia; R09.02-Hypoxemia; J18.1-Lobar pneumonia,  unspecified organism; Z74.09-Other reduced mobility; Z74.09-Other  reduced mobility     TECHNIQUE: 48 seconds of fluoroscopic time was used for this exam. 1  associated image was saved. The patient was evaluated in the seated  lateral position while taking a variety of consistencies of barium by  mouth under the direction of speech pathology.     COMPARISON: NONE     FINDINGS: There was no penetration and no aspiration with any of the  media attempted.          Impression:       Fluoroscopy provided for a modified barium swallow. Please  see speech therapy report for full details and recommendations.         This report was finalized on 6/11/2019 4:33 PM by Dr. Wesley Santamaria MD.           I have reviewed the medications:  Scheduled Meds:    aspirin 81 mg Oral Daily   ferrous sulfate 325 mg Oral BID With Meals   heparin (porcine) 5,000 Units Subcutaneous Q8H   meropenem 1 g Intravenous Q12H   metoprolol tartrate 12.5 mg  Oral Q12H   sodium chloride 3 mL Intravenous Q12H   vitamin C 500 mg Oral BID     Continuous Infusions:   PRN Meds:.•  acetaminophen  •  docusate sodium  •  famotidine  •  melatonin  •  ondansetron **OR** ondansetron  •  sodium chloride  •  traMADol    Assessment/Plan   Assessment / Plan     Active Hospital Problems    Diagnosis POA   • **Lung infiltrate [R91.8] Yes   • Serratia infection [A49.8] Yes   • Presence of cardiac pacemaker [Z95.0] Yes   • Non-small cell lung cancer and reports right lower lobe lobectomy in May at [facility] [C34.90] Unknown   • History of lobectomy of lung [Z90.2] Not Applicable   • Hypoxia [R09.02] Yes   • CKD (chronic kidney disease) stage 3, GFR 30-59 ml/min (CMS/[facility]) [N18.3] Yes          Brief Hospital Course to date:  Rodney Looney is a 84 y.o. male with history of non-small cell lung cancer reported and recent lobectomy in May at [facility] with recent reported Serratia bacteremia and concern reportedly for a cardiac pacemaker infection presents from infectious disease clinic with hypoxia in the 80s on vital signs and was found in the emergency room to have right lung infiltrate concerning for pneumonia.     Right lung infiltrate, possible right lung pneumonia:  Infectious disease recommends Merrem.  Blood cultures NGTD here.     Hypoxia:  Likely due to lobectomy and possible pneumonia.  Plan for nasal cannula oxygen as needed and wean as tolerated.     Serratia infection:  Reportedly had recent Serratia bacteremia. Infectious disease recommends Merrem.  Repeat blood cultures NGTD     Reported concern for pacemaker infection:  To have extraction of PM tomorrow 6/13. Merrem as per above     Elevated BNP:  Probably has chronic heart failure.  Does not appear to decompensated at this point.  Plan to get outside records.     Stage III chronic kidney disease:  Reports he sees Dr. Courtney outpatient.  Unsure of his baseline.      Anemia:   Notably patient had recent surgery.   Baseline hemoglobin unknown.  Denies recent bleeding. Continue to monitor and transfuse PRN HgB<7        Xarelto on hold for Cardiology evaluation - resume Xarelto when ok with Cardiology      DVT Prophylaxis:  Heparin SC (stop when resume Xarelto)     Disposition: I expect the patient to be discharged TBD    CODE STATUS:   Code Status and Medical Interventions:   Ordered at: 06/07/19 1509     Code Status:    CPR     Medical Interventions (Level of Support Prior to Arrest):    Full         Electronically signed by Cheri Batista MD, 06/12/19, 12:32 PM.

## 2019-06-12 NOTE — PROGRESS NOTES
CARDIOLOGY PROGRESS NOTE           6/11/2019 10:07 PM    Admit Date: 6/7/2019    Admit Diagnosis: Lung infiltrate [R91.8]    Chief Compliant: Follow up for A. Fib. Pacemaker lead infection    Subjective:   Patient's status has been stable. Remains in A. Fib with controlled rates.      Objective:     Vitals:    06/11/19 0757 06/11/19 0821 06/11/19 1941 06/11/19 2100   BP: 133/81  115/84    BP Location: Left arm      Patient Position: Lying      Pulse:  92  90   Resp: 18  18    Temp: 97.7 °F (36.5 °C)  98.5 °F (36.9 °C)    TempSrc: Oral  Oral    SpO2:    92%   Weight:       Height:           Physical Exam:  General-Well Nourished, Well developed  Eyes - PERRLA  Neck- supple, No mass  CV- irregular rate and rhythm, no MRG  Lung- clear bilaterally  Abd- soft, +BS  Musc/skel - Norm strength and range of motion  Skin- warm and dry  Neuro - Alert & Oriented x 3, appropriate mood.      Current Facility-Administered Medications:   •  acetaminophen (TYLENOL) tablet 650 mg, 650 mg, Oral, Q4H PRN, Elan Chen MD  •  aspirin EC tablet 81 mg, 81 mg, Oral, Daily, Chino Taylor MD, 81 mg at 06/11/19 0821  •  docusate sodium (COLACE) capsule 100 mg, 100 mg, Oral, BID PRN, Elan Chen MD, 100 mg at 06/11/19 2011  •  famotidine (PEPCID) tablet 20 mg, 20 mg, Oral, BID PRN, Elan Chen MD, 20 mg at 06/11/19 2011  •  ferrous sulfate tablet 325 mg, 325 mg, Oral, BID With Meals, Chino Taylor MD, 325 mg at 06/11/19 1721  •  heparin (porcine) 5000 UNIT/ML injection 5,000 Units, 5,000 Units, Subcutaneous, Q8H, Elan Chen MD, 5,000 Units at 06/11/19 2012  •  melatonin tablet 5 mg, 5 mg, Oral, Nightly PRN, Elan Chen MD, 5 mg at 06/11/19 2011  •  meropenem (MERREM) 1 g/100 mL 0.9% NS VTB (mbp), 1 g, Intravenous, Q12H, Elan Chen MD, Last Rate: 0 mL/hr at 06/10/19 1130, 1 g at 06/11/19 2010  •  metoprolol tartrate (LOPRESSOR) half tablet 12.5 mg, 12.5 mg, Oral,  Q12H, Chino Taylor MD, 12.5 mg at 06/11/19 2011  •  ondansetron (ZOFRAN) tablet 4 mg, 4 mg, Oral, Q6H PRN **OR** ondansetron (ZOFRAN) injection 4 mg, 4 mg, Intravenous, Q6H PRN, Elan Chen MD, 4 mg at 06/07/19 2114  •  sodium chloride 0.9 % flush 3 mL, 3 mL, Intravenous, Q12H, Elan Chen MD, 3 mL at 06/11/19 2011  •  sodium chloride 0.9 % flush 3-10 mL, 3-10 mL, Intravenous, PRN, Elan Chen MD  •  traMADol (ULTRAM) tablet 50 mg, 50 mg, Oral, Q6H PRN, Elan Chen MD  •  vitamin C (ASCORBIC ACID) tablet 500 mg, 500 mg, Oral, BID, Chino Taylor MD, 500 mg at 06/11/19 2011    Data Review:   No results found for this or any previous visit (from the past 24 hour(s)).  Assessment:       Lung infiltrate    Serratia infection    Presence of cardiac pacemaker    Non-small cell lung cancer and reports right lower lobe lobectomy in May at [facility]    History of lobectomy of lung    Hypoxia    CKD (chronic kidney disease) stage 3, GFR 30-59 ml/min (CMS/[facility])    Plan:   1. Pacemaker lead infection - Case reviewed with ID. Extraction is possible but high risk based on Age, other co-morbidities, and age of pacemaker leads. He is not pacemaker dependent. After discussion with ID it is felt that the only way to clear the infection is for full lead extraction. Will plan for extraction on Thursday morning  2. A. Fib Perst to Perm in nature - Plan for long term rate control.      Alpesh Oneal M.D., F.A.C.C, F.H.R.S.  Cardiology/Electrophysiology

## 2019-06-12 NOTE — PLAN OF CARE
Problem: Patient Care Overview  Goal: Plan of Care Review  Outcome: Ongoing (interventions implemented as appropriate)   06/12/19 0454   Plan of Care Review   Progress improving   OTHER   Outcome Summary HR still tachy when sitting up at the side of the bed or standing. Weaning oxygen. Currently on 1 L NC. Denies pain. Will continue to monitor.   Coping/Psychosocial   Plan of Care Reviewed With patient       Problem: Pneumonia (Adult)  Goal: Signs and Symptoms of Listed Potential Problems Will be Absent, Minimized or Managed (Pneumonia)  Outcome: Ongoing (interventions implemented as appropriate)   06/12/19 0454   Goal/Outcome Evaluation   Problems Assessed (Pneumonia) all   Problems Present (Pneumonia) respiratory compromise;infection progression       Problem: Fall Risk (Adult)  Goal: Absence of Fall  Outcome: Ongoing (interventions implemented as appropriate)   06/12/19 0454   Fall Risk (Adult)   Absence of Fall making progress toward outcome

## 2019-06-12 NOTE — PROGRESS NOTES
INFECTIOUS DISEASE  Progress Note    Rodney Looney  1934  [id number]      Admission Date: 6/7/2019      Requesting Provider: No ref. provider found  Evaluating Physician: George Zayas MD    Reason for Consultation: Serratia bacteremia/pacemaker lead infection    History of present illness:    6/8/2019: Mr. Looney is an 84-year-old white male who is seen today for evaluation of Serratia bacteremia/pacemaker lead infection in the setting of a recent diagnosis of non-small cell lung cancer for which he underwent a right lower lobectomy at [facility] on 5/14.  His course at [facility] was complicated by Serratia bacteremia with 2 sets of positive blood cultures on 5/21 and 1 out of 2 sets positive on 5/23.  Subsequent blood cultures on 5/26 were negative.  A ZI revealed a mobile mass on the pacemaker lead, consistent with a pacemaker lead infection/endocarditis.  He was treated with intravenous Zosyn therapy and subsequently discharged on outpatient intravenous Zosyn.  Consideration was given for referral back to  for pacemaker extraction-his pacemaker was placed at .  Due to his underlying lung cancer and advanced age, he is being given consideration for chronic antibiotic suppression rather than pacemaker lead extraction.  He presented for follow-up in our office yesterday and was noted to have dyspnea with hypoxemia.  His O2 saturations were in the mid 80s while sitting.  He was not interested in readmission to [facility] and requested readmission to [facility].  He was evaluated in the emergency room where he was noted to have a right basilar pulmonary infiltrate by chest x-ray and chest CT scan.  His antibiotic therapy was switched from Zosyn to Merrem.  He has remained afebrile overnight.  He has a minimal cough with minimal sputum production.  He denies nausea, vomiting, and diarrhea.  He denies severe chest pain.  6/9/19: He remains  afebrile. He is less short of breath today.  No increased sputum production.  No diarrhea.   6/10/19: Scheduled for MBS today. He still complains of shortness of breath.  No sputum production with cough.  He remains afebrile.     6/11/19; doing well; no events overnight; no fever, rash, sore throat on oxygen    6/12/19; feels well; no events overnight; no fever, rash, sore throat    Past Medical History:   Diagnosis Date   • Cancer (CMS/HCC)    • Coronary artery disease    • Elevated cholesterol    • GERD (gastroesophageal reflux disease)    • History of BPH 2 yrs   • Hypertension    • Kidney disease    • Smoking        Past Surgical History:   Procedure Laterality Date   • CARDIAC SURGERY     • HERNIA REPAIR         History reviewed. No pertinent family history.    Social History     Socioeconomic History   • Marital status:      Spouse name: Not on file   • Number of children: Not on file   • Years of education: Not on file   • Highest education level: Not on file   Tobacco Use   • Smoking status: Former Smoker     Types: Cigarettes   • Smokeless tobacco: Never Used       No Known Allergies      Medication:    Current Facility-Administered Medications:   •  acetaminophen (TYLENOL) tablet 650 mg, 650 mg, Oral, Q4H PRN, Elan Chen MD  •  aspirin EC tablet 81 mg, 81 mg, Oral, Daily, Chino Taylor MD, 81 mg at 06/12/19 0921  •  docusate sodium (COLACE) capsule 100 mg, 100 mg, Oral, BID PRN, Elan Chen MD, 100 mg at 06/11/19 2011  •  famotidine (PEPCID) tablet 20 mg, 20 mg, Oral, BID PRN, Elan Chen MD, 20 mg at 06/11/19 2011  •  ferrous sulfate tablet 325 mg, 325 mg, Oral, BID With Meals, Chino Taylor MD, 325 mg at 06/12/19 0921  •  heparin (porcine) 5000 UNIT/ML injection 5,000 Units, 5,000 Units, Subcutaneous, Q8H, Elan Chen MD, 5,000 Units at 06/12/19 0530  •  melatonin tablet 5 mg, 5 mg, Oral, Nightly PRN, Elan Chen MD, 5 mg at  19  •  meropenem (MERREM) 1 g/100 mL 0.9% NS VTB (mbp), 1 g, Intravenous, Q12H, Elan Chen MD, Last Rate: 0 mL/hr at 06/10/19 1130, 1 g at 19  •  metoprolol tartrate (LOPRESSOR) half tablet 12.5 mg, 12.5 mg, Oral, Q12H, Chino Taylor MD, 12.5 mg at 19  •  ondansetron (ZOFRAN) tablet 4 mg, 4 mg, Oral, Q6H PRN **OR** ondansetron (ZOFRAN) injection 4 mg, 4 mg, Intravenous, Q6H PRN, Elan Chen MD, 4 mg at 19  •  sodium chloride 0.9 % flush 3 mL, 3 mL, Intravenous, Q12H, Elan Chen MD, 3 mL at 19  •  sodium chloride 0.9 % flush 3-10 mL, 3-10 mL, Intravenous, PRN, Elan Chen MD  •  traMADol (ULTRAM) tablet 50 mg, 50 mg, Oral, Q6H PRN, Elan Chen MD  •  vitamin C (ASCORBIC ACID) tablet 500 mg, 500 mg, Oral, BID, Chino Taylor MD, 500 mg at 19    Antibiotics:  Anti-Infectives (From admission, onward)    Ordered     Dose/Rate Route Frequency Start Stop    19 1654  meropenem (MERREM) 1 g/100 mL 0.9% NS VTB (mbp)     Comments:  Changed from q8h per extended infusion guidelines   Ordering Provider:  Elan Chen MD    1 g  over 3 Hours Intravenous Every 12 Hours Scheduled 19 2100 19 1434  meropenem (MERREM) 1 g/100 mL 0.9% NS VTB (mbp)     Ordering Provider:  Leopoldo Rogers MD    1 g  over 30 Minutes Intravenous Once 19 1436 19 1617                Physical Exam:   Vital Signs  Temp (24hrs), Av.2 °F (36.8 °C), Min:97.9 °F (36.6 °C), Max:98.5 °F (36.9 °C)    Temp  Min: 97.9 °F (36.6 °C)  Max: 98.5 °F (36.9 °C)  BP  Min: 115/84  Max: 138/82  Pulse  Min: 76  Max: 95  Resp  Min: 18  Max: 18  SpO2  Min: 84 %  Max: 99 %    GENERAL: Awake and alert, in no acute distress.   HEENT: Normocephalic, atraumatic.  PERRL. EOMI. No conjunctival injection. No icterus. Oropharynx clear without evidence of thrush or exudate    HEART: RRR; No  murmur, rubs, gallops.   LUNGS: He has expiratory wheezes on right   ABDOMEN: Soft, nontender, nondistended. Positive bowel sounds. No rebound or guarding. No mass or HSM.  EXT:  No cyanosis, clubbing or edema. No cord.    MSK: FROM without joint effusions noted arms/legs.    SKIN: Warm and dry, thoracotomy incision with small amount of serous drainage  NEURO: Oriented to PPT. No focal deficits on motor/sensory exam at arms/legs.  PSYCHIATRIC: Normal insight and judgement. Cooperative with PE  Chest wall: There is a left chest wall pacemaker pocket with no erythema and he also has a right anterior chest wall tunneled Groshong catheter with no exit site erythema    Laboratory Data    Results from last 7 days   Lab Units 06/09/19  0450 06/08/19  0526 06/07/19  1223   WBC 10*3/mm3 6.32 6.11 6.10   HEMOGLOBIN g/dL 8.4* 8.2* 8.6*   HEMATOCRIT % 29.3* 28.3* 29.8*   PLATELETS 10*3/mm3 246 247 282     Results from last 7 days   Lab Units 06/09/19  0450   SODIUM mmol/L 137   POTASSIUM mmol/L 4.9   CHLORIDE mmol/L 103   CO2 mmol/L 24.0   BUN mg/dL 24*   CREATININE mg/dL 2.04*   GLUCOSE mg/dL 101*   CALCIUM mg/dL 9.1     Results from last 7 days   Lab Units 06/08/19  0526   ALK PHOS U/L 88   BILIRUBIN mg/dL 0.5   ALT (SGPT) U/L 12   AST (SGOT) U/L 16     Results from last 7 days   Lab Units 06/07/19  1223   SED RATE mm/hr 36*     Results from last 7 days   Lab Units 06/07/19  1223   CRP mg/dL 3.52*     Results from last 7 days   Lab Units 06/07/19  1435   LACTATE mmol/L 1.6             Estimated Creatinine Clearance: 29.6 mL/min (A) (by C-G formula based on SCr of 2.04 mg/dL (H)).      Microbiology:      6/7:  BC no growth                           Radiology:  Imaging Results (last 72 hours)     Procedure Component Value Units Date/Time    CT Chest Without Contrast [[id number]] Collected:  06/07/19 1453     Updated:  06/07/19 1610    Narrative:       EXAMINATION: CT CHEST WO CONTRAST-06/07/2019:      INDICATION: Hypoxemia after  recent lobectomy, bleeding from posterior  incision, recent bacteremia but no fever now; R09.02-Hypoxemia;  J18.1-Lobar pneumonia, unspecified organism, lobar pneumonia.     TECHNIQUE: Multiple axial CT imaging was obtained of the chest without  the administration of intravenous contrast.     The radiation dose reduction device was turned on for each scan per the  ALARA (As Low as Reasonably Achievable) protocol.     COMPARISON: NONE.     FINDINGS: There is a small amount of pleural fluid identified along the  right lateral chest wall with a fracture involving the right posterior  sixth rib. Findings are likely postoperative. There is consolidation  posteriorly within the right lung base concerning for infiltrate. There  is some chronic interstitial changes identified likely postoperative.  Superimposed infection is likely within the right lung base. Severe  emphysematous changes seen within the lung fields bilaterally with  bronchiectatic changes centrally. Severe emphysematous changes seen  within the upper lung fields. Old healed granulomatous disease seen  within the chest. The cardiac chambers are within normal limits. No  pericardial effusion. No bulky hilar or axillary lymphadenopathy. The  visualized upper abdomen is unremarkable.       Impression:       Findings concerning for dense consolidation and infiltrate  superimposed on chronic and emphysematous change within the right lung.  There is a fracture involving the right posterior sixth rib likely  postsurgical with some pleural thickening and small pleural fluid seen  on the right.     D:  06/07/2019  E:  06/07/2019     This report was finalized on 6/7/2019 4:07 PM by Dr. Jo Ann Galarza MD.       XR Chest 1 View [[id number]] Collected:  06/07/19 1306     Updated:  06/07/19 1423    Narrative:       EXAMINATION: XR CHEST 1 VW- 06/07/2019      INDICATION: SOA triage protocol      COMPARISON: NONE     FINDINGS: Portable chest reveals heart to be  enlarged. Ill-defined  opacification seen at the right lung base with small right pleural  effusion. Deep line catheter on the right tip in the SVC. Cardiac pacer  leads in satisfactory position.       Impression:       Patchy ill-defined opacification seen within the right mid  and lower lung field with small right pleural effusion. Left lung is  clear.     D:  06/07/2019  E:  06/07/2019     This report was finalized on 6/7/2019 2:20 PM by Dr. Jo Ann Galarza MD.               Impression:   1.  Serratia bacteremia/Pacemaker lead infection- he has Serratia bacteremia with 2+ blood cultures of 5/21 and 1 out of 2 sets positive on 5/23 with subsequent blood cultures on 5/26- at [facility].  He had a mobile mass attached to the pacemaker lead by ZI at [facility] on 5/28.  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  He would require pacemaker lead extraction to cure this process but due to his advanced age and underlying lung cancer, consideration is being given for chronic suppression instead of a higher risk of lead extraction.  Dr. Zayas has been following him at [facility] and I will confer with him regarding this issue when he returns on Tuesday.  In the meantime, we will leave him on intravenous Merrem.  2.  Right basilar infiltrate- this does not appear particularly different when compared to his chest x-ray at [facility] of 5/30.  3.  Non-small cell lung cancer-status post right lower lobe lobectomy, 5/14/2019  4.  Acute hypoxic respiratory failure-if he clinically worsens, we may need to assess for pulmonary embolism.  5.  Stage II-III chronic kidney disease.  His antibiotic therapy will need to be dose adjusted for his renal dysfunction.  6.  History of atrial fibrillation  7.  Coronary artery disease  8.  Status post pacemaker implantation-this was performed at   9.  Blood loss anemia    PLAN/RECOMMENDATIONS:   1.  Merrem 1 g IV every 12  hours  2.  Blood cultures-pending  3.  Anticipate pacemaker lead extraction         Patient's lobectomy was potentially curative.    Continue meropenem    Pacemaker to be removed tomorrow      George Zayas MD  6/12/2019  2:30 PM

## 2019-06-12 NOTE — THERAPY TREATMENT NOTE
"Acute Care - Occupational Therapy Treatment Note  [facility]     Patient Name: Rodney Looney  : 1934  MRN: [id number]  Today's Date: 2019  Onset of Illness/Injury or Date of Surgery: 19  Date of Referral to OT: 19  Referring Physician: MD Gustavo    Admit Date: 2019       ICD-10-CM ICD-9-CM   1. Hypoxemia R09.02 799.02   2. Pneumonia of right lower lobe due to infectious organism (CMS/HCC) J18.1 486   3. Impaired functional mobility, balance, gait, and endurance Z74.09 V49.89   4. Impaired mobility and ADLs Z74.09 799.89   5. Oropharyngeal dysphagia R13.12 787.22     Patient Active Problem List   Diagnosis   • Lung infiltrate   • Serratia infection   • Presence of cardiac pacemaker   • Non-small cell lung cancer and reports right lower lobe lobectomy in May at [facility]   • History of lobectomy of lung   • Hypoxia   • CKD (chronic kidney disease) stage 3, GFR 30-59 ml/min (CMS/HCC)     Past Medical History:   Diagnosis Date   • Cancer (CMS/HCC)    • Coronary artery disease    • Elevated cholesterol    • GERD (gastroesophageal reflux disease)    • History of BPH 2 yrs   • Hypertension    • Kidney disease    • Smoking      Past Surgical History:   Procedure Laterality Date   • CARDIAC SURGERY     • HERNIA REPAIR         Therapy Treatment    Rehabilitation Treatment Summary     Row Name 19 1421             Treatment Time/Intention    Discipline  occupational therapist  -AC      Document Type  therapy note (daily note)  -AC      Subjective Information  complains of \"my joints art a little stiff today.\"  -AC      Patient/Family Observations  Pt received in bed.  On O2, IV intact.  Wife present  -AC      Patient Effort  good  -AC      Existing Precautions/Restrictions  fall;oxygen therapy device and L/min  (Significant)  recent lobectimy;  place gait belt high  -AC      Recorded by [AC] Alecia Bowen, OT 19 9220      Row Name 19 1421             Vital Signs    " Pretreatment Heart Rate (beats/min)  85  -AC      Intratreatment Heart Rate (beats/min)  129  -AC      Posttreatment Heart Rate (beats/min)  90  -AC      Pre SpO2 (%)  93  -AC      O2 Delivery Pre Treatment  supplemental O2  -AC      Intra SpO2 (%)  85  -AC      O2 Delivery Intra Treatment  supplemental O2  -AC      Post SpO2 (%)  92  -AC      O2 Delivery Post Treatment  supplemental O2  -AC      Pre Patient Position  Supine  -AC      Intra Patient Position  Standing  -AC      Post Patient Position  Sitting  -AC      Recorded by [AC] Alecia Bowen, OT 06/12/19 1454      Row Name 06/12/19 1421             Cognitive Assessment/Intervention- PT/OT    Orientation Status (Cognition)  oriented x 4  -AC      Follows Commands (Cognition)  WFL  -AC      Safety Deficit (Cognitive)  mild deficit;safety precautions awareness;safety precautions follow-through/compliance  -AC      Personal Safety Interventions  fall prevention program maintained;gait belt;nonskid shoes/slippers when out of bed  -AC      Recorded by [AC] Alecia Bowen, OT 06/12/19 1454      Row Name 06/12/19 1421             Bed Mobility Assessment/Treatment    Bed Mobility Assessment/Treatment  supine-sit;sit-supine  -AC      Supine-Sit [place] (Bed Mobility)  conditional independence  -AC      Sit-Supine [place] (Bed Mobility)  independent  -AC      Assistive Device (Bed Mobility)  bed rails;head of bed elevated  -AC      Recorded by [AC] Alecia Bowen, OT 06/12/19 1454      Row Name 06/12/19 1421             Functional Mobility    Functional Mobility- Ind. Level  contact guard assist  -AC      Functional Mobility- Device  rolling walker  -AC      Functional Mobility-Distance (Feet)  180  -AC      Functional Mobility- Comment  pt with SOA   -AC      Recorded by [AC] Alecia Bowen, OT 06/12/19 1454      Row Name 06/12/19 1421             Transfer Assessment/Treatment    Transfer Assessment/Treatment  sit-stand transfer;stand-sit transfer;bathtub  transfer  -AC      Comment (Transfers)  simulated tub shower transfer with pt  facing  wall and place hand on wall and lateral stepping over object the heigth of tub  -AC      Recorded by [AC] Alecia Bowen, OT 06/12/19 1454      Row Name 06/12/19 1421             Sit-Stand Transfer    Sit-Stand [place] (Transfers)  supervision  -AC      Assistive Device (Sit-Stand Transfers)  walker, front-wheeled  -AC      Recorded by [AC] Alecia Bowen, OT 06/12/19 1454      Row Name 06/12/19 1421             Stand-Sit Transfer    Stand-Sit [place] (Transfers)  supervision  -AC      Assistive Device (Stand-Sit Transfers)  walker, front-wheeled  -AC      Recorded by [AC] Alecia Bowen, OT 06/12/19 1454      Row Name 06/12/19 1421             Bathtub Transfer    Type (Bathtub Transfer)  lateral  -AC      [place] Level (Bathtub Transfer)  nonverbal cues (demo/gesture)  -AC      Assistive Device (Bathtub Transfer)  -- simulated  -AC      Recorded by [AC] Alecia Bowen, OT 06/12/19 1454      Row Name 06/12/19 1421             ADL Assessment/Intervention    54922 - OT Self Care/Mgmt Minutes  1  -AC      BADL Assessment/Intervention  lower body dressing  -AC      Recorded by [AC] Alecia Bowen, OT 06/12/19 1454      Row Name 06/12/19 1421             Lower Body Dressing Assessment/Training    Lower Body Dressing [place] Level  doff;don;socks;supervision  -AC      Lower Body Dressing Position  edge of bed sitting  -AC      Recorded by [AC] Alecai Bowen, OT 06/12/19 1454      Row Name 06/12/19 1421             Motor Skills Assessment/Interventions    Additional Documentation  Therapeutic Exercise (Group);Therapeutic Exercise Interventions (Group)  -AC      Recorded by [AC] Alecia Bowen, OT 06/12/19 1454      Row Name 06/12/19 1421             Therapeutic Exercise    35927 - OT Therapeutic Exercise Minutes  5  -AC      14445 - OT Therapeutic Activity Minutes  20  -AC      Recorded by [AC] Alecia Bowen, OT  06/12/19 1454      Row Name 06/12/19 1421             Therapeutic Exercise    Upper Extremity Range of Motion (Therapeutic Exercise)  shoulder flexion/extension, bilateral;shoulder horizontal abduction/adduction, bilateral;elbow flexion/extension, bilateral  -AC      Exercise Type (Therapeutic Exercise)  AROM (active range of motion)  -AC      Position (Therapeutic Exercise)  seated  -AC      Sets/Reps (Therapeutic Exercise)  1/10  -AC      Comment (Therapeutic Exercise)  pt requireed 2 restbreaks  -AC      Recorded by [AC] Alecia Bowen, OT 06/12/19 1454      Row Name 06/12/19 1421             Positioning and Restraints    Pre-Treatment Position  in bed  -AC      Post Treatment Position  bed  -AC      In Bed  supine;call light within reach;encouraged to call for assist  -AC      Recorded by [AC] Alecia Bowen, OT 06/12/19 1454      Row Name 06/12/19 1421             Pain Scale: Numbers Pre/Post-Treatment    Pain Scale: Numbers, Pretreatment  0/10 - no pain  -AC      Pain Scale: Numbers, Post-Treatment  0/10 - no pain  -AC      Recorded by [AC] Alecia Bowen, OT 06/12/19 1454      Row Name                Wound 06/07/19 1735 Right thoracic spine incision;surgical    Wound - Properties Group Date first assessed: 06/07/19 [TL] Time first assessed: 1735 [TL] Side: Right [TL] Location: thoracic spine [TL2] Type: incision;surgical [TL] Recorded by:  [TL] Wesley DIXON RN 06/07/19 1735 [TL2] Wesley DIXON RN 06/07/19 1736    Row Name 06/12/19 1421             Outcome Summary/Treatment Plan (OT)    Daily Summary of Progress (OT)  progress toward functional goals as expected  -AC      Barriers to Overall Progress (OT)  decreased activity tolerance  -AC      Anticipated Discharge Disposition (OT)  home with assist;home with home health  -AC      Recorded by [AC] Alecia Bowen, OT 06/12/19 1454        User Key  (r) = Recorded By, (t) = Taken By, (c) = Cosigned By    Initials Name Effective Dates Discipline    AC Andrés  Alecia PRICE, OT 06/23/15 -  OT    TL Wesley DIXON RN 08/07/17 -  Nurse        Wound Right lateral incision;surgical (Active)   Closure Sutures 6/11/2019  8:00 PM   Periwound ecchymotic 6/11/2019  8:00 PM   Periwound Temperature warm 6/11/2019  8:00 PM   Periwound Skin Turgor soft 6/11/2019  8:00 PM   Edges irregular 6/11/2019  8:00 PM   Drainage Amount none 6/11/2019  8:00 PM   Dressing Care, Wound open to air 6/11/2019  8:00 PM       Wound 06/07/19 1735 Right thoracic spine incision;surgical (Active)   Dressing Appearance dry;intact 6/11/2019  8:00 PM   Closure None 6/11/2019  8:00 PM   Base dressing in place, unable to visualize 6/11/2019  8:00 PM   Periwound ecchymotic;edematous 6/11/2019  8:00 PM   Periwound Temperature warm 6/11/2019  8:00 PM   Periwound Skin Turgor soft 6/11/2019  8:00 PM   Drainage Amount none 6/11/2019  8:00 PM       Occupational Therapy Education     Title: PT OT SLP Therapies (In Progress)     Topic: Occupational Therapy (In Progress)     Point: ADL training (Done)     Description: Instruct learner(s) on proper safety adaptation and remediation techniques during self care or transfers.   Instruct in proper use of assistive devices.    Learning Progress Summary           Patient Acceptance, E, VU,NR by CHELSEA at 6/12/2019  2:21 PM    Comment:  adaptive breathing, tub shower transfer training    Acceptance, E,D, VU,NR by COLTON at 6/8/2019  2:05 PM                               User Key     Initials Effective Dates Name Provider Type Discipline     06/23/15 -  Alecia Bowen, OT Occupational Therapist OT    COLTON 06/22/15 -  Sydney Montes, OT Occupational Therapist OT                OT Recommendation and Plan  Outcome Summary/Treatment Plan (OT)  Daily Summary of Progress (OT): progress toward functional goals as expected  Barriers to Overall Progress (OT): decreased activity tolerance  Anticipated Discharge Disposition (OT): home with assist, home with home health  Daily Summary of Progress (OT):  progress toward functional goals as expected  Plan of Care Review  Plan of Care Reviewed With: patient  Plan of Care Reviewed With: patient  Outcome Summary: Pt supervision to don socks,  supervison STS w/ RW,  CGA to simulate lateral tub shower transfer.  Pt with oT sats dropping in 80's with activity and recovering to 90's at rest ~ 1min.  Pt is limited by decreased activity tolerance.,   Outcome Measures     Row Name 06/12/19 1421 06/11/19 1334          How much help from another person do you currently need...    Turning from your back to your side while in flat bed without using bedrails?  --  4  -LM     Moving from lying on back to sitting on the side of a flat bed without bedrails?  --  4  -LM     Moving to and from a bed to a chair (including a wheelchair)?  --  3  -LM     Standing up from a chair using your arms (e.g., wheelchair, bedside chair)?  --  3  -LM     Climbing 3-5 steps with a railing?  --  3  -LM     To walk in hospital room?  --  3  -LM     AM-PAC 6 Clicks Score  --  20  -LM        How much help from another is currently needed...    Putting on and taking off regular lower body clothing?  3  -AC  --     Bathing (including washing, rinsing, and drying)  3  -AC  --     Toileting (which includes using toilet bed pan or urinal)  3  -AC  --     Putting on and taking off regular upper body clothing  3  -AC  --     Taking care of personal grooming (such as brushing teeth)  4  -AC  --     Eating meals  4  -AC  --     Score  20  -AC  --        Functional Assessment    Outcome Measure Options  AM-PAC 6 Clicks Daily Activity (OT)  -AC  AM-PAC 6 Clicks Basic Mobility (PT)  -LM       User Key  (r) = Recorded By, (t) = Taken By, (c) = Cosigned By    Initials Name Provider Type    AC Alecia Bowen, OT Occupational Therapist    Aria Staley, PT Physical Therapist           Time Calculation:   Time Calculation- OT     Row Name 06/12/19 1421             Time Calculation- OT    OT Start Time  1421  -       OT Received On  06/12/19  -      OT Goal Re-Cert Due Date  06/18/19  -         Timed Charges    07709 - OT Therapeutic Exercise Minutes  5  -      85427 - OT Therapeutic Activity Minutes  20  -      16466 - OT Self Care/Mgmt Minutes  1  -        User Key  (r) = Recorded By, (t) = Taken By, (c) = Cosigned By    Initials Name Provider Type     Alecia Bowen, OT Occupational Therapist        Therapy Charges for Today     Code Description Service Date Service Provider Modifiers Qty    [id number]  OT THER PROC EA 15 MIN 6/12/2019 Alecia Bowen, OT GO 1    [id number]  OT THERAPEUTIC ACT EA 15 MIN 6/12/2019 Alecia Bowen, OT GO 1               Alecia Bowen OT  6/12/2019

## 2019-06-13 PROBLEM — E78.00 HYPERCHOLESTEROLEMIA: Status: ACTIVE | Noted: 2019-01-01

## 2019-06-13 PROBLEM — I25.10 CAD (CORONARY ARTERY DISEASE): Status: ACTIVE | Noted: 2019-01-01

## 2019-06-13 PROBLEM — I10 HYPERTENSION: Status: ACTIVE | Noted: 2019-01-01

## 2019-06-13 NOTE — ANESTHESIA POSTPROCEDURE EVALUATION
Patient: Rodney Looney    Procedure Summary     Date:  06/13/19 Room / Location:  [facility]    Anesthesia Start:  0839 Anesthesia Stop:      Procedure:  LEAD EXPLANT (N/A ) Diagnosis:      Provider:  Alpesh Oneal MD Provider:  Eddi Ovalle MD    Anesthesia Type:  general ASA Status:  4          Anesthesia Type: general  Last vitals  BP   117/70 (06/13/19 0757)   Temp   98 °F (36.7 °C) (06/13/19 0757)   Pulse   81 (06/13/19 0757)   Resp   18 (06/13/19 0757)     SpO2   (!) 84 % (06/12/19 1924)     Post Anesthesia Care and Evaluation    Patient location: OR.  Patient participation: complete - patient cannot participate (sedated)  Post-procedure mental status: sedated.  Pain score: 0  Pain management: adequate  Airway patency: patent  Anesthetic complications: No anesthetic complications  PONV Status: none  Cardiovascular status: acceptable and hemodynamically stable  Respiratory status: acceptable, ETT and ventilator  Hydration status: acceptable    Comments: Pt transported to OR for median sternotomy for lead extraction. Unable to remove in cath lab.

## 2019-06-13 NOTE — DISCHARGE PLACEMENT REQUEST
"Rodney Looney (84 y.o. Male)     To [facility]  From [facility] at [facility]() [phone]    [facility]  [address] [address]  Phone:  [phone]  Fax:          Patient:     Rodney Looney MRN:  [id number]   [address] [address] :  1934  SSN:    Phone: [phone] Sex:  M      INSURANCE PAYOR PLAN GROUP # SUBSCRIBER ID   Primary:    HU[employer]NA MEDICARE REPLACEMENT [id number]   Admitting Diagnosis: Lung infiltrate [R91.8]  Order Date:  2019         Case Management  Consult       (Order ID: [id number])     Diagnosis:         Priority:  Routine Expected Date:   Expiration Date:        Interval:   Count:    Reason for Consult? Arrange to resume HH at discharge.     Specimen Type:   Specimen Source:   Specimen Taken Date:   Specimen Taken Time:                   Verbal Order Mode: Telephone with readback   Authorizing Provider: Cheri Batista MD  Authorizing Provider's NPI: [id number]     Order Entered By: Xochitl Ruvalcaba RN 2019  4:05 PM     Electronically signed by: Cheri Batista MD 2019  8:32 AM                Date of Birth Social Security Number Address Home Phone MRN    1934  [address] [address] [phone] [id number]    Buddhist Marital Status          Sikhism        Admission Date Admission Type Admitting Provider Attending Provider Department, Room/Bed    19 Emergency Cheri Batista MD Howard, Gabriela Kirk, MD [facility] OR, [place] OR/NONE    Discharge Date Discharge Disposition Discharge Destination                       Attending Provider:  Cheri Batista MD    Allergies:  No Known Allergies    Isolation:  None   Infection:  None   Code Status:  CPR    Ht:  182.9 cm (72\")   Wt:  77.4 kg (170 lb 9.6 oz)    Admission Cmt:  None   Principal Problem:  Lung infiltrate [R91.8]                 Active " Insurance as of 2019     Primary Coverage     Payor Plan Insurance Group Employer/Plan Group    HUMANA MEDICARE REPLACEMENT HUMANA MEDICARE REPL [id number]     Payor Plan Address Payor Plan Phone Number Payor Plan Fax Number Effective Dates    [address] [address] [phone]  2018 - None Entered    [facility] [address]       Subscriber Name Subscriber Birth Date Member ID       JEFF LOONEY 1934 [id number]                 Emergency Contacts      (Rel.) Home Phone Work Phone Mobile Phone    DICK LOONEY (Spouse) -- -- [phone]               History & Physical      Elan Chen MD at 2019  3:09 PM              [facility] Medicine Services  HISTORY AND PHYSICAL    Patient Name: Jeff Looney  : 1934  MRN: [id number]  Primary Care Physician: Dana Isidro DO  Date of admission: 2019      Subjective   Subjective     Chief Complaint:  Generalized weakness and hypoxia    HPI:  Jeff Looney is a 84 y.o. male who reports past medical history of non-small cell lung cancer with recent lobectomy in May at [facility], recent diagnosis of Serratia infection and reported concern for pacemaker infection, reports history of stage III chronic kidney disease presents to the hospital emergency department from infectious disease clinic with complaint of moderate persistent generalized weakness present for several weeks and low oxygen levels noted at infectious disease clinic reportedly in the 80s.  Upon arrival to the emergency department he received chest x-ray that was concerning for right-sided infiltrates possible pneumonia.  CT scan again showed right-sided infiltrates.  Infectious disease team recommends meropenem and plan to evaluate him in the morning.    Review of Systems   Constitutional: Positive for chills. Negative for fever.   HENT: Negative for sinus pressure and sinus pain.    Eyes: Negative.  Negative for visual  disturbance.   Respiratory: Positive for cough and shortness of breath.    Cardiovascular: Negative for chest pain and palpitations.   Gastrointestinal: Negative for diarrhea, nausea and vomiting.   Endocrine: Negative.  Negative for cold intolerance and heat intolerance.   Genitourinary: Negative.  Negative for dysuria and hematuria.   Musculoskeletal: Negative.    Skin: Negative for rash and wound.   Allergic/Immunologic: Negative.    Neurological: Negative for light-headedness and headaches.   Hematological: Negative.  Negative for adenopathy. Does not bruise/bleed easily.   Psychiatric/Behavioral: Negative for behavioral problems and confusion.            Personal History     Past medical history: Cardiac pacemaker, recent lung cancer with recent lobectomy, recent Serratia infection, chronic kidney disease    Surgical history: Recent lobectomy    Family History: pertinent FHx was reviewed and unremarkable.     Social History:    Social History     Social History Narrative   • Not on file       Medications:    Available home medication information reviewed.      No Known Allergies    Objective   Objective     Vital Signs:   Temp:  [97.5 °F (36.4 °C)] 97.5 °F (36.4 °C)  Heart Rate:  [78] 78  Resp:  [20] 20  BP: (110)/(60) 110/60        Physical Exam   Constitutional: Awake, alert  Eyes: Pupils equal, sclerae anicteric, no conjunctival injection  HENT: NCAT, mucous membranes moist  Neck: Supple, no thyromegaly, no lymphadenopathy, trachea midline  Respiratory: Right-sided rhonchi and rales noted, mild tachypnea, nonlabored breathing on nasal cannula oxygen   Cardiovascular: RRR, no murmurs, rubs, or gallops, palpable pedal pulses bilaterally  Gastrointestinal: Positive bowel sounds, soft, nontender, nondistended  Musculoskeletal: Elderly and debilitated in appearance, trace bilateral lower extremity edema, BMI 24   Psychiatric: Poor historian, appropriate affect, cooperative  Neurologic: Oriented x 3, strength  symmetric in all extremities, Cranial Nerves grossly intact to confrontation, speech clear  Skin: No rashes      Results Reviewed:  I have personally reviewed current lab, radiology, and data and agree.    Results from last 7 days   Lab Units 06/07/19  1223   WBC 10*3/mm3 6.10   HEMOGLOBIN g/dL 8.6*   HEMATOCRIT % 29.8*   PLATELETS 10*3/mm3 282     Results from last 7 days   Lab Units 06/07/19  1435 06/07/19  1223   SODIUM mmol/L  --  134*   POTASSIUM mmol/L  --  4.8   CHLORIDE mmol/L  --  99   CO2 mmol/L  --  22.0   BUN mg/dL  --  21   CREATININE mg/dL  --  1.85*   GLUCOSE mg/dL  --  103*   CALCIUM mg/dL  --  9.4   ALT (SGPT) U/L  --  13   AST (SGOT) U/L  --  15   TROPONIN T ng/mL  --  <0.010   PROBNP pg/mL  --  2,107.0*   LACTATE mmol/L 1.6  --    PROCALCITONIN ng/mL  --  0.14     Estimated Creatinine Clearance: 34.3 mL/min (A) (by C-G formula based on SCr of 1.85 mg/dL (H)).  Brief Urine Lab Results     None        Imaging Results (last 24 hours)     Procedure Component Value Units Date/Time    CT Chest Without Contrast [[id number]] Collected:  06/07/19 1453     Updated:  06/07/19 1455    Narrative:       EXAMINATION: CT CHEST WO CONTRAST-      INDICATION: hypoxemia after recent lobectomy, bleeding from posterior  incision, recent bacteremia but no fever now; R09.02-Hypoxemia;  J18.1-Lobar pneumonia, unspecified organism lobar pneumonia     TECHNIQUE: Multiple axial CT imaging is obtained of the chest without  the ministration of intravenous contrast.     The radiation dose reduction device was turned on for each scan per the  ALARA (As Low as Reasonably Achievable) protocol.     COMPARISON: NONE     FINDINGS: There is a small amount of pleural fluid identified along the  right lateral chest wall with a fracture involving the right posterior  sixth rib. Findings are likely postoperative. There is consolidation  posteriorly within the right lung base concerning for infiltrate. There  is some chronic interstitial  changes identified likely postoperative.  Superimposed infection is likely within the right lung base. Severe  emphysematous changes seen within the lung fields bilaterally with  bronchiectatic changes centrally. Severe emphysematous changes seen  within the upper lung fields. Old healed granulomatous disease in the  chest. The cardiac chambers within normal limits. No pericardial  effusion. No bulky hilar or axillary lymphadenopathy. Visualized upper  abdomen is unremarkable.             Impression:       Findings concerning for dense consolidation and infiltrate  superimposed on chronic and emphysematous change within the right lung.  There is a fracture involving the right posterior sixth rib likely  postsurgical with some pleural thickening and small pleural fluid seen  on the right.          XR Chest 1 View [[id number]] Collected:  06/07/19 1306     Updated:  06/07/19 1423    Narrative:       EXAMINATION: XR CHEST 1 VW- 06/07/2019      INDICATION: SOA triage protocol      COMPARISON: NONE     FINDINGS: Portable chest reveals heart to be enlarged. Ill-defined  opacification seen at the right lung base with small right pleural  effusion. Deep line catheter on the right tip in the SVC. Cardiac pacer  leads in satisfactory position.       Impression:       Patchy ill-defined opacification seen within the right mid  and lower lung field with small right pleural effusion. Left lung is  clear.     D:  06/07/2019  E:  06/07/2019     This report was finalized on 6/7/2019 2:20 PM by Dr. Jo Ann Glaarza MD.                Assessment/Plan   Assessment / Plan     Active Hospital Problems    Diagnosis POA   • **Lung infiltrate [R91.8] Yes   • Serratia infection [A49.8] Yes   • Presence of cardiac pacemaker [Z95.0] Yes   • Non-small cell lung cancer and reports right lower lobe lobectomy in May at [facility] [C34.90] Unknown   • History of lobectomy of lung [Z90.2] Not Applicable   • Hypoxia [R09.02] Yes   • CKD (chronic  kidney disease) stage 3, GFR 30-59 ml/min (CMS/[facility]) [N18.3] Yes     84-year-old male with history of non-small cell lung cancer reported and recent lobectomy in May at [facility] with recent reported Serratia bacteremia and concern reportedly for a cardiac pacemaker infection presents from infectious disease clinic with hypoxia in the 80s on vital signs and was found in the emergency room to have right lung infiltrate concerning for pneumonia.    Right lung infiltrate, possible right lung pneumonia:  Infectious disease recommends Merrem.  Blood cultures.    Hypoxia:  Likely due to lobectomy and possible pneumonia.  Plan for nasal cannula oxygen as needed and wean as tolerated.    Serratia infection:  Reportedly had recent Serratia bacteremia.  Trying to get outside records.  Infectious disease recommends Merrem.  Repeat blood cultures.    Reported concern for pacemaker infection:  Plan to follow-up blood cultures and ID recommendations.  Monitor on telemetry.  Merrem as per above    Elevated BNP:  Probably has chronic heart failure.  Does not appear to decompensated at this point.  Plan to get outside records.    Stage III chronic kidney disease:  Reports he sees Dr. Courtney outpatient.  Unsure of his baseline.  Plan to get outside records.    Anemia:   Notably patient had recent surgery.  Baseline hemoglobin unknown.  Denies recent bleeding.  Plan to monitor.    Nursing staff currently working to get home medication list.  Chest x-ray images reviewed right-sided infiltrate noted, left lung is clear.  CT scan results reviewed also concerning for right-sided pneumonia.  Unclear how much of this could be postoperative findings.  Plan to repeat laboratory studies tomorrow.  Monitor renal function carefully.  Attempting to get outside records.    DVT prophylaxis: Subcutaneous heparin    CODE STATUS:    Code Status and Medical Interventions:   Ordered at: 06/07/19 1509     Code Status:    [facility]     Medical  Interventions (Level of Support Prior to Arrest):    Full       Admission Status:  I believe this patient meets INPATIENT status due to the need for care which can only be reasonably provided in an hospital setting with hypoxia, recent Serratia bacteremia needing IV antibiotics and possible pacemaker infection.  In such, I feel patient’s risk for adverse outcomes and need for care warrant INPATIENT evaluation and predict the patient’s care encounter to likely last beyond 2 midnights.      Electronically signed by Elan Chen MD, 06/07/19, 3:09 PM.        Electronically signed by Elan Chen MD at 6/7/2019  3:23 PM

## 2019-06-13 NOTE — BRIEF OP NOTE
CORONARY ARTERY BYPASS WITH INTERNAL MAMMARY ARTERY GRAFT  Progress Note    Rodney Looney  6/7/2019 - 6/13/2019    Pre-op Diagnosis:   Infected ventricular pacing lead-retained       Post-Op Diagnosis Codes:  Infected ventricular pacing lead-retain    Procedure/CPT® Codes:      Procedure(s):  MEDIANSTERNOTOMY, REMOVAL OF RETAINED PACING LEAD    Surgeon(s):  Rohan Boyer MD Rogers, Anthony G, MD    Anesthesia: General    Staff:   Circulator: Karma aMtos RN  Perfusionist: Erik Corral  Scrub Person: Cullen Ross  Nursing Assistant: Susannah Aburto; Antionette Christie  Assistant: Barney Carrera PA    Estimated Blood Loss: <50 mL    Urine Voided: none      Specimens: Retained right ventricular pacing lead sent for routine pathology, routine culture, acid-fast and fungal smear and culture.                        Drains:   Chest Tube 1 Mediastinal (Active)   Function -20 cm H2O 6/13/2019  2:15 PM   Air Leak/Fluctuation air leak not present;fluctuation not present 6/13/2019  2:15 PM   Patency Intervention Milked;Tip/tilt 6/13/2019  2:15 PM   Drainage Description Sanguineous 6/13/2019  2:15 PM   Dressing Type Gauze 6/13/2019  2:15 PM   Dressing Status Clean;Dry;Intact 6/13/2019  2:15 PM   Site Assessment Clean;Dry;Intact 6/13/2019  2:15 PM   Surrounding Skin Dry;Intact 6/13/2019  2:15 PM   Safety all connections secured;suction checked 6/13/2019  2:15 PM   Output (mL) 60 mL 6/13/2019  2:15 PM       Urethral Catheter Temperature probe;Silicone 16 Fr. (Active)       Findings: The patient lead could not be palpated through the wall of the right ventricle.  It was removed via a vaginotomy incision in the innominate vein    Complications: None      Rohan Boyer MD     Date: 6/13/2019  Time: 2:36 PM

## 2019-06-13 NOTE — SIGNIFICANT NOTE
06/13/19 1549   SLP Deferred Reason   SLP Deferred Reason (Note pt s/p procedure and transfer to ICU. Will check back for appropriateness to resume dysphagia tx. Thanks )

## 2019-06-13 NOTE — PROGRESS NOTES
Continued Stay Note  [facility]     Patient Name: Rodney Looney  MRN: [id number]  Today's Date: 6/13/2019    Admit Date: 6/7/2019    Discharge Plan     Row Name 06/13/19 1246       Plan    Plan   referral    Patient/Family in Agreement with Plan  yes    Plan Comments  Resume HH order faxed to [facility] at [phone]. Spoke with Sean at [facility] on Wednesday and CM will notify [facility] at discharge( [phone]. CM will cont to follow        Discharge Codes    No documentation.       Expected Discharge Date and Time     Expected Discharge Date Expected Discharge Time    Kash 15, 2019             Xochitl Ruvalcaba RN

## 2019-06-13 NOTE — PROGRESS NOTES
CARDIOLOGY PROGRESS NOTE           6/13/2019 8:05 AM    Admit Date: 6/7/2019    Admit Diagnosis: Lung infiltrate [R91.8]    Chief Compliant: Follow up for A. Fib. Pacemaker lead infection    Subjective:   Patient's status has been stable. Remains in A. Fib with controlled rates. Had further discuss with family about risk      Objective:     Vitals:    06/12/19 0802 06/12/19 1924 06/13/19 0500 06/13/19 0757   BP: 138/82 124/65  117/70   BP Location: Left arm Left arm  Left arm   Patient Position: Lying Lying  Lying   Pulse: 89 89  81   Resp: 18 18  18   Temp: 97.9 °F (36.6 °C) 97.9 °F (36.6 °C)  98 °F (36.7 °C)   TempSrc: Oral Oral  Oral   SpO2:  (!) 84%     Weight:   77.4 kg (170 lb 9.6 oz)    Height:           Physical Exam:  General-Well Nourished, Well developed  Eyes - PERRLA  Neck- supple, No mass  CV- irregular rate and rhythm, no MRG  Lung- clear bilaterally  Abd- soft, +BS  Musc/skel - Norm strength and range of motion  Skin- warm and dry  Neuro - Alert & Oriented x 3, appropriate mood.      Current Facility-Administered Medications:   •  acetaminophen (TYLENOL) tablet 650 mg, 650 mg, Oral, Q4H PRN, Elan Chen MD  •  aspirin EC tablet 81 mg, 81 mg, Oral, Daily, Chino Taylor MD, 81 mg at 06/12/19 0921  •  docusate sodium (COLACE) capsule 100 mg, 100 mg, Oral, BID PRN, Elan Chen MD, 100 mg at 06/12/19 2217  •  famotidine (PEPCID) tablet 20 mg, 20 mg, Oral, BID PRN, Elan Chen MD, 20 mg at 06/11/19 2011  •  ferrous sulfate tablet 325 mg, 325 mg, Oral, BID With Meals, Chino Taylor MD, 325 mg at 06/12/19 1613  •  melatonin tablet 5 mg, 5 mg, Oral, Nightly PRN, Elan Chen MD, 5 mg at 06/12/19 2212  •  meropenem (MERREM) 1 g/100 mL 0.9% NS VTB (mbp), 1 g, Intravenous, Q12H, Elan Chen MD, Last Rate: 0 mL/hr at 06/10/19 1130, 1 g at 06/12/19 2211  •  metoprolol tartrate (LOPRESSOR) half tablet 12.5 mg, 12.5 mg, Oral, Q12H, Chino Taylor,  MD, 12.5 mg at 06/12/19 2212  •  ondansetron (ZOFRAN) tablet 4 mg, 4 mg, Oral, Q6H PRN **OR** ondansetron (ZOFRAN) injection 4 mg, 4 mg, Intravenous, Q6H PRN, Elan Chen MD, 4 mg at 06/07/19 2114  •  sodium chloride 0.9 % flush 3 mL, 3 mL, Intravenous, Q12H, Elan Chen MD, 3 mL at 06/12/19 2211  •  sodium chloride 0.9 % flush 3-10 mL, 3-10 mL, Intravenous, PRN, Elan Chen MD  •  traMADol (ULTRAM) tablet 50 mg, 50 mg, Oral, Q6H PRN, Elan Chen MD  •  vitamin C (ASCORBIC ACID) tablet 500 mg, 500 mg, Oral, BID, Chino Taylor MD, 500 mg at 06/12/19 2212    Data Review:   Recent Results (from the past 24 hour(s))   Type & Screen    Collection Time: 06/12/19 11:13 PM   Result Value Ref Range    ABO Type O     RH type Positive     Antibody Screen Negative     T&S Expiration Date 6/15/2019 11:59:59 PM    CBC (No Diff)    Collection Time: 06/13/19  4:17 AM   Result Value Ref Range    WBC 5.90 3.40 - 10.80 10*3/mm3    RBC 3.35 (L) 4.14 - 5.80 10*6/mm3    Hemoglobin 9.2 (L) 13.0 - 17.7 g/dL    Hematocrit 31.6 (L) 37.5 - 51.0 %    MCV 94.3 79.0 - 97.0 fL    MCH 27.5 26.6 - 33.0 pg    MCHC 29.1 (L) 31.5 - 35.7 g/dL    RDW 18.6 (H) 12.3 - 15.4 %    RDW-SD 61.4 (H) 37.0 - 54.0 fl    MPV 9.4 6.0 - 12.0 fL    Platelets 225 140 - 450 10*3/mm3   Basic Metabolic Panel    Collection Time: 06/13/19  4:17 AM   Result Value Ref Range    Glucose 86 65 - 99 mg/dL    BUN 24 (H) 8 - 23 mg/dL    Creatinine 1.43 (H) 0.76 - 1.27 mg/dL    Sodium 137 136 - 145 mmol/L    Potassium 4.7 3.5 - 5.2 mmol/L    Chloride 102 98 - 107 mmol/L    CO2 24.0 22.0 - 29.0 mmol/L    Calcium 9.4 8.6 - 10.5 mg/dL    eGFR Non African Amer 47 (L) >60 mL/min/1.73    BUN/Creatinine Ratio 16.8 7.0 - 25.0    Anion Gap 11.0 mmol/L   ABO RH Specimen Verification    Collection Time: 06/13/19  4:17 AM   Result Value Ref Range    ABO Type O     RH type Positive    Prepare RBC, 4 Units    Collection Time: 06/13/19  8:03 AM    Result Value Ref Range    Product Code I7851K72     Unit Number [id number]     UNIT  ABO O     UNIT  RH POS     Dispense Status XM     Blood Type OPOS     Blood Expiration Date 201907112359     Blood Type Barcode 5100     Product Code F5850G57     Unit Number [id number]     UNIT  ABO O     UNIT  RH POS     Dispense Status XM     Blood Type OPOS     Blood Expiration Date 201907112359     Blood Type Barcode 5100     Product Code N3899M40     Unit Number [id number]     UNIT  ABO O     UNIT  RH POS     Dispense Status XM     Blood Type OPOS     Blood Expiration Date 201907112359     Blood Type Barcode 5100     Product Code Y8517G49     Unit Number [id number]     UNIT  ABO O     UNIT  RH POS     Dispense Status XM     Blood Type OPOS     Blood Expiration Date 201907112359     Blood Type Barcode 5100      Assessment:       Lung infiltrate    Serratia infection    Presence of cardiac pacemaker    Non-small cell lung cancer and reports right lower lobe lobectomy in May at [facility]    History of lobectomy of lung    Hypoxia    CKD (chronic kidney disease) stage 3, GFR 30-59 ml/min (CMS/[facility])    Plan:   1. Pacemaker lead infection - Case reviewed with ID. He is not pacemaker dependent. After discussion with ID it is felt that the only way to clear the infection is for full lead extraction. Will plan for extraction on Thursday morning. The risk of the surgery were explained in detail. We discussed the procedure in great detail including risks, benefits, and alternatives. The patient understands that risks include bleeding, infection, stroke, MI, cardiac perforation, and even death.  2. A. Fib Perst to Perm in nature - Plan for long term rate control.  3. Pre-op for lead extraction - Type and Cross pRBC      Alpesh Oneal M.D., F.A.C.C, F.H.R.S.  Cardiology/Electrophysiology

## 2019-06-13 NOTE — ANESTHESIA PROCEDURE NOTES
Airway  Urgency: elective    Airway not difficult    General Information and Staff    Patient location during procedure: OR  CRNA: Shy Anton CRNA    Indications and Patient Condition  Indications for airway management: airway protection    Preoxygenated: yes  MILS not maintained throughout  Mask difficulty assessment: 2 - vent by mask + OA or adjuvant +/- NMBA    Final Airway Details  Final airway type: endotracheal airway      Successful airway: ETT  Cuffed: yes   Successful intubation technique: direct laryngoscopy  Endotracheal tube insertion site: oral  Blade: Quintin  Blade size: 4  ETT size (mm): 7.5  Cormack-Lehane Classification: grade I - full view of glottis  Placement verified by: chest auscultation and capnometry   Cuff volume (mL): 6  Measured from: lips  ETT to lips (cm): 21  Number of attempts at approach: 1    Additional Comments  Patient history, labs, physical exam, anesthetic plan reviewed with MDA and patient in preop.  Pt transported to room via RN. All ASA monitors applied, preoxygenated x 3 min/ ETO2 of >85, IV patent, smooth induction, easy intubation, atraumatic, dentition/soft tissue intact, + ETCO2, tube secured, all VSS, cspine neutrality maintained throughout induction, intubation and postitioning, all ppp, arms <90.

## 2019-06-13 NOTE — OP NOTE
Operative Report      Rodney Looney    : 1934  MRN: [id number]  [facility]: [id number]      Date:19      Preop Diagnosis: Infected retained right ventricular pacing lead      Postop Diagnosis: Infected retained right ventricular pacing lead      Procedure: Removal of retained right ventricular pacing lead      Surgeon:   MD Kimo Velarde MD      Assistant: Barney Carrera PA-C      Indications: 84-year-old  male with a retained right ventricular pacing lead.  Attempts at extraction in the EP lab were unsuccessful because of the adherence of the tip of the lead to the right ventricle.  Patient is brought from the EP lab to the operating room for removal of the lead.      Operative Findings: The lead could not be palpated in the right atrium or right ventricle.  The lead could be palpated in the innominate vein.  The lead was removed with gentle traction after a small venotomy was made in the innominate vein.      EBL: < 50 cc      Operative Narrative: The patient was brought from the EP lab directly to the operating room and prepared for surgery in the usual fashion.  I discussed the procedure with the patient's family and verbal consent was obtained from them.  Risks of the procedure such as stroke, heart attack, death, bleeding, etc. were discussed with the family.  Following arrival of the patient in the operating room a timeout was called.  The patient's identity, the proposed procedure, the availability of appropriate equipment and personnel, the patient's allergies, and the ministration of preoperative antibiotics was verified.  All members of the operative team agreed to the findings noted in the timeout.  Following satisfactory inhalation anesthesia a Asher catheter was placed the patient was prepped and draped in usual manner.  A subxiphoid incision was made xiphoid was excised sternum was elevated and the pericardium was identified.  A large pericardial window was made in  the right ventricle was identified.  No pericardial effusion was noted.  Palpation of the apex of the right ventricle and the acute margin of the heart was carried out through this incision however the position of the right ventricular lead could not be identified.  The subxiphoid incision was then connected to a median sternotomy incision and the sternum was split in the usual manner.  The pericardium was opened up to the innominate vein.  At that point in time we were able to palpate the right ventricle and right atrium better and could not palpate or feel the lead.  The lead however was palpable and easily identifiable in the innominate vein.  The innominate vein was isolated proximally and distally with Vesseloops Vesseloops were placed around the innominate vein as Fong ties.  A 1 cm incision was made in the innominate vein the lead was grasped and the distal portion was easily removed.  It should be noted that there was clot in the innominate vein there was material adherent to the lead.  The proximal vessel loop was loosened and utilizing gentle traction the lead was removed completely.  Inspection revealed the tip of the lead to be intact.  Tissue over the tip of the lead was sent for routine culture, acid-fast and fungal smear and culture.  The venotomy incision was closed with 2  layers of running 6-0 Prolene.  All Vesseloops were removed. Inspection revealed hemostasis to be adequate. Inspection of the right ventricle and right atrium revealed no hematoma and no evidence of perforation.  A #32 Cypriot chest tube was brought out through a separate stab wound.  The sternum was closed with #6 sternal wires 0 Vicryl and the linea alba and subcu and 4-0 subcuticular sterile dressings were placed the patient was transported to the recovery room in stable condition          Rohan Boyer MD  CTSurgery  06/13/19   2:39 PM

## 2019-06-13 NOTE — PROGRESS NOTES
INTENSIVIST / PULMONARY FOLLOW UP NOTE     Hospital:  LOS: 6 days   Mr. Rodney Looney, 84 y.o. male is followed for:     Pacemaker infection s/p lead extraction     Right mid-lower lobe infiltrate     Serratia bacteremia     H/O NSC lung cancer s/p RL lobectomy     Stage III CKD, GFR 30-59 ml/min     CAD     Hypercholesterolemia    Hypertension          SUBJECTIVE   On vent post lead extraction    The patient's relevant past medical, surgical, family, and social history were reviewed    Allergies and medications were reviewed    ROS:  Per subjective, all other systems were reviewed and were negative        OBJECTIVE     Vital Sign Min/Max for last 24 hours:  Temp  Min: 97.9 °F (36.6 °C)  Max: 98 °F (36.7 °C)   BP  Min: 117/70  Max: 124/65   Pulse  Min: 81  Max: 89   Resp  Min: 18  Max: 18   SpO2  Min: 84 %  Max: 84 %   No Data Recorded     Physical Exam:  General Appearance:  Intubated, in no acute distress  Eyes:  No scleral icterus or pallor, pupils normal  Ears, Nose, Mouth, Throat:  Atraumatic, oral endotracheal tube  Neck:  Trachea midline, thyroid normal  Respiratory:  Clear to auscultation bilaterally, normal effort  Cardiovascular:  Regular rate and rhythm, no murmurs, no peripheral edema  Gastrointestinal:  Soft, non-tender, non-distended, no hepatosplenomegaly  Skin:  Normal temperature, no rash  Psychiatric:  Intubated, sedated, no agitation  Neuro:  No new focal neurologic deficits observed      Telemetry:              Hemodynamics:   CVP:     PAP:     PAOP:     CO:     CI:     SVI:     SVR:       SpO2: (!) 84 % SpO2  Min: 84 %  Max: 84 %   Device:      Flow Rate:   No Data Recorded     Mechanical Ventilator Settings:       Vt (Set, L): 0.6 L    Resp Rate (Set): 14 Resp Rate (Observed) Vent: 14   FiO2 (%): 100 %    PEEP/CPAP (cm H2O): 5 cm H20      Minute Ventilation (L/min) (Obs): 7.89 L/min    I:E Ratio (Obs): 1:2.50     Intake/Ouptut 24 hrs (7:00AM - 6:59 AM)  Intake & Output (last 3 days)        06/10 0701 - 06/11 0700 06/11 0701 - 06/12 0700 06/12 0701 - 06/13 0700 06/13 0701 - 06/14 0700    I.V. (mL/kg)    1600 (20.7)    IV Piggyback  100 100     Total Intake(mL/kg)  100 (1.3) 100 (1.3) 1600 (20.7)    Urine (mL/kg/hr) 1125 (0.6) 2900 (1.6) 2025 (1.1)     Stool   0     Blood    250    Chest Tube    60    Total Output 1125 2900 2025 310    Net -1125 -2800 -1925 +1290            Urine Unmeasured Occurrence   3 x     Stool Unmeasured Occurrence 1 x  1 x           Lines, Drains & Airways    Active LDAs     Name:   Placement date:   Placement time:   Site:   Days:    CVC Double Lumen Tunneled Right Subclavian   --    --    Subclavian       Chest Tube 1 Mediastinal   06/13/19    --    Mediastinal   less than 1    Urethral Catheter Temperature probe;Silicone 16 Fr.   06/13/19    --     less than 1    ETT    06/13/19    0917 created via procedure documentation     less than 1    Arterial Line 06/13/19 Right Radial   06/13/19    1220 created via procedure documentation    Radial   less than 1    Arterial Sheath  Right Femoral   06/13/19    0915    Femoral   less than 1    Venous Sheath 7 Fr. Right Femoral   06/13/19    0912    Femoral   less than 1    Venous Sheath 6 Fr. Right Femoral   06/13/19    0927    Femoral   less than 1                Hematology:  Results from last 7 days   Lab Units 06/13/19  1226 06/13/19  0417 06/09/19  0450 06/08/19  0526 06/07/19  1223   WBC 10*3/mm3  --  5.90 6.32 6.11 6.10   HEMOGLOBIN g/dL  --  9.2* 8.4* 8.2* 8.6*   HEMOGLOBIN, POC g/dL 10.2*  --   --   --   --    HEMATOCRIT %  --  31.6* 29.3* 28.3* 29.8*   HEMATOCRIT POC % 30*  --   --   --   --    PLATELETS 10*3/mm3  --  225 246 247 282     Electrolytes, Magnesium and Phosphorus:  Results from last 7 days   Lab Units 06/13/19  0417 06/09/19  0450 06/08/19  0526 06/07/19  1223   SODIUM mmol/L 137 137 134* 134*   CHLORIDE mmol/L 102 103 100 99   POTASSIUM mmol/L 4.7 4.9 4.3 4.8   CO2 mmol/L 24.0 24.0 24.0 22.0   MAGNESIUM mg/dL  --    --  1.9  --      Renal:  Results from last 7 days   Lab Units 06/13/19  0417 06/09/19  0450 06/08/19  0526 06/07/19  1223   CREATININE mg/dL 1.43* 2.04* 1.83* 1.85*   BUN mg/dL 24* 24* 21 21     Estimated Creatinine Clearance: 42.1 mL/min (A) (by C-G formula based on SCr of 1.43 mg/dL (H)).  Hepatic:  Results from last 7 days   Lab Units 06/08/19  0526 06/07/19  1223   ALK PHOS U/L 88 92   BILIRUBIN mg/dL 0.5 0.7   ALT (SGPT) U/L 12 13   AST (SGOT) U/L 16 15     Arterial Blood Gases:  Results from last 7 days   Lab Units 06/13/19  1422 06/13/19  1226   PH, ARTERIAL pH units 7.406 7.30*   PCO2, ARTERIAL mm Hg 32.8  --    PO2 ART mm Hg 393.0*  --    FIO2 % 100  --              Lab Results   Component Value Date    LACTATE 1.6 06/07/2019       Relevant imaging studies and labs from 06/13/19 were reviewed and interpreted by me    Medications (drips):    dexmedetomidine   dextrose 5 % with KCl 20 mEq   DOBUTamine   DOPamine   EPINEPHrine   insulin   niCARdipine   nitroglycerin   norepinephrine   phenylephrine   propofol   sodium chloride   vasopressin         albumin human      aspirin 81 mg Oral Daily   atorvastatin 40 mg Oral Nightly   cefuroxime 1.5 g Intravenous Q8H   chlorhexidine 15 mL Mouth/Throat Q12H   ferrous sulfate 325 mg Oral BID With Meals   meropenem 1 g Intravenous Q12H   [START ON 6/14/2019] metoprolol tartrate 12.5 mg Oral Q12H   metoprolol tartrate 2.5 mg Intravenous Q6H   norepinephrine 0.02-0.3 mcg/kg/min Intravenous Once   protamine 50 mg Intravenous Once   sennosides-docusate sodium 2 tablet Oral BID   sodium chloride 3 mL Intravenous Q12H   vitamin C 500 mg Oral BID       Assessment/Plan   IMPRESSION / PLAN     Inpatient Problem List:  84 y.o.male:  Active Hospital Problems    Diagnosis   • **Pacemaker infection s/p lead extraction      On 6/13/19 per Dr. Oneal      • CAD    • Hypercholesterolemia   • Hypertension   • Right mid-lower lobe infiltrate    • Serratia bacteremia    • H/O NSC lung  cancer s/p RL lobectomy      At [facility] in May [id number]      • Stage III CKD, GFR 30-59 ml/min      Managed by Dr. Courtney           Impression:  84 y.o.male with relevant PMH of NSCLC s/p RLL lobectomy 5/2019 @ [facility], Stage III CKD, PPM, Afib admitted 6/7/2019 w/ Serratia bacteremia / PM lead infection.  Underwent sternotomy and lead extraction in OR on 6/13 by Dr. Boyer.    Plan:  Post op care - per CTS    PM lead extraction / Afib - per cardiology    Serratia Bacteremia / Infected PM lead - abx per ID    Mechanical Ventilation / Hypoxemia - wean vent per protocol, nebs    CKD - monitor Cr    Glycemic control    DVT prophylaxis    Nutrition - NPO Diet    Plan of care and goals reviewed with mulitdisciplinary team at daily rounds    Critical Care time spent in direct patient care: 30 minutes (excluding procedure time, if applicable) including high complexity decision making to assess, manipulate, and support vital organ system failure in this individual who has impairment of one or more vital organ systems such that there is a high probability of imminent or life threatening deterioration in the patient’s condition.       Levar Pollard MD  Intensive Care Medicine  06/13/19 2:37 PM

## 2019-06-13 NOTE — PLAN OF CARE
Problem: Patient Care Overview  Goal: Plan of Care Review  Outcome: Outcome(s) achieved Date Met: 06/13/19 06/13/19 0603   Plan of Care Review   Progress improving   OTHER   Outcome Summary no events/changes overnight, patient alert and oriented, VSS, remains in afib/flutter on the monitor with controlled rate. NPO after midnight, continue to monitor    Coping/Psychosocial   Plan of Care Reviewed With patient

## 2019-06-13 NOTE — PLAN OF CARE
Problem: Patient Care Overview  Goal: Plan of Care Review  Outcome: Ongoing (interventions implemented as appropriate)   06/13/19 1850   Plan of Care Review   Progress improving   OTHER   Outcome Summary Pt to 2H S/P pacemaker lead extraction. Sedated, afib, vero and propofol infusing. 1000ml of albumin adminsitered. UOP marginal, labs ok. Fem arterial, and venous sheeths DC'd per protocol no complications. Weaning vent per post op orders. Family updated.    Coping/Psychosocial   Plan of Care Reviewed With spouse;daughter;son       Problem: Fall Risk (Adult)  Goal: Absence of Fall  Outcome: Ongoing (interventions implemented as appropriate)   06/13/19 1850   Fall Risk (Adult)   Absence of Fall making progress toward outcome       Problem: Skin Injury Risk (Adult)  Goal: Identify Related Risk Factors and Signs and Symptoms  Outcome: Ongoing (interventions implemented as appropriate)   06/13/19 1850   Skin Injury Risk (Adult)   Related Risk Factors (Skin Injury Risk) advanced age;critical care admission;cognitive impairment;hospitalization prolonged;tissue perfusion altered     Goal: Skin Health and Integrity  Outcome: Ongoing (interventions implemented as appropriate)   06/13/19 1850   Skin Injury Risk (Adult)   Skin Health and Integrity making progress toward outcome       Problem: Cardiac Surgery (Adult)  Goal: Signs and Symptoms of Listed Potential Problems Will be Absent, Minimized or Managed (Cardiac Surgery)  Outcome: Ongoing (interventions implemented as appropriate)   06/13/19 1850   Goal/Outcome Evaluation   Problems Assessed (Cardiac Surgery) all   Problems Present (Cardiac Surgery) respiratory compromise;situational response       Problem: Ventilation, Mechanical Invasive (Adult)  Goal: Signs and Symptoms of Listed Potential Problems Will be Absent, Minimized or Managed (Ventilation, Mechanical Invasive)  Outcome: Ongoing (interventions implemented as appropriate)   06/13/19 1850   Goal/Outcome Evaluation    Problems Assessed (Mechanical Ventilation, Invasive) all   Problems Present (Mech Vent, Invasive) immobility;situational response

## 2019-06-13 NOTE — ANESTHESIA POSTPROCEDURE EVALUATION
Patient: Rodney Looney    Procedure Summary     Date:  06/13/19 Room / Location:   [facility] /  [facility]    Anesthesia Start:  1214 Anesthesia Stop:  1416    Procedure:  MEDIANSTERNOTOMY, REMOVAL OF RETAINED PACING LEAD (N/A Chest) Diagnosis:      Surgeon:  Rohan Boyer MD Provider:  Riddhi Treviño MD    Anesthesia Type:  general ASA Status:  4          Anesthesia Type: general  Last vitals  BP   117/70 (06/13/19 0757)   Temp   98 °F (36.7 °C) (06/13/19 0757)   Pulse   81 (06/13/19 0757)   Resp   18 (06/13/19 0757)     SpO2   (!) 84 % (06/12/19 1924)     Post Anesthesia Care and Evaluation    Patient location during evaluation: ICU  Note status: intubated and sedated.  Post-procedure mental status: sedated.  Pain score: 0  Pain management: adequate  Airway patency: patent  Anesthetic complications: No anesthetic complications  PONV Status: none  Cardiovascular status: acceptable and hemodynamically stable  Respiratory status: acceptable, ETT, intubated and ventilator  Hydration status: acceptable    Comments: No apparent anesthesia complications noted at this time

## 2019-06-13 NOTE — PROGRESS NOTES
CARDIOLOGY PROGRESS NOTE           6/12/2019 9:31 PM    Admit Date: 6/7/2019    Admit Diagnosis: Lung infiltrate [R91.8]    Chief Compliant: Follow up for A. Fib. Pacemaker lead infection    Subjective:   Patient's status has been stable. Remains in A. Fib with controlled rates.      Objective:     Vitals:    06/12/19 0616 06/12/19 0700 06/12/19 0802 06/12/19 1924   BP:   138/82 124/65   BP Location:   Left arm Left arm   Patient Position:   Lying Lying   Pulse: 85 95 89 89   Resp:   18 18   Temp:   97.9 °F (36.6 °C) 97.9 °F (36.6 °C)   TempSrc:   Oral Oral   SpO2: 95% 95%  (!) 84%   Weight:       Height:           Physical Exam:  General-Well Nourished, Well developed  Eyes - PERRLA  Neck- supple, No mass  CV- irregular rate and rhythm, no MRG  Lung- clear bilaterally  Abd- soft, +BS  Musc/skel - Norm strength and range of motion  Skin- warm and dry  Neuro - Alert & Oriented x 3, appropriate mood.      Current Facility-Administered Medications:   •  acetaminophen (TYLENOL) tablet 650 mg, 650 mg, Oral, Q4H PRN, Elan Chen MD  •  aspirin EC tablet 81 mg, 81 mg, Oral, Daily, Chino aTylor MD, 81 mg at 06/12/19 0921  •  docusate sodium (COLACE) capsule 100 mg, 100 mg, Oral, BID PRN, Elan Chen MD, 100 mg at 06/11/19 2011  •  famotidine (PEPCID) tablet 20 mg, 20 mg, Oral, BID PRN, Elan Chen MD, 20 mg at 06/11/19 2011  •  ferrous sulfate tablet 325 mg, 325 mg, Oral, BID With Meals, Chino Taylor MD, 325 mg at 06/12/19 1613  •  heparin (porcine) 5000 UNIT/ML injection 5,000 Units, 5,000 Units, Subcutaneous, Q8H, Elan Chen MD, 5,000 Units at 06/12/19 1612  •  melatonin tablet 5 mg, 5 mg, Oral, Nightly PRN, Elan Chen MD, 5 mg at 06/11/19 2011  •  meropenem (MERREM) 1 g/100 mL 0.9% NS VTB (mbp), 1 g, Intravenous, Q12H, Elan Chen MD, Last Rate: 0 mL/hr at 06/10/19 1130, 1 g at 06/12/19 0921  •  metoprolol tartrate (LOPRESSOR) half  tablet 12.5 mg, 12.5 mg, Oral, Q12H, Chino Taylor MD, 12.5 mg at 06/12/19 0921  •  ondansetron (ZOFRAN) tablet 4 mg, 4 mg, Oral, Q6H PRN **OR** ondansetron (ZOFRAN) injection 4 mg, 4 mg, Intravenous, Q6H PRN, Elan Chen MD, 4 mg at 06/07/19 2114  •  sodium chloride 0.9 % flush 3 mL, 3 mL, Intravenous, Q12H, Elan Chen MD, 3 mL at 06/12/19 0922  •  sodium chloride 0.9 % flush 3-10 mL, 3-10 mL, Intravenous, PRN, Elan Chen MD  •  traMADol (ULTRAM) tablet 50 mg, 50 mg, Oral, Q6H PRN, Elan Chen MD  •  vitamin C (ASCORBIC ACID) tablet 500 mg, 500 mg, Oral, BID, Chino Taylor MD, 500 mg at 06/12/19 0921    Data Review:   No results found for this or any previous visit (from the past 24 hour(s)).  Assessment:       Lung infiltrate    Serratia infection    Presence of cardiac pacemaker    Non-small cell lung cancer and reports right lower lobe lobectomy in May at [facility]    History of lobectomy of lung    Hypoxia    CKD (chronic kidney disease) stage 3, GFR 30-59 ml/min (CMS/[facility])    Plan:   1. Pacemaker lead infection - Case reviewed with ID. He is not pacemaker dependent. After discussion with ID it is felt that the only way to clear the infection is for full lead extraction. Will plan for extraction on Thursday morning. The risk of the surgery were explained in detail. We discussed the procedure in great detail including risks, benefits, and alternatives. The patient understands that risks include bleeding, infection, stroke, MI, cardiac perforation, and even death.  2. A. Fib Perst to Perm in nature - Plan for long term rate control.  3. Pre-op for lead extraction - Type and Cross pRBC      Alpesh Oneal M.D., F.A.C.C, F.H.R.S.  Cardiology/Electrophysiology

## 2019-06-13 NOTE — ANESTHESIA PREPROCEDURE EVALUATION
Anesthesia Evaluation     Patient summary reviewed and Nursing notes reviewed   history of anesthetic complications:  NPO Solid Status: > 8 hours  NPO Liquid Status: > 8 hours           Airway   Mallampati: II  TM distance: >3 FB  Neck ROM: full  No difficulty expected  Comment: intubated  Dental - normal exam     Pulmonary - normal exam   (+) a smoker Former, lung cancer (s/p RLL lobectomy),   Cardiovascular     ECG reviewed  Rhythm: irregular  Rate: normal    (+) pacemaker pacemaker, hypertension, CAD, dysrhythmias (xarelto tx (last dose=6 days ago); rate controlled) Atrial Fib, hyperlipidemia,     ROS comment: Echo 6/7/19: normal EF, mild AR, mod-severe TR (RVSP 69); mobile echogenic structures possibly attached to right atrial lead    Neuro/Psych- negative ROS  GI/Hepatic/Renal/Endo    (+)  GERD,  renal disease CRI,     Musculoskeletal (-) negative ROS    Abdominal  - normal exam    Bowel sounds: normal.   Substance History - negative use     OB/GYN negative ob/gyn ROS         Other      history of cancer      Other Comment: Anemia HCT 31                    Anesthesia Plan    ASA 4     general   ([place]; post op ICU Ventilation)  intravenous induction   Anesthetic plan, all risks, benefits, and alternatives have been provided, discussed and informed consent has been obtained with: patient.

## 2019-06-13 NOTE — ANESTHESIA PREPROCEDURE EVALUATION
Anesthesia Evaluation     Patient summary reviewed and Nursing notes reviewed                Airway   Mallampati: II  TM distance: >3 FB  Neck ROM: full  No difficulty expected  Dental - normal exam     Pulmonary - normal exam   (+) a smoker Former, lung cancer (s/p RLL lobectomy),   Cardiovascular     Rhythm: irregular  Rate: normal    (+) pacemaker pacemaker, hypertension, CAD, dysrhythmias (xarelto tx (last dose=6 days ago); rate controlled) Atrial Fib, hyperlipidemia,     ROS comment: Echo 6/7/19: normal EF, mild AR, mod-severe TR (RVSP 69); mobile echogenic structures possibly attached to right atrial lead    Neuro/Psych- negative ROS  GI/Hepatic/Renal/Endo    (+)  GERD,  renal disease CRI,     Musculoskeletal (-) negative ROS    Abdominal  - normal exam    Bowel sounds: normal.   Substance History - negative use     OB/GYN negative ob/gyn ROS         Other          Other Comment: Anemia HCT 31                Anesthesia Plan    ASA 4     general     intravenous induction   Anesthetic plan, all risks, benefits, and alternatives have been provided, discussed and informed consent has been obtained with: patient.    Plan discussed with CRNA.

## 2019-06-13 NOTE — ANESTHESIA PROCEDURE NOTES
Arterial Line      Patient reassessed immediately prior to procedure    Patient location during procedure: OR  Start time: 6/13/2019 12:20 PM   Line placed for ABGs/Labs/ISTAT, MD/Surgeon request and hemodynamic monitoring.  Performed By   Anesthesiologist: Eddi Ovalle MD  Preanesthetic Checklist  Completed: patient identified, site marked, surgical consent, pre-op evaluation, timeout performed, IV checked, risks and benefits discussed and monitors and equipment checked  Arterial Line Prep   Sterile Tech: cap, gloves, mask and sterile barriers  Prep: ChloraPrep  Patient monitoring: blood pressure monitoring, continuous pulse oximetry and EKG  Arterial Line Procedure   Laterality:right  Location:  radial artery  Catheter size: 20 G   Guidance: landmark technique and palpation technique  Number of attempts: 1  Successful placement: yes          Post Assessment   Dressing Type: line sutured, occlusive dressing applied, secured with tape and wrist guard applied.   Complications no  Circ/Move/Sens Assessment: normal.   Patient Tolerance: patient tolerated the procedure well with no apparent complications

## 2019-06-14 NOTE — PROGRESS NOTES
INTENSIVIST / PULMONARY FOLLOW UP NOTE     Hospital:  LOS: 7 days   Mr. Rodney Looney, 84 y.o. male is followed for:     Pacemaker infection s/p lead extraction     Right mid-lower lobe infiltrate     Serratia bacteremia     H/O NSC lung cancer s/p RL lobectomy     Stage III CKD, GFR 30-59 ml/min     CAD     Hypercholesterolemia    Hypertension          SUBJECTIVE   On vent post lead extraction    The patient's relevant past medical, surgical, family, and social history were reviewed    Allergies and medications were reviewed    ROS:  Per subjective, all other systems were reviewed and were negative        OBJECTIVE     Vital Sign Min/Max for last 24 hours:  Temp  Min: 95.2 °F (35.1 °C)  Max: 99.5 °F (37.5 °C)   BP  Min: 82/55  Max: 157/95   Pulse  Min: 84  Max: 147   Resp  Min: 13  Max: 22   SpO2  Min: 85 %  Max: 100 %   No Data Recorded     Physical Exam:  General Appearance:  Intubated, in no acute distress  Eyes:  No scleral icterus or pallor, pupils normal  Ears, Nose, Mouth, Throat:  Atraumatic, oral endotracheal tube  Neck:  Trachea midline, thyroid normal  Respiratory:  Clear to auscultation bilaterally, normal effort  Cardiovascular:  Regular rate and rhythm, no murmurs, no peripheral edema  Gastrointestinal:  Soft, non-tender, non-distended, no hepatosplenomegaly  Skin:  Normal temperature, no rash  Psychiatric:  Intubated, sedated, no agitation  Neuro:  No new focal neurologic deficits observed      Telemetry:              Hemodynamics:   CVP:     PAP:     PAOP:     CO:     CI:     SVI:     SVR:       SpO2: 99 % SpO2  Min: 85 %  Max: 100 %   Device:      Flow Rate:   No Data Recorded     Mechanical Ventilator Settings:       Vt (Set, L): 0.6 L    Resp Rate (Set): 14 Resp Rate (Observed) Vent: 22   FiO2 (%): 40 %    PEEP/CPAP (cm H2O): 5 cm H20 Plateau Pressure (cm H2O): 15 cm H2O    Minute Ventilation (L/min) (Obs): 7.95 L/min    I:E Ratio (Obs): 1:3.80     Intake/Ouptut 24 hrs (7:00AM - 6:59  AM)  Intake & Output (last 3 days)       06/11 0701 - 06/12 0700 06/12 0701 - 06/13 0700 06/13 0701 - 06/14 0700 06/14 0701 - 06/15 0700    P.O.   120     I.V. (mL/kg)   3555 (45.9)     NG/GT   260     IV Piggyback 100 100      Total Intake(mL/kg) 100 (1.3) 100 (1.3) 3935 (50.8)     Urine (mL/kg/hr) 2900 (1.6) 2025 (1.1) 1260 (0.7)     Emesis/NG output   120     Stool  0      Blood   250     Chest Tube   190     Total Output 2900 2025 1820     Net -2800 -1925 +2115             Urine Unmeasured Occurrence  3 x      Stool Unmeasured Occurrence  1 x            Lines, Drains & Airways    Active LDAs     Name:   Placement date:   Placement time:   Site:   Days:    CVC Double Lumen Tunneled Right Subclavian   --    --    Subclavian       Chest Tube 1 Mediastinal   06/13/19    --    Mediastinal   less than 1    Urethral Catheter Temperature probe;Silicone 16 Fr.   06/13/19    --     less than 1    ETT    06/13/19    0917 created via procedure documentation     less than 1    Arterial Line 06/13/19 Right Radial   06/13/19    1220 created via procedure documentation    Radial   less than 1    Arterial Sheath  Right Femoral   06/13/19    0915    Femoral   less than 1    Venous Sheath 7 Fr. Right Femoral   06/13/19    0912    Femoral   less than 1    Venous Sheath 6 Fr. Right Femoral   06/13/19    0927    Femoral   less than 1                Hematology:  Results from last 7 days   Lab Units 06/14/19  0341 06/13/19  1828 06/13/19  1456 06/13/19  1226 06/13/19  0417 06/09/19  0450 06/08/19  0526 06/07/19  1223   WBC 10*3/mm3 9.79 9.51 10.22  --  5.90 6.32 6.11 6.10   HEMOGLOBIN g/dL 8.1* 7.8* 8.4*  --  9.2* 8.4* 8.2* 8.6*   HEMOGLOBIN, POC g/dL  --   --   --  10.2*  --   --   --   --    HEMATOCRIT % 27.9* 26.1* 28.9*  --  31.6* 29.3* 28.3* 29.8*   HEMATOCRIT POC %  --   --   --  30*  --   --   --   --    PLATELETS 10*3/mm3 166 190 219  --  225 246 247 282     Electrolytes, Magnesium and Phosphorus:  Results from last 7 days    Lab Units 06/14/19 0341 06/13/19 1828 06/13/19  1456 06/13/19  0417 06/09/19  0450 06/08/19  0526 06/07/19  1223   SODIUM mmol/L 132* 136 134* 137 137 134* 134*   CHLORIDE mmol/L 100 104 104 102 103 100 99   POTASSIUM mmol/L 4.9 4.6 4.5 4.7 4.9 4.3 4.8   CO2 mmol/L 20.0* 20.0* 19.0* 24.0 24.0 24.0 22.0   MAGNESIUM mg/dL 1.9 1.7 1.8  --   --  1.9  --    PHOSPHORUS mg/dL 2.9 2.7 3.0  --   --   --   --      Renal:  Results from last 7 days   Lab Units 06/14/19 0341 06/13/19 1828 06/13/19  1456 06/13/19 0417 06/09/19  0450 06/08/19  0526 06/07/19  1223   CREATININE mg/dL 1.26 1.40* 1.35* 1.43* 2.04* 1.83* 1.85*   BUN mg/dL 19 21 22 24* 24* 21 21     Estimated Creatinine Clearance: 47.8 mL/min (by C-G formula based on SCr of 1.26 mg/dL).  Hepatic:  Results from last 7 days   Lab Units 06/08/19 0526 06/07/19  1223   ALK PHOS U/L 88 92   BILIRUBIN mg/dL 0.5 0.7   ALT (SGPT) U/L 12 13   AST (SGOT) U/L 16 15     Arterial Blood Gases:  Results from last 7 days   Lab Units 06/13/19 2005 06/13/19 1422 06/13/19  1226   PH, ARTERIAL pH units 7.430 7.406 7.30*   PCO2, ARTERIAL mm Hg 33.2 32.8  --    PO2 ART mm Hg 74.3* 393.0*  --    FIO2 % 40 100  --              Lab Results   Component Value Date    LACTATE 1.6 06/07/2019       Relevant imaging studies and labs from 06/14/19 were reviewed and interpreted by me    Medications (drips):    dexmedetomidine    DOBUTamine    DOPamine    EPINEPHrine    insulin    niCARdipine    nitroglycerin Last Rate: Stopped (06/14/19 0000)   norepinephrine Last Rate: Stopped (06/13/19 1930)   phenylephrine          aspirin 81 mg Oral Daily   atorvastatin 40 mg Oral Nightly   chlorhexidine 15 mL Mouth/Throat Q12H   ferric gluconate 250 mg Intravenous Daily   meropenem 1 g Intravenous Q12H   metoprolol tartrate 2.5 mg Intravenous Q6H   norepinephrine 0.02-0.3 mcg/kg/min Intravenous Once   protamine 50 mg Intravenous Once   sennosides-docusate sodium 2 tablet Oral BID   tamsulosin 0.4 mg Oral  Daily   vitamin C 500 mg Oral BID       Assessment/Plan   IMPRESSION / PLAN     Inpatient Problem List:  84 y.o.male:  Active Hospital Problems    Diagnosis   • **Pacemaker infection s/p lead extraction      On 6/13/19 per Dr. Oneal      • CAD    • Hypercholesterolemia   • Hypertension   • Right mid-lower lobe infiltrate    • Serratia bacteremia    • H/O NSC lung cancer s/p RL lobectomy      At [facility] in May [address]      • Stage III CKD, GFR 30-59 ml/min      Managed by Dr. Courtney           Impression:  84 y.o.male with relevant PMH of NSCLC s/p RLL lobectomy (per family early stage, no chemo / RT) 5/2019 @ [facility], Stage III CKD, PPM, Afib admitted 6/7/2019 w/ Serratia bacteremia / PM lead infection.  Underwent sternotomy and lead extraction in OR on 6/13 by Dr. Boyer.    Plan:  Post op care - per CTS    PM lead extraction / Afib - per cardiology    Serratia Bacteremia / Infected PM lead - abx per ID    Hypoxemia / Right Pleural Effusion - Nebs, monitor effusion, may need drainage    CARINA on CKD - monitor Cr, avoid nephrotoxic meds    Glycemic control    DVT prophylaxis    Nutrition - Diet Clear Liquid    Plan of care and goals reviewed with mulitdisciplinary team at daily rounds    Critical Care time spent in direct patient care: 30 minutes (excluding procedure time, if applicable) including high complexity decision making to assess, manipulate, and support vital organ system failure in this individual who has impairment of one or more vital organ systems such that there is a high probability of imminent or life threatening deterioration in the patient’s condition.       Levar Pollard MD  Intensive Care Medicine  06/14/19 11:40 AM

## 2019-06-14 NOTE — THERAPY RE-EVALUATION
Acute Care - Physical Therapy Re-Evaluation   [place]     Patient Name: Rodney Looney  : 1934  MRN: [id number]  Today's Date: 2019   Onset of Illness/Injury or Date of Surgery: 19  Date of Referral to PT: 19  Referring Physician: MD Pollard      Admit Date: 2019    Visit Dx:     ICD-10-CM ICD-9-CM   1. Hypoxemia R09.02 799.02   2. Pneumonia of right lower lobe due to infectious organism (CMS/HCC) J18.1 486   3. Impaired functional mobility, balance, gait, and endurance Z74.09 V49.89   4. Impaired mobility and ADLs Z74.09 799.89   5. Oropharyngeal dysphagia R13.12 787.22   6. Complication associated with cardiac pacemaker lead, initial encounter T82.9XXA 996.72     Patient Active Problem List   Diagnosis   • Right mid-lower lobe infiltrate    • Serratia bacteremia    • Pacemaker infection s/p lead extraction    • H/O NSC lung cancer s/p RL lobectomy    • History of lobectomy of lung   • Hypoxia   • Stage III CKD, GFR 30-59 ml/min    • CAD    • Hypercholesterolemia   • Hypertension     Past Medical History:   Diagnosis Date   • Cancer (CMS/HCC)    • Coronary artery disease    • Elevated cholesterol    • GERD (gastroesophageal reflux disease)    • History of BPH 2 yrs   • Hypertension    • Kidney disease    • Smoking      Past Surgical History:   Procedure Laterality Date   • CARDIAC SURGERY     • CORONARY ARTERY BYPASS GRAFT N/A 2019    Procedure: MEDIANSTERNOTOMY, REMOVAL OF RETAINED PACING LEAD;  Surgeon: Rohan Boyer MD;  Location: [facility];  Service: Cardiothoracic   • HERNIA REPAIR          PT ASSESSMENT (last 12 hours)      Physical Therapy Evaluation     Row Name 19 0930          PT Evaluation Time/Intention    Subjective Information  complains of;weakness;pain  -ZARI     Document Type  re-evaluation  -ZARI     Mode of Treatment  physical therapy  -ZARI     Patient Effort  good  -ZARI     Symptoms Noted During/After Treatment  increased pain  -ZARI     Row Name  06/14/19 0930          General Information    Patient Profile Reviewed?  yes  -ZARI     Onset of Illness/Injury or Date of Surgery  06/07/19  -ZARI     Referring Physician  MD Pollard  -ZARI     Patient Observations  alert;cooperative;agree to therapy  -ZARI     Patient/Family Observations  resume orders post extraction of pacemaker lead 6/13  -ZARI     Prior Level of Function  independent:;gait;transfer;bed mobility;ADL's  -ZARI     Equipment Currently Used at Home  walker, rolling;grab bar  -ZARI     Pertinent History of Current Functional Problem  5/14 lobectomy patient readmitted with hypoxia possible PNA. patient now is S/P extraction of pacemaker lead via sternotomy  -ZARI     Existing Precautions/Restrictions  fall;oxygen therapy device and L/min  -ZARI     Risks Reviewed  patient:;LOB;increased discomfort;change in vital signs  -ZARI     Benefits Reviewed  patient:;improve function;increase strength;increase balance;decrease risk of DVT  -ZARI     Row Name 06/14/19 0930          Relationship/Environment    Lives With  spouse  -ZARI     Row Name 06/14/19 0930          Resource/Environmental Concerns    Current Living Arrangements  home/apartment/condo  -ZARI     Row Name 06/14/19 0930          Home Main Entrance    Number of Stairs, Main Entrance  four  -ZARI     Row Name 06/14/19 0930          Cognitive Assessment/Intervention- PT/OT    Affect/Mental Status (Cognitive)  WFL  -ZARI     Orientation Status (Cognition)  oriented x 4  -ZARI     Follows Commands (Cognition)  L  -ZARI     Cognitive Function (Cognitive)  safety deficit  -ZARI     Safety Deficit (Cognitive)  safety precautions awareness;safety precautions follow-through/compliance  -ZARI     Personal Safety Interventions  fall prevention program maintained;gait belt;nonskid shoes/slippers when out of bed  -ZARI     Row Name 06/14/19 0930          Safety Issues, Functional Mobility    Safety Issues Affecting Function (Mobility)  insight into deficits/self awareness;awareness of need for  assistance;safety precaution awareness;safety precautions follow-through/compliance  -     Impairments Affecting Function (Mobility)  balance;endurance/activity tolerance;pain;shortness of breath;strength  -ZARI     Row Name 06/14/19 0930          Bed Mobility Assessment/Treatment    Bed Mobility Assessment/Treatment  rolling left;rolling right;scooting/bridging;sit-supine  -ZARI     Rolling Left [place] (Bed Mobility)  minimum assist (75% patient effort);2 person assist  -ZARI     Rolling Right [place] (Bed Mobility)  minimum assist (75% patient effort);2 person assist  -ZARI     Scooting/Bridging [place] (Bed Mobility)  minimum assist (75% patient effort);2 person assist  -ZARI     Sit-Supine [place] (Bed Mobility)  minimum assist (75% patient effort);2 person assist  -ZARI     Assistive Device (Bed Mobility)  head of bed elevated;draw sheet  -ZARI     Row Name 06/14/19 0930          Transfer Assessment/Treatment    Transfer Assessment/Treatment  chair-bed transfer;sit-stand transfer;stand-sit transfer  -ZARI     Chair-Bed [place] (Transfers)  minimum assist (75% patient effort);2 person assist  -ZARI     Sit-Stand [place] (Transfers)  minimum assist (75% patient effort);2 person assist  -ZARI     Stand-Sit [place] (Transfers)  minimum assist (75% patient effort);2 person assist  -ZARI     Row Name 06/14/19 0930          Sit-Stand Transfer    Assistive Device (Sit-Stand Transfers)  walker, front-wheeled  -ZARI     Row Name 06/14/19 0930          Stand-Sit Transfer    Assistive Device (Stand-Sit Transfers)  walker, front-wheeled  -ZARI     Row Name 06/14/19 0930          Gait/Stairs Assessment/Training    Gait/Stairs Assessment/Training  gait/ambulation independence;gait/ambulation assistive device;distance ambulated  -ZARI     [place]wart Level (Gait)  minimum assist (75% patient effort);1 person to manage equipment  -     Assistive Device (Gait)  walker, front-wheeled  -ZARI     Distance in Feet (Gait)   80  -     Deviations/Abnormal Patterns (Gait)  bilateral deviations;stride length decreased  -     Bilateral Gait Deviations  forward flexed posture  -[facility] Name 06/14/19 0930          General ROM    GENERAL ROM COMMENTS  BLE WFLs  -[facility] Name 06/14/19 0930          MMT (Manual Muscle Testing)    General MMT Comments  generalized weakness 4-/5  -[facility] Name 06/14/19 0930          Motor Assessment/Intervention    Additional Documentation  Balance (Group);Therapeutic Exercise (Group);Therapeutic Exercise Interventions (Group)  -[facility] Name 06/14/19 0930          Therapeutic Exercise    Therapeutic Exercise  seated, lower extremities  -     66281 - PT Therapeutic Activity Minutes  23  -[facility] Name 06/14/19 0930          Lower Extremity Seated Therapeutic Exercise    Performed, Seated Lower Extremity (Therapeutic Exercise)  hip flexion/extension;knee flexion/extension;ankle dorsiflexion/plantarflexion  -     Exercise Type, Seated Lower Extremity (Therapeutic Exercise)  AROM (active range of motion)  -     Sets/Reps Detail, Seated Lower Extremity (Therapeutic Exercise)  1/10  -[facility] Name 06/14/19 0930          Balance    Balance  static sitting balance;static standing balance;dynamic sitting balance;dynamic standing balance  -ZARI     Row Name 06/14/19 0930          Static Sitting Balance    Level of [place] (Unsupported Sitting, Static Balance)  contact guard assist  -     Sitting Position (Unsupported Sitting, Static Balance)  sitting on edge of bed  -     Time Able to Maintain Position (Unsupported Sitting, Static Balance)  more than 5 minutes  -[facility] Name 06/14/19 0930          Dynamic Sitting Balance    Level of [place], Reaches Outside Midline (Sitting, Dynamic Balance)  contact guard assist  -     Sitting Position, Reaches Outside Midline (Sitting, Dynamic Balance)  sitting on edge of bed  -[facility] Name 06/14/19 0930          Static Standing Balance     Level of [place] (Supported Standing, Static Balance)  minimal assist, 75% patient effort;2 person assist  -ZARI     Time Able to Maintain Position (Supported Standing, Static Balance)  1 to 2 minutes  -ZARI     Assistive Device Utilized (Supported Standing, Static Balance)  walker, rolling  -ZARI     Row Name 06/14/19 0930          Dynamic Standing Balance    Level of [place], Reaches Outside Midline (Standing, Dynamic Balance)  minimal assist, 75% patient effort;2 person assist  -ZARI     Time Able to Maintain Position, Reaches Outside Midline (Standing, Dynamic Balance)  2 to 3 minutes  -ZARI     Assistive Device Utilized (Supported Standing, Dynamic Balance)  walker, rolling  -ZARI     Row Name 06/14/19 0930          Pain Scale: Numbers Pre/Post-Treatment    Pain Scale: Numbers, Pretreatment  3/10  -ZARI     Pain Scale: Numbers, Post-Treatment  5/10  -ZARI     Pain Location - Orientation  incisional  -ZARI     Pain Location  chest  -ZARI     Pain Intervention(s)  Repositioned;Ambulation/increased activity;Splinting  -ZARI     Row Name             Wound 06/07/19 1735 Right thoracic spine incision;surgical    Wound - Properties Group Date first assessed: 06/07/19  -TL Time first assessed: 1735  -TL Side: Right  -TL Location: thoracic spine  -TL Type: incision;surgical  -TL    Row Name             Wound 06/13/19 0951 Left anterior chest incision;surgical    Wound - Properties Group Date first assessed: 06/13/19  -JS Time first assessed: 0951  -JS Side: Left  -JS Orientation: anterior  -MH Location: chest  -JS Type: incision;surgical  -JS    Row Name             Wound 06/13/19 1930 Right lateral other (see notes) other (see comments)    Wound - Properties Group Date first assessed: 06/13/19  -MH Time first assessed: 1930  -MH Present On Admission : yes  -MH Side: Right  -MH Orientation: lateral  -MH Location: other (see notes)  -MH, lateral chest from previous chest tube  Type: other (see comments)  -MH, previous chest tube  insertion     Row Name 06/14/19 0930          Coping    Observed Emotional State  calm;cooperative  -ZARI     Verbalized Emotional State  acceptance  -ZARI     Row Name 06/14/19 0930          Plan of Care Review    Plan of Care Reviewed With  patient  -ZARI     Row Name 06/14/19 0930          Physical Therapy Clinical Impression    Date of Referral to PT  06/14/19  -ZARI     PT Diagnosis (PT Clinical Impression)  impaired bed mobility transfer and gait decreased strength and balance  -ZARI     Patient/Family Goals Statement (PT Clinical Impression)  patient to go home with family assist  -ZARI     Criteria for Skilled Interventions Met (PT Clinical Impression)  yes;treatment indicated  -ZARI     Rehab Potential (PT Clinical Summary)  good, to achieve stated therapy goals  -ZARI     Care Plan Review (PT)  evaluation/treatment results reviewed;care plan/treatment goals reviewed;patient/other agree to care plan;risks/benefits reviewed  -ZARI     Row Name 06/14/19 0930          Vital Signs    Pre Systolic BP Rehab  109  -ZARI     Pre Treatment Diastolic BP  70  -ZARI     Post Systolic BP Rehab  130  -ZARI     Post Treatment Diastolic BP  80  -ZARI     Pretreatment Heart Rate (beats/min)  110  -ZARI     Intratreatment Heart Rate (beats/min)  136 A-fib  -ZARI     Posttreatment Heart Rate (beats/min)  100  -ZARI     Pre SpO2 (%)  94  -ZARI     O2 Delivery Pre Treatment  nasal cannula  -ZARI     Intra SpO2 (%)  85  -ZARI     O2 Delivery Intra Treatment  nasal cannula  -ZARI     Post SpO2 (%)  95  -ZARI     O2 Delivery Post Treatment  nasal cannula  -ZARI     Pre Patient Position  Sitting  -ZARI     Intra Patient Position  Standing  -ZARI     Post Patient Position  Supine  -ZARI     Row Name 06/14/19 0930          Physical Therapy Goals    Bed Mobility Goal Selection (PT)  bed mobility, PT goal 1  -ZARI     Transfer Goal Selection (PT)  transfer, PT goal 1  -ZARI     Gait Training Goal Selection (PT)  gait training, PT goal 1  -ZARI     Stairs Goal Selection (PT)  stairs, PT goal  1  -ZARI     Row Name 06/14/19 0930          Bed Mobility Goal 1 (PT)    Activity/Assistive Device (Bed Mobility Goal 1, PT)  sit to supine/supine to sit  -ZARI     [place] Level/Cues Needed (Bed Mobility Goal 1, PT)  independent  -ZARI     Time Frame (Bed Mobility Goal 1, PT)  long term goal (LTG);10 days  -ZARI     Progress/Outcomes (Bed Mobility Goal 1, PT)  goal revised this date  -ZARI     Row Name 06/14/19 0930          Transfer Goal 1 (PT)    Activity/Assistive Device (Transfer Goal 1, PT)  bed-to-chair/chair-to-bed  -ZARI     [place] Level/Cues Needed (Transfer Goal 1, PT)  conditional independence  -ZARI     Time Frame (Transfer Goal 1, PT)  long term goal (LTG);10 days  -ZARI     Progress/Outcome (Transfer Goal 1, PT)  goal revised this date  -ZARI     Row Name 06/14/19 0930          Gait Training Goal 1 (PT)    Activity/Assistive Device (Gait Training Goal 1, PT)  gait (walking locomotion)  -ZARI     [place] Level (Gait Training Goal 1, PT)  independent  -ZARI     Distance (Gait Goal 1, PT)  350  -ZARI     Time Frame (Gait Training Goal 1, PT)  long term goal (LTG);10 days  -ZARI     Progress/Outcome (Gait Training Goal 1, PT)  goal revised this date  -ZARI     Row Name 06/14/19 0930          Stairs Goal 1 (PT)    Activity/Assistive Device (Stairs Goal 1, PT)  ascending stairs;descending stairs;using handrail, left  -ZARI     [place] Level/Cues Needed (Stairs Goal 1, PT)  supervision required  -ZARI     Number of Stairs (Stairs Goal 1, PT)  12  -ZARI     Time Frame (Stairs Goal 1, PT)  long term goal (LTG);10 days  -ZARI     Progress/Outcome (Stairs Goal 1, PT)  goal revised this date  -ZARI     Row Name 06/14/19 0930          Patient Education Goal (PT)    Activity (Patient Education Goal, PT)  HEP  -ZARI     [place]/Cues/Accuracy (Memory Goal 2, PT)  verbalizes understanding  -ZARI     Time Frame (Patient Education Goal, PT)  long term goal (LTG);10 days  -ZARI     Progress/Outcome (Patient Education Goal, PT)  goal  revised this date  -     Row Name 06/14/19 0930          Positioning and Restraints    Pre-Treatment Position  sitting in chair/recliner  -ZARI     Post Treatment Position  bed  -ZARI     In Bed  notified nsg;supine;call light within reach;encouraged to call for assist;with family/caregiver  -ZARI       User Key  (r) = Recorded By, (t) = Taken By, (c) = Cosigned By    Initials Name Provider Type    ZARI Kirstie Serra, PT Physical Therapist    Lg Baker, RN Registered Nurse    MARÍA DIXON, Wesley Alston RN Registered Nurse    Josh Solis RN Registered Nurse        Physical Therapy Education     Title: PT OT SLP Therapies (In Progress)     Topic: Physical Therapy (In Progress)     Point: Mobility training (In Progress)     Learning Progress Summary           Patient Acceptance, E, NR by ZARI at 6/14/2019  9:30 AM    Acceptance, E, NR by  at 6/8/2019 11:54 AM    Comment:  Educated pt on completing AP, HS, hip abd, and SLR twice throughout the day (one more time tonight).                   Point: Home exercise program (In Progress)     Learning Progress Summary           Patient Acceptance, E, NR by ZARI at 6/14/2019  9:30 AM    Acceptance, E, NR by  at 6/8/2019 11:54 AM    Comment:  Educated pt on completing AP, HS, hip abd, and SLR twice throughout the day (one more time tonight).                   Point: Body mechanics (In Progress)     Learning Progress Summary           Patient Acceptance, E, NR by ZARI at 6/14/2019  9:30 AM                   Point: Precautions (In Progress)     Learning Progress Summary           Patient Acceptance, E, NR by ZARI at 6/14/2019  9:30 AM    Acceptance, E, NR by LM at 6/8/2019 11:54 AM    Comment:  Educated pt on completing AP, HS, hip abd, and SLR twice throughout the day (one more time tonight).                               User Key     Initials Effective Dates Name Provider Type Discipline    ZARI 06/19/15 -  Kirstie Serra PT Physical Therapist PT    BHASKAR 06/15/16 -  Beltran  Aria, PT Physical Therapist PT              PT Recommendation and Plan  Anticipated Discharge Disposition (PT): home with home health  Planned Therapy Interventions (PT Eval): balance training, bed mobility training, gait training, strengthening, transfer training  Therapy Frequency (PT Clinical Impression): daily  Outcome Summary/Treatment Plan (PT)  Anticipated Discharge Disposition (PT): home with home health  Plan of Care Reviewed With: patient  Outcome Summary: PT Reeval completed patient is able to ambulate 80 ft with min assist. anticipate patient to go home with HH upon D/C  Outcome Measures     Row Name 06/14/19 0930 06/12/19 1421 06/11/19 1334       How much help from another person do you currently need...    Turning from your back to your side while in flat bed without using bedrails?  3  -ZARI  --  4  -LM    Moving from lying on back to sitting on the side of a flat bed without bedrails?  3  -ZARI  --  4  -LM    Moving to and from a bed to a chair (including a wheelchair)?  3  -ZARI  --  3  -LM    Standing up from a chair using your arms (e.g., wheelchair, bedside chair)?  3  -ZARI  --  3  -LM    Climbing 3-5 steps with a railing?  2  -ZARI  --  3  -LM    To walk in hospital room?  3  -ZARI  --  3  -LM    AM-PAC 6 Clicks Score  17  -ZARI  --  20  -LM       How much help from another is currently needed...    Putting on and taking off regular lower body clothing?  --  3  -AC  --    Bathing (including washing, rinsing, and drying)  --  3  -AC  --    Toileting (which includes using toilet bed pan or urinal)  --  3  -AC  --    Putting on and taking off regular upper body clothing  --  3  -AC  --    Taking care of personal grooming (such as brushing teeth)  --  4  -AC  --    Eating meals  --  4  -AC  --    Score  --  20  -AC  --       Functional Assessment    Outcome Measure Options  --  AM-PAC 6 Clicks Daily Activity (OT)  -AC  AM-PAC 6 Clicks Basic Mobility (PT)  -LM      User Key  (r) = Recorded By, (t) = Taken By, (c)  = Cosigned By    Initials Name Provider Type    Kirstie Reeves, PT Physical Therapist    Alecia Lott, OT Occupational Therapist    Aria Staley, PT Physical Therapist         Time Calculation:   PT Charges     Row Name 06/14/19 0930             Time Calculation    Start Time  0930  -ZARI      PT Received On  06/14/19  -ZARI      PT Goal Re-Cert Due Date  06/24/19  -ZARI         Time Calculation- PT    Total Timed Code Minutes- PT  23 minute(s)  -ZARI         Timed Charges    89423 - PT Therapeutic Activity Minutes  23  -ZARI        User Key  (r) = Recorded By, (t) = Taken By, (c) = Cosigned By    Initials Name Provider Type    Kirstie Reeves, PT Physical Therapist        Therapy Charges for Today     Code Description Service Date Service Provider Modifiers Qty    43443415171 HC PT THERAPEUTIC ACT EA 15 MIN 6/14/2019 Kirstie Serra, PT GP 2    20569869537 HC PT RE-EVAL ESTABLISHED PLAN 2 6/14/2019 Kirstie Serra, PT GP 1          PT G-Codes  Outcome Measure Options: AM-PAC 6 Clicks Daily Activity (OT)  AM-PAC 6 Clicks Score: 17  Score: 20      Kirstie Serra, PT  6/14/2019

## 2019-06-14 NOTE — PROGRESS NOTES
Continued Stay Note   [place]     Patient Name: Rodney Looney  MRN: [id number]  Today's Date: 6/14/2019    Admit Date: 6/7/2019    Discharge Plan     Row Name 06/14/19 1320       Plan    Plan  Ongoing     Plan Comments  Placed in ICU after lead extraction per , alert/oriented, PT to work with patient and chronic AFib. Patient eating. Patient plans to return home with wife in [facility] and order to resume HH in epic. Layla @ [id number]        Discharge Codes    No documentation.       Expected Discharge Date and Time     Expected Discharge Date Expected Discharge Time    Kash 15, 2019             Iesha Marshall RN

## 2019-06-14 NOTE — PROGRESS NOTES
"                  Clinical Nutrition     Nutrition Assessment  Reason for Visit:   SENA JIMENEZ    Patient Name: Rodney Looney  YOB: 1934  MRN: [id number]  Date of Encounter: 06/14/19 2:25 PM  Admission date: 6/7/2019    Nutrition Assessment     Additional applicable diagnosis, conditions, procedures    Pacemaker infection s/p lead extraction     Right mid-lower lobe infiltrate     Serratia bacteremia     H/O NSC lung cancer s/p RL lobectomy     Stage III CKD, GFR 30-59 ml/min     CAD     Hypercholesterolemia    Hypertension      Applicable PMH/ PSxH  PMH: He  has a past medical history of Cancer (CMS/HCC), Coronary artery disease, Elevated cholesterol, GERD (gastroesophageal reflux disease), History of BPH (2 yrs), Hypertension, Kidney disease, and Smoking.   PSxH: He  has a past surgical history that includes Cardiac surgery; Hernia repair; and Coronary artery bypass graft (N/A, 6/13/2019).     Reported/Observed/Food/Nutrition Related History:     Good appetite prior to surgery. Clear liquid diet to start with lunch meal     Anthropometrics     Height: 182.9 cm (72\")  Last filed wt: Weight: 77.4 kg (170 lb 9.6 oz) (06/13/19 0500)  Weight Method: Standing scale    BMI: BMI (Calculated): 23.6  Normal: 18.5-24.9kg/m2    Ideal Body Weight (IBW) (kg): 82.07    Labs reviewed     Results from last 7 days   Lab Units 06/14/19  0341  06/08/19  0526   SODIUM mmol/L 132*   < > 134*   POTASSIUM mmol/L 4.9   < > 4.3   CHLORIDE mmol/L 100   < > 100   CO2 mmol/L 20.0*   < > 24.0   BUN mg/dL 19   < > 21   CREATININE mg/dL 1.26   < > 1.83*   CALCIUM mg/dL 9.0   < > 9.5   BILIRUBIN mg/dL  --   --  0.5   ALK PHOS U/L  --   --  88   ALT (SGPT) U/L  --   --  12   AST (SGOT) U/L  --   --  16   GLUCOSE mg/dL 133*   < > 83    < > = values in this interval not displayed.       Results from last 7 days   Lab Units 06/14/19  1022 06/14/19  0913 06/14/19  0804 06/14/19  0557 06/14/19  0150 06/13/19  2255   GLUCOSE mg/dL 87 96 " 100 107 134* 142*     No results found for: HGBA1C    Medications reviewed   Yes     Current Nutrition Prescription     PO: Diet Clear Liquid  No active supplement orders      Intake:  100% x 9 meals prior to surgery          Nutrition Diagnosis   6/14  Problem No nutrition diagnosis at this time   Etiology PO intake prior to surgery    Signs/Symptoms 100% x 9 meals prior to surgery      Nutrition Intervention     Interventions Goal    General: Nutrition support treatment    Nutrition Interventions   1.  Follow treatment progress, Care plan reviewed, Interview for preferences   Advance diet as tolerated   Monitor intake post surgery     Monitor/ Evaluation    Per protocol, PO intake, Pertinent labs      Will Continue to follow per protocol      Zari Ann RD  Time Spent: 20min

## 2019-06-14 NOTE — THERAPY RE-EVALUATION
Acute Care - Speech Language Pathology   Swallow Re-Evaluation [facility]     Patient Name: Rodney Looney  : 1934  MRN: [id number]  Today's Date: 2019  Onset of Illness/Injury or Date of Surgery: 19     Referring Physician: MD Pollard      Admit Date: 2019    Visit Dx:     ICD-10-CM ICD-9-CM   1. Hypoxemia R09.02 799.02   2. Pneumonia of right lower lobe due to infectious organism (CMS/HCC) J18.1 486   3. Impaired functional mobility, balance, gait, and endurance Z74.09 V49.89   4. Impaired mobility and ADLs Z74.09 799.89   5. Oropharyngeal dysphagia R13.12 787.22   6. Complication associated with cardiac pacemaker lead, initial encounter T82.9XXA 996.72     Patient Active Problem List   Diagnosis   • Right mid-lower lobe infiltrate    • Serratia bacteremia    • Pacemaker infection s/p lead extraction    • H/O NSC lung cancer s/p RL lobectomy    • History of lobectomy of lung   • Hypoxia   • Stage III CKD, GFR 30-59 ml/min    • CAD    • Hypercholesterolemia   • Hypertension     Past Medical History:   Diagnosis Date   • Cancer (CMS/HCC)    • Coronary artery disease    • Elevated cholesterol    • GERD (gastroesophageal reflux disease)    • History of BPH 2 yrs   • Hypertension    • Kidney disease    • Smoking      Past Surgical History:   Procedure Laterality Date   • CARDIAC SURGERY     • CORONARY ARTERY BYPASS GRAFT N/A 2019    Procedure: MEDIANSTERNOTOMY, REMOVAL OF RETAINED PACING LEAD;  Surgeon: Rohan Boyer MD;  Location: [facility];  Service: Cardiothoracic   • HERNIA REPAIR          SWALLOW EVALUATION (last 72 hours)      SLP Adult Swallow Evaluation     Row Name 19 1315                   Rehab Evaluation    Document Type  re-evaluation  -LR        Subjective Information  no complaints  -LR        Patient Observations  alert;cooperative;agree to therapy  -LR        Patient/Family Observations  pt's wife and daughter present  -LR        Patient Effort  good  -LR            General Information    Patient Profile Reviewed  yes  -LR        Pertinent History Of Current Problem  Pt admitted w/ RML infiltrate, recent lobectomy 5/14 for non-small cell Lung CA, acute hypoxic resp failure. Pt s/p sternotomy for infected pacemaker lead on 6/13. Re-eval needed for new concerns of dysphagia. Pt coughing with lunch and possible desaturations.  -LR        Current Method of Nutrition  clear liquids  -LR        Precautions/Limitations, Vision  WFL with corrective lenses  -LR        Precautions/Limitations, Hearing  WFL;for purposes of eval  -LR        Prior Level of Function-Communication  WFL  -LR        Prior Level of Function-Swallowing  no diet consistency restrictions  -LR        Plans/Goals Discussed with  patient;agreed upon  -LR        Barriers to Rehab  none identified  -LR           Oral Motor and Function    Dentition Assessment  natural, present and adequate  -LR        Secretion Management  WNL/WFL  -LR        Mucosal Quality  moist, healthy  -LR        Volitional Swallow  WFL  -LR        Volitional Cough  WFL  -LR           Oral Musculature and Cranial Nerve Assessment    Oral Motor General Assessment  WFL  -LR           General Eating/Swallowing Observations    Respiratory Support Currently in Use  nasal cannula  -LR        Eating/Swallowing Skills  fed by SLP  -LR        Positioning During Eating  upright 90 degree;upright in bed  -LR        Utensils Used  spoon;cup;straw  -LR        Consistencies Trialed  pureed;thin liquids  -LR           Clinical Swallow Eval    Oral Prep Phase  WFL  -LR        Oral Transit  WFL  -LR        Oral Residue  WFL  -LR        Pharyngeal Phase  suspected pharyngeal impairment  -LR        Esophageal Phase  unremarkable  -LR        Clinical Swallow Evaluation Summary  Clinical swallow eval completed. Pt given thin liquids via spoon, cup, and straw and pureed. Oral skills are WFL. Suspect pharyngeal dypshagia as pt demonstrated multiple swallows (3-4)  per 1/2 tsp bolus. Delayed cough w/ thin liquids. Recommend FEES to further assess pharyngeal skills.   -LR           Pharyngeal Phase Concerns    Pharyngeal Phase Concerns  multiple swallows;cough  -LR        Multiple Swallows  thin;pudding  -LR        Cough  thin  -LR           Clinical Impression    SLP Swallowing Diagnosis  functional oral phase;suspected pharyngeal dysfunction  -LR        Functional Impact  risk of aspiration/pneumonia  -LR        Rehab Potential/Prognosis, Swallowing  good, to achieve stated therapy goals  -LR        Swallow Criteria for Skilled Therapeutic Interventions Met  demonstrates skilled criteria  -LR           Recommendations    SLP Diet Recommendation  NPO NPO until FEES  -LR        Recommended Diagnostics  FEES  -LR          User Key  (r) = Recorded By, (t) = Taken By, (c) = Cosigned By    Initials Name Effective Dates    LR Renetta Barrera MS CCC-SLP 06/22/15 -           EDUCATION  The patient has been educated in the following areas:   Dysphagia (Swallowing Impairment).    SLP Recommendation and Plan  SLP Swallowing Diagnosis: functional oral phase, suspected pharyngeal dysfunction  SLP Diet Recommendation: NPO(NPO until FEES)              Recommended Diagnostics: FEES  Swallow Criteria for Skilled Therapeutic Interventions Met: demonstrates skilled criteria     Rehab Potential/Prognosis, Swallowing: good, to achieve stated therapy goals                            Time Calculation:   Time Calculation- SLP     Row Name 06/14/19 1425             Time Calculation- SLP    SLP Start Time  1315  -LR      SLP Received On  06/14/19  -LR        User Key  (r) = Recorded By, (t) = Taken By, (c) = Cosigned By    Initials Name Provider Type    Renetta Bernstein MS CCC-SLP Speech and Language Pathologist          Therapy Charges for Today     Code Description Service Date Service Provider Modifiers Qty    [id number]  ST EVAL ORAL PHARYNG SWALLOW 4 6/14/2019 Renetta Barrera MS CCC-SLP GN 1                Renetta Barrera, MS CCC-SLP  6/14/2019

## 2019-06-14 NOTE — PROGRESS NOTES
CTS Progress Note      POD 1 s/p sternotomy for removal of infected pacemaker lead       LOS: 7 days   Patient Care Team:  Dana Isidro DO as PCP - General (Family Medicine)    Subjective  Awake, alert, up in chair  No pressors, conversant\  Oriented x3 and cooperative    CC: Status post sternotomy for removal of infected pacemaker lead    Objective    Vital Signs  Temp:  [95.2 °F (35.1 °C)-99.5 °F (37.5 °C)] 99 °F (37.2 °C)  Heart Rate:  [] 108  Resp:  [13-22] 20  BP: ()/(55-95) 104/70  Arterial Line BP: ()/(48-74) 113/64  FiO2 (%):  [40 %-100 %] 40 %    Physical Exam:   General Appearance: alert, appears stated age and cooperative   Lungs: clear to auscultation, respirations regular, respirations even and respirations unlabored   Heart: regular rhythm & normal rate, normal S1, S2, no murmur, no gallop, no rub and no click   Skin: Warm, dry, sternum stable, incision c/d/i     Results   Results from last 7 days   Lab Units 06/14/19  0341   WBC 10*3/mm3 9.79   HEMOGLOBIN g/dL 8.1*   HEMATOCRIT % 27.9*   PLATELETS 10*3/mm3 166     Results from last 7 days   Lab Units 06/14/19  0341   SODIUM mmol/L 132*   POTASSIUM mmol/L 4.9   CHLORIDE mmol/L 100   CO2 mmol/L 20.0*   BUN mg/dL 19   CREATININE mg/dL 1.26   GLUCOSE mg/dL 133*   CALCIUM mg/dL 9.0           Imaging Results (last 24 hours)     Procedure Component Value Units Date/Time    XR Chest 1 View [[id number]] Updated:  06/14/19 0514    XR Chest 1 View [[id number]] Collected:  06/13/19 1619     Updated:  06/13/19 1649    Narrative:          EXAMINATION: XR CHEST 1 VW - 06/13/2019     INDICATION:  R09.02-Hypoxemia; J18.1-Lobar pneumonia, unspecified  organism; Z74.09-Other reduced mobility;  R13.12-Dysphagia,  oropharyngeal phase; T82.9XXA-Unspecified complication of cardiac and  vascular prosthetic device, implant and graft, initial encounter.     COMPARISON: 06/07/2019     FINDINGS: Portable chest reveals the heart to be borderline enlarged.    There is a small right pleural effusion with mild increased markings  seen within the right lung base. The left lung base is demonstrating  mild increased markings. The lines and tubes are in satisfactory  position. Degenerative change is seen within the spine. Patient is  status post median sternotomy.           Impression:       New median sternotomy in the interval with ill-defined  opacification seen within the right lung base and mild increased  markings at the left lung base. No change in the small right pleural  effusion with lines and tubes in satisfactory position.     DICTATED:   06/13/2019  EDITED/ls :   06/13/2019                 Assessment      Pacemaker infection s/p lead extraction     Right mid-lower lobe infiltrate     Serratia bacteremia     H/O NSC lung cancer s/p RL lobectomy     Stage III CKD, GFR 30-59 ml/min     CAD     Hypercholesterolemia    Hypertension  Chest tubes with 190 cc out previous 18 hours  Hemodynamically stable      Plan   Continue support    JENN Hoover  06/14/19  6:40 AM     As above.  Patient looks great.  Satisfactory postop course to this point.  Chest x-ray findings the right hemithorax are secondary to the patient's recent right thoracotomy and right lower lobectomy.  No further intervention needed at this point in time. I have reviewed, verified, and confirmed the above history and current status.  I have examined the patient and confirmed the above physical findings.Above plan and treatment regimen discussed in detail with patient.  Options of treatment, attendant risks vs benefits, and my recommendations were discussed and all questions answered.    Rohan Boyer MD  CTSurgery  06/14/19   9:48 AM

## 2019-06-14 NOTE — PLAN OF CARE
Problem: Patient Care Overview  Goal: Plan of Care Review  Outcome: Ongoing (interventions implemented as appropriate)   06/14/19 0600   Plan of Care Review   Progress improving   OTHER   Outcome Summary extubated easily to nasal oxygen. No drips this AM. Up to chair with ease. A Fib throughout, however, rate up m   Coping/Psychosocial   Plan of Care Reviewed With patient       Problem: Pneumonia (Adult)  Goal: Signs and Symptoms of Listed Potential Problems Will be Absent, Minimized or Managed (Pneumonia)  Outcome: Ongoing (interventions implemented as appropriate)   06/14/19 0600   Goal/Outcome Evaluation   Problems Assessed (Pneumonia) all   Problems Present (Pneumonia) respiratory compromise       Problem: Fall Risk (Adult)  Goal: Identify Related Risk Factors and Signs and Symptoms  Outcome: Ongoing (interventions implemented as appropriate)   06/14/19 0600   Fall Risk (Adult)   Related Risk Factors (Fall Risk) fatigue/slow reaction;gait/mobility problems;homeostatic imbalance;environment unfamiliar;age-related changes   Signs and Symptoms (Fall Risk) presence of risk factors     Goal: Absence of Fall  Outcome: Ongoing (interventions implemented as appropriate)   06/14/19 0600   Fall Risk (Adult)   Absence of Fall making progress toward outcome       Problem: Skin Injury Risk (Adult)  Goal: Identify Related Risk Factors and Signs and Symptoms  Outcome: Ongoing (interventions implemented as appropriate)   06/14/19 0600   Skin Injury Risk (Adult)   Related Risk Factors (Skin Injury Risk) advanced age;critical care admission;hospitalization prolonged     Goal: Skin Health and Integrity  Outcome: Ongoing (interventions implemented as appropriate)   06/14/19 0600   Skin Injury Risk (Adult)   Skin Health and Integrity making progress toward outcome       Problem: Cardiac Surgery (Adult)  Goal: Signs and Symptoms of Listed Potential Problems Will be Absent, Minimized or Managed (Cardiac Surgery)  Outcome: Ongoing  (interventions implemented as appropriate)   06/14/19 0600   Goal/Outcome Evaluation   Problems Assessed (Cardiac Surgery) all   Problems Present (Cardiac Surgery) functional deficit       Problem: Ventilation, Mechanical Invasive (Adult)  Goal: Signs and Symptoms of Listed Potential Problems Will be Absent, Minimized or Managed (Ventilation, Mechanical Invasive)  Outcome: Outcome(s) achieved Date Met: 06/13/19

## 2019-06-14 NOTE — PLAN OF CARE
Problem: Patient Care Overview  Goal: Plan of Care Review  Outcome: Ongoing (interventions implemented as appropriate)   06/14/19 7544   Coping/Psychosocial   Plan of Care Reviewed With patient;spouse;daughter   SLP re-evaluation completed. FEES completed. Will continue to address dysphagia. Please see note for further details and recommendations.

## 2019-06-14 NOTE — PLAN OF CARE
Problem: Patient Care Overview  Goal: Plan of Care Review  Outcome: Ongoing (interventions implemented as appropriate)   06/14/19 1725   Plan of Care Review   Progress improving   OTHER   Outcome Summary Pt aoX4. Walked twice today with walker and assistance x2.5L as patient taking shallow breaths due to pain. Pain improved with medication. CT to remain until tomorrow, minimal drainage. remains in atrial fib. Dr. Boyer aware, PO amio ordered, as well as metoprolol. Asher cath remains, flomax restarted. Pt coughing with consecutive sips, FEEs done. No s/s of aspiration. Soft diet ordered, no straws. Pt overall making good progress.   Coping/Psychosocial   Plan of Care Reviewed With patient;family       Problem: Pneumonia (Adult)  Goal: Signs and Symptoms of Listed Potential Problems Will be Absent, Minimized or Managed (Pneumonia)  Outcome: Ongoing (interventions implemented as appropriate)   06/14/19 1725   Goal/Outcome Evaluation   Problems Assessed (Pneumonia) all   Problems Present (Pneumonia) none       Problem: Fall Risk (Adult)  Goal: Identify Related Risk Factors and Signs and Symptoms  Outcome: Outcome(s) achieved Date Met: 06/14/19 06/14/19 1725   Fall Risk (Adult)   Related Risk Factors (Fall Risk) gait/mobility problems;environment unfamiliar   Signs and Symptoms (Fall Risk) presence of risk factors     Goal: Absence of Fall  Outcome: Ongoing (interventions implemented as appropriate)   06/14/19 1725   Fall Risk (Adult)   Absence of Fall achieves outcome       Problem: Skin Injury Risk (Adult)  Goal: Identify Related Risk Factors and Signs and Symptoms  Outcome: Outcome(s) achieved Date Met: 06/14/19 06/14/19 1725   Skin Injury Risk (Adult)   Related Risk Factors (Skin Injury Risk) advanced age;critical care admission;mobility impaired     Goal: Skin Health and Integrity  Outcome: Ongoing (interventions implemented as appropriate)   06/14/19 1725   Skin Injury Risk (Adult)   Skin Health and Integrity  achieves outcome       Problem: Cardiac Surgery (Adult)  Goal: Signs and Symptoms of Listed Potential Problems Will be Absent, Minimized or Managed (Cardiac Surgery)  Outcome: Ongoing (interventions implemented as appropriate)   06/14/19 2290   Goal/Outcome Evaluation   Problems Assessed (Cardiac Surgery) all   Problems Present (Cardiac Surgery) pain

## 2019-06-14 NOTE — MBS/VFSS/FEES
Acute Care - Speech Language Pathology   Swallow Re-Evaluation [facility]   Fiberoptic Endoscopic Evaluation of Swallowing (FEES)    Patient Name: Rodney Looney  : 1934  MRN: [id number]  Today's Date: 2019  Onset of Illness/Injury or Date of Surgery: 19     Referring Physician: MD Pollard      Admit Date: 2019    Visit Dx:     ICD-10-CM ICD-9-CM   1. Hypoxemia R09.02 799.02   2. Pneumonia of right lower lobe due to infectious organism (CMS/HCC) J18.1 486   3. Impaired functional mobility, balance, gait, and endurance Z74.09 V49.89   4. Impaired mobility and ADLs Z74.09 799.89   5. Oropharyngeal dysphagia R13.12 787.22   6. Complication associated with cardiac pacemaker lead, initial encounter T82.9XXA 996.72     Patient Active Problem List   Diagnosis   • Right mid-lower lobe infiltrate    • Serratia bacteremia    • Pacemaker infection s/p lead extraction    • H/O NSC lung cancer s/p RL lobectomy    • History of lobectomy of lung   • Hypoxia   • Stage III CKD, GFR 30-59 ml/min    • CAD    • Hypercholesterolemia   • Hypertension     Past Medical History:   Diagnosis Date   • Cancer (CMS/HCC)    • Coronary artery disease    • Elevated cholesterol    • GERD (gastroesophageal reflux disease)    • History of BPH 2 yrs   • Hypertension    • Kidney disease    • Smoking      Past Surgical History:   Procedure Laterality Date   • CARDIAC SURGERY     • CORONARY ARTERY BYPASS GRAFT N/A 2019    Procedure: MEDIANSTERNOTOMY, REMOVAL OF RETAINED PACING LEAD;  Surgeon: Rohan Boyer MD;  Location: [facility];  Service: Cardiothoracic   • HERNIA REPAIR          SWALLOW EVALUATION (last 72 hours)      SLP Adult Swallow Evaluation     Row Name 19 1415 19 1315                Rehab Evaluation    Document Type  evaluation  -LR  re-evaluation  -LR       Subjective Information  no complaints  -LR  no complaints  -LR       Patient Observations  alert;cooperative;agree to therapy  -LR   alert;cooperative;agree to therapy  -LR       Patient/Family Observations  wife and daughter present  -LR  pt's wife and daughter present  -LR       Patient Effort  good  -LR  good  -LR          General Information    Patient Profile Reviewed  yes  -LR  yes  -LR       Pertinent History Of Current Problem  Pt admitted w/ RML infiltrate, recent lobectomy 5/14 for non-small cell lung CA, acute hypoxic resp failure. Pt s/p sternotomy for infected pacemaker lead on 6/13. Re-eval needed for new concerns of dysphagia.   -LR  Pt admitted w/ RML infiltrate, recent lobectomy 5/14 for non-small cell Lung CA, acute hypoxic resp failure. Pt s/p sternotomy for infected pacemaker lead on 6/13. Re-eval needed for new concerns of dysphagia. Pt coughing with lunch and possible desaturations.  -LR       Current Method of Nutrition  clear liquids  -LR  clear liquids  -LR       Precautions/Limitations, Vision  WFL with corrective lenses  -LR  WFL with corrective lenses  -LR       Precautions/Limitations, Hearing  WFL;for purposes of eval  -LR  WFL;for purposes of eval  -LR       Prior Level of Function-Communication  WFL  -LR  WFL  -LR       Prior Level of Function-Swallowing  no diet consistency restrictions  -LR  no diet consistency restrictions  -LR       Plans/Goals Discussed with  patient;agreed upon  -LR  patient;agreed upon  -LR       Barriers to Rehab  none identified  -LR  none identified  -LR          Oral Motor and Function    Dentition Assessment  --  natural, present and adequate  -LR       Secretion Management  --  WNL/WFL  -LR       Mucosal Quality  --  moist, healthy  -LR       Volitional Swallow  --  WFL  -LR       Volitional Cough  --  WFL  -LR          Oral Musculature and Cranial Nerve Assessment    Oral Motor General Assessment  --  WFL  -LR          General Eating/Swallowing Observations    Respiratory Support Currently in Use  --  nasal cannula  -LR       Eating/Swallowing Skills  --  fed by SLP  -LR        Positioning During Eating  --  upright 90 degree;upright in bed  -LR       Utensils Used  --  spoon;cup;straw  -LR       Consistencies Trialed  --  pureed;thin liquids  -LR          Clinical Swallow Eval    Oral Prep Phase  --  WFL  -LR       Oral Transit  --  WFL  -LR       Oral Residue  --  WFL  -LR       Pharyngeal Phase  --  suspected pharyngeal impairment  -LR       Esophageal Phase  --  unremarkable  -LR       Clinical Swallow Evaluation Summary  --  Clinical swallow eval completed. Pt given thin liquids via spoon, cup, and straw and pureed. Oral skills are WFL. Suspect pharyngeal dypshagia as pt demonstrated multiple swallows (3-4) per 1/2 tsp bolus. Delayed cough w/ thin liquids. Recommend FEES to further assess pharyngeal skills.   -LR          Pharyngeal Phase Concerns    Pharyngeal Phase Concerns  --  multiple swallows;cough  -LR       Multiple Swallows  --  thin;pudding  -LR       Cough  --  thin  -LR          Fiberoptic Endoscopic Evaluation of Swallowing (FEES)    Risks/Benefits Reviewed  risks/benefits explained;patient;family;agreed to eval  -LR  --       Nasal Entry  right:  -LR  --       Special Considerations  scope 837  -LR  --          Anatomy and Physiology    Anatomic Considerations  no anatomic structural deviation  -LR  --       Velopharyngeal  WFL  -LR  --       Base of Tongue  symmetrical  -LR  --       Epiglottis  WFL  -LR  --       Laryngeal Function Breathing  symmetrical  -LR  --       Laryngeal Function Phonation  symmetrical  -LR  --       Laryngeal Function to Breath Hold  TVT/FVF/Arytenoid;sustained closure  -LR  --       Secretion Rating Scale (Marco Antonio et al. 1996)  0- normal, no visible secretions  -LR  --       Secretion Description  thin  -LR  --       Ice Chips  elicited swallow;not aspirated  -LR  --       Spontaneous Swallow  frequency adequate  -LR  --       Sensory  sensed scope  -LR  --       Utensils Used  Spoon;Cup;Straw  -LR  --       Consistencies Trialed  thin  liquids;pudding/puree;Regular textures;Spoon;cup;straw  -LR  --          FEES Interpretation    Oral Phase  WFL  -LR  --          Initiation of Pharyngeal Swallow    Initiation of Pharyngeal Swallow  WFL  -LR  --       Pharyngeal Phase  impaired pharyngeal phase of swallowing  -LR  --       Penetration After the Swallow  thin liquids;secondary to residue;in pyriform sinuses  -LR  --       Rosenbek's Scale  thin:;2-->Level 2  -LR  --       Residue  thin liquids;pudding/puree;regular Textures;valleculae;pyriform sinuses;secondary to reduced base of tongue retraction;secondary to reduced posterior pharyngeal wall stripping;secondary to reduced laryngeal elevation;secondary to reduced hyolaryngeal excursion  -LR  --       Response to Residue  unable to clear residue;with spontaneous subsequent swallow residue mostly cleared w/ spont swallows  -LR  --       Attempted Compensatory Maneuvers  chin tuck;multiple swallows  -LR  --       Response to Attempted Compensatory Maneuvers  prevented penetration  -LR  --       FEES Summary  FEES completed. Pt presents with functional oral skills and Mild Pharyngeal Dysphagia. No aspiration occurred during the study. Mild to mod pharyngeal residue present in pyriforms with thin liquids which resulted in penetration after the swallow. Spontaneous subsequent swallow reduced residue. Residue also present in valleculae with all consistencies due to decreased tongue base/pharyngeal wall mvmt. Spontaneous subsequent swallows decreased amount of residue, but did not eliminate it. Chin tuck eliminated penetration with thin liquids. Recommend a mechanical soft diet with thin liquids, chin tuck, and alternate liquids and solids. f/u dysphagia tx.   -LR  --          Clinical Impression    SLP Swallowing Diagnosis  functional oral phase;mild;pharyngeal dysfunction  -LR  functional oral phase;suspected pharyngeal dysfunction  -LR       Functional Impact  risk of aspiration/pneumonia  -LR  risk of  aspiration/pneumonia  -LR       Rehab Potential/Prognosis, Swallowing  good, to achieve stated therapy goals  -LR  good, to achieve stated therapy goals  -LR       Swallow Criteria for Skilled Therapeutic Interventions Met  demonstrates skilled criteria  -LR  demonstrates skilled criteria  -LR          Recommendations    Therapy Frequency (Swallow)  5 days per week  -LR  --       Predicted Duration Therapy Intervention (Days)  until discharge  -LR  --       SLP Diet Recommendation  soft textures;thin liquids  -LR  NPO NPO until FEES  -LR       Recommended Diagnostics  --  FEES  -LR       Recommended Precautions and Strategies  upright posture during/after eating;small bites of food and sips of liquid;no straw;alternate between small bites of food and sips of liquid;chin Tuck  -LR  --       SLP Rec. for Method of Medication Administration  meds whole;with thin liquids  -LR  --       Monitor for Signs of Aspiration  yes;notify SLP if any concerns  -LR  --       Anticipated Dischage Disposition  unknown  -LR  --          Swallow Compensatory Strategies Goal 1 (SLP)    Activity (Swallow Compensatory Strategies/Techniques Goal 1, SLP)  compensatory strategies;postural techniques;during meal intake;alternate food/liquid intake;chin tuck posture  -LR  --       [place]/Accuracy (Swallow Compensatory Strategies/Techniques Goal 1, SLP)  independently (over 90% accuracy)  -LR  --       Time Frame (Swallow Compensatory Strategies/Techniques Goal 1, SLP)  short term goal (STG);by discharge  -LR  --         User Key  (r) = Recorded By, (t) = Taken By, (c) = Cosigned By    Initials Name Effective Dates    LR Renetta Barrera MS CCC-SLP 06/22/15 -           EDUCATION  The patient has been educated in the following areas:   Dysphagia (Swallowing Impairment).    SLP Recommendation and Plan  SLP Swallowing Diagnosis: functional oral phase, mild, pharyngeal dysfunction  SLP Diet Recommendation: soft textures, thin  liquids  Recommended Precautions and Strategies: upright posture during/after eating, small bites of food and sips of liquid, no straw, alternate between small bites of food and sips of liquid, chin Tuck  SLP Rec. for Method of Medication Administration: meds whole, with thin liquids     Monitor for Signs of Aspiration: yes, notify SLP if any concerns  Recommended Diagnostics: FEES  Swallow Criteria for Skilled Therapeutic Interventions Met: demonstrates skilled criteria  Anticipated Dischage Disposition: unknown  Rehab Potential/Prognosis, Swallowing: good, to achieve stated therapy goals  Therapy Frequency (Swallow): 5 days per week  Predicted Duration Therapy Intervention (Days): until discharge       Plan of Care Reviewed With: patient, spouse, daughter  Plan of Care Review  Plan of Care Reviewed With: patient, spouse, daughter    SLP GOALS     Row Name 06/14/19 1415             Swallow Compensatory Strategies Goal 1 (SLP)    Activity (Swallow Compensatory Strategies/Techniques Goal 1, SLP)  compensatory strategies;postural techniques;during meal intake;alternate food/liquid intake;chin tuck posture  -LR      [place]/Accuracy (Swallow Compensatory Strategies/Techniques Goal 1, SLP)  independently (over 90% accuracy)  -LR      Time Frame (Swallow Compensatory Strategies/Techniques Goal 1, SLP)  short term goal (STG);by discharge  -LR        User Key  (r) = Recorded By, (t) = Taken By, (c) = Cosigned By    Initials Name Provider Type    Renetta Bernstein MS CCC-SLP Speech and Language Pathologist             Time Calculation:   Time Calculation- SLP     Row Name 06/14/19 1539 06/14/19 1425          Time Calculation- SLP    SLP Start Time  1415  -LR  1315  -LR     SLP Received On  06/14/19  -LR  06/14/19  -LR       User Key  (r) = Recorded By, (t) = Taken By, (c) = Cosigned By    Initials Name Provider Type    Renetta Bernstein MS CCC-SLP Speech and Language Pathologist          Therapy Charges for Today      Code Description Service Date Service Provider Modifiers Qty    45657011092 HC ST EVAL ORAL PHARYNG SWALLOW 4 6/14/2019 Renetta Barrera, MS CCC-SLP GN 1    [id number] HC ST FIBEROPTIC ENDO EVAL SWALL 5 6/14/2019 Renetta Barrera, MS CCC-SLP GN 1               Renetta Barrera, MS CCC-SLP  6/14/2019

## 2019-06-14 NOTE — PROGRESS NOTES
INFECTIOUS DISEASE  Progress Note    Rodney Looney  1934  [id number]      Admission Date: 6/7/2019      Requesting Provider: No ref. provider found  Evaluating Physician: George Zayas MD    Reason for Consultation: Serratia bacteremia/pacemaker lead infection    History of present illness:    6/8/2019: Mr. Looney is an 84-year-old white male who is seen today for evaluation of Serratia bacteremia/pacemaker lead infection in the setting of a recent diagnosis of non-small cell lung cancer for which he underwent a right lower lobectomy at [facility] on 5/14.  His course at [facility] was complicated by Serratia bacteremia with 2 sets of positive blood cultures on 5/21 and 1 out of 2 sets positive on 5/23.  Subsequent blood cultures on 5/26 were negative.  A ZI revealed a mobile mass on the pacemaker lead, consistent with a pacemaker lead infection/endocarditis.  He was treated with intravenous Zosyn therapy and subsequently discharged on outpatient intravenous Zosyn.  Consideration was given for referral back to  for pacemaker extraction-his pacemaker was placed at .  Due to his underlying lung cancer and advanced age, he is being given consideration for chronic antibiotic suppression rather than pacemaker lead extraction.  He presented for follow-up in our office yesterday and was noted to have dyspnea with hypoxemia.  His O2 saturations were in the mid 80s while sitting.  He was not interested in readmission to [facility] and requested readmission to [facility].  He was evaluated in the emergency room where he was noted to have a right basilar pulmonary infiltrate by chest x-ray and chest CT scan.  His antibiotic therapy was switched from Zosyn to Merrem.  He has remained afebrile overnight.  He has a minimal cough with minimal sputum production.  He denies nausea, vomiting, and diarrhea.  He denies severe chest pain.  6/9/19: He remains  afebrile. He is less short of breath today.  No increased sputum production.  No diarrhea.   6/10/19: Scheduled for MBS today. He still complains of shortness of breath.  No sputum production with cough.  He remains afebrile.     6/11/19; doing well; no events overnight; no fever, rash, sore throat on oxygen    6/12/19; feels well; no events overnight; no fever, rash, sore throat      6/14/19; had to have surgical removal of lead/sternotomy; looks well; no events overnight; hemodynamically stable.  Past Medical History:   Diagnosis Date   • Cancer (CMS/HCC)    • Coronary artery disease    • Elevated cholesterol    • GERD (gastroesophageal reflux disease)    • History of BPH 2 yrs   • Hypertension    • Kidney disease    • Smoking        Past Surgical History:   Procedure Laterality Date   • CARDIAC SURGERY     • CORONARY ARTERY BYPASS GRAFT N/A 6/13/2019    Procedure: MEDIANSTERNOTOMY, REMOVAL OF RETAINED PACING LEAD;  Surgeon: Rohan Boyer MD;  Location: [facility];  Service: Cardiothoracic   • HERNIA REPAIR         History reviewed. No pertinent family history.    Social History     Socioeconomic History   • Marital status:      Spouse name: Not on file   • Number of children: Not on file   • Years of education: Not on file   • Highest education level: Not on file   Tobacco Use   • Smoking status: Former Smoker     Types: Cigarettes   • Smokeless tobacco: Never Used       No Known Allergies      Medication:    Current Facility-Administered Medications:   •  acetaminophen (TYLENOL) tablet 650 mg, 650 mg, Oral, Q4H PRN, Rohan Boyer MD  •  albuterol (PROVENTIL) nebulizer solution 0.083% 2.5 mg/3mL, 2.5 mg, Nebulization, Q4H PRN, Barney Carrera PA  •  amiodarone (PACERONE) tablet 200 mg, 200 mg, Oral, Q24H, Rohan Boyer MD  •  aspirin EC tablet 81 mg, 81 mg, Oral, Daily, Chino Taylor MD, 81 mg at 06/14/19 0854  •  atorvastatin (LIPITOR) tablet 40 mg, 40 mg, Oral, Nightly, Barney Carrera PA, 40 mg  at 06/13/19 2056  •  bisacodyl (DULCOLAX) EC tablet 10 mg, 10 mg, Oral, Daily PRN, Barney Carrera PA  •  bisacodyl (DULCOLAX) suppository 10 mg, 10 mg, Rectal, Daily PRN, Barney Carrera PA  •  calcium gluconate 1 g in sodium chloride 0.9 % 100 mL IVPB, 1 g, Intravenous, Once PRN **AND** calcium gluconate 6 g in sodium chloride 0.9 % 500 mL IVPB, 6 g, Intravenous, Once PRN, Barney Carrera PA  •  calcium gluconate 2 g in sodium chloride 0.9 % 100 mL IVPB, 2 g, Intravenous, Once PRN, Barney Carrera PA  •  chlorhexidine (PERIDEX) 0.12 % solution 15 mL, 15 mL, Mouth/Throat, Q12H, Barney Carrera PA, 15 mL at 06/13/19 2057  •  DEXMEDETOMIDINE 1000 MCG/250ML INFUSION infusion, 0.2-1.5 mcg/kg/hr, Intravenous, Continuous PRN, Barney Carrera PA  •  docusate sodium (COLACE) capsule 100 mg, 100 mg, Oral, BID PRN, Barney Carrera PA  •  famotidine (PEPCID) tablet 20 mg, 20 mg, Oral, BID PRN, Elan Chen MD, 20 mg at 06/11/19 2011  •  fentaNYL citrate (PF) (SUBLIMAZE) injection 25 mcg, 25 mcg, Intravenous, Q1H PRN, Rohan Boyer MD  •  ferric gluconate (FERRLECIT) 250 mg in sodium chloride 0.9 % 120 mL IVPB, 250 mg, Intravenous, Daily, Levar Pollard MD, Last Rate: 60 mL/hr at 06/14/19 0859, 250 mg at 06/14/19 0859  •  HYDROcodone-acetaminophen (NORCO) 7.5-325 MG per tablet 1 tablet, 1 tablet, Oral, Q4H PRN, Rohan Boyer MD, 1 tablet at 06/14/19 0854  •  magnesium hydroxide (MILK OF MAGNESIA) suspension 2400 mg/10mL 10 mL, 10 mL, Oral, Daily PRN, Banrey Carrera PA  •  Magnesium Sulfate 2 gram Bolus, followed by 8 gram infusion (total Mg dose 10 grams)- Mg less than or equal to 1mg/dL, 2 g, Intravenous, PRN **OR** Magnesium Sulfate 2 gram / 50mL Infusion (GIVE X 3 BAGS TO EQUAL 6GM TOTAL DOSE) - Mg 1.1 - 1.5 mg/dl, 2 g, Intravenous, PRN **OR** Magnesium Sulfate 4 gram infusion- Mg 1.6-1.9 mg/dL, 4 g, Intravenous, PRN, Levar Pollard MD, Last Rate: 25 mL/hr at 06/14/19 1123, 4 g  at 06/14/19 1123  •  meperidine (DEMEROL) injection 25 mg, 25 mg, Intravenous, Q4H PRN, Rohan Boyer MD  •  morphine injection 2 mg, 2 mg, Intravenous, Q30 Min PRN, Rohan Boyer MD, 2 mg at 06/14/19 1102  •  naloxone (NARCAN) injection 0.2 mg, 0.2 mg, Intravenous, PRN, Rohan Boyer MD  •  niCARdipine (CARDENE-IV) 40 mg/200 mL (0.2 mg/mL) in 0.9% NaCl infusion, 5-15 mg/hr, Intravenous, Continuous PRN, Barney Carrera PA  •  nitroglycerin 50 mg/250 mL (0.2 mg/mL) infusion, 5-200 mcg/min, Intravenous, Continuous PRN, Barney Carrera PA, Stopped at 06/14/19 0000  •  norepinephrine (LEVOPHED) 8 mg/250 mL (32 mcg/mL) in sodium chloride 0.9% infusion (premix), 0.02-0.3 mcg/kg/min, Intravenous, Continuous PRN, Barney Carrera PA, Stopped at 06/13/19 1930  •  ondansetron (ZOFRAN) tablet 4 mg, 4 mg, Oral, Q6H PRN **OR** ondansetron (ZOFRAN) injection 4 mg, 4 mg, Intravenous, Q6H PRN, Elan Chen MD, 4 mg at 06/07/19 2114  •  ondansetron (ZOFRAN) injection 4 mg, 4 mg, Intravenous, Q6H PRN, Barney Carrera PA  •  oxyCODONE-acetaminophen (PERCOCET) 5-325 MG per tablet 2 tablet, 2 tablet, Oral, Q4H PRN, Rohan Boyer MD, 2 tablet at 06/14/19 1123  •  phenylephrine (AKIRA-SYNEPHRINE) 50 mg/250 mL (0.2 mg/mL) in 0.9% NS  infusion, 0.5-3 mcg/kg/min, Intravenous, Continuous PRN, Barney Carrera PA  •  potassium chloride (MICRO-K) CR capsule 40 mEq, 40 mEq, Oral, PRN **OR** potassium chloride (KLOR-CON) packet 40 mEq, 40 mEq, Oral, PRN, Barney Carrera PA  •  potassium chloride 20 mEq in 50 mL IVPB, 20 mEq, Intravenous, Q1H PRN **OR** potassium chloride 20 mEq in 50 mL IVPB, 20 mEq, Intravenous, Q1H PRN, Barney Carrera PA  •  protamine injection 50 mg, 50 mg, Intravenous, Once, Barney Carrera PA  •  racemic epinephrine (RACEPINEPHRINE) nebulizer solution 0.5 mL, 0.5 mL, Nebulization, Once PRN, Barney Carrera PA  •  sennosides-docusate sodium (SENOKOT-S) 8.6-50 MG tablet 2 tablet, 2 tablet, Oral, BID, Black,  JENN Miller, 2 tablet at 19 0854  •  tamsulosin (FLOMAX) 24 hr capsule 0.4 mg, 0.4 mg, Oral, Daily, Levar Pollard MD, 0.4 mg at 19 1102  •  traMADol (ULTRAM) tablet 50 mg, 50 mg, Oral, Q6H PRN, Elan Chen MD  •  vitamin C (ASCORBIC ACID) tablet 500 mg, 500 mg, Oral, BID, Chino Taylor MD, 500 mg at 19 0854    Antibiotics:  Anti-Infectives (From admission, onward)    Ordered     Dose/Rate Route Frequency Start Stop    19 1654  meropenem (MERREM) 1 g/100 mL 0.9% NS VTB (mbp)     Comments:  Changed from q8h per extended infusion guidelines   Ordering Provider:  Elan Chen MD    1 g  over 3 Hours Intravenous Every 12 Hours Scheduled 19 2100 19 1159    19 1434  meropenem (MERREM) 1 g/100 mL 0.9% NS VTB (mbp)     Ordering Provider:  Leopoldo Rogers MD    1 g  over 30 Minutes Intravenous Once 19 1436 19 1617                Physical Exam:   Vital Signs  Temp (24hrs), Av.7 °F (36.5 °C), Min:95.2 °F (35.1 °C), Max:99.5 °F (37.5 °C)    Temp  Min: 95.2 °F (35.1 °C)  Max: 99.5 °F (37.5 °C)  BP  Min: 82/55  Max: 157/95  Pulse  Min: 84  Max: 147  Resp  Min: 13  Max: 22  SpO2  Min: 85 %  Max: 100 %    GENERAL: Awake and alert, in no acute distress.   HEENT: Normocephalic, atraumatic.  PERRL. EOMI. No conjunctival injection. No icterus. Oropharynx clear without evidence of thrush or exudate    HEART: RRR; No murmur, rubs, gallops.   LUNGS: He has expiratory wheezes on right   ABDOMEN: Soft, nontender, nondistended. Positive bowel sounds. No rebound or guarding. No mass or HSM.  EXT:  No cyanosis, clubbing or edema. No cord.    MSK: FROM without joint effusions noted arms/legs.    SKIN: Warm and dry, thoracotomy incision with small amount of serous drainage  NEURO: Oriented to PPT. No focal deficits on motor/sensory exam at arms/legs.  PSYCHIATRIC: Normal insight and judgement. Cooperative with PE    Laboratory Data    Results  from last 7 days   Lab Units 06/14/19  0341 06/13/19  1828 06/13/19  1456   WBC 10*3/mm3 9.79 9.51 10.22   HEMOGLOBIN g/dL 8.1* 7.8* 8.4*   HEMATOCRIT % 27.9* 26.1* 28.9*   PLATELETS 10*3/mm3 166 190 219     Results from last 7 days   Lab Units 06/14/19  0341   SODIUM mmol/L 132*   POTASSIUM mmol/L 4.9   CHLORIDE mmol/L 100   CO2 mmol/L 20.0*   BUN mg/dL 19   CREATININE mg/dL 1.26   GLUCOSE mg/dL 133*   CALCIUM mg/dL 9.0     Results from last 7 days   Lab Units 06/08/19  0526   ALK PHOS U/L 88   BILIRUBIN mg/dL 0.5   ALT (SGPT) U/L 12   AST (SGOT) U/L 16             Results from last 7 days   Lab Units 06/07/19  1435   LACTATE mmol/L 1.6             Estimated Creatinine Clearance: 47.8 mL/min (by C-G formula based on SCr of 1.26 mg/dL).      Microbiology:      6/7:  BC no growth                           Radiology:  Imaging Results (last 72 hours)     Procedure Component Value Units Date/Time    CT Chest Without Contrast [[id number]] Collected:  06/07/19 1453     Updated:  06/07/19 1610    Narrative:       EXAMINATION: CT CHEST WO CONTRAST-06/07/2019:      INDICATION: Hypoxemia after recent lobectomy, bleeding from posterior  incision, recent bacteremia but no fever now; R09.02-Hypoxemia;  J18.1-Lobar pneumonia, unspecified organism, lobar pneumonia.     TECHNIQUE: Multiple axial CT imaging was obtained of the chest without  the administration of intravenous contrast.     The radiation dose reduction device was turned on for each scan per the  ALARA (As Low as Reasonably Achievable) protocol.     COMPARISON: NONE.     FINDINGS: There is a small amount of pleural fluid identified along the  right lateral chest wall with a fracture involving the right posterior  sixth rib. Findings are likely postoperative. There is consolidation  posteriorly within the right lung base concerning for infiltrate. There  is some chronic interstitial changes identified likely postoperative.  Superimposed infection is likely within the  right lung base. Severe  emphysematous changes seen within the lung fields bilaterally with  bronchiectatic changes centrally. Severe emphysematous changes seen  within the upper lung fields. Old healed granulomatous disease seen  within the chest. The cardiac chambers are within normal limits. No  pericardial effusion. No bulky hilar or axillary lymphadenopathy. The  visualized upper abdomen is unremarkable.       Impression:       Findings concerning for dense consolidation and infiltrate  superimposed on chronic and emphysematous change within the right lung.  There is a fracture involving the right posterior sixth rib likely  postsurgical with some pleural thickening and small pleural fluid seen  on the right.     D:  06/07/2019  E:  06/07/2019     This report was finalized on 6/7/2019 4:07 PM by Dr. Jo Ann Galarza MD.       XR Chest 1 View [[id number]] Collected:  06/07/19 1306     Updated:  06/07/19 1423    Narrative:       EXAMINATION: XR CHEST 1 VW- 06/07/2019      INDICATION: SOA triage protocol      COMPARISON: NONE     FINDINGS: Portable chest reveals heart to be enlarged. Ill-defined  opacification seen at the right lung base with small right pleural  effusion. Deep line catheter on the right tip in the SVC. Cardiac pacer  leads in satisfactory position.       Impression:       Patchy ill-defined opacification seen within the right mid  and lower lung field with small right pleural effusion. Left lung is  clear.     D:  06/07/2019  E:  06/07/2019     This report was finalized on 6/7/2019 2:20 PM by Dr. Jo Ann Galarza MD.               Impression:   1.  Serratia bacteremia/Pacemaker lead infection- he has Serratia bacteremia with 2+ blood cultures of 5/21 and 1 out of 2 sets positive on 5/23 with subsequent blood cultures on 5/26- at [facility].  He had a mobile mass attached to the pacemaker lead by ZI at [facility] on 5/28.  This is highly suggestive of a pacemaker lead  infection.  He would require pacemaker lead extraction to cure this process but due to his advanced age and underlying lung cancer, consideration is being given for chronic suppression instead of a higher risk of lead extraction.  Dr. Zayas has been following him at [facility] and I will confer with him regarding this issue when he returns on Tuesday.  In the meantime, we will leave him on intravenous Merrem.  2.  Right basilar infiltrate- this does not appear particularly different when compared to his chest x-ray at [facility] of 5/30.  3.  Non-small cell lung cancer-status post right lower lobe lobectomy, 5/14/2019  4.  Acute hypoxic respiratory failure-if he clinically worsens, we may need to assess for pulmonary embolism.  5.  Stage II-III chronic kidney disease.  His antibiotic therapy will need to be dose adjusted for his renal dysfunction.  6.  History of atrial fibrillation  7.  Coronary artery disease  8.  Status post pacemaker implantation-this was performed at   9.  Blood loss anemia    PLAN/RECOMMENDATIONS:   Change meropenem to invanz 1 g iv daily  F/u wound pacemaker cultures      D/w family  Duration of abx probably 2 weeks following extraction               George Zayas MD  6/14/2019  2:09 PM

## 2019-06-15 NOTE — PROGRESS NOTES
Intensive Care Follow-up      LOS: 8 days     Mr. Rodney Looney, 84 y.o. male is followed for: Presence of cardiac pacemaker Pacemaker infection s/p lead extraction     Right mid-lower lobe infiltrate     Serratia bacteremia     H/O NSC lung cancer s/p RL lobectomy     Stage III CKD, GFR 30-59 ml/min     CAD     Hypercholesterolemia    Hypertension     Subjective - Interval History   84 y.o.male with relevant PMH of NSCLC s/p RLL lobectomy (per family early stage, no chemo / RT) 5/2019 @ [facility], Stage III CKD, PPM, Afib admitted 6/7/2019 w/ Serratia bacteremia / PM lead infection.  Underwent sternotomy and lead extraction in OR on 6/13 by Dr. Boyer  Patient thinks he is feeling a little bit better each day.  His potassium is slightly higher today and we will address it.  He has been ordered to the floor    The patient's relevant past medical, surgical and social history were reviewed and updated in Epic as appropriate.     Objective     Infusions:    niCARdipine 5-15 mg/hr    nitroglycerin 5-200 mcg/min Last Rate: Stopped (06/14/19 0000)     Medications:    aspirin 81 mg Oral Daily   atorvastatin 40 mg Oral Nightly   chlorhexidine 15 mL Mouth/Throat Q12H   ertapenem 1 g Intravenous Q24H   ferric gluconate 250 mg Intravenous Daily   metoprolol tartrate 25 mg Oral Q12H   Patiromer Sorbitex Calcium 8.4 g Oral Once   sennosides-docusate sodium 2 tablet Oral BID   tamsulosin 0.4 mg Oral Daily   vitamin C 500 mg Oral BID     Intake/Output       06/14/19 0700 - 06/15/19 0659 06/15/19 0700 - 06/16/19 0659    Intake (ml) 984.8 --    Output (ml) 1430 150    Net (ml) -445.2 -150        Vital Sign Min/Max for last 24 hours  Temp  Min: 98 °F (36.7 °C)  Max: 98.4 °F (36.9 °C)   BP  Min: 91/80  Max: 125/74   Pulse  Min: 101  Max: 151   Resp  Min: 18  Max: 20   SpO2  Min: 86 %  Max: 100 %   Flow (L/min)  Min: 4  Max: 4        Physical Exam:   GENERAL: Awake and alert and up in chair   HEENT: Normocephalic   LUNGS: Few slight  rales bilaterally   HEART: Is audible normal S1-S2 irregularly irregular   GI: Soft   EXTREMITIES: No edema   NEURO/PSYCH: No lateralizing deficits    Results from last 7 days   Lab Units 06/15/19  0313 06/14/19  0341 06/13/19  1828   WBC 10*3/mm3 10.31 9.79 9.51   HEMOGLOBIN g/dL 8.5* 8.1* 7.8*   PLATELETS 10*3/mm3 158 166 190     Results from last 7 days   Lab Units 06/15/19  0313 06/14/19  0341 06/13/19  1828   SODIUM mmol/L 130* 132* 136   POTASSIUM mmol/L 5.9* 4.9 4.6   CO2 mmol/L 24.0 20.0* 20.0*   BUN mg/dL 19 19 21   CREATININE mg/dL 1.30* 1.26 1.40*   MAGNESIUM mg/dL 2.6* 1.9 1.7   PHOSPHORUS mg/dL 2.9 2.9 2.7   GLUCOSE mg/dL 129* 133* 127*     Estimated Creatinine Clearance: 46.3 mL/min (A) (by C-G formula based on SCr of 1.3 mg/dL (H)).          Results from last 7 days   Lab Units 06/13/19 2005   PH, ARTERIAL pH units 7.430   PCO2, ARTERIAL mm Hg 33.2   PO2 ART mm Hg 74.3*     Lab Results   Component Value Date    LACTATE 1.6 06/07/2019          Images: Appears fairly stable with right pleural effusion    I reviewed the patient's results and images.     Impression      Active Hospital Problems    Diagnosis   • **Pacemaker infection s/p lead extraction      On 6/13/19 per Dr. Oneal      • CAD    • Hypercholesterolemia   • Hypertension   • Right mid-lower lobe infiltrate    • Serratia bacteremia    • H/O NSC lung cancer s/p RL lobectomy      At [facility] in May [id number]      • Stage III CKD, GFR 30-59 ml/min      Managed by Dr. Courtney               Plan        #1 hyperkalemia.  Will give a dose of Veltassa No. 2 status post pace maker lead extraction.  Postoperative plan as per CT surgery and patient has orders to the telemetry.  We will continue antibiotics as per ID.  We will continue other supportive measures.   Plan of care and goals reviewed with nurse   I discussed the patient's findings and my recommendations with patient and nursing staff      High level of risk due to:  illness with threat to life or  bodily function.     Олег Esquivel MD  Pulmonary and Critical Care Medicine

## 2019-06-15 NOTE — PLAN OF CARE
Problem: Patient Care Overview  Goal: Plan of Care Review  Outcome: Ongoing (interventions implemented as appropriate)   06/15/19 4696   Plan of Care Review   Progress improving   OTHER   Outcome Summary Pt increased ambulation distance to 140' using RW MinAx1 + 1 to manage equipment. Pt in and out of afib during ambulation, nsg notified, pt denied complaints. Pt will continue to benefit from IP PT services to further increase independence with mobility, cont to progress as tolerated.    Coping/Psychosocial   Plan of Care Reviewed With patient

## 2019-06-15 NOTE — THERAPY TREATMENT NOTE
Acute Care - Physical Therapy Treatment Note  [facility]     Patient Name: Rodney Looney  : 1934  MRN: [id number]  Today's Date: 6/15/2019  Onset of Illness/Injury or Date of Surgery: 19  Date of Referral to PT: 19  Referring Physician: MD Pollard    Admit Date: 2019    Visit Dx:    ICD-10-CM ICD-9-CM   1. Hypoxemia R09.02 799.02   2. Pneumonia of right lower lobe due to infectious organism (CMS/HCC) J18.1 486   3. Impaired functional mobility, balance, gait, and endurance Z74.09 V49.89   4. Impaired mobility and ADLs Z74.09 799.89   5. Oropharyngeal dysphagia R13.12 787.22   6. Complication associated with cardiac pacemaker lead, initial encounter T82.9XXA 996.72     Patient Active Problem List   Diagnosis   • Right mid-lower lobe infiltrate    • Serratia bacteremia    • Pacemaker infection s/p lead extraction    • H/O NSC lung cancer s/p RL lobectomy    • History of lobectomy of lung   • Hypoxia   • Stage III CKD, GFR 30-59 ml/min    • CAD    • Hypercholesterolemia   • Hypertension       Therapy Treatment    Rehabilitation Treatment Summary     Row Name 06/15/19 0933             Treatment Time/Intention    Discipline  physical therapist  -ZARI      Document Type  therapy note (daily note)  -ZARI      Subjective Information  no complaints  -ZARI      Mode of Treatment  individual therapy  -ZARI      Patient/Family Observations  No visitors present, Pt UIC.   -ZARI      Care Plan Review  patient/other agree to care plan;care plan/treatment goals reviewed;risks/benefits reviewed  -ZARI      Therapy Frequency (PT Clinical Impression)  daily  -ZARI      Patient Effort  excellent  -ZARI      Existing Precautions/Restrictions  fall;oxygen therapy device and L/min;cardiac  -ZARI      Treatment Considerations/Comments  Ant L shoulder sore from pacemaker removal.   -ZARI      Patient Response to Treatment  No adverse events.   -ZARI      Recorded by [ZARI] Mily Chapa, PT 06/15/19 1018      Row Name 06/15/19  0933             Vital Signs    Pre Systolic BP Rehab  116  -ZARI      Pre Treatment Diastolic BP  69  -ZARI      Post Systolic BP Rehab  123  -ZARI      Post Treatment Diastolic BP  74  -ZARI      Pretreatment Heart Rate (beats/min)  105  -ZARI      Intratreatment Heart Rate (beats/min)  149 Up and down throughout walk. Nsg notified.   -ZARI      Posttreatment Heart Rate (beats/min)  121  -ZARI      Pre SpO2 (%)  94  -ZARI      O2 Delivery Pre Treatment  nasal cannula  -ZARI      O2 Delivery Intra Treatment  nasal cannula  -ZARI      Post SpO2 (%)  90  -ZARI      O2 Delivery Post Treatment  nasal cannula  -ZARI      Pre Patient Position  Sitting  -ZARI      Intra Patient Position  Standing  -ZARI      Post Patient Position  Supine  -ZARI      Recorded by [ZARI] Mily Chapa, PT 06/15/19 1018      Row Name 06/15/19 0933             Cognitive Assessment/Intervention    Additional Documentation  Cognitive Assessment/Intervention (Group)  -ZARI      Recorded by [ZARI] Mily Chapa, PT 06/15/19 1018      Row Name 06/15/19 0933             Cognitive Assessment/Intervention- PT/OT    Affect/Mental Status (Cognitive)  WFL  -ZARI      Orientation Status (Cognition)  oriented x 4  -ZARI      Follows Commands (Cognition)  WFL  -ZARI      Safety Deficit (Cognitive)  mild deficit;safety precautions follow-through/compliance;safety precautions awareness;awareness of need for assistance;insight into deficits/self awareness  -ZARI      Personal Safety Interventions  fall prevention program maintained;gait belt;muscle strengthening facilitated;nonskid shoes/slippers when out of bed  -ZARI      Recorded by [ZARI] Mily Chapa, PT 06/15/19 1018      Row Name 06/15/19 0933             Bed Mobility Assessment/Treatment    Bed Mobility Assessment/Treatment  sit-supine  -ZARI      Sit-Supine [place] (Bed Mobility)  moderate assist (50% patient effort)  -ZARI      Bed Mobility, Safety Issues  decreased use of legs for bridging/pushing;decreased use of arms for  pushing/pulling  -ZARI      Assistive Device (Bed Mobility)  head of bed elevated  -ZARI      Comment (Bed Mobility)  Pt req assist at trunk and LEs.   -ZARI      Recorded by [ZARI] Mily Chapa, PT 06/15/19 1018      Row Name 06/15/19 0933             Transfer Assessment/Treatment    Transfer Assessment/Treatment  stand-sit transfer;sit-stand transfer  -ZARI      Recorded by [ZARI] Mily Chapa, PT 06/15/19 1018      Row Name 06/15/19 0933             Sit-Stand Transfer    Sit-Stand [place] (Transfers)  minimum assist (75% patient effort)  -ZARI      Assistive Device (Sit-Stand Transfers)  walker, front-wheeled  -ZARI      Recorded by [ZARI] Mily Chapa, PT 06/15/19 1018      Row Name 06/15/19 0933             Stand-Sit Transfer    Stand-Sit [place] (Transfers)  minimum assist (75% patient effort)  -ZARI      Assistive Device (Stand-Sit Transfers)  walker, front-wheeled  -ZRAI      Recorded by [ZARI] Mily Chapa, PT 06/15/19 1018      Row Name 06/15/19 0933             Gait/Stairs Assessment/Training    26972 - Gait Training Minutes   15  -ZARI      Gait/Stairs Assessment/Training  gait/ambulation independence;gait/ambulation assistive device;distance ambulated;gait pattern  -ZARI      [place] Level (Gait)  minimum assist (75% patient effort);1 person assist;1 person to manage equipment  -ZARI      Assistive Device (Gait)  walker, front-wheeled  -ZARI      Distance in Feet (Gait)  140  -ZARI      Pattern (Gait)  step-through  -ZARI      Deviations/Abnormal Patterns (Gait)  bilateral deviations;gait speed decreased;stride length decreased  -ZARI      Bilateral Gait Deviations  forward flexed posture;heel strike decreased;weight shift ability decreased  -ZARI      Comment (Gait/Stairs)  Pt req 1 standing rest break due to fatigue. Pt ambulated slowly, decreased toe clearance at times.   -ZARI      Recorded by [ZARI] Mily Chapa, PT 06/15/19 1018      Row Name 06/15/19 0933             Motor Skills Assessment/Interventions     Additional Documentation  Balance (Group)  -ZARI      Recorded by [ZARI] Mily Chapa, PT 06/15/19 1018      Row Name 06/15/19 0933             Therapeutic Exercise    29620 - PT Therapeutic Activity Minutes  10  -ZARI      Recorded by [ZARI] Mily Chapa, PT 06/15/19 1018      Row Name 06/15/19 0933             Balance    Balance  static sitting balance;static standing balance  -ZARI      Recorded by [ZARI] Mily Chapa, PT 06/15/19 1018      Row Name 06/15/19 0933             Static Sitting Balance    Level of [place] (Unsupported Sitting, Static Balance)  supervision  -ZARI      Sitting Position (Unsupported Sitting, Static Balance)  sitting on edge of bed  -ZARI      Time Able to Maintain Position (Unsupported Sitting, Static Balance)  2 to 3 minutes  -ZARI      Recorded by [ZARI] Mily Chapa, PT 06/15/19 1018      Row Name 06/15/19 0933             Static Standing Balance    Level of [place] (Supported Standing, Static Balance)  contact guard assist  -ZARI      Time Able to Maintain Position (Supported Standing, Static Balance)  30 to 45 seconds  -ZARI      Assistive Device Utilized (Supported Standing, Static Balance)  walker, rolling  -ZARI      Recorded by [ZARI] Mily Chapa, PT 06/15/19 1018      Row Name 06/15/19 0933             Positioning and Restraints    Pre-Treatment Position  sitting in chair/recliner  -ZARI      Post Treatment Position  bed  -ZARI      In Bed  notified nsg;supine;call light within reach;encouraged to call for assist  -ZARI      Recorded by [ZARI] Mily Chapa, PT 06/15/19 1018      Row Name 06/15/19 0933             Pain Assessment    Additional Documentation  Pain Scale: Numbers Pre/Post-Treatment (Group)  -ZARI      Recorded by [ZARI] Mily Chapa, PT 06/15/19 1018      Row Name 06/15/19 0933             Pain Scale: Numbers Pre/Post-Treatment    Pain Scale: Numbers, Pretreatment  0/10 - no pain  -ZARI      Pain Scale: Numbers, Post-Treatment  2/10  -ZARI      Pain Location - Side   Left  -ZARI      Pain Location - Orientation  anterior  -ZARI      Pain Location  shoulder  -ZARI      Pre/Post Treatment Pain Comment  Pacemaker removal site.   -ZARI      Pain Intervention(s)  Repositioned;Ambulation/increased activity  -ZARI      Recorded by [ZARI] Mily Chapa, BRYAN 06/15/19 1018      Row Name                Wound 06/07/19 1735 Right thoracic spine incision;surgical    Wound - Properties Group Date first assessed: 06/07/19 [TL] Time first assessed: 1735 [TL] Side: Right [TL] Location: thoracic spine [TL2] Type: incision;surgical [TL] Recorded by:  [TL] Wesley DIXON RN 06/07/19 1735 [TL2] Wesley DIXON RN 06/07/19 1736    Row Name                Wound 06/13/19 0951 Left anterior chest incision;surgical    Wound - Properties Group Date first assessed: 06/13/19 [JS] Time first assessed: 0951 [JS] Side: Left [JS] Orientation: anterior [MH] Location: chest [JS] Type: incision;surgical [JS] Recorded by:  [JS] Josh Hart RN 06/13/19 1157 [MH] Lg Max RN 06/13/19 2330    Row Name                Wound 06/13/19 1930 Right lateral other (see notes) other (see comments)    Wound - Properties Group Date first assessed: 06/13/19 [MH] Time first assessed: 1930 [MH] Present On Admission : yes [MH] Side: Right [MH] Orientation: lateral [MH] Location: other (see notes) [MH], lateral chest from previous chest tube  Type: other (see comments) [MH], previous chest tube insertion  Recorded by:  [MH] Lg Max RN 06/13/19 2319    Row Name                Wound 06/14/19 0800 midline chest incision    Wound - Properties Group Date first assessed: 06/14/19 [BH] Time first assessed: 0800 [BH] Orientation: midline [BH] Location: chest [BH] Type: incision [BH] Additional Comments: midsternal incision from surgery yesterday, 6/14 [BH] Recorded by:  [BH] Christine Kaufman RN 06/14/19 1203    Row Name 06/15/19 0933             Coping    Observed Emotional State  cooperative;calm  -ZARI      Verbalized Emotional  State  acceptance  -ZARI      Recorded by [ZARI] Mily Chapa, PT 06/15/19 1018      Row Name 06/15/19 0933             Plan of Care Review    Plan of Care Reviewed With  patient  -ZARI      Recorded by [ZARI] Mily Chapa, PT 06/15/19 1018      Row Name 06/15/19 0933             Outcome Summary/Treatment Plan (PT)    Daily Summary of Progress (PT)  progress toward functional goals is good  -ZARI      Recorded by [ZARI] Mily Chapa, PT 06/15/19 1018        User Key  (r) = Recorded By, (t) = Taken By, (c) = Cosigned By    Initials Name Effective Dates Discipline     Christine Kaufman RN 01/19/18 -  Nurse    Lg Baker, RN 06/27/16 -  Nurse    Wesley BARRIOS, RN 08/07/17 -  Nurse    Mily Badillo, PT 08/30/18 -  PT    Josh Solis, RN 01/30/19 -  Nurse          Wound 06/07/19 1735 Right thoracic spine incision;surgical (Active)   Dressing Appearance open to air 6/15/2019  8:00 AM   Closure Open to air 6/15/2019  8:00 AM   Base scab 6/15/2019  8:00 AM   Drainage Amount none 6/15/2019  8:00 AM   Care, Wound cleansed with;antimicrobial agent applied;other (see comments) 6/14/2019  8:00 PM       Wound 06/13/19 0951 Left anterior chest incision;surgical (Active)   Dressing Appearance dry;intact;no drainage 6/15/2019  8:00 AM   Closure VIJAY 6/15/2019  8:00 AM   Drainage Amount none 6/15/2019  8:00 AM   Dressing Care, Wound hydrogel 6/14/2019  8:00 PM       Wound 06/13/19 1930 Right lateral other (see notes) other (see comments) (Active)   Dressing Appearance open to air 6/15/2019  8:00 AM   Base pink 6/15/2019  8:00 AM   Drainage Amount none 6/15/2019  8:00 AM   Care, Wound cleansed with;antimicrobial agent applied;other (see comments) 6/14/2019  8:00 PM       Wound 06/14/19 0800 midline chest incision (Active)   Dressing Appearance open to air 6/15/2019  8:00 AM   Base scab 6/15/2019  8:00 AM   Drainage Amount none 6/15/2019  8:00 AM   Care, Wound cleansed with;antimicrobial agent applied;other  (see comments) 6/14/2019  8:00 PM   Dressing Care, Wound dressing removed;open to air 6/14/2019  8:00 PM           Physical Therapy Education     Title: PT OT SLP Therapies (In Progress)     Topic: Physical Therapy (In Progress)     Point: Mobility training (Done)     Learning Progress Summary           Patient Acceptance, E, VU,NR by ZARI at 6/15/2019  9:33 AM    Acceptance, E, NR by JB1 at 6/14/2019  9:30 AM    Acceptance, E, NR by  at 6/8/2019 11:54 AM    Comment:  Educated pt on completing AP, HS, hip abd, and SLR twice throughout the day (one more time tonight).                   Point: Home exercise program (In Progress)     Learning Progress Summary           Patient Acceptance, E, NR by [email or web address] at 6/14/2019  9:30 AM    Acceptance, E, NR by  at 6/8/2019 11:54 AM    Comment:  Educated pt on completing AP, HS, hip abd, and SLR twice throughout the day (one more time tonight).                   Point: Body mechanics (Done)     Learning Progress Summary           Patient Acceptance, E, VU,NR by ZARI at 6/15/2019  9:33 AM    Acceptance, E, NR by JB1 at 6/14/2019  9:30 AM                   Point: Precautions (Done)     Learning Progress Summary           Patient Acceptance, E, VU,NR by ZARI at 6/15/2019  9:33 AM    Acceptance, E, NR by JB1 at 6/14/2019  9:30 AM    Acceptance, E, NR by  at 6/8/2019 11:54 AM    Comment:  Educated pt on completing AP, HS, hip abd, and SLR twice throughout the day (one more time tonight).                               User Key     Initials Effective Dates Name Provider Type Discipline    [facility] 06/19/15 -  Kirstie Serra, PT Physical Therapist PT     06/15/16 -  Aria Gong, PT Physical Therapist PT     08/30/18 -  Mily Chapa, PT Physical Therapist PT                PT Recommendation and Plan  Therapy Frequency (PT Clinical Impression): daily  Outcome Summary/Treatment Plan (PT)  Daily Summary of Progress (PT): progress toward functional goals is good  Plan of Care Reviewed  With: patient  Progress: improving  Outcome Summary: Pt increased ambulation distance to 140' using RW MinAx1 + 1 to manage equipment. Pt in and out of afib during ambulation, nsg notified, pt denied complaints. Pt will continue to benefit from IP PT services to further increase independence with mobility, cont to progress as tolerated.   Outcome Measures     Row Name 06/15/19 0933 06/14/19 0930 06/12/19 1421       How much help from another person do you currently need...    Turning from your back to your side while in flat bed without using bedrails?  4  -ZARI  3  -JBA  --    Moving from lying on back to sitting on the side of a flat bed without bedrails?  3  -ZARI  3  -JBA  --    Moving to and from a bed to a chair (including a wheelchair)?  3  -AZRI  3  -JBA  --    Standing up from a chair using your arms (e.g., wheelchair, bedside chair)?  3  -ZARI  3  -JBA  --    Climbing 3-5 steps with a railing?  2  -ZARI  2  -JBA  --    To walk in hospital room?  3  -ZARI  3  -JBA  --    AM-PAC 6 Clicks Score  18  -ZARI  17  -JBA  --       How much help from another is currently needed...    Putting on and taking off regular lower body clothing?  --  --  3  -AC    Bathing (including washing, rinsing, and drying)  --  --  3  -AC    Toileting (which includes using toilet bed pan or urinal)  --  --  3  -AC    Putting on and taking off regular upper body clothing  --  --  3  -AC    Taking care of personal grooming (such as brushing teeth)  --  --  4  -AC    Eating meals  --  --  4  -AC    Score  --  --  20  -AC       Functional Assessment    Outcome Measure Options  AM-PAC 6 Clicks Basic Mobility (PT)  -ZARI  --  AM-PAC 6 Clicks Daily Activity (OT)  -AC      User Key  (r) = Recorded By, (t) = Taken By, (c) = Cosigned By    Initials Name Provider Type    Kirstie Nielsen, PT Physical Therapist    Alecia Lott, OT Occupational Therapist    Mily Badillo, PT Physical Therapist         Time Calculation:   PT Charges     Row Name  06/15/19 0933             Time Calculation    Start Time  0933  -ZARI      PT Received On  06/15/19  -ZARI      PT Goal Re-Cert Due Date  06/24/19  -ZARI         Time Calculation- PT    Total Timed Code Minutes- PT  25 minute(s)  -ZARI         Timed Charges    19402 - Gait Training Minutes   15  -ZARI      87890 - PT Therapeutic Activity Minutes  10  -ZARI        User Key  (r) = Recorded By, (t) = Taken By, (c) = Cosigned By    Initials Name Provider Type    Mily Badillo, PT Physical Therapist        Therapy Charges for Today     Code Description Service Date Service Provider Modifiers Qty    83335138764 HC GAIT TRAINING EA 15 MIN 6/15/2019 Mily Chapa, PT GP 1    62827056742 HC PT THERAPEUTIC ACT EA 15 MIN 6/15/2019 Mily Chapa, PT GP 1          PT G-Codes  Outcome Measure Options: AM-PAC 6 Clicks Basic Mobility (PT)  AM-PAC 6 Clicks Score: 18  Score: 20    Mily Chapa PT  6/15/2019

## 2019-06-15 NOTE — PLAN OF CARE
Problem: Patient Care Overview  Goal: Plan of Care Review  Outcome: Ongoing (interventions implemented as appropriate)   06/15/19 2891   Plan of Care Review   Progress improving   OTHER   Outcome Summary Remains afib, SBP 100s. Up wit 2. gait unsteady. [place] dc'd. ROBBIE bourgeois'd. Left subclavian dressing CDI. Right tunnel cath, dressing changed today. afebrile. Tolerating PO. orders to tele. niki givem once for k+5.9   Coping/Psychosocial   Plan of Care Reviewed With patient

## 2019-06-15 NOTE — PROGRESS NOTES
[facility] at [facility]  Progress Note       LOS: 8 days   Patient Care Team:  Dana Isidro DO as PCP - General (Family Medicine)    Chief Complaint:  Pacemaker lead infection    Subjective     Interval History: Patient sitting up in chair.  Reports he ambulated the halls yesterday without issue.  Remains in atrial fibrillation with rapid ventricular rates though overall asymptomatic.  Mild BUSCH which he reports is his baseline.  No new complaints today.  Overall slow improvement.         Review of Systems:   Pertinent positives in HPI, all others reviewed and negative.      Objective       Current Facility-Administered Medications:   •  acetaminophen (TYLENOL) tablet 650 mg, 650 mg, Oral, Q4H PRN, Rohan Boyer MD  •  amiodarone (PACERONE) tablet 200 mg, 200 mg, Oral, Q24H, Rohan Boyer MD, 200 mg at 06/14/19 1518  •  aspirin EC tablet 81 mg, 81 mg, Oral, Daily, Chino Taylor MD, 81 mg at 06/14/19 0854  •  atorvastatin (LIPITOR) tablet 40 mg, 40 mg, Oral, Nightly, Barney Carrera PA, 40 mg at 06/14/19 2019  •  bisacodyl (DULCOLAX) EC tablet 10 mg, 10 mg, Oral, Daily PRN, Barney Carrera PA  •  bisacodyl (DULCOLAX) suppository 10 mg, 10 mg, Rectal, Daily PRN, Barney Carrera PA  •  calcium gluconate 2 g in sodium chloride 0.9 % 100 mL IVPB, 2 g, Intravenous, Once PRN, Barney Carrera PA  •  chlorhexidine (PERIDEX) 0.12 % solution 15 mL, 15 mL, Mouth/Throat, Q12H, Barney Carrera PA, 15 mL at 06/14/19 2019  •  docusate sodium (COLACE) capsule 100 mg, 100 mg, Oral, BID PRN, Barney Carrera PA  •  ertapenem (INVanz) 1 g/100 mL 0.9% NS VTB (mbp), 1 g, Intravenous, Q24H, George Zayas MD, 1 g at 06/14/19 1658  •  famotidine (PEPCID) tablet 20 mg, 20 mg, Oral, BID PRN, Elan Chen MD, 20 mg at 06/11/19 2011  •  ferric gluconate (FERRLECIT) 250 mg in sodium chloride 0.9 % 120 mL IVPB, 250 mg, Intravenous, Daily, Levar Pollard MD, Last Rate: 60  mL/hr at 06/14/19 0859, 250 mg at 06/14/19 0859  •  HYDROcodone-acetaminophen (NORCO) 7.5-325 MG per tablet 1 tablet, 1 tablet, Oral, Q4H PRN, Rohan Boyer MD, 1 tablet at 06/14/19 1705  •  magnesium hydroxide (MILK OF MAGNESIA) suspension 2400 mg/10mL 10 mL, 10 mL, Oral, Daily PRN, Barney Carrera PA  •  Magnesium Sulfate 2 gram Bolus, followed by 8 gram infusion (total Mg dose 10 grams)- Mg less than or equal to 1mg/dL, 2 g, Intravenous, PRN **OR** Magnesium Sulfate 2 gram / 50mL Infusion (GIVE X 3 BAGS TO EQUAL 6GM TOTAL DOSE) - Mg 1.1 - 1.5 mg/dl, 2 g, Intravenous, PRN **OR** Magnesium Sulfate 4 gram infusion- Mg 1.6-1.9 mg/dL, 4 g, Intravenous, PRN, Levar Pollard MD, Last Rate: 25 mL/hr at 06/14/19 1123, 4 g at 06/14/19 1123  •  morphine injection 2 mg, 2 mg, Intravenous, Q30 Min PRN, Rohan Boyer MD, 2 mg at 06/14/19 1102  •  naloxone (NARCAN) injection 0.2 mg, 0.2 mg, Intravenous, PRN, Rohan Boyer MD  •  niCARdipine (CARDENE-IV) 40 mg/200 mL (0.2 mg/mL) in 0.9% NaCl infusion, 5-15 mg/hr, Intravenous, Continuous PRN, Barney Carrera PA  •  nitroglycerin 50 mg/250 mL (0.2 mg/mL) infusion, 5-200 mcg/min, Intravenous, Continuous PRN, Barney Carrera PA, Stopped at 06/14/19 0000  •  ondansetron (ZOFRAN) injection 4 mg, 4 mg, Intravenous, Q6H PRN, Barney Carrera PA  •  oxyCODONE-acetaminophen (PERCOCET) 5-325 MG per tablet 2 tablet, 2 tablet, Oral, Q4H PRN, Rohan Boyer MD, 2 tablet at 06/15/19 0154  •  potassium chloride (MICRO-K) CR capsule 40 mEq, 40 mEq, Oral, PRN **OR** potassium chloride (KLOR-CON) packet 40 mEq, 40 mEq, Oral, PRN, Barney Carrera PA  •  potassium chloride 20 mEq in 50 mL IVPB, 20 mEq, Intravenous, Q1H PRN **OR** potassium chloride 20 mEq in 50 mL IVPB, 20 mEq, Intravenous, Q1H PRN, Barney Carrera PA  •  sennosides-docusate sodium (SENOKOT-S) 8.6-50 MG tablet 2 tablet, 2 tablet, Oral, BID, Barney Carrera PA, 2 tablet at 06/14/19 2019  •  tamsulosin (FLOMAX) 24  "hr capsule 0.4 mg, 0.4 mg, Oral, Daily, Levar Pollard MD, 0.4 mg at 06/14/19 1102  •  traMADol (ULTRAM) tablet 50 mg, 50 mg, Oral, Q6H PRN, Elan Chen MD  •  vitamin C (ASCORBIC ACID) tablet 500 mg, 500 mg, Oral, BID, Chino Taylor MD, 500 mg at 06/14/19 2019    Vital Sign Min/Max for last 24 hours  Temp  Min: 97.6 °F (36.4 °C)  Max: 98.4 °F (36.9 °C)   BP  Min: 91/80  Max: 138/84   Pulse  Min: 99  Max: 128   Resp  Min: 18  Max: 22   SpO2  Min: 87 %  Max: 100 %   Flow (L/min)  Min: 4  Max: 5   No Data Recorded     Flowsheet Rows      First Filed Value   Admission Height  182.9 cm (72\") Documented at 06/07/2019 1212   Admission Weight  81.6 kg (180 lb) Documented at 06/07/2019 1212            Intake/Output Summary (Last 24 hours) at 6/15/2019 0807  Last data filed at 6/15/2019 0600  Gross per 24 hour   Intake 984.8 ml   Output 1295 ml   Net -310.2 ml       Physical Exam:     General Appearance:    Alert, cooperative, in no acute distress   Lungs:    Crackles at bases bilaterally with decreased breath sounds right greater than left , respiratory effort normal    Heart:   Irregular irregular rate and rhythm tachycardic normal S1-S2.  PMI stable.   Chest Wall:    Midsternal incision well approximated, L chest wall PM site with dressing C/D/I   Abdomen:     Normal bowel sounds, no masses, soft, non-tender, non-distended, benign.   Extremities:   Moves all extremities well, no edema, no cyanosis, no             redness   Pulses:   Pulses palpable and equal bilaterally   Skin:   No bleeding, bruising or rash        Results Review:   Results from last 7 days   Lab Units 06/15/19  0313 06/14/19  0341 06/13/19  1828   WBC 10*3/mm3 10.31 9.79 9.51   HEMOGLOBIN g/dL 8.5* 8.1* 7.8*   HEMATOCRIT % 29.6* 27.9* 26.1*   PLATELETS 10*3/mm3 158 166 190     Results from last 7 days   Lab Units 06/15/19  0313 06/14/19  0341 06/13/19  1828   SODIUM mmol/L 130* 132* 136   POTASSIUM mmol/L 5.9* 4.9 4.6 "   CHLORIDE mmol/L 98 100 104   CO2 mmol/L 24.0 20.0* 20.0*   BUN mg/dL 19 19 21   CREATININE mg/dL 1.30* 1.26 1.40*   GLUCOSE mg/dL 129* 133* 127*                  Results from last 7 days   Lab Units 06/14/19  0341   PROBNP pg/mL 1,641.0     Results from last 7 days   Lab Units 06/14/19  0341 06/13/19  1456   PROTIME Seconds 16.5* 16.6*   INR  1.40* 1.41*   APTT seconds  --  41.2*         Results from last 7 days   Lab Units 06/15/19  0313   MAGNESIUM mg/dL 2.6*       Intake/Output Summary (Last 24 hours) at 6/15/2019 0807  Last data filed at 6/15/2019 0600  Gross per 24 hour   Intake 984.8 ml   Output 1295 ml   Net -310.2 ml       I personally viewed and interpreted the patient's EKG/Telemetry data    EKG: Atrial fibrillation,  bpm    Telemetry: Atrial fibrillation, HR  bpm    Ejection Fraction  No results found for: EF    Echo EF Estimated  No results found for: ECHOEFEST      Present on Admission:  • Right mid-lower lobe infiltrate   • Serratia bacteremia   • Pacemaker infection s/p lead extraction   • H/O NSC lung cancer s/p RL lobectomy   • Stage III CKD, GFR 30-59 ml/min   • CAD   • Hypercholesterolemia  • Hypertension    Assessment/Plan   1. Pacemaker lead infection: Clinically improved.  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  Echogenic structure also noted on TTE this admission.   - Patient is s/p attempted lead extraction per Dr. Oneal, now s/p sternotomy and lead extraction per Dr. Boyer, 6/13/19     2. Persistent atrial fibrillation:   - Initiated on amiodarone 05/2019 at [place].  Overall asymptomatic  - Would favor discontinuation of amiodarone with focus on rate control as per prior plan; adjust meds as necessary for adequate rate control.  - CHADSVASc = 3, restart Xarelto when appropriate after CT out     3. Bacteremia:  - Serratia positive blood cultures at OSH  - ID following     4. NSCLC:  -  S/p RLL resection, [facility]      Plan: Discontinue amiodarone with plans for rate control as patient asymptomatic.  Add metoprolol 25 mg bid and titrate as tolerated for better rate control.    Electronically signed by LINDA Ruano, 06/15/19, 8:07 AM.      IKeyon MD, personally performed the services face to face as described and documented by the above named individual. I have made any necessary edits and it is both accurate and complete 6/15/2019  10:37 AM

## 2019-06-16 PROBLEM — I48.0 PAF (PAROXYSMAL ATRIAL FIBRILLATION) (HCC): Status: ACTIVE | Noted: 2019-01-01

## 2019-06-16 NOTE — PROGRESS NOTES
[facility] Medicine Services  PROGRESS NOTE    Patient Name: Rodney Looney  : 1934  MRN: [id number]    Date of Admission: 2019  Length of Stay: 9  Primary Care Physician: Dana Isidro DO    Subjective   Subjective     CC:  F/U pacemaker infection    HPI:  Patient seen this morning. Has no major complaints. Wants to walk more.    Review of Systems  Gen-no fevers, no chills  CV-no chest pain, no palpitations  Resp-no cough, no dyspnea  GI-no N/V/D, no abd pain      Otherwise ROS is negative except as mentioned in the HPI.    Objective   Objective     Vital Signs:   Temp:  [98 °F (36.7 °C)-98.9 °F (37.2 °C)] 98 °F (36.7 °C)  Heart Rate:  [] 106  Resp:  [16-21] 16  BP: (101-127)/(65-72) 102/66        Physical Exam:  Gen-no acute distress  HENT-NCAT, mucous membranes moist  CV-irregularly irregular, S1 S2 normal, no m/r/g  Resp-mild rales at the right base, no wheezes, non-labored  Abd-soft, NT, ND, +BS  Ext-no edema  Neuro-A&Ox3, no focal deficits  Skin-no rashes  Psych-appropriate mood    Results Reviewed:  I have personally reviewed current lab, radiology, and data and agree.    Results from last 7 days   Lab Units 198 06/15/19  0313 06/14/19  0341  19  1456   WBC 10*3/mm3 8.65 10.31 9.79   < > 10.22   HEMOGLOBIN g/dL 8.1* 8.5* 8.1*   < > 8.4*   HEMATOCRIT % 28.0* 29.6* 27.9*   < > 28.9*   PLATELETS 10*3/mm3 160 158 166   < > 219   INR   --   --  1.40*  --  1.41*    < > = values in this interval not displayed.     Results from last 7 days   Lab Units 19  0408 06/15/19  0313 19  0341   SODIUM mmol/L 133* 130* 132*   POTASSIUM mmol/L 4.5 5.9* 4.9   CHLORIDE mmol/L 98 98 100   CO2 mmol/L 24.0 24.0 20.0*   BUN mg/dL 22 19 19   CREATININE mg/dL 1.33* 1.30* 1.26   GLUCOSE mg/dL 91 129* 133*   CALCIUM mg/dL 9.3 9.6 9.0   ALT (SGPT) U/L  --  17  --    AST (SGOT) U/L  --  24  --    PROBNP pg/mL  --   --  1,641.0     Estimated Creatinine  Clearance: 45.7 mL/min (A) (by C-G formula based on SCr of 1.33 mg/dL (H)).    Microbiology Results Abnormal     Procedure Component Value - Date/Time    Tissue / Bone Culture - Tissue, Heart [[id number]] Collected:  06/13/19 1326    Lab Status:  Final result Specimen:  Tissue from Heart Updated:  06/16/19 0832     Tissue Culture No growth at 3 days     Gram Stain Rare (1+) WBCs seen      No organisms seen    Wound Culture - Wound, Heart [[id number]] Collected:  06/13/19 1049    Lab Status:  Final result Specimen:  Wound from Heart Updated:  06/16/19 0832     Wound Culture No growth at 3 days     Gram Stain Many (4+) Red blood cells      Occasional WBCs seen      No organisms seen    AFB Culture - Tissue, Heart [[id number]] Collected:  06/13/19 1326    Lab Status:  Preliminary result Specimen:  Tissue from Heart Updated:  06/14/19 1352     AFB Stain No acid fast bacilli seen on concentrated smear    Blood Culture - Blood, Arm, Right [[id number]] Collected:  06/07/19 1425    Lab Status:  Final result Specimen:  Blood from Arm, Right Updated:  06/12/19 1500     Blood Culture No growth at 5 days    Blood Culture - Blood, Arm, Left [[id number]] Collected:  06/07/19 1430    Lab Status:  Final result Specimen:  Blood from Arm, Left Updated:  06/12/19 1500     Blood Culture No growth at 5 days          Imaging Results (last 24 hours)     Procedure Component Value Units Date/Time    XR Chest 1 View [[id number]] Collected:  06/15/19 0811     Updated:  06/15/19 1148    Narrative:          EXAMINATION: XR CHEST 1 VW - 06/15/2019      INDICATION:  R09.02-Hypoxemia; J18.1-Lobar pneumonia, unspecified  organism; Z74.09-Other reduced mobility; R13.12-Dysphagia, oropharyngeal  phase; T82.9XXA-Unspecified complication of cardiac and vascular  prosthetic device, implant and graft, initial encounter.      COMPARISON: Chest x-ray 06/14/2019     FINDINGS: Interval removal of esophagogastric tube with support hardware  otherwise unchanged.  Cardiac silhouette enlarged and similar to prior  with persistent right lower lung opacifications and effusion without  change. No pneumothorax or new parenchymal findings.           Impression:       Interval removal of esophagogastric tube; otherwise no  significant interval change.     DICTATED:   06/15/2019  EDITED/ls :   06/15/2019              Results for orders placed during the hospital encounter of 06/07/19   Adult Transthoracic Echo Complete W/ Cont if Necessary Per Protocol    Narrative · Estimated EF appears to be in the range of 61 - 65%.  · Left ventricular systolic function is normal.  · Right ventricular cavity is mildly dilated.  · Left atrial cavity size is mild-to-moderately dilated.  · Mild aortic valve regurgitation is present.  · Calculated right ventricular systolic pressure from tricuspid   regurgitation is 69 mmHg.  · Moderate to severe tricuspid valve regurgitation is present.  · Thin fibrinous mobile echogenic structures possibly attached to the   right atrial lead, versus prominent Chiari network. ZI recommended for   follow-up if clinically appropriate..          I have reviewed the medications:  Scheduled Meds:  aspirin 81 mg Oral Daily   atorvastatin 40 mg Oral Nightly   chlorhexidine 15 mL Mouth/Throat Q12H   ertapenem 1 g Intravenous Q24H   ferric gluconate 250 mg Intravenous Daily   metoprolol tartrate 25 mg Oral Q12H   sennosides-docusate sodium 2 tablet Oral BID   tamsulosin 0.4 mg Oral Daily   vitamin C 500 mg Oral BID     Continuous Infusions:  niCARdipine 5-15 mg/hr    nitroglycerin 5-200 mcg/min Last Rate: Stopped (06/14/19 0000)     PRN Meds:.•  acetaminophen  •  bisacodyl  •  bisacodyl  •  calcium gluconate IVPB  •  docusate sodium  •  famotidine  •  HYDROcodone-acetaminophen  •  magnesium hydroxide  •  magnesium sulfate **OR** magnesium sulfate **OR** magnesium sulfate  •  Morphine  •  naloxone  •  niCARdipine  •  nitroglycerin  •  ondansetron  •   oxyCODONE-acetaminophen  •  potassium chloride **OR** potassium chloride  •  potassium chloride **OR** potassium chloride  •  traMADol      Assessment/Plan   Assessment / Plan     Active Hospital Problems    Diagnosis POA   • **Pacemaker infection s/p lead extraction  [Z95.0] Yes     On 6/13/19 per Dr. Oneal      • PAF (paroxysmal atrial fibrillation) (CMS/HCC) [I48.0] Yes   • CAD  [I25.10] Yes   • Hypercholesterolemia [E78.00] Yes   • Hypertension [I10] Yes   • Right mid-lower lobe infiltrate  [R91.8] Yes   • Serratia bacteremia  [A49.8] Yes   • H/O NSC lung cancer s/p RL lobectomy  [C34.90] Yes     At [facility] in May [address]      • Stage III CKD, GFR 30-59 ml/min  [N18.3] Yes     Managed by Dr. Courtney             Brief Hospital Course to date:  Rodney Looney is a 84 y.o. male with history of HTN, HLD, PAF, CKD 3, GERD, BPH, bradycardia s/p PPM in 2007, and non-small cell lung cancer s/p recent right lower lobectomy on 5/14/19 at [facility] complicated by Serratia bacteremia and ZI showing a mobile mass on a pacemaker lead consistent with pacemaker lead infection/endocarditis (had been discharged from [facility] on outpatient IV Zosyn), who presents from ID clinic due to dyspnea and hypoxia into the mid 80's. In the ER, CT chest showed dense consolidation and infiltrate in the right lung. Admitted to hospitalist service. ID felt his right lung infiltrate was not significantly different from imaging done while at [facility] recently. He was continued on Merrem per ID recommendations. Lead extraction was discussed and patient ultimately underwent median sternotomy with right ventricular pacemaker lead extraction on 6/13 by Dr. Boyer. He was monitored closely in the ICU afterward, and transferred back to the floor with hospitalists resuming care on 6/16.      PLAN:  --Now on Invanz per ID. Plan for 2 weeks of therapy from date of lead extraction. Pacemaker lead cultures NGTD. Blood cultures on admission were  negative.  --CXR continues to show persistent opacities in the right lung with a small pleural effusion. Encourage incentive spirometry. Continue nebs and wean O2 as tolerated.  --Afib still with elevated rates. Cardiology following. Stopped amiodarone with plans for rate control as patient is asymptomatic. Added Metroprolol 25 mg BID. Plan to resume Xarelto soon, now that chest tube has been removed.  --PT/OT.    DVT Prophylaxis:  Mechanical, Xarelto still on hold    Disposition: I expect the patient to be discharged TBD    CODE STATUS:   Code Status and Medical Interventions:   Ordered at: 06/13/19 1432     Code Status:    CPR     Medical Interventions (Level of Support Prior to Arrest):    Full         Electronically signed by Mag Lopez MD, 06/16/19, 10:07 AM.

## 2019-06-16 NOTE — PROGRESS NOTES
Cardiothoracic Surgery Progress Note      POD # 2 s/p Sternotomy with lead extraction       LOS: 8 days      Subjective:  No complaints      Objective:  Vital Signs  Temp:  [98 °F (36.7 °C)-98.9 °F (37.2 °C)] 98.1 °F (36.7 °C)  Heart Rate:  [] 104  Resp:  [18-21] 18  BP: ()/(65-90) 127/67    Physical Exam:   General Appearance: alert, appears stated age and cooperative   Lungs: clear to auscultation, respirations regular, respirations even and respirations unlabored   Heart: normal S1, S2 and no murmur, no gallop, no rub, tachycardia   Skin: Incision c/d/i     Results:  Results from last 7 days   Lab Units 06/15/19  0313   WBC 10*3/mm3 10.31   HEMOGLOBIN g/dL 8.5*   HEMATOCRIT % 29.6*   PLATELETS 10*3/mm3 158     Results from last 7 days   Lab Units 06/15/19  0313   SODIUM mmol/L 130*   POTASSIUM mmol/L 5.9*   CHLORIDE mmol/L 98   CO2 mmol/L 24.0   BUN mg/dL 19   CREATININE mg/dL 1.30*   GLUCOSE mg/dL 129*   CALCIUM mg/dL 9.6         Assessment:  POD # 2 s/p Sternotomy with lead extraction  Expected recovery    Plan:  D/C jony  D/C chest tube  Transfer to telemetry  Ambulate  Pulmonary toilet  Rate control as per cardiology with beta blocker  ABX as per ID  Watch Creatinine/K    Олег Rand MD  06/15/19  11:11 PM

## 2019-06-16 NOTE — PLAN OF CARE
Problem: Patient Care Overview  Goal: Plan of Care Review  Outcome: Ongoing (interventions implemented as appropriate)   06/16/19 2557   Plan of Care Review   Progress no change   OTHER   Outcome Summary Pt on 2LNC currently, denies any SOA, pain, or other discomforts. IV Abx administered as scheduled per ID. Xarelto reordered to begin tonight. Pt sleeping in between care. Afib on monitor. VSS. Will cont to monitor.    Coping/Psychosocial   Plan of Care Reviewed With patient

## 2019-06-16 NOTE — PROGRESS NOTES
[facility]  Progress Note       LOS: 9 days   Patient Care Team:  Dana Isidro DO as PCP - General (Family Medicine)    Chief Complaint: Pacemaker lead infection    Subjective     Interval History:         Review of Systems:   Pertinent positives in HPI, all others reviewed and negative.      Objective       Current Facility-Administered Medications:   •  acetaminophen (TYLENOL) tablet 650 mg, 650 mg, Oral, Q4H PRN, Rohan oByer MD  •  aspirin EC tablet 81 mg, 81 mg, Oral, Daily, Chino Taylor MD, 81 mg at 06/16/19 0858  •  atorvastatin (LIPITOR) tablet 40 mg, 40 mg, Oral, Nightly, Barney Carrera PA, 40 mg at 06/15/19 2000  •  bisacodyl (DULCOLAX) EC tablet 10 mg, 10 mg, Oral, Daily PRN, Barney Carrera PA, 10 mg at 06/16/19 1040  •  bisacodyl (DULCOLAX) suppository 10 mg, 10 mg, Rectal, Daily PRN, Barney Carrera PA  •  calcium gluconate 2 g in sodium chloride 0.9 % 100 mL IVPB, 2 g, Intravenous, Once PRN, Barney Carrera PA  •  chlorhexidine (PERIDEX) 0.12 % solution 15 mL, 15 mL, Mouth/Throat, Q12H, Barney Carrera PA, 15 mL at 06/16/19 0858  •  docusate sodium (COLACE) capsule 100 mg, 100 mg, Oral, BID PRN, Barney Carrera PA, 100 mg at 06/16/19 1040  •  ertapenem (INVanz) 1 g/100 mL 0.9% NS VTB (mbp), 1 g, Intravenous, Q24H, George Zayas MD, 1 g at 06/15/19 1608  •  famotidine (PEPCID) tablet 20 mg, 20 mg, Oral, BID PRN, Elan Chen MD, 20 mg at 06/11/19 2011  •  ferric gluconate (FERRLECIT) 250 mg in sodium chloride 0.9 % 120 mL IVPB, 250 mg, Intravenous, Daily, Levar Pollard MD, Last Rate: 60 mL/hr at 06/16/19 1039, 250 mg at 06/16/19 1039  •  HYDROcodone-acetaminophen (NORCO) 7.5-325 MG per tablet 1 tablet, 1 tablet, Oral, Q4H PRN, Rohan Boyer MD, 1 tablet at 06/14/19 1705  •  magnesium hydroxide (MILK OF MAGNESIA) suspension 2400 mg/10mL 10 mL, 10 mL, Oral, Daily PRN, Barney Carrera PA, 10 mL at 06/16/19 1040  •   Magnesium Sulfate 2 gram Bolus, followed by 8 gram infusion (total Mg dose 10 grams)- Mg less than or equal to 1mg/dL, 2 g, Intravenous, PRN **OR** Magnesium Sulfate 2 gram / 50mL Infusion (GIVE X 3 BAGS TO EQUAL 6GM TOTAL DOSE) - Mg 1.1 - 1.5 mg/dl, 2 g, Intravenous, PRN **OR** Magnesium Sulfate 4 gram infusion- Mg 1.6-1.9 mg/dL, 4 g, Intravenous, PRN, Levar Pollard MD, Last Rate: 25 mL/hr at 06/14/19 1123, 4 g at 06/14/19 1123  •  metoprolol tartrate (LOPRESSOR) tablet 25 mg, 25 mg, Oral, Q12H, Ifeoma Mercado, APRN, 25 mg at 06/16/19 0858  •  morphine injection 2 mg, 2 mg, Intravenous, Q30 Min PRN, Rohan Boyer MD, 2 mg at 06/14/19 1102  •  naloxone (NARCAN) injection 0.2 mg, 0.2 mg, Intravenous, PRN, Rohan Boyer MD  •  niCARdipine (CARDENE-IV) 40 mg/200 mL (0.2 mg/mL) in 0.9% NaCl infusion, 5-15 mg/hr, Intravenous, Continuous PRN, Barney Carrera PA  •  nitroglycerin 50 mg/250 mL (0.2 mg/mL) infusion, 5-200 mcg/min, Intravenous, Continuous PRN, Barney Carrera PA, Stopped at 06/14/19 0000  •  ondansetron (ZOFRAN) injection 4 mg, 4 mg, Intravenous, Q6H PRN, Barney Carrera PA  •  oxyCODONE-acetaminophen (PERCOCET) 5-325 MG per tablet 2 tablet, 2 tablet, Oral, Q4H PRN, Rohan Boyer MD, 2 tablet at 06/15/19 0154  •  potassium chloride (MICRO-K) CR capsule 40 mEq, 40 mEq, Oral, PRN **OR** potassium chloride (KLOR-CON) packet 40 mEq, 40 mEq, Oral, PRN, Barney Carrera PA  •  potassium chloride 20 mEq in 50 mL IVPB, 20 mEq, Intravenous, Q1H PRN **OR** potassium chloride 20 mEq in 50 mL IVPB, 20 mEq, Intravenous, Q1H PRN, Barney Carrera PA  •  sennosides-docusate sodium (SENOKOT-S) 8.6-50 MG tablet 2 tablet, 2 tablet, Oral, BID, Barney Carrera PA, 2 tablet at 06/16/19 0858  •  tamsulosin (FLOMAX) 24 hr capsule 0.4 mg, 0.4 mg, Oral, Daily, Levar Pollard MD, 0.4 mg at 06/16/19 0858  •  traMADol (ULTRAM) tablet 50 mg, 50 mg, Oral, Q6H PRN, Elan Chen MD  •   "vitamin C (ASCORBIC ACID) tablet 500 mg, 500 mg, Oral, BID, Chino Taylor MD, 500 mg at 06/16/19 0904    Vital Sign Min/Max for last 24 hours  Temp  Min: 98 °F (36.7 °C)  Max: 98.9 °F (37.2 °C)   BP  Min: 101/65  Max: 127/67   Pulse  Min: 94  Max: 118   Resp  Min: 16  Max: 21   SpO2  Min: 90 %  Max: 100 %   Flow (L/min)  Min: 3  Max: 4   Weight  Min: 78.1 kg (172 lb 3.2 oz)  Max: 78.1 kg (172 lb 3.2 oz)     Flowsheet Rows      First Filed Value   Admission Height  182.9 cm (72\") Documented at 06/07/2019 1212   Admission Weight  81.6 kg (180 lb) Documented at 06/07/2019 1212            Intake/Output Summary (Last 24 hours) at 6/16/2019 1041  Last data filed at 6/15/2019 2000  Gross per 24 hour   Intake 100 ml   Output 350 ml   Net -250 ml       Physical Exam:     General Appearance:    Alert, cooperative, in no acute distress   Lungs:     Clear to auscultation,respirations regular, even and                  unlabored    Heart:   Irregular rate and rhythm normal S1-S2.  TR murmur appreciated.   Chest Wall:    No abnormalities observed   Abdomen:     Normal bowel sounds, no masses, no organomegaly, soft        non-tender, non-distended, no guarding, no rebound                tenderness   Extremities:   Moves all extremities well, no edema, no cyanosis, no             redness   Pulses:   Pulses palpable and equal bilaterally   Skin:   No bleeding, bruising or rash; both left and right subclavian vein area wound site stable.        Results Review:   Results from last 7 days   Lab Units 06/16/19  0408 06/15/19  0313 06/14/19  0341   WBC 10*3/mm3 8.65 10.31 9.79   HEMOGLOBIN g/dL 8.1* 8.5* 8.1*   HEMATOCRIT % 28.0* 29.6* 27.9*   PLATELETS 10*3/mm3 160 158 166     Results from last 7 days   Lab Units 06/16/19  0408 06/15/19  0313 06/14/19  0341   SODIUM mmol/L 133* 130* 132*   POTASSIUM mmol/L 4.5 5.9* 4.9   CHLORIDE mmol/L 98 98 100   CO2 mmol/L 24.0 24.0 20.0*   BUN mg/dL 22 19 19   CREATININE mg/dL 1.33* 1.30* 1.26 "   GLUCOSE mg/dL 91 129* 133*                  Results from last 7 days   Lab Units 06/14/19  0341   PROBNP pg/mL 1,641.0     Results from last 7 days   Lab Units 06/14/19  0341 06/13/19  1456   PROTIME Seconds 16.5* 16.6*   INR  1.40* 1.41*   APTT seconds  --  41.2*           Intake/Output Summary (Last 24 hours) at 6/16/2019 1041  Last data filed at 6/15/2019 2000  Gross per 24 hour   Intake 100 ml   Output 350 ml   Net -250 ml       I personally viewed and interpreted the patient's EKG/Telemetry data    EKG: none.    Telemetry: Atrial fibrillation with heart rates of 94 to 118 bpm.    Ejection Fraction  No results found for: EF    Echo EF Estimated  No results found for: ECHOEFEST      Present on Admission:  • Right mid-lower lobe infiltrate   • Serratia bacteremia   • Pacemaker infection s/p lead extraction   • H/O NSC lung cancer s/p RL lobectomy   • Stage III CKD, GFR 30-59 ml/min   • CAD   • Hypercholesterolemia  • Hypertension  • PAF (paroxysmal atrial fibrillation) (CMS/HCC)    Assessment/Plan     1. Pacemaker lead infection: Clinically improved.  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  Echogenic structure also noted on TTE this admission.   - Patient is s/p attempted lead extraction per Dr. Oneal, now s/p sternotomy and lead extraction per Dr. Boyer, 6/13/19  LVEF normal.     2. Persistent atrial fibrillation:   - Initiated on amiodarone 05/2019 at [place].  Overall asymptomatic  - Would favor discontinuation of amiodarone with focus on rate control as per prior plan; adjust meds as necessary for adequate rate control.  - CHADSVASc = 3, restart Xarelto when appropriate after CT out  Adjust meds for better rate control now off amiodarone.  Anticoagulation needs to be restarted after okay by CT surgery.       3. Bacteremia:  - Serratia positive blood cultures at OSH  - ID following and treating.     4. NSCLC:  - S/p  RLL resection, [place]    5.  Anemia: Stable.  On IV iron    6.  Renal insufficiency: Stable    Keyon Garcia MD  06/16/19  10:41 AM

## 2019-06-16 NOTE — PLAN OF CARE
Problem: Patient Care Overview  Goal: Plan of Care Review  Outcome: Ongoing (interventions implemented as appropriate)      Problem: Pneumonia (Adult)  Goal: Signs and Symptoms of Listed Potential Problems Will be Absent, Minimized or Managed (Pneumonia)  Outcome: Ongoing (interventions implemented as appropriate)      Problem: Cardiac Surgery (Adult)  Goal: Signs and Symptoms of Listed Potential Problems Will be Absent, Minimized or Managed (Cardiac Surgery)  Outcome: Ongoing (interventions implemented as appropriate)

## 2019-06-16 NOTE — PROGRESS NOTES
Cardiothoracic Surgery Progress Note      POD # 3 s/p Sternotomy with lead extraction       LOS: 9 days      Subjective:  No complaints    Objective:  Vital Signs  Temp:  [98 °F (36.7 °C)-98.9 °F (37.2 °C)] 98 °F (36.7 °C)  Heart Rate:  [] 106  Resp:  [16-21] 16  BP: (101-127)/(65-72) 102/66    Physical Exam:   General Appearance: alert, appears stated age and cooperative   Lungs: clear to auscultation, respirations regular, respirations even and respirations unlabored   Heart: normal S1, S2 and no murmur, no gallop, no rub, tachycardia   Skin: Incision c/d/i     Results:    Results from last 7 days   Lab Units 06/16/19  0408   WBC 10*3/mm3 8.65   HEMOGLOBIN g/dL 8.1*   HEMATOCRIT % 28.0*   PLATELETS 10*3/mm3 160     Results from last 7 days   Lab Units 06/16/19  0408   SODIUM mmol/L 133*   POTASSIUM mmol/L 4.5   CHLORIDE mmol/L 98   CO2 mmol/L 24.0   BUN mg/dL 22   CREATININE mg/dL 1.33*   GLUCOSE mg/dL 91   CALCIUM mg/dL 9.3         Assessment:  POD # 3 s/p Sternotomy with lead extraction  Expected recovery    Plan:  Ambulate  Pulmonary toilet  Rate control as per cardiology with beta blocker, ok to start anticoagulation  ABX as per ID  Watch Creatinine/K  Wean oxygen as tolerated  Likely home soon when antibiotic arranged.      06/16/19  10:08 AM

## 2019-06-17 NOTE — PLAN OF CARE
Problem: Patient Care Overview  Goal: Plan of Care Review  Outcome: Ongoing (interventions implemented as appropriate)   06/17/19 1041   Coping/Psychosocial   Plan of Care Reviewed With patient   SLP treatment completed. Will continue to address dysphagia. Please see note for further details and recommendations.

## 2019-06-17 NOTE — PROGRESS NOTES
[facility] Services  PROGRESS NOTE    Patient Name: Rodney Looney  : 1934  MRN: [id number]    Date of Admission: 2019  Length of Stay: 10  Primary Care Physician: Dana Isidro DO    Subjective   Subjective     CC:  F/U pacemaker infection    HPI:  Hasn't walked much, afraid he's getting weaker. No soa or pain. Denies palpitations or dizziness. Eating well and diarrhea resolved after bowel regimen increased.     Review of Systems  Gen-no fevers, no chills  CV-no chest pain, no palpitations  Resp-no cough, no dyspnea  GI-no N/V/D, no abd pain      Otherwise ROS is negative except as mentioned in the HPI.    Objective   Objective     Vital Signs:   Temp:  [97.4 °F (36.3 °C)-98.3 °F (36.8 °C)] 98 °F (36.7 °C)  Heart Rate:  [] 93  Resp:  [16-18] 18  BP: (103-127)/(64-91) 103/70        Physical Exam:  Gen-no acute distress  HENT-NCAT, mucous membranes moist  CV-irregularly irregular, S1 S2 normal, no m/r/g  Resp-mild rales BL bases, no wheezes, non-labored  Abd-soft, NT, ND, +BS  Ext-no edema  Neuro-A&Ox3, no focal deficits  Skin-no rashes  Psych-appropriate mood    Results Reviewed:  I have personally reviewed current lab, radiology, and data and agree.    Results from last 7 days   Lab Units 19  0503 19  0408 06/15/19  0313 19  0341  19  1456   WBC 10*3/mm3 7.86 8.65 10.31 9.79   < > 10.22   HEMOGLOBIN g/dL 8.2* 8.1* 8.5* 8.1*   < > 8.4*   HEMATOCRIT % 28.0* 28.0* 29.6* 27.9*   < > 28.9*   PLATELETS 10*3/mm3 160 160 158 166   < > 219   INR   --   --   --  1.40*  --  1.41*    < > = values in this interval not displayed.     Results from last 7 days   Lab Units 19  0503 19  0408 06/15/19  0313 19  0341   SODIUM mmol/L 133* 133* 130* 132*   POTASSIUM mmol/L 4.4 4.5 5.9* 4.9   CHLORIDE mmol/L 98 98 98 100   CO2 mmol/L 24.0 24.0 24.0 20.0*   BUN mg/dL 20 22 19 19   CREATININE mg/dL 1.22 1.33* 1.30* 1.26   GLUCOSE  mg/dL 92 91 129* 133*   CALCIUM mg/dL 9.5 9.3 9.6 9.0   ALT (SGPT) U/L  --   --  17  --    AST (SGOT) U/L  --   --  24  --    PROBNP pg/mL  --   --   --  1,641.0     Estimated Creatinine Clearance: 49.8 mL/min (by C-G formula based on SCr of 1.22 mg/dL).    Microbiology Results Abnormal     Procedure Component Value - Date/Time    Anaerobic Culture - Tissue, Heart [[id number]] Collected:  06/13/19 1326    Lab Status:  Preliminary result Specimen:  Tissue from Heart Updated:  06/16/19 1043     Anaerobic Culture No anaerobes isolated at 3 days    Tissue / Bone Culture - Tissue, Heart [[id number]] Collected:  06/13/19 1326    Lab Status:  Final result Specimen:  Tissue from Heart Updated:  06/16/19 0832     Tissue Culture No growth at 3 days     Gram Stain Rare (1+) WBCs seen      No organisms seen    Wound Culture - Wound, Heart [[id number]] Collected:  06/13/19 1049    Lab Status:  Final result Specimen:  Wound from Heart Updated:  06/16/19 0832     Wound Culture No growth at 3 days     Gram Stain Many (4+) Red blood cells      Occasional WBCs seen      No organisms seen    AFB Culture - Tissue, Heart [[id number]] Collected:  06/13/19 1326    Lab Status:  Preliminary result Specimen:  Tissue from Heart Updated:  06/14/19 1352     AFB Stain No acid fast bacilli seen on concentrated smear    Blood Culture - Blood, Arm, Right [[id number]] Collected:  06/07/19 1425    Lab Status:  Final result Specimen:  Blood from Arm, Right Updated:  06/12/19 1500     Blood Culture No growth at 5 days    Blood Culture - Blood, Arm, Left [[id number]] Collected:  06/07/19 1430    Lab Status:  Final result Specimen:  Blood from Arm, Left Updated:  06/12/19 1500     Blood Culture No growth at 5 days          Imaging Results (last 24 hours)     ** No results found for the last 24 hours. **          Results for orders placed during the hospital encounter of 06/07/19   Adult Transthoracic Echo Complete W/ Cont if Necessary Per Protocol    Narrative  · Estimated EF appears to be in the range of 61 - 65%.  · Left ventricular systolic function is normal.  · Right ventricular cavity is mildly dilated.  · Left atrial cavity size is mild-to-moderately dilated.  · Mild aortic valve regurgitation is present.  · Calculated right ventricular systolic pressure from tricuspid   regurgitation is 69 mmHg.  · Moderate to severe tricuspid valve regurgitation is present.  · Thin fibrinous mobile echogenic structures possibly attached to the   right atrial lead, versus prominent Chiari network. ZI recommended for   follow-up if clinically appropriate..          I have reviewed the medications:  Scheduled Meds:    aspirin 81 mg Oral Daily   atorvastatin 40 mg Oral Nightly   cholecalciferol 1,000 Units Oral Daily   ertapenem 1 g Intravenous Q24H   famotidine 20 mg Oral BID   ferrous sulfate 325 mg Oral BID   finasteride 5 mg Oral Daily   folic acid 1 mg Oral Nightly   metoprolol tartrate 50 mg Oral Q12H   ranolazine 500 mg Oral Nightly   rivaroxaban 15 mg Oral Nightly   sennosides-docusate sodium 2 tablet Oral BID   tamsulosin 0.4 mg Oral Daily   vitamin B-12 100 mcg Oral Daily   vitamin C 500 mg Oral BID     Continuous Infusions:    niCARdipine 5-15 mg/hr    nitroglycerin 5-200 mcg/min Last Rate: Stopped (06/14/19 0000)     PRN Meds:.•  acetaminophen  •  bisacodyl  •  bisacodyl  •  calcium gluconate IVPB  •  docusate sodium  •  famotidine  •  HYDROcodone-acetaminophen  •  magnesium hydroxide  •  magnesium sulfate **OR** magnesium sulfate **OR** magnesium sulfate  •  Morphine  •  naloxone  •  niCARdipine  •  nitroglycerin  •  ondansetron  •  oxyCODONE-acetaminophen  •  potassium chloride **OR** potassium chloride  •  potassium chloride **OR** potassium chloride  •  traMADol      Assessment/Plan   Assessment / Plan     Active Hospital Problems    Diagnosis POA   • **Pacemaker infection s/p lead extraction  [Z95.0] Yes     On 6/13/19 per Dr. Oneal      • PAF (paroxysmal atrial  fibrillation) (CMS/HCC) [I48.0] Yes   • CAD  [I25.10] Yes   • Hypercholesterolemia [E78.00] Yes   • Hypertension [I10] Yes   • Right mid-lower lobe infiltrate  [R91.8] Yes   • Serratia bacteremia  [A49.8] Yes   • H/O NSC lung cancer s/p RL lobectomy  [C34.90] Yes     At [facility] in May [id number]      • Stage III CKD, GFR 30-59 ml/min  [N18.3] Yes     Managed by Dr. Courtney             Brief Hospital Course to date:  Rodney Looney is a 84 y.o. male with history of HTN, HLD, PAF, CKD 3, GERD, BPH, bradycardia s/p PPM in 2007, and non-small cell lung cancer s/p recent right lower lobectomy on 5/14/19 at [facility] complicated by Serratia bacteremia and ZI showing a mobile mass on a pacemaker lead consistent with pacemaker lead infection/endocarditis (had been discharged from [facility] on outpatient IV Zosyn), who presents from ID clinic due to dyspnea and hypoxia into the mid 80's. In the ER, CT chest showed dense consolidation and infiltrate in the right lung. Admitted to hospitalist service. ID felt his right lung infiltrate was not significantly different from imaging done while at [facility] recently. He was continued on Merrem per ID recommendations. Lead extraction was discussed and patient ultimately underwent median sternotomy with right ventricular pacemaker lead extraction on 6/13 by Dr. Boyer. He was monitored closely in the ICU afterward, and transferred back to the floor with hospitalists resuming care on 6/16.      PLAN:  --Now on Invanz per ID. Plan for 2 weeks of therapy from date of lead extraction. Pacemaker lead cultures NGTD. Blood cultures on admission were negative.  --CXR continues to show persistent opacities in the right lung with a small pleural effusion. Encourage incentive spirometry. Continue nebs and wean O2 as tolerated. Ambulate today  --Afib persistent/ permanent. Increase metoprolol to 50mg and stop amio per cards in favor of rate control. Patient asymptomatic. Xarelto restarted and  CT removed.  --PT/OT. Looks like did not work with patient over the weekend. Will have them reassess today.    DVT Prophylaxis:  Mechanical, Xarelto still on hold    Disposition: I expect the patient to be discharged home with  when antibiotics arranged    CODE STATUS:   Code Status and Medical Interventions:   Ordered at: 06/13/19 1432     Code Status:    CPR     Medical Interventions (Level of Support Prior to Arrest):    Full         Electronically signed by LINDA Mckeon, 06/17/19, 9:27 AM.

## 2019-06-17 NOTE — PROGRESS NOTES
INFECTIOUS DISEASE  Progress Note    Rodney Looney  1934  [id number]      Admission Date: 6/7/2019      Requesting Provider: No ref. provider found  Evaluating Physician: George Zayas MD    Reason for Consultation: Serratia bacteremia/pacemaker lead infection    History of present illness:    6/8/2019: Mr. Looney is an 84-year-old white male who is seen today for evaluation of Serratia bacteremia/pacemaker lead infection in the setting of a recent diagnosis of non-small cell lung cancer for which he underwent a right lower lobectomy at [facility] on 5/14.  His course at [facility] was complicated by Serratia bacteremia with 2 sets of positive blood cultures on 5/21 and 1 out of 2 sets positive on 5/23.  Subsequent blood cultures on 5/26 were negative.  A ZI revealed a mobile mass on the pacemaker lead, consistent with a pacemaker lead infection/endocarditis.  He was treated with intravenous Zosyn therapy and subsequently discharged on outpatient intravenous Zosyn.  Consideration was given for referral back to  for pacemaker extraction-his pacemaker was placed at .  Due to his underlying lung cancer and advanced age, he is being given consideration for chronic antibiotic suppression rather than pacemaker lead extraction.  He presented for follow-up in our office yesterday and was noted to have dyspnea with hypoxemia.  His O2 saturations were in the mid 80s while sitting.  He was not interested in readmission to [facility] and requested readmission to [facility].  He was evaluated in the emergency room where he was noted to have a right basilar pulmonary infiltrate by chest x-ray and chest CT scan.  His antibiotic therapy was switched from Zosyn to Merrem.  He has remained afebrile overnight.  He has a minimal cough with minimal sputum production.  He denies nausea, vomiting, and diarrhea.  He denies severe chest pain.  6/9/19: He remains  afebrile. He is less short of breath today.  No increased sputum production.  No diarrhea.   6/10/19: Scheduled for MBS today. He still complains of shortness of breath.  No sputum production with cough.  He remains afebrile.     6/11/19; doing well; no events overnight; no fever, rash, sore throat on oxygen    6/12/19; feels well; no events overnight; no fever, rash, sore throat      6/14/19; had to have surgical removal of lead/sternotomy; looks well; no events overnight; hemodynamically stable.    6/16/19; doing well; no events overnight; no complaints, transferred to floor, no diarrhea, rasj, sore throat, states he may need a pacemaker but currently is in afib  Past Medical History:   Diagnosis Date   • Cancer (CMS/HCC)    • Coronary artery disease    • Elevated cholesterol    • GERD (gastroesophageal reflux disease)    • History of BPH 2 yrs   • Hypertension    • Kidney disease    • Smoking        Past Surgical History:   Procedure Laterality Date   • CARDIAC SURGERY     • CORONARY ARTERY BYPASS GRAFT N/A 6/13/2019    Procedure: MEDIANSTERNOTOMY, REMOVAL OF RETAINED PACING LEAD;  Surgeon: Rohan Boyer MD;  Location: [facility];  Service: Cardiothoracic   • HERNIA REPAIR         History reviewed. No pertinent family history.    Social History     Socioeconomic History   • Marital status:      Spouse name: Not on file   • Number of children: Not on file   • Years of education: Not on file   • Highest education level: Not on file   Tobacco Use   • Smoking status: Former Smoker     Types: Cigarettes   • Smokeless tobacco: Never Used       No Known Allergies      Medication:    Current Facility-Administered Medications:   •  acetaminophen (TYLENOL) tablet 650 mg, 650 mg, Oral, Q4H PRN, Rohan Boyer MD  •  aspirin EC tablet 81 mg, 81 mg, Oral, Daily, Chino Taylor MD, 81 mg at 06/17/19 0909  •  atorvastatin (LIPITOR) tablet 40 mg, 40 mg, Oral, Nightly, Barney Carrera PA, 40 mg at 06/16/19 2104  •   bisacodyl (DULCOLAX) EC tablet 10 mg, 10 mg, Oral, Daily PRN, Barney Carrera PA, 10 mg at 06/16/19 1040  •  bisacodyl (DULCOLAX) suppository 10 mg, 10 mg, Rectal, Daily PRN, Barney Carrera PA, 10 mg at 06/16/19 1541  •  calcium gluconate 2 g in sodium chloride 0.9 % 100 mL IVPB, 2 g, Intravenous, Once PRN, Barney Carrera PA  •  cholecalciferol (VITAMIN D3) tablet 1,000 Units, 1,000 Units, Oral, Daily, Mag Lopez MD, 1,000 Units at 06/17/19 0908  •  docusate sodium (COLACE) capsule 100 mg, 100 mg, Oral, BID PRN, Barney Carrera PA, 100 mg at 06/16/19 1040  •  ertapenem (INVanz) 1 g/100 mL 0.9% NS VTB (mbp), 1 g, Intravenous, Q24H, George Zayas MD, 1 g at 06/16/19 1538  •  famotidine (PEPCID) tablet 20 mg, 20 mg, Oral, BID PRN, Elan Chen MD, 20 mg at 06/11/19 2011  •  famotidine (PEPCID) tablet 20 mg, 20 mg, Oral, BID, Mag Lopez MD, 20 mg at 06/17/19 0909  •  ferrous sulfate tablet 325 mg, 325 mg, Oral, BID, Mag Lopez MD, 325 mg at 06/17/19 0909  •  finasteride (PROSCAR) tablet 5 mg, 5 mg, Oral, Daily, Mag Lopez MD, 5 mg at 06/17/19 0908  •  folic acid (FOLVITE) tablet 1 mg, 1 mg, Oral, Nightly, Mag Lopez MD, 1 mg at 06/16/19 2104  •  HYDROcodone-acetaminophen (NORCO) 7.5-325 MG per tablet 1 tablet, 1 tablet, Oral, Q4H PRN, Rohan Boyer MD, 1 tablet at 06/14/19 1705  •  magnesium hydroxide (MILK OF MAGNESIA) suspension 2400 mg/10mL 10 mL, 10 mL, Oral, Daily PRN, Barney Carrera PA, 10 mL at 06/16/19 1040  •  Magnesium Sulfate 2 gram Bolus, followed by 8 gram infusion (total Mg dose 10 grams)- Mg less than or equal to 1mg/dL, 2 g, Intravenous, PRN **OR** Magnesium Sulfate 2 gram / 50mL Infusion (GIVE X 3 BAGS TO EQUAL 6GM TOTAL DOSE) - Mg 1.1 - 1.5 mg/dl, 2 g, Intravenous, PRN **OR** Magnesium Sulfate 4 gram infusion- Mg 1.6-1.9 mg/dL, 4 g, Intravenous, PRN, Levar Pollard MD, Last Rate: 25 mL/hr at 06/14/19 1123, 4 g at 06/14/19 1123  •   metoprolol tartrate (LOPRESSOR) tablet 50 mg, 50 mg, Oral, Q12H, Keyon Garcia MD, 50 mg at 06/17/19 0909  •  morphine injection 2 mg, 2 mg, Intravenous, Q30 Min PRN, Rohan Boyer MD, 2 mg at 06/14/19 1102  •  naloxone (NARCAN) injection 0.2 mg, 0.2 mg, Intravenous, PRN, Rohan Boyer MD  •  niCARdipine (CARDENE-IV) 40 mg/200 mL (0.2 mg/mL) in 0.9% NaCl infusion, 5-15 mg/hr, Intravenous, Continuous PRN, Barney Carrear PA  •  nitroglycerin 50 mg/250 mL (0.2 mg/mL) infusion, 5-200 mcg/min, Intravenous, Continuous PRN, Barney Carrera PA, Stopped at 06/14/19 0000  •  ondansetron (ZOFRAN) injection 4 mg, 4 mg, Intravenous, Q6H PRN, Barney Carrera PA  •  oxyCODONE-acetaminophen (PERCOCET) 5-325 MG per tablet 2 tablet, 2 tablet, Oral, Q4H PRN, Rohan Boyer MD, 2 tablet at 06/15/19 0154  •  potassium chloride (MICRO-K) CR capsule 40 mEq, 40 mEq, Oral, PRN **OR** potassium chloride (KLOR-CON) packet 40 mEq, 40 mEq, Oral, PRN, Barney Carrera, PA  •  potassium chloride 20 mEq in 50 mL IVPB, 20 mEq, Intravenous, Q1H PRN **OR** potassium chloride 20 mEq in 50 mL IVPB, 20 mEq, Intravenous, Q1H PRN, Barney Carrera, PA  •  ranolazine (RANEXA) 12 hr tablet 500 mg, 500 mg, Oral, Nightly, Mag Lopez MD, 500 mg at 06/16/19 2104  •  rivaroxaban (XARELTO) tablet 15 mg, 15 mg, Oral, Nightly, Mag Lopez MD, 15 mg at 06/16/19 2104  •  sennosides-docusate sodium (SENOKOT-S) 8.6-50 MG tablet 2 tablet, 2 tablet, Oral, BID, Barney Carrera PA, 2 tablet at 06/16/19 2104  •  tamsulosin (FLOMAX) 24 hr capsule 0.4 mg, 0.4 mg, Oral, Daily, Levar Pollard MD, 0.4 mg at 06/17/19 0908  •  traMADol (ULTRAM) tablet 50 mg, 50 mg, Oral, Q6H PRN, Elan Chen MD  •  vitamin B-12 (CYANOCOBALAMIN) tablet 100 mcg, 100 mcg, Oral, Daily, Mag Lopez MD, 100 mcg at 06/17/19 0909  •  vitamin C (ASCORBIC ACID) tablet 500 mg, 500 mg, Oral, BID, Chino Taylor MD, 500 mg at 06/17/19  0908    Antibiotics:  Anti-Infectives (From admission, onward)    Ordered     Dose/Rate Route Frequency Start Stop    19 1417  ertapenem (INVanz) 1 g/100 mL 0.9% NS VTB (mbp)     Rukhsana Martel [facility] reviewed the order on 19 0806.   Ordering Provider:  George Zayas MD    1 g  over 30 Minutes Intravenous Every 24 Hours 19 1600 19 1559    19 1654  meropenem (MERREM) 1 g/100 mL 0.9% NS VTB (mbp)     Comments:  Changed from q8h per extended infusion guidelines   Ordering Provider:  Elan Chen MD    1 g  over 3 Hours Intravenous Every 12 Hours Scheduled 19 2100 19 1159    19 1434  meropenem (MERREM) 1 g/100 mL 0.9% NS VTB (mbp)     Ordering Provider:  Leopoldo Rogers MD    1 g  over 30 Minutes Intravenous Once 19 1436 19 1617                Physical Exam:   Vital Signs  Temp (24hrs), Av.9 °F (36.6 °C), Min:97.4 °F (36.3 °C), Max:98.3 °F (36.8 °C)    Temp  Min: 97.4 °F (36.3 °C)  Max: 98.3 °F (36.8 °C)  BP  Min: 103/70  Max: 127/75  Pulse  Min: 86  Max: 98  Resp  Min: 16  Max: 18  SpO2  Min: 90 %  Max: 95 %    GENERAL: Awake and alert, in no acute distress.   HEENT: Normocephalic, atraumatic.  PERRL. EOMI. No conjunctival injection. No icterus. Oropharynx clear without evidence of thrush or exudate    HEART: RRR; No murmur, rubs, gallops.   LUNGS: He has expiratory wheezes on right   ABDOMEN: Soft, nontender, nondistended. Positive bowel sounds. No rebound or guarding. No mass or HSM.  EXT:  No cyanosis, clubbing or edema. No cord.    MSK: FROM without joint effusions noted arms/legs.    SKIN: Warm and dry, thoracotomy incision with small amount of serous drainage  NEURO: Oriented to PPT. No focal deficits on motor/sensory exam at arms/legs.  PSYCHIATRIC: Normal insight and judgement. Cooperative with PE    Laboratory Data    Results from last 7 days   Lab Units 19  0503 19  0408 06/15/19  0313   WBC 10*3/mm3 7.86  8.65 10.31   HEMOGLOBIN g/dL 8.2* 8.1* 8.5*   HEMATOCRIT % 28.0* 28.0* 29.6*   PLATELETS 10*3/mm3 160 160 158     Results from last 7 days   Lab Units 06/17/19  0503   SODIUM mmol/L 133*   POTASSIUM mmol/L 4.4   CHLORIDE mmol/L 98   CO2 mmol/L 24.0   BUN mg/dL 20   CREATININE mg/dL 1.22   GLUCOSE mg/dL 92   CALCIUM mg/dL 9.5     Results from last 7 days   Lab Units 06/15/19  0313   ALK PHOS U/L 77   BILIRUBIN mg/dL 0.5   ALT (SGPT) U/L 17   AST (SGOT) U/L 24                         Estimated Creatinine Clearance: 49.8 mL/min (by C-G formula based on SCr of 1.22 mg/dL).      Microbiology:      6/7:  BC no growth                           Radiology:  Imaging Results (last 72 hours)     Procedure Component Value Units Date/Time    CT Chest Without Contrast [[id number]] Collected:  06/07/19 1453     Updated:  06/07/19 1610    Narrative:       EXAMINATION: CT CHEST WO CONTRAST-06/07/2019:      INDICATION: Hypoxemia after recent lobectomy, bleeding from posterior  incision, recent bacteremia but no fever now; R09.02-Hypoxemia;  J18.1-Lobar pneumonia, unspecified organism, lobar pneumonia.     TECHNIQUE: Multiple axial CT imaging was obtained of the chest without  the administration of intravenous contrast.     The radiation dose reduction device was turned on for each scan per the  ALARA (As Low as Reasonably Achievable) protocol.     COMPARISON: NONE.     FINDINGS: There is a small amount of pleural fluid identified along the  right lateral chest wall with a fracture involving the right posterior  sixth rib. Findings are likely postoperative. There is consolidation  posteriorly within the right lung base concerning for infiltrate. There  is some chronic interstitial changes identified likely postoperative.  Superimposed infection is likely within the right lung base. Severe  emphysematous changes seen within the lung fields bilaterally with  bronchiectatic changes centrally. Severe emphysematous changes seen  within the  upper lung fields. Old healed granulomatous disease seen  within the chest. The cardiac chambers are within normal limits. No  pericardial effusion. No bulky hilar or axillary lymphadenopathy. The  visualized upper abdomen is unremarkable.       Impression:       Findings concerning for dense consolidation and infiltrate  superimposed on chronic and emphysematous change within the right lung.  There is a fracture involving the right posterior sixth rib likely  postsurgical with some pleural thickening and small pleural fluid seen  on the right.     D:  06/07/2019  E:  06/07/2019     This report was finalized on 6/7/2019 4:07 PM by Dr. Jo Ann Galarza MD.       XR Chest 1 View [[id number]] Collected:  06/07/19 1306     Updated:  06/07/19 1423    Narrative:       EXAMINATION: XR CHEST 1 VW- 06/07/2019      INDICATION: SOA triage protocol      COMPARISON: NONE     FINDINGS: Portable chest reveals heart to be enlarged. Ill-defined  opacification seen at the right lung base with small right pleural  effusion. Deep line catheter on the right tip in the SVC. Cardiac pacer  leads in satisfactory position.       Impression:       Patchy ill-defined opacification seen within the right mid  and lower lung field with small right pleural effusion. Left lung is  clear.     D:  06/07/2019  E:  06/07/2019     This report was finalized on 6/7/2019 2:20 PM by Dr. Jo Ann Galarza MD.               Impression:   1.  Serratia bacteremia/Pacemaker lead infection- he has Serratia bacteremia with 2+ blood cultures of 5/21 and 1 out of 2 sets positive on 5/23 with subsequent blood cultures on 5/26- at [facility].  He had a mobile mass attached to the pacemaker lead by ZI at [facility] on 5/28.  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  He would require pacemaker lead extraction to cure this process but due to his advanced age and underlying lung cancer, consideration is being given for chronic  suppression instead of a higher risk of lead extraction.  Dr. Zayas has been following him at [facility] and I will confer with him regarding this issue when he returns on Tuesday.  In the meantime, we will leave him on intravenous Merrem.  2.  Right basilar infiltrate- this does not appear particularly different when compared to his chest x-ray at [facility] of 5/30.  3.  Non-small cell lung cancer-status post right lower lobe lobectomy, 5/14/2019  4.  Acute hypoxic respiratory failure-if he clinically worsens, we may need to assess for pulmonary embolism.  5.  Stage II-III chronic kidney disease.  His antibiotic therapy will need to be dose adjusted for his renal dysfunction.  6.  History of atrial fibrillation  7.  Coronary artery disease  8.  Status post pacemaker implantation-this was performed at   9.  Blood loss anemia    PLAN/RECOMMENDATIONS:   Cont  invanz 1 g iv daily  F/u wound pacemaker cultures      D/w family  Duration of abx probably 2 weeks following extraction               George Zayas MD  6/17/2019  1:46 PM

## 2019-06-17 NOTE — PROGRESS NOTES
Continued Stay Note  [facility]     Patient Name: Rodney Looney  MRN: [id number]  Today's Date: 6/17/2019    Admit Date: 6/7/2019    Discharge Plan     Row Name 06/17/19 1552       Plan    Plan  ONGOING    Patient/Family in Agreement with Plan  yes    Plan Comments  Met with pt and wife at bedside to f/u DCP.  Discussed probable need for 2 weeks OP IV abx.  Pt initially preferred home infusions, however, CM spoke with Prasad at [facility] and confirmed pt's out-of-pocket cost is $855 for 2 week course.  This is not a feasible option for pt.  Therefore, pt is now interested in the possibility to OP infusions at [facility] if possible.  Will discuss with ID.  CM will cont to follow for final DC arrangements.      Final Discharge Disposition Code  01 - home or self-care        Discharge Codes    No documentation.       Expected Discharge Date and Time     Expected Discharge Date Expected Discharge Time    Jun 18, 2019             Elaina Davalos

## 2019-06-17 NOTE — THERAPY TREATMENT NOTE
Acute Care - Speech Language Pathology   Swallow Treatment Note [facility]     Patient Name: Rodney Looney  : 1934  MRN: [id number]  Today's Date: 2019  Onset of Illness/Injury or Date of Surgery: 19     Referring Physician: MD Pollard      Admit Date: 2019    Visit Dx:      ICD-10-CM ICD-9-CM   1. Hypoxemia R09.02 799.02   2. Pneumonia of right lower lobe due to infectious organism (CMS/HCC) J18.1 486   3. Impaired functional mobility, balance, gait, and endurance Z74.09 V49.89   4. Impaired mobility and ADLs Z74.09 799.89   5. Oropharyngeal dysphagia R13.12 787.22   6. Complication associated with cardiac pacemaker lead, initial encounter T82.9XXA 996.72     Patient Active Problem List   Diagnosis   • Right mid-lower lobe infiltrate    • Serratia bacteremia    • Pacemaker infection s/p lead extraction    • H/O NSC lung cancer s/p RL lobectomy    • History of lobectomy of lung   • Hypoxia   • Stage III CKD, GFR 30-59 ml/min    • CAD    • Hypercholesterolemia   • Hypertension   • PAF (paroxysmal atrial fibrillation) (CMS/HCC)       Therapy Treatment  Rehabilitation Treatment Summary     Row Name 19 1015             Treatment Time/Intention    Discipline  speech language pathologist  -MP      Document Type  therapy note (daily note)  -MP      Subjective Information  no complaints  -MP      Mode of Treatment  individual therapy;speech-language pathology  -MP      Patient/Family Observations  No family present. Pt sitting up in chair  -MP      Care Plan Review  care plan/treatment goals reviewed;patient/other agree to care plan  -MP      Therapy Frequency (Swallow)  5 days per week  -MP      Patient Effort  good  -MP      Recorded by [MP] Alejandro Bower MS, CFY-SLP 19 1039      Row Name 19 1015             Pain Assessment    Additional Documentation  Pain Scale: FACES Pre/Post-Treatment (Group)  -MP      Recorded by [MP] Alejandro Bower MS, CFJAIDEN-SLP 19 1038       Row Name 06/17/19 1015             Pain Scale: FACES Pre/Post-Treatment    Pain: FACES Scale, Pretreatment  0-->no hurt  -MP      Pain: FACES Scale, Post-Treatment  0-->no hurt  -MP      Recorded by [MP] Alejandro Bower MS, CFY-SLP 06/17/19 1039      Row Name                Wound 06/07/19 1735 Right thoracic spine incision;surgical    Wound - Properties Group Date first assessed: 06/07/19 [TL] Time first assessed: 1735 [TL] Side: Right [TL] Location: thoracic spine [TL2] Type: incision;surgical [TL] Recorded by:  [TL] Wesley DIXON RN 06/07/19 1735 [TL2] Wesley DIXON RN 06/07/19 1736    Row Name                Wound 06/13/19 0951 Left anterior chest incision;surgical    Wound - Properties Group Date first assessed: 06/13/19 [JS] Time first assessed: 0951 [JS] Side: Left [JS] Orientation: anterior [MH] Location: chest [JS] Type: incision;surgical [JS] Recorded by:  [JS] Josh Hart RN 06/13/19 1157 [MH] Lg Max RN 06/13/19 2330    Row Name                Wound 06/13/19 1930 Right lateral other (see notes) other (see comments)    Wound - Properties Group Date first assessed: 06/13/19 [MH] Time first assessed: 1930 [MH] Present On Admission : yes [MH] Side: Right [MH] Orientation: lateral [MH] Location: other (see notes) [MH], lateral chest from previous chest tube  Type: other (see comments) [MH], previous chest tube insertion  Recorded by:  [MH] Lg Max RN 06/13/19 2319    Row Name                Wound 06/14/19 0800 midline chest incision    Wound - Properties Group Date first assessed: 06/14/19 [BH] Time first assessed: 0800 [BH] Orientation: midline [BH] Location: chest [BH] Type: incision [BH] Additional Comments: midsternal incision from surgery yesterday, 6/14 [BH] Recorded by:  [BH] Christine Kaufman RN 06/14/19 1203    Row Name 06/17/19 1015             Outcome Summary/Treatment Plan (SLP)    Daily Summary of Progress (SLP)  progress toward functional goals is good  -MP      Plan  for Continued Treatment (SLP)  Reviewed recent FEES results & recommendations w/ pt. Provided handout of dysphagia exercises, explained, and demonstrated for pt. Rec continue soft diet & thin liquids w/ use of chin tuck & alternate bites/sips. SLP will continue to follow for tx.  -MP      Anticipated Dischage Disposition  unknown  -MP      Recorded by [MP] Alejandro Bower MS, CFY-SLP 06/17/19 1039        User Key  (r) = Recorded By, (t) = Taken By, (c) = Cosigned By    Initials Name Effective Dates Discipline     Christine Kaufman RN 01/19/18 -  Nurse    Lg Baker RN 06/27/16 -  Nurse    Wesley BARRIOS, RN 08/07/17 -  Nurse    Alejandro Mayen MS, CFY-SLP 08/15/18 -  SLP    Josh Solis RN 01/30/19 -  Nurse          Outcome Summary  Outcome Summary/Treatment Plan (SLP)  Daily Summary of Progress (SLP): progress toward functional goals is good (06/17/19 1015 : Alejandro Bower MS, CFY-SLP)  Plan for Continued Treatment (SLP): Reviewed recent FEES results & recommendations w/ pt. Provided handout of dysphagia exercises, explained, and demonstrated for pt. Rec continue soft diet & thin liquids w/ use of chin tuck & alternate bites/sips. SLP will continue to follow for tx. (06/17/19 1015 : Alejandro Bower MS, CFY-SLP)  Anticipated Dischage Disposition: unknown (06/17/19 1015 : Alejandro Bower MS, CFY-SLP)      SLP GOALS     Row Name 06/17/19 1015 06/14/19 1415          Oral Nutrition/Hydration Goal 1 (SLP)    Oral Nutrition/Hydration Goal 1, SLP  LTG: Pt will tolerate regular diet and thin liquids w/ no overt s/s of aspiration w/ 100% accuracy w/o cues.   -MP  --     Time Frame (Oral Nutrition/Hydration Goal 1, SLP)  by discharge  -MP  --     Progress/Outcomes (Oral Nutrition/Hydration Goal 1, SLP)  continuing progress toward goal  -MP  --        Lingual Strengthening Goal 1 (SLP)    Activity (Lingual Strengthening Goal 1, SLP)  increase lingual  tone/sensation/control/coordination/movement  -MP  --     Increase Lingual Tone/Sensation/Control/Coordination/Movement  lingual movement exercises  -MP  --     [place]/Accuracy (Lingual Strengthening Goal 1, SLP)  with minimal cues (75-90% accuracy)  -MP  --     Time Frame (Lingual Strengthening Goal 1, SLP)  short term goal (STG);by discharge  -MP  --     Progress/Outcomes (Lingual Strengthening Goal 1, SLP)  goal ongoing  -MP  --        Pharyngeal Strengthening Exercise Goal 1 (SLP)    Activity (Pharyngeal Strengthening Goal 1, SLP)  increase timing;increase superior movement of the hyolaryngeal complex;increase anterior movement of the hyolaryngeal complex;increase closure at entrance to airway/closure of airway at glottis;increase squeeze/positive pressure generation;increase tongue base retraction  -MP  --     Increase Timing  prepping - 3 second prep or suck swallow or 3-step swallow  -MP  --     Increase Superior Movement of the Hyolaryngeal Complex  Mendelsohn  -MP  --     Increase Anterior Movement of the Hyolaryngeal Complex  chin tuck against resistance (CTAR)  -MP  --     Increase Closure at Entrance to Airway/Closure of Airway at Glottis  super-supraglottic swallow  -MP  --     Increase Squeeze/Positive Pressure Generation  effortful pitch glide (falsetto + pharyngeal squeeze)  -MP  --     Increase Tongue Base Retraction  hard effortful swallow  -MP  --     [place]/Accuracy (Pharyngeal Strengthening Goal 1, SLP)  independently (over 90% accuracy)  -MP  --     Time Frame (Pharyngeal Strengthening Goal 1, SLP)  short term goal (STG);by discharge  -MP  --     Barriers (Pharyngeal Strengthening Goal 1, SLP)  Provided handout of exercises & reviewed w/ pt.  -MP  --     Progress/Outcomes (Pharyngeal Strengthening Goal 1, SLP)  good progress toward goal  -MP  --        Swallow Compensatory Strategies Goal 1 (SLP)    Activity (Swallow Compensatory Strategies/Techniques Goal 1, SLP)  compensatory  strategies;postural techniques;during meal intake;alternate food/liquid intake;chin tuck posture  -MP  compensatory strategies;postural techniques;during meal intake;alternate food/liquid intake;chin tuck posture  -LR     [place]/Accuracy (Swallow Compensatory Strategies/Techniques Goal 1, SLP)  independently (over 90% accuracy)  -MP  independently (over 90% accuracy)  -LR     Time Frame (Swallow Compensatory Strategies/Techniques Goal 1, SLP)  short term goal (STG);by discharge  -MP  short term goal (STG);by discharge  -LR     Barriers (Swallow Compensatory Strategies/Techniques Goal 1, SLP)  Pt able to independently recall need for chin tuck.  -MP  --     Progress/Outcomes (Swallow Compensatory Strategies/Techniques Goal 1, SLP)  good progress toward goal  -MP  --       User Key  (r) = Recorded By, (t) = Taken By, (c) = Cosigned By    Initials Name Provider Type    Renetta Bernstein MS CCC-SLP Speech and Language Pathologist    Alejandro Mayen MS, CFY-SLP Speech and Language Pathologist          EDUCATION  The patient has been educated in the following areas:   Dysphagia (Swallowing Impairment) Modified Diet Instruction.    SLP Recommendation and Plan  Daily Summary of Progress (SLP): progress toward functional goals is good     Plan for Continued Treatment (SLP): Reviewed recent FEES results & recommendations w/ pt. Provided handout of dysphagia exercises, explained, and demonstrated for pt. Rec continue soft diet & thin liquids w/ use of chin tuck & alternate bites/sips. SLP will continue to follow for tx.  Anticipated Dischage Disposition: unknown                    Time Calculation:   Time Calculation- SLP     Row Name 06/17/19 1042             Time Calculation- SLP    SLP Start Time  1015  -MP      SLP Received On  06/17/19  -        User Key  (r) = Recorded By, (t) = Taken By, (c) = Cosigned By    Initials Name Provider Type    Alejandro Mayen, MS, CFY-SLP Speech and Language Pathologist           Therapy Charges for Today     Code Description Service Date Service Provider Modifiers Qty    72788988733 HC ST TREATMENT SWALLOW 3 6/17/2019 Alejandro Bower MS, CFY-SLP GN 1                 Alejandro Bower MS, CFY-SLP  6/17/2019

## 2019-06-17 NOTE — PROGRESS NOTES
CARDIOLOGY PROGRESS NOTE           6/17/2019 8:02 AM    Admit Date: 6/7/2019    Admit Diagnosis: Lung infiltrate [R91.8]    Chief Compliant: Follow up for A. Fib    Subjective:   Patient's status has been cardiac stable. Remains in A. Fib with stable rates      Objective:     Vitals:    06/16/19 1400 06/16/19 1707 06/16/19 2100 06/17/19 0436   BP:  127/75 104/66 117/76   BP Location:  Left arm Left arm Right arm   Patient Position:  Lying Lying Lying   Pulse:  98 89 86   Resp:  18 16 18   Temp:  98.3 °F (36.8 °C) 98 °F (36.7 °C) 98 °F (36.7 °C)   TempSrc:  Oral Oral Oral   SpO2: 94% 95% 90% 94%   Weight:       Height:           Physical Exam:  General-Well Nourished, Well developed  Eyes - PERRLA  Neck- supple, No mass  CV- irregular rate and rhythm, no MRG  Lung- clear bilaterally  Abd- soft, +BS  Musc/skel - Norm strength and range of motion  Skin- warm and dry  Neuro - Alert & Oriented x 3, appropriate mood.      Current Facility-Administered Medications:   •  acetaminophen (TYLENOL) tablet 650 mg, 650 mg, Oral, Q4H PRN, Rohan Boyer MD  •  aspirin EC tablet 81 mg, 81 mg, Oral, Daily, Chino Taylor MD, 81 mg at 06/16/19 0858  •  atorvastatin (LIPITOR) tablet 40 mg, 40 mg, Oral, Nightly, Barney Carrera PA, 40 mg at 06/16/19 2104  •  bisacodyl (DULCOLAX) EC tablet 10 mg, 10 mg, Oral, Daily PRN, Barney Carrera PA, 10 mg at 06/16/19 1040  •  bisacodyl (DULCOLAX) suppository 10 mg, 10 mg, Rectal, Daily PRN, Barney Carrera PA, 10 mg at 06/16/19 1541  •  calcium gluconate 2 g in sodium chloride 0.9 % 100 mL IVPB, 2 g, Intravenous, Once PRN, Barney Carrera PA  •  cholecalciferol (VITAMIN D3) tablet 1,000 Units, 1,000 Units, Oral, Daily, Mag Lopez MD, 1,000 Units at 06/16/19 1539  •  docusate sodium (COLACE) capsule 100 mg, 100 mg, Oral, BID PRN, Barney Carrera PA, 100 mg at 06/16/19 1040  •  ertapenem (INVanz) 1 g/100 mL 0.9% NS VTB (mbp), 1 g, Intravenous, Q24H, George Zayas MD, 1 g at 06/16/19  1538  •  famotidine (PEPCID) tablet 20 mg, 20 mg, Oral, BID PRN, Elan Chen MD, 20 mg at 06/11/19 2011  •  famotidine (PEPCID) tablet 20 mg, 20 mg, Oral, BID, Mag Lopez MD, 20 mg at 06/16/19 2104  •  ferrous sulfate tablet 325 mg, 325 mg, Oral, BID, Mag Lopez MD, 325 mg at 06/16/19 2104  •  finasteride (PROSCAR) tablet 5 mg, 5 mg, Oral, Daily, Mag Lopez MD, 5 mg at 06/16/19 1539  •  folic acid (FOLVITE) tablet 1 mg, 1 mg, Oral, Nightly, Mag Lopez MD, 1 mg at 06/16/19 2104  •  HYDROcodone-acetaminophen (NORCO) 7.5-325 MG per tablet 1 tablet, 1 tablet, Oral, Q4H PRN, Rohan Boyer MD, 1 tablet at 06/14/19 1705  •  magnesium hydroxide (MILK OF MAGNESIA) suspension 2400 mg/10mL 10 mL, 10 mL, Oral, Daily PRN, Barney Carrera PA, 10 mL at 06/16/19 1040  •  Magnesium Sulfate 2 gram Bolus, followed by 8 gram infusion (total Mg dose 10 grams)- Mg less than or equal to 1mg/dL, 2 g, Intravenous, PRN **OR** Magnesium Sulfate 2 gram / 50mL Infusion (GIVE X 3 BAGS TO EQUAL 6GM TOTAL DOSE) - Mg 1.1 - 1.5 mg/dl, 2 g, Intravenous, PRN **OR** Magnesium Sulfate 4 gram infusion- Mg 1.6-1.9 mg/dL, 4 g, Intravenous, PRN, Levar Pollard MD, Last Rate: 25 mL/hr at 06/14/19 1123, 4 g at 06/14/19 1123  •  metoprolol tartrate (LOPRESSOR) tablet 50 mg, 50 mg, Oral, Q12H, Keyon Garcia MD, 50 mg at 06/16/19 2104  •  morphine injection 2 mg, 2 mg, Intravenous, Q30 Min PRN, Rohan Boyer MD, 2 mg at 06/14/19 1102  •  naloxone (NARCAN) injection 0.2 mg, 0.2 mg, Intravenous, PRN, Rohan Boyer MD  •  niCARdipine (CARDENE-IV) 40 mg/200 mL (0.2 mg/mL) in 0.9% NaCl infusion, 5-15 mg/hr, Intravenous, Continuous PRN, Barney Carrera PA  •  nitroglycerin 50 mg/250 mL (0.2 mg/mL) infusion, 5-200 mcg/min, Intravenous, Continuous PRN, Barney Carrera PA, Stopped at 06/14/19 0000  •  ondansetron (ZOFRAN) injection 4 mg, 4 mg, Intravenous, Q6H PRN, Barney Carrrea PA  •   oxyCODONE-acetaminophen (PERCOCET) 5-325 MG per tablet 2 tablet, 2 tablet, Oral, Q4H PRN, Rohan Boyer MD, 2 tablet at 06/15/19 0154  •  potassium chloride (MICRO-K) CR capsule 40 mEq, 40 mEq, Oral, PRN **OR** potassium chloride (KLOR-CON) packet 40 mEq, 40 mEq, Oral, PRN, Barney Carrera PA  •  potassium chloride 20 mEq in 50 mL IVPB, 20 mEq, Intravenous, Q1H PRN **OR** potassium chloride 20 mEq in 50 mL IVPB, 20 mEq, Intravenous, Q1H PRN, Barney Carrera PA  •  ranolazine (RANEXA) 12 hr tablet 500 mg, 500 mg, Oral, Nightly, Mag Lopez MD, 500 mg at 06/16/19 2104  •  rivaroxaban (XARELTO) tablet 15 mg, 15 mg, Oral, Nightly, Mag Lopez MD, 15 mg at 06/16/19 2104  •  sennosides-docusate sodium (SENOKOT-S) 8.6-50 MG tablet 2 tablet, 2 tablet, Oral, BID, Barney Carrera PA, 2 tablet at 06/16/19 2104  •  tamsulosin (FLOMAX) 24 hr capsule 0.4 mg, 0.4 mg, Oral, Daily, Levar Pollard MD, 0.4 mg at 06/16/19 0858  •  traMADol (ULTRAM) tablet 50 mg, 50 mg, Oral, Q6H PRN, Elan Chen MD  •  vitamin B-12 (CYANOCOBALAMIN) tablet 100 mcg, 100 mcg, Oral, Daily, Mag Lopez MD, 100 mcg at 06/16/19 1538  •  vitamin C (ASCORBIC ACID) tablet 500 mg, 500 mg, Oral, BID, Chino Taylor MD, 500 mg at 06/16/19 2104    Data Review:   Recent Results (from the past 24 hour(s))   Basic Metabolic Panel    Collection Time: 06/17/19  5:03 AM   Result Value Ref Range    Glucose 92 65 - 99 mg/dL    BUN 20 8 - 23 mg/dL    Creatinine 1.22 0.76 - 1.27 mg/dL    Sodium 133 (L) 136 - 145 mmol/L    Potassium 4.4 3.5 - 5.2 mmol/L    Chloride 98 98 - 107 mmol/L    CO2 24.0 22.0 - 29.0 mmol/L    Calcium 9.5 8.6 - 10.5 mg/dL    eGFR Non African Amer 57 (L) >60 mL/min/1.73    BUN/Creatinine Ratio 16.4 7.0 - 25.0    Anion Gap 11.0 mmol/L   CBC (No Diff)    Collection Time: 06/17/19  5:03 AM   Result Value Ref Range    WBC 7.86 3.40 - 10.80 10*3/mm3    RBC 2.85 (L) 4.14 - 5.80 10*6/mm3    Hemoglobin 8.2 (L) 13.0 -  17.7 g/dL    Hematocrit 28.0 (L) 37.5 - 51.0 %    MCV 98.2 (H) 79.0 - 97.0 fL    MCH 28.8 26.6 - 33.0 pg    MCHC 29.3 (L) 31.5 - 35.7 g/dL    RDW 21.5 (H) 12.3 - 15.4 %    RDW-SD 76.9 (H) 37.0 - 54.0 fl    MPV 9.9 6.0 - 12.0 fL    Platelets 160 140 - 450 10*3/mm3     Assessment:       Pacemaker infection s/p lead extraction     Right mid-lower lobe infiltrate     Serratia bacteremia     H/O NSC lung cancer s/p RL lobectomy     Stage III CKD, GFR 30-59 ml/min     CAD     Hypercholesterolemia    Hypertension    PAF (paroxysmal atrial fibrillation) (CMS/HCC)    Plan:   Today's rounds are unrelated and/or in addition to recent device implant/surgery  1. A. Fib Perst to Perm in nature - Plan for long term rate control. Rates are currently stable. On Metoprolol 50mg BID and Xarelto  2. Pacemaker lead infection - Post extraction with a hybrid approach. Atrial lead was removed by Laser lead and RV was removed by surgical approach.       Alpesh Oneal M.D., F.A.C.C, F.H.R.S.  Cardiology/Electrophysiology

## 2019-06-17 NOTE — PLAN OF CARE
Problem: Patient Care Overview  Goal: Plan of Care Review  Outcome: Ongoing (interventions implemented as appropriate)   06/17/19 8291   Plan of Care Review   Progress no change   Coping/Psychosocial   Plan of Care Reviewed With patient

## 2019-06-17 NOTE — PLAN OF CARE
Problem: Patient Care Overview  Goal: Plan of Care Review  Outcome: Ongoing (interventions implemented as appropriate)   06/17/19 2035   OTHER   Outcome Summary Pt ambulates in [place] 130ft with RWx, desats and has SOB. Returned to room on higher O2 per RN titration in recliner. Pt was able to sustain >90% SpO2 on 2 L after return to room.    Coping/Psychosocial   Plan of Care Reviewed With patient

## 2019-06-17 NOTE — PROGRESS NOTES
INFECTIOUS DISEASE  Progress Note    Rodney Looney  1934  [id number]      Admission Date: 6/7/2019      Requesting Provider: No ref. provider found  Evaluating Physician: George Zayas MD    Reason for Consultation: Serratia bacteremia/pacemaker lead infection    History of present illness:    6/8/2019: Mr. Looney is an 84-year-old white male who is seen today for evaluation of Serratia bacteremia/pacemaker lead infection in the setting of a recent diagnosis of non-small cell lung cancer for which he underwent a right lower lobectomy at [facility] on 5/14.  His course at [facility] was complicated by Serratia bacteremia with 2 sets of positive blood cultures on 5/21 and 1 out of 2 sets positive on 5/23.  Subsequent blood cultures on 5/26 were negative.  A ZI revealed a mobile mass on the pacemaker lead, consistent with a pacemaker lead infection/endocarditis.  He was treated with intravenous Zosyn therapy and subsequently discharged on outpatient intravenous Zosyn.  Consideration was given for referral back to  for pacemaker extraction-his pacemaker was placed at .  Due to his underlying lung cancer and advanced age, he is being given consideration for chronic antibiotic suppression rather than pacemaker lead extraction.  He presented for follow-up in our office yesterday and was noted to have dyspnea with hypoxemia.  His O2 saturations were in the mid 80s while sitting.  He was not interested in readmission to [facility] and requested readmission to [facility].  He was evaluated in the emergency room where he was noted to have a right basilar pulmonary infiltrate by chest x-ray and chest CT scan.  His antibiotic therapy was switched from Zosyn to Merrem.  He has remained afebrile overnight.  He has a minimal cough with minimal sputum production.  He denies nausea, vomiting, and diarrhea.  He denies severe chest pain.  6/9/19: He remains  afebrile. He is less short of breath today.  No increased sputum production.  No diarrhea.   6/10/19: Scheduled for MBS today. He still complains of shortness of breath.  No sputum production with cough.  He remains afebrile.     6/11/19; doing well; no events overnight; no fever, rash, sore throat on oxygen    6/12/19; feels well; no events overnight; no fever, rash, sore throat      6/14/19; had to have surgical removal of lead/sternotomy; looks well; no events overnight; hemodynamically stable.    6/16/19; doing well; no events overnight; no complaints, transferred to floor, no diarrhea, rasj, sore throat, states he may need a pacemaker but currently is in afib    6/17/19; no events overnight; no complaints, denies diarrhea, rash, sore throat;   Past Medical History:   Diagnosis Date   • Cancer (CMS/HCC)    • Coronary artery disease    • Elevated cholesterol    • GERD (gastroesophageal reflux disease)    • History of BPH 2 yrs   • Hypertension    • Kidney disease    • Smoking        Past Surgical History:   Procedure Laterality Date   • CARDIAC SURGERY     • CORONARY ARTERY BYPASS GRAFT N/A 6/13/2019    Procedure: MEDIANSTERNOTOMY, REMOVAL OF RETAINED PACING LEAD;  Surgeon: Rohan Boyer MD;  Location: [facility];  Service: Cardiothoracic   • HERNIA REPAIR         History reviewed. No pertinent family history.    Social History     Socioeconomic History   • Marital status:      Spouse name: Not on file   • Number of children: Not on file   • Years of education: Not on file   • Highest education level: Not on file   Tobacco Use   • Smoking status: Former Smoker     Types: Cigarettes   • Smokeless tobacco: Never Used       No Known Allergies      Medication:    Current Facility-Administered Medications:   •  acetaminophen (TYLENOL) tablet 650 mg, 650 mg, Oral, Q4H PRN, Rohan Boyer MD  •  aspirin EC tablet 81 mg, 81 mg, Oral, Daily, Cihno Taylor MD, 81 mg at 06/17/19 0909  •  atorvastatin (LIPITOR)  tablet 40 mg, 40 mg, Oral, Nightly, Barney Carrera PA, 40 mg at 06/16/19 2104  •  bisacodyl (DULCOLAX) EC tablet 10 mg, 10 mg, Oral, Daily PRN, Barney Carrera PA, 10 mg at 06/16/19 1040  •  bisacodyl (DULCOLAX) suppository 10 mg, 10 mg, Rectal, Daily PRN, Barney Carrera PA, 10 mg at 06/16/19 1541  •  calcium gluconate 2 g in sodium chloride 0.9 % 100 mL IVPB, 2 g, Intravenous, Once PRN, Barney Carrera PA  •  cholecalciferol (VITAMIN D3) tablet 1,000 Units, 1,000 Units, Oral, Daily, Mag Lopez MD, 1,000 Units at 06/17/19 0908  •  docusate sodium (COLACE) capsule 100 mg, 100 mg, Oral, BID PRN, Banrey Carrera PA, 100 mg at 06/16/19 1040  •  ertapenem (INVanz) 1 g/100 mL 0.9% NS VTB (mbp), 1 g, Intravenous, Q24H, George Zayas MD, 1 g at 06/16/19 1538  •  famotidine (PEPCID) tablet 20 mg, 20 mg, Oral, BID PRN, Elan Chen MD, 20 mg at 06/11/19 2011  •  famotidine (PEPCID) tablet 20 mg, 20 mg, Oral, BID, Mag Lopez MD, 20 mg at 06/17/19 0909  •  ferrous sulfate tablet 325 mg, 325 mg, Oral, BID, Mag Lopez MD, 325 mg at 06/17/19 0909  •  finasteride (PROSCAR) tablet 5 mg, 5 mg, Oral, Daily, Mag Lopez MD, 5 mg at 06/17/19 0908  •  folic acid (FOLVITE) tablet 1 mg, 1 mg, Oral, Nightly, Mag Lopez MD, 1 mg at 06/16/19 2104  •  HYDROcodone-acetaminophen (NORCO) 7.5-325 MG per tablet 1 tablet, 1 tablet, Oral, Q4H PRN, Rohan Boyer MD, 1 tablet at 06/14/19 1705  •  magnesium hydroxide (MILK OF MAGNESIA) suspension 2400 mg/10mL 10 mL, 10 mL, Oral, Daily PRN, Barney Carrera PA, 10 mL at 06/16/19 1040  •  Magnesium Sulfate 2 gram Bolus, followed by 8 gram infusion (total Mg dose 10 grams)- Mg less than or equal to 1mg/dL, 2 g, Intravenous, PRN **OR** Magnesium Sulfate 2 gram / 50mL Infusion (GIVE X 3 BAGS TO EQUAL 6GM TOTAL DOSE) - Mg 1.1 - 1.5 mg/dl, 2 g, Intravenous, PRN **OR** Magnesium Sulfate 4 gram infusion- Mg 1.6-1.9 mg/dL, 4 g, Intravenous, PRN, Levar Pollard  MD Wilma, Last Rate: 25 mL/hr at 06/14/19 1123, 4 g at 06/14/19 1123  •  metoprolol tartrate (LOPRESSOR) tablet 50 mg, 50 mg, Oral, Q12H, Keyon Garcia MD, 50 mg at 06/17/19 0909  •  morphine injection 2 mg, 2 mg, Intravenous, Q30 Min PRN, Rohan Boyer MD, 2 mg at 06/14/19 1102  •  naloxone (NARCAN) injection 0.2 mg, 0.2 mg, Intravenous, PRN, Rohan Boyer MD  •  niCARdipine (CARDENE-IV) 40 mg/200 mL (0.2 mg/mL) in 0.9% NaCl infusion, 5-15 mg/hr, Intravenous, Continuous PRN, Barney Carrera PA  •  nitroglycerin 50 mg/250 mL (0.2 mg/mL) infusion, 5-200 mcg/min, Intravenous, Continuous PRN, Barney Carrera PA, Stopped at 06/14/19 0000  •  ondansetron (ZOFRAN) injection 4 mg, 4 mg, Intravenous, Q6H PRN, Barney Carrera PA  •  oxyCODONE-acetaminophen (PERCOCET) 5-325 MG per tablet 2 tablet, 2 tablet, Oral, Q4H PRN, Rohan Boyer MD, 2 tablet at 06/15/19 0154  •  potassium chloride (MICRO-K) CR capsule 40 mEq, 40 mEq, Oral, PRN **OR** potassium chloride (KLOR-CON) packet 40 mEq, 40 mEq, Oral, PRN, Barney Carrera PA  •  potassium chloride 20 mEq in 50 mL IVPB, 20 mEq, Intravenous, Q1H PRN **OR** potassium chloride 20 mEq in 50 mL IVPB, 20 mEq, Intravenous, Q1H PRN, Barney Carrera PA  •  ranolazine (RANEXA) 12 hr tablet 500 mg, 500 mg, Oral, Nightly, Mag Lopez MD, 500 mg at 06/16/19 2104  •  rivaroxaban (XARELTO) tablet 15 mg, 15 mg, Oral, Nightly, Mag Lopez MD, 15 mg at 06/16/19 2104  •  sennosides-docusate sodium (SENOKOT-S) 8.6-50 MG tablet 2 tablet, 2 tablet, Oral, BID, Barney Carrera PA, 2 tablet at 06/16/19 2104  •  tamsulosin (FLOMAX) 24 hr capsule 0.4 mg, 0.4 mg, Oral, Daily, Levar Pollard MD, 0.4 mg at 06/17/19 0908  •  traMADol (ULTRAM) tablet 50 mg, 50 mg, Oral, Q6H PRN, Elan Chen MD  •  vitamin B-12 (CYANOCOBALAMIN) tablet 100 mcg, 100 mcg, Oral, Daily, Mag Lopez MD, 100 mcg at 06/17/19 0909  •  vitamin C (ASCORBIC ACID) tablet 500 mg, 500  mg, Oral, BID, Chino Taylor MD, 500 mg at 19 0908    Antibiotics:  Anti-Infectives (From admission, onward)    Ordered     Dose/Rate Route Frequency Start Stop    19 1417  ertapenem (INVanz) 1 g/100 mL 0.9% NS VTB (mbp)     [facility]hs[facility]el [facility] reviewed the order on 19 0806.   Ordering Provider:  George Zayas MD    1 g  over 30 Minutes Intravenous Every 24 Hours 19 1600 19 1559    19 1654  meropenem (MERREM) 1 g/100 mL 0.9% NS VTB (mbp)     Comments:  Changed from q8h per extended infusion guidelines   Ordering Provider:  Elan Chen MD    1 g  over 3 Hours Intravenous Every 12 Hours Scheduled 19 2100 19 1159    19 1434  meropenem (MERREM) 1 g/100 mL 0.9% NS VTB (mbp)     Ordering Provider:  Leopoldo Rogers MD    1 g  over 30 Minutes Intravenous Once 19 1436 19 1617                Physical Exam:   Vital Signs  Temp (24hrs), Av.9 °F (36.6 °C), Min:97.4 °F (36.3 °C), Max:98.3 °F (36.8 °C)    Temp  Min: 97.4 °F (36.3 °C)  Max: 98.3 °F (36.8 °C)  BP  Min: 103/70  Max: 127/75  Pulse  Min: 86  Max: 98  Resp  Min: 16  Max: 18  SpO2  Min: 90 %  Max: 95 %    GENERAL: Awake and alert, in no acute distress.   HEENT: Normocephalic, atraumatic.  PERRL. EOMI. No conjunctival injection. No icterus. Oropharynx clear without evidence of thrush or exudate    HEART: RRR; No murmur, rubs, gallops.   LUNGS: He has expiratory wheezes on right   ABDOMEN: Soft, nontender, nondistended. Positive bowel sounds. No rebound or guarding. No mass or HSM.  EXT:  No cyanosis, clubbing or edema. No cord.    MSK: FROM without joint effusions noted arms/legs.    SKIN: Warm and dry, thoracotomy incision with small amount of serous drainage  NEURO: Oriented to PPT. No focal deficits on motor/sensory exam at arms/legs.  PSYCHIATRIC: Normal insight and judgement. Cooperative with PE    Laboratory Data    Results from last 7 days   Lab Units  06/17/19  0503 06/16/19  0408 06/15/19  0313   WBC 10*3/mm3 7.86 8.65 10.31   HEMOGLOBIN g/dL 8.2* 8.1* 8.5*   HEMATOCRIT % 28.0* 28.0* 29.6*   PLATELETS 10*3/mm3 160 160 158     Results from last 7 days   Lab Units 06/17/19  0503   SODIUM mmol/L 133*   POTASSIUM mmol/L 4.4   CHLORIDE mmol/L 98   CO2 mmol/L 24.0   BUN mg/dL 20   CREATININE mg/dL 1.22   GLUCOSE mg/dL 92   CALCIUM mg/dL 9.5     Results from last 7 days   Lab Units 06/15/19  0313   ALK PHOS U/L 77   BILIRUBIN mg/dL 0.5   ALT (SGPT) U/L 17   AST (SGOT) U/L 24                         Estimated Creatinine Clearance: 49.8 mL/min (by C-G formula based on SCr of 1.22 mg/dL).      Microbiology:      6/7:  BC no growth                           Radiology:  Imaging Results (last 72 hours)     Procedure Component Value Units Date/Time    CT Chest Without Contrast [[id number]] Collected:  06/07/19 1453     Updated:  06/07/19 1610    Narrative:       EXAMINATION: CT CHEST WO CONTRAST-06/07/2019:      INDICATION: Hypoxemia after recent lobectomy, bleeding from posterior  incision, recent bacteremia but no fever now; R09.02-Hypoxemia;  J18.1-Lobar pneumonia, unspecified organism, lobar pneumonia.     TECHNIQUE: Multiple axial CT imaging was obtained of the chest without  the administration of intravenous contrast.     The radiation dose reduction device was turned on for each scan per the  ALARA (As Low as Reasonably Achievable) protocol.     COMPARISON: NONE.     FINDINGS: There is a small amount of pleural fluid identified along the  right lateral chest wall with a fracture involving the right posterior  sixth rib. Findings are likely postoperative. There is consolidation  posteriorly within the right lung base concerning for infiltrate. There  is some chronic interstitial changes identified likely postoperative.  Superimposed infection is likely within the right lung base. Severe  emphysematous changes seen within the lung fields bilaterally with  bronchiectatic  changes centrally. Severe emphysematous changes seen  within the upper lung fields. Old healed granulomatous disease seen  within the chest. The cardiac chambers are within normal limits. No  pericardial effusion. No bulky hilar or axillary lymphadenopathy. The  visualized upper abdomen is unremarkable.       Impression:       Findings concerning for dense consolidation and infiltrate  superimposed on chronic and emphysematous change within the right lung.  There is a fracture involving the right posterior sixth rib likely  postsurgical with some pleural thickening and small pleural fluid seen  on the right.     D:  06/07/2019  E:  06/07/2019     This report was finalized on 6/7/2019 4:07 PM by Dr. Jo Ann Galarza MD.       XR Chest 1 View [[id number]] Collected:  06/07/19 1306     Updated:  06/07/19 1423    Narrative:       EXAMINATION: XR CHEST 1 VW- 06/07/2019      INDICATION: SOA triage protocol      COMPARISON: NONE     FINDINGS: Portable chest reveals heart to be enlarged. Ill-defined  opacification seen at the right lung base with small right pleural  effusion. Deep line catheter on the right tip in the SVC. Cardiac pacer  leads in satisfactory position.       Impression:       Patchy ill-defined opacification seen within the right mid  and lower lung field with small right pleural effusion. Left lung is  clear.     D:  06/07/2019  E:  06/07/2019     This report was finalized on 6/7/2019 2:20 PM by Dr. Jo Ann Galarza MD.               Impression:   1.  Serratia bacteremia/Pacemaker lead infection- he has Serratia bacteremia with 2+ blood cultures of 5/21 and 1 out of 2 sets positive on 5/23 with subsequent blood cultures on 5/26- at [facility].  He had a mobile mass attached to the pacemaker lead by ZI at [facility] on 5/28.  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  He would require pacemaker lead extraction to cure this process but due to his advanced age and  underlying lung cancer, consideration is being given for chronic suppression instead of a higher risk of lead extraction.  Dr. Zayas has been following him at [facility] and I will confer with him regarding this issue when he returns on Tuesday.  In the meantime, we will leave him on intravenous Merrem.  2.  Right basilar infiltrate- this does not appear particularly different when compared to his chest x-ray at [facility] of 5/30.  3.  Non-small cell lung cancer-status post right lower lobe lobectomy, 5/14/2019  4.  Acute hypoxic respiratory failure-if he clinically worsens, we may need to assess for pulmonary embolism.  5.  Stage II-III chronic kidney disease.  His antibiotic therapy will need to be dose adjusted for his renal dysfunction.  6.  History of atrial fibrillation  7.  Coronary artery disease  8.  Status post pacemaker implantation-this was performed at   9.  Blood loss anemia    PLAN/RECOMMENDATIONS:   Cont  invanz 1 g iv daily  F/u wound pacemaker cultures      D/w family  Duration of abx probably 2 weeks following extraction               George Zayas MD  6/17/2019  4:02 PM

## 2019-06-17 NOTE — PROGRESS NOTES
Cardiothoracic Surgery Progress Note      POD # 4 s/p Sternotomy with lead extraction       LOS: 10 days      Subjective:  VSS. On 2L NC. Xarelto started yesterday.  No complaints.    Objective:  Vital Signs  Temp:  [97.4 °F (36.3 °C)-98.3 °F (36.8 °C)] 98 °F (36.7 °C)  Heart Rate:  [] 86  Resp:  [16-18] 18  BP: (102-127)/(64-91) 117/76    Physical Exam:   General Appearance: alert, appears stated age and cooperative   Lungs: clear to auscultation, respirations regular, respirations even and respirations unlabored   Heart: normal S1, S2 and no murmur, no gallop, no rub, tachycardia   Skin: Incision c/d/i     Results:    Results from last 7 days   Lab Units 06/17/19  0503   WBC 10*3/mm3 7.86   HEMOGLOBIN g/dL 8.2*   HEMATOCRIT % 28.0*   PLATELETS 10*3/mm3 160     Results from last 7 days   Lab Units 06/17/19  0503   SODIUM mmol/L 133*   POTASSIUM mmol/L 4.4   CHLORIDE mmol/L 98   CO2 mmol/L 24.0   BUN mg/dL 20   CREATININE mg/dL 1.22   GLUCOSE mg/dL 92   CALCIUM mg/dL 9.5         Assessment:  POD # 4 s/p Sternotomy with lead extraction  Expected recovery    Plan:  Fluid restriction of 1.5L/ 24 hours for hyponatremia  Ambulate  Pulmonary toilet  Continue anticoagulation  Rate control as per cardiology with beta blocker  ABX as per ID  Wean oxygen as tolerated  Likely home soon when antibiotic arranged.      06/17/19  7:17 AM     As above.  Probable discharge wean patient off O2.  Current status is stable. I have reviewed, verified, and confirmed the above history and current status.  I have examined the patient and confirmed the above physical findings.Above plan and treatment regimen discussed in detail with patient.  Options of treatment, attendant risks vs benefits, and my recommendations were discussed and all questions answered.    Rohan Boyer MD  CTSurgery  06/17/19   10:46 AM

## 2019-06-17 NOTE — THERAPY TREATMENT NOTE
Acute Care - Physical Therapy Treatment Note  [facility]     Patient Name: Rodney Looney  : 1934  MRN: [id number]  Today's Date: 2019  Onset of Illness/Injury or Date of Surgery: 19  Date of Referral to PT: 19  Referring Physician: MD Pollard    Admit Date: 2019    Visit Dx:    ICD-10-CM ICD-9-CM   1. Hypoxemia R09.02 799.02   2. Pneumonia of right lower lobe due to infectious organism (CMS/HCC) J18.1 486   3. Impaired functional mobility, balance, gait, and endurance Z74.09 V49.89   4. Impaired mobility and ADLs Z74.09 799.89   5. Oropharyngeal dysphagia R13.12 787.22   6. Complication associated with cardiac pacemaker lead, initial encounter T82.9XXA 996.72     Patient Active Problem List   Diagnosis   • Right mid-lower lobe infiltrate    • Serratia bacteremia    • Pacemaker infection s/p lead extraction    • H/O NSC lung cancer s/p RL lobectomy    • History of lobectomy of lung   • Hypoxia   • Stage III CKD, GFR 30-59 ml/min    • CAD    • Hypercholesterolemia   • Hypertension   • PAF (paroxysmal atrial fibrillation) (CMS/HCC)       Therapy Treatment    Rehabilitation Treatment Summary     Row Name 19 1535 19 1015          Treatment Time/Intention    Discipline  physical therapist  -EJ  speech language pathologist  -MP     Document Type  therapy note (daily note) tx at 4075-3377  -EJ2  therapy note (daily note)  -MP     Subjective Information  no complaints  -EJ  no complaints  -MP     Mode of Treatment  individual therapy  -EJ  individual therapy;speech-language pathology  -MP     Patient/Family Observations  --  No family present. Pt sitting up in chair  -MP     Care Plan Review  --  care plan/treatment goals reviewed;patient/other agree to care plan  -MP     Therapy Frequency (PT Clinical Impression)  daily  -EJ  --     Therapy Frequency (Swallow)  --  5 days per week  -MP     Patient Effort  good  -EJ  good  -MP     Existing Precautions/Restrictions  fall;oxygen  therapy device and L/min;cardiac  -EJ  --     Recorded by [EJ] Rukhsana Marie, PT 06/17/19 1616  [EJ2] Rukhsana Marie, PT 06/17/19 1618 [MP] Alejandro Bower, MS, CFY-SLP 06/17/19 1039     Row Name 06/17/19 1535             Vital Signs    Pre SpO2 (%)  96  -EJ      O2 Delivery Pre Treatment  nasal cannula  -EJ      Intra SpO2 (%)  77  -EJ      O2 Delivery Intra Treatment  nasal cannula Pt changed over to green tank, returned to room on higher O2  -EJ      Post SpO2 (%)  95  -EJ      O2 Delivery Post Treatment  nasal cannula  -EJ      Pre Patient Position  Supine  -EJ      Intra Patient Position  Standing  -EJ      Post Patient Position  Sitting  -EJ      Recorded by [EJ] Rukhsana Marie, PT 06/17/19 1616      Row Name 06/17/19 1535             Cognitive Assessment/Intervention- PT/OT    Affect/Mental Status (Cognitive)  WFL  -EJ      Orientation Status (Cognition)  oriented x 4  -EJ      Follows Commands (Cognition)  WFL  -EJ      Cognitive Function (Cognitive)  safety deficit  -EJ      Safety Deficit (Cognitive)  safety precautions awareness;safety precautions follow-through/compliance;mild deficit  -EJ      Personal Safety Interventions  fall prevention program maintained;gait belt;nonskid shoes/slippers when out of bed  -EJ      Recorded by [EJ] Rukhsana Marie, PT 06/17/19 1616      Row Name 06/17/19 1535             Bed Mobility Assessment/Treatment    Bed Mobility Assessment/Treatment  sit-supine  -EJ      Supine-Sit [place] (Bed Mobility)  conditional independence  -EJ      Recorded by [EJ] Rukhsana Marie, PT 06/17/19 1616      Row Name 06/17/19 1535             Transfer Assessment/Treatment    Transfer Assessment/Treatment  stand-sit transfer;sit-stand transfer  -EJ      Recorded by [EJ] Rukhsana Marie, PT 06/17/19 1616      Row Name 06/17/19 1535             Sit-Stand Transfer    Sit-Stand [place] (Transfers)  stand by assist  -EJ      Assistive Device (Sit-Stand Transfers)   walker, front-wheeled  -EJ      Recorded by [EJ] Rukhsana Marie, PT 06/17/19 1616      Row Name 06/17/19 1535             Stand-Sit Transfer    Stand-Sit [place] (Transfers)  stand by assist  -EJ      Assistive Device (Stand-Sit Transfers)  walker, front-wheeled  -EJ      Recorded by [EJ] Rukhsana Marie, PT 06/17/19 1616      Row Name 06/17/19 1535             Gait/Stairs Assessment/Training    Gait/Stairs Assessment/Training  gait/ambulation independence;gait/ambulation assistive device;distance ambulated;gait pattern  -EJ      [place] Level (Gait)  minimum assist (75% patient effort);1 person assist;1 person to manage equipment  -EJ      Assistive Device (Gait)  walker, front-wheeled  -EJ      Distance in Feet (Gait)  130  -EJ      Pattern (Gait)  step-through  -EJ      Deviations/Abnormal Patterns (Gait)  bilateral deviations;gait speed decreased;stride length decreased  -EJ      Bilateral Gait Deviations  forward flexed posture;heel strike decreased;weight shift ability decreased  -EJ      Comment (Gait/Stairs)  Pt ambulates in [place] with forward flexed posture, notably breathing through nose. Pt cued for PLB.pt reports needing sitting rest break at 130 ft. SpO2 measured and was at 77 %, not elevating with cues for PLB, pts lips turning color. NSG stayed with pt while PT brings chair up. SpO2 elevated with increasing O2 amount on green tank. Pts coloring normalized. Pt is rolled back to room. Returned to 2L per NC and sustained >90% SpO2.  -EJ      Recorded by [EJ] Rukshana Marie, PT 06/17/19 1616      Row Name 06/17/19 1535             Therapeutic Exercise    19136 - PT Therapeutic Exercise Minutes  10  -EJ      17890 - PT Therapeutic Activity Minutes  15  -EJ      Recorded by [EJ] Rukhsana Marie, PT 06/17/19 1616      Row Name 06/17/19 1535             Lower Extremity Seated Therapeutic Exercise    Performed, Seated Lower Extremity (Therapeutic Exercise)  hip flexion/extension;knee  flexion/extension;ankle dorsiflexion/plantarflexion  -EJ      Exercise Type, Seated Lower Extremity (Therapeutic Exercise)  AROM (active range of motion)  -EJ      Sets/Reps Detail, Seated Lower Extremity (Therapeutic Exercise)  1/20, rest breaks as needed  -EJ      Recorded by [EJ] Rukhsana Marie, PT 06/17/19 1616      Row Name 06/17/19 1535             Positioning and Restraints    Pre-Treatment Position  in bed  -EJ      Post Treatment Position  chair  -EJ      In Chair  notified nsg;reclined;call light within reach;encouraged to call for assist;with family/caregiver  -EJ      Recorded by [EJ] Rukhsana Marie, PT 06/17/19 1616      Row Name 06/17/19 1015             Pain Assessment    Additional Documentation  Pain Scale: FACES Pre/Post-Treatment (Group)  -MP      Recorded by [MP] Alejandro Bower MS, CFY-SLP 06/17/19 1039      Row Name 06/17/19 1535             Pain Scale: Numbers Pre/Post-Treatment    Pain Scale: Numbers, Pretreatment  0/10 - no pain  -EJ      Pain Scale: Numbers, Post-Treatment  0/10 - no pain  -EJ      Recorded by [EJ] Rukhsana Marie, PT 06/17/19 1616      Row Name 06/17/19 1015             Pain Scale: FACES Pre/Post-Treatment    Pain: FACES Scale, Pretreatment  0-->no hurt  -MP      Pain: FACES Scale, Post-Treatment  0-->no hurt  -MP      Recorded by [MP] Alejandro Bower MS, CFY-SLP 06/17/19 1039      Row Name                Wound 06/07/19 1735 Right thoracic spine incision;surgical    Wound - Properties Group Date first assessed: 06/07/19 [TL] Time first assessed: 1735 [TL] Side: Right [TL] Location: thoracic spine [TL2] Type: incision;surgical [TL] Recorded by:  [TL] Wesley DIXON RN 06/07/19 1735 [TL2] Wesley DIXON RN 06/07/19 1736    Row Name                Wound 06/13/19 0951 Left anterior chest incision;surgical    Wound - Properties Group Date first assessed: 06/13/19 [JS] Time first assessed: 0951 [JS] Side: Left [JS] Orientation: anterior [MH] Location: chest [JS]  Type: incision;surgical [JS] Recorded by:  [JS] Josh Hart RN 06/13/19 1157 [MH] Lg Max RN 06/13/19 2330    Row Name                Wound 06/13/19 1930 Right lateral other (see notes) other (see comments)    Wound - Properties Group Date first assessed: 06/13/19 [MH] Time first assessed: 1930 [MH] Present On Admission : yes [MH] Side: Right [MH] Orientation: lateral [MH] Location: other (see notes) [MH], lateral chest from previous chest tube  Type: other (see comments) [MH], previous chest tube insertion  Recorded by:  [MH] Lg Max RN 06/13/19 2319    Row Name                Wound 06/14/19 0800 midline chest incision    Wound - Properties Group Date first assessed: 06/14/19 [BH] Time first assessed: 0800 [BH] Orientation: midline [BH] Location: chest [BH] Type: incision [BH] Additional Comments: midsternal incision from surgery yesterday, 6/14 [BH] Recorded by:  [BH] Christine Kaufman RN 06/14/19 1203    Row Name 06/17/19 1535             Coping    Observed Emotional State  cooperative;calm  -EJ      Verbalized Emotional State  acceptance  -EJ      Recorded by [EJ] Rukhsana Marie, PT 06/17/19 1616      Row Name 06/17/19 1535             Plan of Care Review    Plan of Care Reviewed With  patient  -EJ      Recorded by [EJ] Rukhsana Marie, PT 06/17/19 1616      Row Name 06/17/19 1535             Outcome Summary/Treatment Plan (PT)    Daily Summary of Progress (PT)  progress towards functional goals is fair  -EJ      Recorded by [EJ] Rukhsana Marie, PT 06/17/19 1616      Row Name 06/17/19 1015             Outcome Summary/Treatment Plan (SLP)    Daily Summary of Progress (SLP)  progress toward functional goals is good  -MP      Plan for Continued Treatment (SLP)  Reviewed recent FEES results & recommendations w/ pt. Provided handout of dysphagia exercises, explained, and demonstrated for pt. Rec continue soft diet & thin liquids w/ use of chin tuck & alternate bites/sips. SLP will  continue to follow for tx.  -MP      Anticipated Dischage Disposition  unknown  -MP      Recorded by [MP] Alejandro Bower, MS, CFY-SLP 06/17/19 1039        User Key  (r) = Recorded By, (t) = Taken By, (c) = Cosigned By    Initials Name Effective Dates Discipline    EJ Frank Rukhsana A, PT 11/20/18 -  PT    Christine Yang, RN 01/19/18 -  Nurse    Lg Baker, RN 06/27/16 -  Nurse    MARÍA DIXON, Wesley Alston, RN 08/07/17 -  Nurse    MP Alejandro Bower, MS, CFY-SLP 08/15/18 -  SLP    JS Josh Hart, RN 01/30/19 -  Nurse          Wound 06/07/19 1735 Right thoracic spine incision;surgical (Active)   Dressing Appearance open to air 6/17/2019  8:00 AM   Closure Open to air 6/17/2019  8:00 AM   Base scab 6/17/2019  8:00 AM   Periwound ecchymotic 6/17/2019  8:00 AM   Periwound Temperature warm 6/17/2019  8:00 AM   Periwound Skin Turgor soft 6/17/2019  8:00 AM   Drainage Amount none 6/17/2019  8:00 AM       Wound 06/13/19 0951 Left anterior chest incision;surgical (Active)   Dressing Appearance dry;intact;no drainage 6/17/2019  8:00 AM   Closure VIJAY 6/17/2019  8:00 AM   Base dressing in place, unable to visualize 6/17/2019  8:00 AM   Drainage Amount none 6/17/2019  8:00 AM   Dressing Care, Wound hydrogel 6/17/2019 12:00 AM       Wound 06/13/19 1930 Right lateral other (see notes) other (see comments) (Active)   Dressing Appearance open to air 6/17/2019  8:00 AM   Closure Open to air 6/17/2019  8:00 AM   Base pink 6/17/2019  8:00 AM   Periwound Temperature warm 6/17/2019  8:00 AM   Periwound Skin Turgor soft 6/17/2019  8:00 AM   Drainage Amount none 6/17/2019  8:00 AM   Dressing Care, Wound open to air 6/17/2019  6:00 AM       Wound 06/14/19 0800 midline chest incision (Active)   Dressing Appearance open to air 6/17/2019  8:00 AM   Closure Liquid skin adhesive 6/17/2019  8:00 AM   Base scab 6/17/2019  8:00 AM   Periwound Temperature warm 6/17/2019  8:00 AM   Periwound Skin Turgor soft 6/17/2019  8:00 AM    Drainage Amount none 6/17/2019  8:00 AM   Dressing Care, Wound open to air 6/17/2019  6:00 AM       Rehab Goal Summary     Row Name 06/17/19 1015             Oral Nutrition/Hydration Goal 1 (SLP)    Oral Nutrition/Hydration Goal 1, SLP  LTG: Pt will tolerate regular diet and thin liquids w/ no overt s/s of aspiration w/ 100% accuracy w/o cues.   -MP      Time Frame (Oral Nutrition/Hydration Goal 1, SLP)  by discharge  -MP      Progress/Outcomes (Oral Nutrition/Hydration Goal 1, SLP)  continuing progress toward goal  -MP         Lingual Strengthening Goal 1 (SLP)    Activity (Lingual Strengthening Goal 1, SLP)  increase lingual tone/sensation/control/coordination/movement  -MP      Increase Lingual Tone/Sensation/Control/Coordination/Movement  lingual movement exercises  -MP      [place]/Accuracy (Lingual Strengthening Goal 1, SLP)  with minimal cues (75-90% accuracy)  -MP      Time Frame (Lingual Strengthening Goal 1, SLP)  short term goal (STG);by discharge  -MP      Progress/Outcomes (Lingual Strengthening Goal 1, SLP)  goal ongoing  -MP         Pharyngeal Strengthening Exercise Goal 1 (SLP)    Activity (Pharyngeal Strengthening Goal 1, SLP)  increase timing;increase superior movement of the hyolaryngeal complex;increase anterior movement of the hyolaryngeal complex;increase closure at entrance to airway/closure of airway at glottis;increase squeeze/positive pressure generation;increase tongue base retraction  -MP      Increase Timing  prepping - 3 second prep or suck swallow or 3-step swallow  -MP      Increase Superior Movement of the Hyolaryngeal Complex  [place]  -MP      Increase Anterior Movement of the Hyolaryngeal Complex  chin tuck against resistance (CTAR)  -MP      Increase Closure at Entrance to Airway/Closure of Airway at Glottis  super-supraglottic swallow  -MP      Increase Squeeze/Positive Pressure Generation  effortful pitch glide (falsetto + pharyngeal squeeze)  -MP      Increase Tongue  Base Retraction  hard effortful swallow  -MP      [place]/Accuracy (Pharyngeal Strengthening Goal 1, SLP)  independently (over 90% accuracy)  -MP      Time Frame (Pharyngeal Strengthening Goal 1, SLP)  short term goal (STG);by discharge  -MP      Barriers (Pharyngeal Strengthening Goal 1, SLP)  Provided handout of exercises & reviewed w/ pt.  -MP      Progress/Outcomes (Pharyngeal Strengthening Goal 1, SLP)  good progress toward goal  -MP         Swallow Compensatory Strategies Goal 1 (SLP)    Activity (Swallow Compensatory Strategies/Techniques Goal 1, SLP)  compensatory strategies;postural techniques;during meal intake;alternate food/liquid intake;chin tuck posture  -MP      [place]/Accuracy (Swallow Compensatory Strategies/Techniques Goal 1, SLP)  independently (over 90% accuracy)  -MP      Time Frame (Swallow Compensatory Strategies/Techniques Goal 1, SLP)  short term goal (STG);by discharge  -MP      Barriers (Swallow Compensatory Strategies/Techniques Goal 1, SLP)  Pt able to independently recall need for chin tuck.  -MP      Progress/Outcomes (Swallow Compensatory Strategies/Techniques Goal 1, SLP)  good progress toward goal  -MP        User Key  (r) = Recorded By, (t) = Taken By, (c) = Cosigned By    Initials Name Provider Type Discipline    MP Alejandro Bower, MS, CFY-SLP Speech and Language Pathologist SLP          Physical Therapy Education     Title: PT OT SLP Therapies (In Progress)     Topic: Physical Therapy (Done)     Point: Mobility training (Done)     Learning Progress Summary           Patient Acceptance, E, VU,NR by RENETTA at 6/17/2019  3:35 PM    Acceptance, E, VU,NR by ZARI at 6/15/2019  9:33 AM    Acceptance, E, NR by JB1 at 6/14/2019  9:30 AM    Acceptance, E, NR by BHASKAR at 6/8/2019 11:54 AM    Comment:  Educated pt on completing AP, HS, hip abd, and SLR twice throughout the day (one more time tonight).                   Point: Home exercise program (Done)     Learning Progress Summary            Patient Acceptance, E, VU,NR by EJ at 6/17/2019  3:35 PM    Acceptance, E, NR by JB1 at 6/14/2019  9:30 AM    Acceptance, E, NR by LM at 6/8/2019 11:54 AM    Comment:  Educated pt on completing AP, HS, hip abd, and SLR twice throughout the day (one more time tonight).                   Point: Body mechanics (Done)     Learning Progress Summary           Patient Acceptance, E, VU,NR by EJ at 6/17/2019  3:35 PM    Acceptance, E, VU,NR by ZARI at 6/15/2019  9:33 AM    Acceptance, E, NR by JB1 at 6/14/2019  9:30 AM                   Point: Precautions (Done)     Learning Progress Summary           Patient Acceptance, E, VU,NR by EJ at 6/17/2019  3:35 PM    Acceptance, E, VU,NR by ZARI at 6/15/2019  9:33 AM    Acceptance, E, NR by JB1 at 6/14/2019  9:30 AM    Acceptance, E, NR by LM at 6/8/2019 11:54 AM    Comment:  Educated pt on completing AP, HS, hip abd, and SLR twice throughout the day (one more time tonight).                               User Key     Initials Effective Dates Name Provider Type Discipline    [facility] 06/19/15 -  Kirstie Serra, PT Physical Therapist PT     11/20/18 -  Rukhsana Marie, PT Physical Therapist PT     06/15/16 -  Aria Gong, PT Physical Therapist PT    ZARI 08/30/18 -  Mily Chapa, PT Physical Therapist PT                PT Recommendation and Plan  Therapy Frequency (PT Clinical Impression): daily  Outcome Summary/Treatment Plan (PT)  Daily Summary of Progress (PT): progress towards functional goals is fair  Plan of Care Reviewed With: patient  Outcome Summary: Pt ambulates in [place] 130ft with RWx, desats and has SOB. Returned to room on higher O2 per RN titration in recliner. Pt was able to sustain >90% SpO2 on 2 L after return to room.   Outcome Measures     Row Name 06/17/19 1535 06/15/19 0933          How much help from another person do you currently need...    Turning from your back to your side while in flat bed without using bedrails?  4  -EJ  4  -ZARI     Moving from  lying on back to sitting on the side of a flat bed without bedrails?  4  -EJ  3  -ZARI     Moving to and from a bed to a chair (including a wheelchair)?  3  -EJ  3  -ZARI     Standing up from a chair using your arms (e.g., wheelchair, bedside chair)?  3  -EJ  3  -ZARI     Climbing 3-5 steps with a railing?  3  -EJ  2  -ZARI     To walk in hospital room?  3  -EJ  3  -ZARI     AM-PAC 6 Clicks Score  20  -EJ  18  -ZARI        Functional Assessment    Outcome Measure Options  AM-PAC 6 Clicks Basic Mobility (PT)  -EJ  AM-PAC 6 Clicks Basic Mobility (PT)  -ZARI       User Key  (r) = Recorded By, (t) = Taken By, (c) = Cosigned By    Initials Name Provider Type    Rukhsana Shields, PT Physical Therapist    Mily Badillo, PT Physical Therapist         Time Calculation:   PT Charges     Row Name 06/17/19 1535             Time Calculation    Start Time  1535  -EJ      PT Received On  06/17/19  -EJ      PT Goal Re-Cert Due Date  06/24/19  -EJ         Time Calculation- PT    Total Timed Code Minutes- PT  25 minute(s)  -EJ         Timed Charges    32867 - PT Therapeutic Exercise Minutes  10  -EJ      48528 - PT Therapeutic Activity Minutes  15  -EJ        User Key  (r) = Recorded By, (t) = Taken By, (c) = Cosigned By    Initials Name Provider Type    Rukhsana Shields, PT Physical Therapist        Therapy Charges for Today     Code Description Service Date Service Provider Modifiers Qty    34808814362 HC PT THER PROC EA 15 MIN 6/17/2019 Rukhsana Marie, PT GP 1    24364274198 HC PT THERAPEUTIC ACT EA 15 MIN 6/17/2019 Rukhsana Marie, PT GP 1          PT G-Codes  Outcome Measure Options: AM-PAC 6 Clicks Basic Mobility (PT)  AM-PAC 6 Clicks Score: 20  Score: 20    Rukhsana Garcia, PT  6/17/2019

## 2019-06-18 NOTE — PROGRESS NOTES
[facility] Medicine Services  PROGRESS NOTE    Patient Name: Rodney Looney  : 1934  MRN: [id number]    Date of Admission: 2019  Length of Stay: 11  Primary Care Physician: Dana Isidro DO    Subjective   Subjective     CC:  F/U pacemaker infection    HPI:  Patient resting in bed. States he did fairly well ambulating earlier, just not as much as he'd like. No soa/ herron. Tolerating diet and no further diarrhea    Review of Systems  Gen-no fevers, no chills  CV-no chest pain, no palpitations  Resp-no cough, no dyspnea  GI-no N/V/D, no abd pain      Otherwise ROS is negative except as mentioned in the HPI.    Objective   Objective     Vital Signs:   Temp:  [97.2 °F (36.2 °C)-98.1 °F (36.7 °C)] 97.2 °F (36.2 °C)  Heart Rate:  [] 116  Resp:  [18] 18  BP: (108-120)/(67-76) 108/67        Physical Exam:  Gen-no acute distress  HENT-NCAT, mucous membranes moist  CV-irregularly irregular, S1 S2 normal, no m/r/g  Resp-diminished bilaterally, unlabored on 1L NC  Abd-soft, NT, ND, +BS  Ext-no edema  Neuro-A&Ox3, no focal deficits  Skin-no rashes  Psych-appropriate mood    Results Reviewed:  I have personally reviewed current lab, radiology, and data and agree.    Results from last 7 days   Lab Units 19  0503 19  0408 06/15/19  0313 19  0341  19  1456   WBC 10*3/mm3 7.86 8.65 10.31 9.79   < > 10.22   HEMOGLOBIN g/dL 8.2* 8.1* 8.5* 8.1*   < > 8.4*   HEMATOCRIT % 28.0* 28.0* 29.6* 27.9*   < > 28.9*   PLATELETS 10*3/mm3 160 160 158 166   < > 219   INR   --   --   --  1.40*  --  1.41*    < > = values in this interval not displayed.     Results from last 7 days   Lab Units 19  0503 19  0408 06/15/19  0313 19  0341   SODIUM mmol/L 133* 133* 130* 132*   POTASSIUM mmol/L 4.4 4.5 5.9* 4.9   CHLORIDE mmol/L 98 98 98 100   CO2 mmol/L 24.0 24.0 24.0 20.0*   BUN mg/dL 20 22 19 19   CREATININE mg/dL 1.22 1.33* 1.30* 1.26   GLUCOSE mg/dL 92 91 129*  133*   CALCIUM mg/dL 9.5 9.3 9.6 9.0   ALT (SGPT) U/L  --   --  17  --    AST (SGOT) U/L  --   --  24  --    PROBNP pg/mL  --   --   --  1,641.0     Estimated Creatinine Clearance: 49.8 mL/min (by C-G formula based on SCr of 1.22 mg/dL).    Microbiology Results Abnormal     Procedure Component Value - Date/Time    Anaerobic Culture - Tissue, Heart [[id number]] Collected:  06/13/19 1326    Lab Status:  Final result Specimen:  Tissue from Heart Updated:  06/18/19 1126     Anaerobic Culture No anaerobes isolated at 5 days    Tissue / Bone Culture - Tissue, Heart [[id number]] Collected:  06/13/19 1326    Lab Status:  Final result Specimen:  Tissue from Heart Updated:  06/16/19 0832     Tissue Culture No growth at 3 days     Gram Stain Rare (1+) WBCs seen      No organisms seen    Wound Culture - Wound, Heart [[id number]] Collected:  06/13/19 1049    Lab Status:  Final result Specimen:  Wound from Heart Updated:  06/16/19 0832     Wound Culture No growth at 3 days     Gram Stain Many (4+) Red blood cells      Occasional WBCs seen      No organisms seen    AFB Culture - Tissue, Heart [[id number]] Collected:  06/13/19 1326    Lab Status:  Preliminary result Specimen:  Tissue from Heart Updated:  06/14/19 1352     AFB Stain No acid fast bacilli seen on concentrated smear    Blood Culture - Blood, Arm, Right [[id number]] Collected:  06/07/19 1425    Lab Status:  Final result Specimen:  Blood from Arm, Right Updated:  06/12/19 1500     Blood Culture No growth at 5 days    Blood Culture - Blood, Arm, Left [[id number]] Collected:  06/07/19 1430    Lab Status:  Final result Specimen:  Blood from Arm, Left Updated:  06/12/19 1500     Blood Culture No growth at 5 days          Imaging Results (last 24 hours)     Procedure Component Value Units Date/Time    XR Chest PA & Lateral [[id number]] Collected:  06/17/19 1056     Updated:  06/17/19 1417    Narrative:       EXAMINATION: XR CHEST PA AND LATERAL-      INDICATION: Postoperative right  lower lobectomy; R09.02-Hypoxemia;  J18.1-Lobar pneumonia, unspecified organism; Z74.09-Other reduced  mobility; R13.12-Dysphagia, oropharyngeal phase; T82.9XXA-Unspecified  complication of cardiac and vascular prosthetic device, implant and  graft, initial encounter.      COMPARISON: 06/15/2019.     FINDINGS: A Port-A-Cath is well positioned. There is right basilar  airspace disease. There are postoperative changes on the right. A right  pleural effusion has resolved. There are postinflammatory changes on the  left. There is no pneumothorax.       Impression:       There are postoperative changes involving the right lung.  There is no pneumothorax. There is patchy airspace disease at the right  lung base.  The right pleural effusion is no longer present.     D:  06/17/2019  E:  06/17/2019     This report was finalized on 6/17/2019 2:14 PM by Dr. Wesley Santamaria MD.             Results for orders placed during the hospital encounter of 06/07/19   Adult Transthoracic Echo Complete W/ Cont if Necessary Per Protocol    Narrative · Estimated EF appears to be in the range of 61 - 65%.  · Left ventricular systolic function is normal.  · Right ventricular cavity is mildly dilated.  · Left atrial cavity size is mild-to-moderately dilated.  · Mild aortic valve regurgitation is present.  · Calculated right ventricular systolic pressure from tricuspid   regurgitation is 69 mmHg.  · Moderate to severe tricuspid valve regurgitation is present.  · Thin fibrinous mobile echogenic structures possibly attached to the   right atrial lead, versus prominent Chiari network. ZI recommended for   follow-up if clinically appropriate..          I have reviewed the medications:  Scheduled Meds:    aspirin 81 mg Oral Daily   atorvastatin 40 mg Oral Nightly   cholecalciferol 1,000 Units Oral Daily   ertapenem 1 g Intravenous Q24H   famotidine 20 mg Oral BID   ferrous sulfate 325 mg Oral BID   finasteride 5 mg Oral Daily   folic acid 1 mg Oral  Nightly   metoprolol tartrate 50 mg Oral Q12H   ranolazine 500 mg Oral Nightly   rivaroxaban 15 mg Oral Nightly   sennosides-docusate sodium 2 tablet Oral BID   tamsulosin 0.4 mg Oral Daily   vitamin B-12 100 mcg Oral Daily   vitamin C 500 mg Oral BID     Continuous Infusions:    niCARdipine 5-15 mg/hr    nitroglycerin 5-200 mcg/min Last Rate: Stopped (06/14/19 0000)     PRN Meds:.•  acetaminophen  •  bisacodyl  •  bisacodyl  •  calcium gluconate IVPB  •  docusate sodium  •  famotidine  •  HYDROcodone-acetaminophen  •  magnesium hydroxide  •  magnesium sulfate **OR** magnesium sulfate **OR** magnesium sulfate  •  melatonin  •  Morphine  •  naloxone  •  niCARdipine  •  nitroglycerin  •  ondansetron  •  oxyCODONE-acetaminophen  •  potassium chloride **OR** potassium chloride  •  potassium chloride **OR** potassium chloride      Assessment/Plan   Assessment / Plan     Active Hospital Problems    Diagnosis POA   • **Pacemaker infection s/p lead extraction  [Z95.0] Yes     On 6/13/19 per Dr. Oneal      • PAF (paroxysmal atrial fibrillation) (CMS/HCC) [I48.0] Yes   • CAD  [I25.10] Yes   • Hypercholesterolemia [E78.00] Yes   • Hypertension [I10] Yes   • Right mid-lower lobe infiltrate  [R91.8] Yes   • Serratia bacteremia  [A49.8] Yes   • H/O NSC lung cancer s/p RL lobectomy  [C34.90] Yes     At [facility] in May [id number]      • Stage III CKD, GFR 30-59 ml/min  [N18.3] Yes     Managed by Dr. Courtney             Brief Hospital Course to date:  Rodney Looney is a 84 y.o. male with history of HTN, HLD, PAF, CKD 3, GERD, BPH, bradycardia s/p PPM in 2007, and non-small cell lung cancer s/p recent right lower lobectomy on 5/14/19 at [facility] complicated by Serratia bacteremia and ZI showing a mobile mass on a pacemaker lead consistent with pacemaker lead infection/endocarditis (had been discharged from [facility] on outpatient IV Zosyn), who presents from ID clinic due to dyspnea and hypoxia into the mid 80's. In the ER, CT  chest showed dense consolidation and infiltrate in the right lung. Admitted to hospitalist service. ID felt his right lung infiltrate was not significantly different from imaging done while at [facility]. He was continued on Merrem per ID recommendations. Lead extraction was discussed and patient ultimately underwent median sternotomy with right ventricular pacemaker lead extraction on 6/13 by Dr. Boyer. He was monitored closely in the ICU afterward, and transferred back to the floor with hospitalists resuming care on 6/16.      PLAN:  --Now on Invanz per ID. Plan for 2 weeks of therapy from date of lead extraction. Pacemaker lead cultures NGTD. Blood cultures on admission were negative.  --CXR continues to show persistent opacities in the right lung with a small pleural effusion. Encourage incentive spirometry. Continue nebs and wean O2 as tolerated. Ambulate today  --Afib persistent/ permanent. Increase metoprolol to 50mg and stop amio per cards in favor of rate control. Patient asymptomatic. Xarelto restarted and CT removed.  --PT/OT- will need home health at DC    DVT Prophylaxis:  Mechanical, Xarelto     Disposition: I expect the patient to be discharged home with  when antibiotics arranged and hopefully O2 weaned    CODE STATUS:   Code Status and Medical Interventions:   Ordered at: 06/13/19 1432     Code Status:    CPR     Medical Interventions (Level of Support Prior to Arrest):    Full         Electronically signed by LINDA Mckeon, 06/18/19, 11:28 AM.

## 2019-06-18 NOTE — PROCEDURES
PRE-ELECTROPHYSIOLOGY STUDY DIAGNOSES  1. Infected DDDR Pacemaker  2. Previous implantation of a [place] LookIt dual-chamber pacemaker.     PROCEDURES PERFORMED  1. Insertion of an arterial line in preparation for lead extraction.  2. Insertion of temporary pacing wire in preparation for lead extraction.  3. Removal of previous dual-chamber pacemaker.  4. Attempted Removal of right ventricle pace/sense lead through transvenous extraction techniques.  5. Removal of right atrial pace/sense lead through transvenous extraction techniques.      Anesthesia: General    Estimated Blood Loss: Less than 20 mL     Specimens: Pacemaker lead     PROCEDURE IN DETAIL: The patient brought into the EP lab in a fasting  state. The right groin was prepped and draped in the usual sterile  fashion. Access was obtained in the right femoral vein via the Seldinger  technique, over which a 7-[place] sheath was placed. Access was obtained  in the right femoral artery over which a 4-[place] sheath was placed.  Recordings were made of blood pressure throughout the extent of the case.  Through the 7-[place] sheath, a 5-[place] temporary wire was placed at the  RV apex. It was connected to a temporary pacer. It was activated at a  lower rate of 50 beats per minute and a VVI mode.     The physician and nurse then re-scrubbed. The left shoulder was prepped  and draped in the usual sterile fashion. Skin was anesthetized with  lidocaine with epinephrine. Incision was made at previous pacemaker site.  Previous pacemaker was removed through blunt dissection techniques. The right ventricle pace/sense  lead was then freed from the header, the suture sleeve was removed, and the lead was able to be removed through  Transvenous laser lead extraction techniques to the tip in the right ventricle . A 14Fr laser sheath was to free the lead to the Tip of the RV.     The right atrial pace/sense lead was then freed from the header, the suture sleeve was removed, and the  lead was able to be removed through  Transvenous laser lead extraction techniques. A 14Fr laser sheath was used but had to be upgraded to a tightrale device for full extraction    The case was reviewed with Dr. Amin's. The risk of full extraction in the EP lab was felt to be too high risk. Dr. Boyer agreed to take the patient to the OR for surgical removal of the RV lead.    Pocket was irrigated with triple-antibiotic flush.   The fascial layer was closed with  2-0 Vicryl, followed by a next layer of 3-0 Vicryl, followed by a  superficial layer of staples. The wound was dressed. The patient was transferred to the OR in a stable  condition.     IMPRESSION:   1. Successful extraction of an infected pacemaker and atrial leads  2. Unable to fully extract the right ventricular lead. Dr. Boyer agreed to take the patient to the OR for surgical removal of the RV lead.

## 2019-06-18 NOTE — PROGRESS NOTES
CARDIOLOGY PROGRESS NOTE           6/18/2019 7:49 AM    Admit Date: 6/7/2019    Admit Diagnosis: Lung infiltrate [R91.8]    Chief Compliant: Follow up for A. Fib    Subjective:   Patient's status has been cardiac stable. Remains in A. Fib with stable rates. Reports feeling better overall. Requiring O2      Objective:     Vitals:    06/17/19 0436 06/17/19 0908 06/17/19 1700 06/17/19 2017   BP: 117/76 103/70 109/69 120/76   BP Location: Right arm  Left arm    Patient Position: Lying  Sitting    Pulse: 86 93 90 100   Resp: 18 18 18    Temp: 98 °F (36.7 °C) 97.4 °F (36.3 °C) 98.1 °F (36.7 °C)    TempSrc: Oral Oral Oral    SpO2: 94%  (!) 89%    Weight:       Height:           Physical Exam:  General-Well Nourished, Well developed  Eyes - PERRLA  Neck- supple, No mass  CV- irregular rate and rhythm, no MRG  Lung- clear bilaterally  Abd- soft, +BS  Musc/skel - Norm strength and range of motion  Skin- warm and dry  Neuro - Alert & Oriented x 3, appropriate mood.      Current Facility-Administered Medications:   •  acetaminophen (TYLENOL) tablet 650 mg, 650 mg, Oral, Q4H PRN, Rohan Boyer MD  •  aspirin EC tablet 81 mg, 81 mg, Oral, Daily, Chino Taylor MD, 81 mg at 06/17/19 0909  •  atorvastatin (LIPITOR) tablet 40 mg, 40 mg, Oral, Nightly, Barney Carrera PA, 40 mg at 06/17/19 2018  •  bisacodyl (DULCOLAX) EC tablet 10 mg, 10 mg, Oral, Daily PRN, Barney Carrera PA, 10 mg at 06/16/19 1040  •  bisacodyl (DULCOLAX) suppository 10 mg, 10 mg, Rectal, Daily PRN, Barney Carrera PA, 10 mg at 06/16/19 1541  •  calcium gluconate 2 g in sodium chloride 0.9 % 100 mL IVPB, 2 g, Intravenous, Once PRN, Barney Carrera PA  •  cholecalciferol (VITAMIN D3) tablet 1,000 Units, 1,000 Units, Oral, Daily, Lopez, Mag V, MD, 1,000 Units at 06/17/19 0908  •  docusate sodium (COLACE) capsule 100 mg, 100 mg, Oral, BID PRN, Barney Carrera PA, 100 mg at 06/16/19 1040  •  ertapenem (INVanz) 1 g/100 mL 0.9% NS VTB (mbp), 1 g, Intravenous,  Q24H, George Zayas MD, 1 g at 06/17/19 1618  •  famotidine (PEPCID) tablet 20 mg, 20 mg, Oral, BID PRN, Elan Chen MD, 20 mg at 06/11/19 2011  •  famotidine (PEPCID) tablet 20 mg, 20 mg, Oral, BID, Mag Lopez MD, 20 mg at 06/17/19 2017  •  ferrous sulfate tablet 325 mg, 325 mg, Oral, BID, Mag Lopez MD, 325 mg at 06/17/19 2018  •  finasteride (PROSCAR) tablet 5 mg, 5 mg, Oral, Daily, Mag Lopez MD, 5 mg at 06/17/19 0908  •  folic acid (FOLVITE) tablet 1 mg, 1 mg, Oral, Nightly, Mag Lopez MD, 1 mg at 06/17/19 2017  •  HYDROcodone-acetaminophen (NORCO) 7.5-325 MG per tablet 1 tablet, 1 tablet, Oral, Q4H PRN, Rohan Boyer MD, 1 tablet at 06/14/19 1705  •  magnesium hydroxide (MILK OF MAGNESIA) suspension 2400 mg/10mL 10 mL, 10 mL, Oral, Daily PRN, Barney Carrera PA, 10 mL at 06/16/19 1040  •  Magnesium Sulfate 2 gram Bolus, followed by 8 gram infusion (total Mg dose 10 grams)- Mg less than or equal to 1mg/dL, 2 g, Intravenous, PRN **OR** Magnesium Sulfate 2 gram / 50mL Infusion (GIVE X 3 BAGS TO EQUAL 6GM TOTAL DOSE) - Mg 1.1 - 1.5 mg/dl, 2 g, Intravenous, PRN **OR** Magnesium Sulfate 4 gram infusion- Mg 1.6-1.9 mg/dL, 4 g, Intravenous, PRN, Levar Pollard MD, Last Rate: 25 mL/hr at 06/14/19 1123, 4 g at 06/14/19 1123  •  metoprolol tartrate (LOPRESSOR) tablet 50 mg, 50 mg, Oral, Q12H, Keyon Garcia MD, 50 mg at 06/17/19 2017  •  morphine injection 2 mg, 2 mg, Intravenous, Q30 Min PRN, Rohan Boyer MD, 2 mg at 06/14/19 1102  •  naloxone (NARCAN) injection 0.2 mg, 0.2 mg, Intravenous, PRN, Rohan Boyer MD  •  niCARdipine (CARDENE-IV) 40 mg/200 mL (0.2 mg/mL) in 0.9% NaCl infusion, 5-15 mg/hr, Intravenous, Continuous PRN, Barney Carrera PA  •  nitroglycerin 50 mg/250 mL (0.2 mg/mL) infusion, 5-200 mcg/min, Intravenous, Continuous PRN, Barney Carrera PA, Stopped at 06/14/19 0000  •  ondansetron (ZOFRAN) injection 4 mg, 4 mg, Intravenous, Q6H  PRN, Barney Carrera PA  •  oxyCODONE-acetaminophen (PERCOCET) 5-325 MG per tablet 2 tablet, 2 tablet, Oral, Q4H PRN, Rohan Boyer MD, 2 tablet at 06/15/19 0154  •  potassium chloride (MICRO-K) CR capsule 40 mEq, 40 mEq, Oral, PRN **OR** potassium chloride (KLOR-CON) packet 40 mEq, 40 mEq, Oral, PRN, Barney Carrera PA  •  potassium chloride 20 mEq in 50 mL IVPB, 20 mEq, Intravenous, Q1H PRN **OR** potassium chloride 20 mEq in 50 mL IVPB, 20 mEq, Intravenous, Q1H PRN, Barney Carrera PA  •  ranolazine (RANEXA) 12 hr tablet 500 mg, 500 mg, Oral, Nightly, Mag Lopez MD, 500 mg at 06/17/19 2017  •  rivaroxaban (XARELTO) tablet 15 mg, 15 mg, Oral, Nightly, Mag Lopez MD, 15 mg at 06/17/19 2018  •  sennosides-docusate sodium (SENOKOT-S) 8.6-50 MG tablet 2 tablet, 2 tablet, Oral, BID, Barney Carrera PA, 2 tablet at 06/17/19 2017  •  tamsulosin (FLOMAX) 24 hr capsule 0.4 mg, 0.4 mg, Oral, Daily, Levar Pollard MD, 0.4 mg at 06/17/19 0908  •  vitamin B-12 (CYANOCOBALAMIN) tablet 100 mcg, 100 mcg, Oral, Daily, Mag Lopez MD, 100 mcg at 06/17/19 0909  •  vitamin C (ASCORBIC ACID) tablet 500 mg, 500 mg, Oral, BID, Chino Taylor MD, 500 mg at 06/17/19 2018    Data Review:   Recent Results (from the past 24 hour(s))   Blood Gas, Arterial With Co-Ox    Collection Time: 06/18/19  4:16 AM   Result Value Ref Range    Site Left Brachial     Bridger's Test N/A     pH, Arterial 7.473 (H) 7.350 - 7.450 pH units    pCO2, Arterial 34.5 mm Hg    pO2, Arterial 53.2 (L) 83.0 - 108.0 mm Hg    HCO3, Arterial 25.3 20.0 - 26.0 mmol/L    Base Excess, Arterial 1.7 0.0 - 2.0 mmol/L    Hemoglobin, Blood Gas 8.7 (L) 13.5 - 17.5 g/dL    Hematocrit, Blood Gas 26.7 %    Oxyhemoglobin 85.0 (L) 94 - 99 %    Methemoglobin 0.60 0.00 - 1.50 %    Carboxyhemoglobin 2.4 (H) 0 - 2 %    CO2 Content 26.3 23 - 27 mmol/L    Temperature 37.0 C    Barometric Pressure for Blood Gas  mmHg    Modality Nasal Cannula     FIO2 28 %     Ventilator Mode        Note      pH, Temp Corrected 7.473 pH Units    pCO2, Temperature Corrected 34.5 (L) 35 - 48 mm Hg    pO2, Temperature Corrected 53.2 (L) 83 - 108 mm Hg     Assessment:       Pacemaker infection s/p lead extraction     Right mid-lower lobe infiltrate     Serratia bacteremia     H/O NSC lung cancer s/p RL lobectomy     Stage III CKD, GFR 30-59 ml/min     CAD     Hypercholesterolemia    Hypertension    PAF (paroxysmal atrial fibrillation) (CMS/HCC)    Plan:   Today's rounds are unrelated and/or in addition to recent device implant/surgery  1. A. Fib Perst to Perm in nature - Plan for long term rate control. Rates are currently stable. On Metoprolol 50mg BID and Xarelto  2. Pacemaker lead infection - Post extraction with a hybrid approach. Atrial lead was removed by Laser lead and RV was removed by surgical approach.   3. Cardiac discharge planning - Patient is cardiac stable for discharge when other medical issues have been stabilized.       Alpesh Oneal M.D., F.A.C.C, F.H.R.S.  Cardiology/Electrophysiology

## 2019-06-18 NOTE — THERAPY TREATMENT NOTE
Acute Care - Physical Therapy Treatment Note  [facility]     Patient Name: Rodney Looney  : 1934  MRN: [id number]  Today's Date: 2019  Onset of Illness/Injury or Date of Surgery: 19  Date of Referral to PT: 19  Referring Physician: MD Pollard    Admit Date: 2019    Visit Dx:    ICD-10-CM ICD-9-CM   1. Hypoxemia R09.02 799.02   2. Pneumonia of right lower lobe due to infectious organism (CMS/HCC) J18.1 486   3. Impaired functional mobility, balance, gait, and endurance Z74.09 V49.89   4. Impaired mobility and ADLs Z74.09 799.89   5. Oropharyngeal dysphagia R13.12 787.22   6. Complication associated with cardiac pacemaker lead, initial encounter T82.9XXA 996.72   7. Pacemaker infection s/p lead extraction  Z95.0 V45.01     Patient Active Problem List   Diagnosis   • Right mid-lower lobe infiltrate    • Serratia bacteremia    • Pacemaker infection s/p lead extraction    • H/O NSC lung cancer s/p RL lobectomy    • History of lobectomy of lung   • Hypoxia   • Stage III CKD, GFR 30-59 ml/min    • CAD    • Hypercholesterolemia   • Hypertension   • PAF (paroxysmal atrial fibrillation) (CMS/HCC)       Therapy Treatment    Rehabilitation Treatment Summary     Row Name 19 1309             Treatment Time/Intention    Discipline  physical therapist  -EJ      Document Type  therapy note (daily note) tx at [id number]  -EJ      Subjective Information  no complaints  -EJ      Mode of Treatment  individual therapy  -EJ      Patient/Family Observations  spouse present.  -EJ      Care Plan Review  care plan/treatment goals reviewed;risks/benefits reviewed;patient/other agree to care plan  -EJ      Therapy Frequency (PT Clinical Impression)  daily  -EJ      Patient Effort  good  -EJ      Existing Precautions/Restrictions  fall;oxygen therapy device and L/min;cardiac  (Significant)  use protable SpO2 montior via forehead sensor....  -EJ      Treatment Considerations/Comments  SpO2 checked during  sitting rest break in [place] via finger and forehead sensor, both indicating saturation was in 60s. Pt with c/o SOB, appears to have difficulty elevating on 1.5 L as pt was on in room. Pt is rolled back to room where hard wired monitor failed to pickup despite all connections being intact until SpO2 was at 100%. PT had pt perform steps in place and did not drop sats during this time, despite becoming SOB each bout.  -EJ      Recorded by [EJ] Rukhsana Marie, PT 06/18/19 1403      Row Name 06/18/19 1309             Vital Signs    Pre SpO2 (%)  -- does not have sensor on in room  -EJ      O2 Delivery Pre Treatment  nasal cannula  -EJ      Intra SpO2 (%)  65  -EJ      O2 Delivery Intra Treatment  nasal cannula per portable sensors on forehead/hand  -EJ      Post SpO2 (%)  93  -EJ      O2 Delivery Post Treatment  nasal cannula during steps in place pt SpO2>90%; sitting rest >89%.  -EJ      Pre Patient Position  Supine  -EJ      Intra Patient Position  Standing  -EJ      Post Patient Position  Sitting  -EJ      Recorded by [EJ] Rukhsana Marie, PT 06/18/19 1403      Row Name 06/18/19 1309             Cognitive Assessment/Intervention- PT/OT    Affect/Mental Status (Cognitive)  WFL  -EJ      Orientation Status (Cognition)  oriented x 4  -EJ      Follows Commands (Cognition)  WFL  -EJ      Cognitive Function (Cognitive)  safety deficit  -EJ      Safety Deficit (Cognitive)  safety precautions awareness;safety precautions follow-through/compliance;insight into deficits/self awareness;awareness of need for assistance  -EJ      Personal Safety Interventions  fall prevention program maintained;gait belt;nonskid shoes/slippers when out of bed  -EJ      Recorded by [EJ] Rukhsana Marie, PT 06/18/19 1403      Row Name 06/18/19 1309             Bed Mobility Assessment/Treatment    Bed Mobility Assessment/Treatment  supine-sit  -EJ      Supine-Sit [place] (Bed Mobility)  minimum assist (75% patient effort)  -EJ       Recorded by [EJ] Rukhsana Marie, PT 06/18/19 1403      Row Name 06/18/19 1309             Transfer Assessment/Treatment    Transfer Assessment/Treatment  stand-sit transfer;sit-stand transfer  -EJ      Recorded by [EJ] Rukhsana Marie, PT 06/18/19 1403      Row Name 06/18/19 1309             Sit-Stand Transfer    Sit-Stand [place] (Transfers)  stand by assist;verbal cues  -EJ      Assistive Device (Sit-Stand Transfers)  walker, front-wheeled  -EJ      Recorded by [EJ] Rukhsana Marie, PT 06/18/19 1403      Row Name 06/18/19 1309             Stand-Sit Transfer    Stand-Sit [place] (Transfers)  stand by assist;verbal cues  -EJ      Assistive Device (Stand-Sit Transfers)  walker, front-wheeled  -EJ      Recorded by [EJ] Rukhsana Marie, PT 06/18/19 1403      Row Name 06/18/19 1309             Gait/Stairs Assessment/Training    Gait/Stairs Assessment/Training  gait/ambulation independence;gait/ambulation assistive device;distance ambulated;gait pattern  -EJ      [place] Level (Gait)  minimum assist (75% patient effort);1 person assist;1 person to manage equipment  -EJ      Assistive Device (Gait)  walker, front-wheeled  -EJ      Distance in Feet (Gait)  100  -EJ      Pattern (Gait)  step-through  -EJ      Deviations/Abnormal Patterns (Gait)  bilateral deviations;gait speed decreased;stride length decreased  -EJ      Bilateral Gait Deviations  forward flexed posture;heel strike decreased;weight shift ability decreased  -EJ      Comment (Gait/Stairs)  Pt ambulates in [place] with forward flexed posture, continues to breath through mouth. Pt cued for PLB. PT brings rolling chair for sitting rest break as needed. When pt sits his SpO2 reads in 60's on forehead and finger monitor, without improvement in [place] with O2 per NC increased to 4L. Once back in room pt ora fuentes does not  until it reads 100%. With monitor donned pt marches in place with room air until fatigued. SpO2>90  jennifer. 2nd bout of marching in place on 1.5 L per NC on wall O2, and SpO2 remains about 90% again. Accuracy of portable monitors unclear. Portions of pt's lips are blue when SpO2 readings are low.  -EJ      Recorded by [EJ] Rukhsana Marie, PT 06/18/19 1403      Row Name 06/18/19 1309             Therapeutic Exercise    87124 - PT Therapeutic Activity Minutes  25  -EJ      Recorded by [EJ] Rukhsana Marie, PT 06/18/19 1403      Row Name 06/18/19 1309             Positioning and Restraints    Pre-Treatment Position  in bed  -EJ      Post Treatment Position  chair  -EJ      In Chair  notified nsg;reclined;call light within reach;encouraged to call for assist;exit alarm on;with family/caregiver  -EJ      Recorded by [EJ] Rukhsana Marie, PT 06/18/19 1403      Row Name 06/18/19 1309             Pain Scale: Numbers Pre/Post-Treatment    Pain Scale: Numbers, Pretreatment  0/10 - no pain  -EJ      Pain Scale: Numbers, Post-Treatment  0/10 - no pain  -EJ      Recorded by [EJ] Rukhsana Marie, PT 06/18/19 1403      Row Name                Wound 06/07/19 1735 Right thoracic spine incision;surgical    Wound - Properties Group Date first assessed: 06/07/19 [TL] Time first assessed: 1735 [TL] Side: Right [TL] Location: thoracic spine [TL2] Type: incision;surgical [TL] Recorded by:  [TL] Wesley DIXON RN 06/07/19 1735 [TL2] Wesley DIXON RN 06/07/19 1736    Row Name                Wound 06/13/19 0951 Left anterior chest incision;surgical    Wound - Properties Group Date first assessed: 06/13/19 [JS] Time first assessed: 0951 [JS] Side: Left [JS] Orientation: anterior [MH] Location: chest [JS] Type: incision;surgical [JS] Recorded by:  [JS] Josh Hart RN 06/13/19 1157 [MH] Lg Max RN 06/13/19 2330    Row Name                Wound 06/13/19 1930 Right lateral other (see notes) other (see comments)    Wound - Properties Group Date first assessed: 06/13/19 [MH] Time first assessed: 1930 [] Present On  Admission : yes [] Side: Right [MH] Orientation: lateral [] Location: other (see notes) [MH], lateral chest from previous chest tube  Type: other (see comments) [], previous chest tube insertion  Recorded by:  [MH] Lg Max RN 06/13/19 2319    Row Name                Wound 06/14/19 0800 midline chest incision    Wound - Properties Group Date first assessed: 06/14/19 [BH] Time first assessed: 0800 [] Orientation: midline [] Location: chest [] Type: incision [] Additional Comments: midsternal incision from surgery yesterday, 6/14 [] Recorded by:  [BH] Christine Kaufman RN 06/14/19 1203    Row Name 06/18/19 1309             Coping    Observed Emotional State  cooperative;calm  -EJ      Verbalized Emotional State  acceptance  -EJ      Recorded by [EJ] Rukhsana Marie, PT 06/18/19 1403      Row Name 06/18/19 1309             Outcome Summary/Treatment Plan (PT)    Daily Summary of Progress (PT)  progress towards functional goals is fair  -EJ      Anticipated Discharge Disposition (PT)  home with home health;home with assist  -EJ      Recorded by [EJ] Rukhsana Marie, PT 06/18/19 1403        User Key  (r) = Recorded By, (t) = Taken By, (c) = Cosigned By    Initials Name Effective Dates Discipline    EJ Rukhsana Marie, PT 11/20/18 -  PT     Christine Kaufman RN 01/19/18 -  Nurse    Lg Baker RN 06/27/16 -  Nurse    Wesley BARRIOS RN 08/07/17 -  Nurse    Josh Solis RN 01/30/19 -  Nurse          Wound 06/07/19 1735 Right thoracic spine incision;surgical (Active)   Dressing Appearance open to air 6/18/2019  8:00 AM   Closure Open to air 6/18/2019  8:00 AM   Base scab 6/18/2019  8:00 AM   Periwound ecchymotic 6/18/2019  8:00 AM   Periwound Temperature warm 6/18/2019  8:00 AM   Periwound Skin Turgor soft 6/18/2019  8:00 AM   Drainage Amount none 6/18/2019  8:00 AM   Dressing Care, Wound open to air 6/17/2019  8:18 PM   Periwound Care, Wound dry periwound area  maintained 6/18/2019  4:00 AM       Wound 06/13/19 0951 Left anterior chest incision;surgical (Active)   Dressing Appearance dry;intact;no drainage 6/18/2019  8:00 AM   Closure VIJAY 6/18/2019  8:00 AM   Base dressing in place, unable to visualize 6/18/2019  8:00 AM   Periwound dry;intact 6/17/2019  8:18 PM   Periwound Temperature warm 6/17/2019  8:18 PM   Periwound Skin Turgor soft 6/17/2019  8:18 PM   Drainage Amount none 6/18/2019  8:00 AM   Dressing Care, Wound hydrogel 6/17/2019  8:18 PM   Periwound Care, Wound dry periwound area maintained 6/18/2019  4:00 AM       Wound 06/13/19 1930 Right lateral other (see notes) other (see comments) (Active)   Dressing Appearance open to air 6/18/2019  8:00 AM   Closure Open to air 6/18/2019  8:00 AM   Base pink 6/18/2019  8:00 AM   Periwound dry;pink 6/17/2019  8:18 PM   Periwound Temperature warm 6/18/2019  8:00 AM   Periwound Skin Turgor soft 6/18/2019  8:00 AM   Drainage Amount none 6/18/2019  8:00 AM   Dressing Care, Wound open to air 6/17/2019  8:18 PM   Periwound Care, Wound dry periwound area maintained 6/18/2019  4:00 AM       Wound 06/14/19 0800 midline chest incision (Active)   Dressing Appearance open to air 6/18/2019  8:00 AM   Closure Liquid skin adhesive 6/18/2019  8:00 AM   Base scab 6/18/2019  8:00 AM   Periwound pink;dry;intact 6/17/2019  8:18 PM   Periwound Temperature warm 6/18/2019  8:00 AM   Periwound Skin Turgor soft 6/18/2019  8:00 AM   Drainage Amount none 6/18/2019  8:00 AM   Dressing Care, Wound open to air 6/17/2019  8:18 PM   Periwound Care, Wound dry periwound area maintained 6/18/2019  4:00 AM           Physical Therapy Education     Title: PT OT SLP Therapies (In Progress)     Topic: Physical Therapy (Done)     Point: Mobility training (Done)     Learning Progress Summary           Patient Acceptance, E, VU,NR by RENETTA at 6/18/2019  1:09 PM    Comment:  education on PLB    Acceptance, MAK MORALES NR by RENETTA at 6/17/2019  3:35 PM    Acceptance, MAK MORALES NR by  ZARI at 6/15/2019  9:33 AM    Acceptance, E, NR by FRED at 6/14/2019  9:30 AM    Acceptance, E, NR by BHASKAR at 6/8/2019 11:54 AM    Comment:  Educated pt on completing AP, HS, hip abd, and SLR twice throughout the day (one more time tonight).                   Point: Home exercise program (Done)     Learning Progress Summary           Patient Acceptance, E, VU,NR by RENETTA at 6/17/2019  3:35 PM    Acceptance, E, NR by FRED at 6/14/2019  9:30 AM    Acceptance, E, NR by BHASKAR at 6/8/2019 11:54 AM    Comment:  Educated pt on completing AP, HS, hip abd, and SLR twice throughout the day (one more time tonight).                   Point: Body mechanics (Done)     Learning Progress Summary           Patient Acceptance, E, VU,NR by RENETTA at 6/18/2019  1:09 PM    Comment:  education on PLB    Acceptance, E, VU,NR by RENETTA at 6/17/2019  3:35 PM    Acceptance, E, VU,NR by ZARI at 6/15/2019  9:33 AM    Acceptance, E, NR by FRED at 6/14/2019  9:30 AM                   Point: Precautions (Done)     Learning Progress Summary           Patient Acceptance, E, VU,NR by RENETTA at 6/18/2019  1:09 PM    Comment:  education on PLB    Acceptance, E, VU,NR by RENETTA at 6/17/2019  3:35 PM    Acceptance, E, VU,NR by ZARI at 6/15/2019  9:33 AM    Acceptance, E, NR by FRED at 6/14/2019  9:30 AM    Acceptance, E, NR by BHASKAR at 6/8/2019 11:54 AM    Comment:  Educated pt on completing AP, HS, hip abd, and SLR twice throughout the day (one more time tonight).                               User Key     Initials Effective Dates Name Provider Type Discipline    [facility] 06/19/15 -  Kirstie Serra, PT Physical Therapist PT     11/20/18 -  Rukhsana Marie, PT Physical Therapist PT     06/15/16 -  Aria Gong, PT Physical Therapist PT    ZARI 08/30/18 -  Mily Chapa, PT Physical Therapist PT                PT Recommendation and Plan  Anticipated Discharge Disposition (PT): home with home health, home with assist  Therapy Frequency (PT Clinical Impression): daily  Outcome  Summary/Treatment Plan (PT)  Daily Summary of Progress (PT): progress towards functional goals is fair  Anticipated Discharge Disposition (PT): home with home health, home with assist  Plan of Care Reviewed With: patient  Outcome Summary: Pt ambulates 100 ft in [place], sits for rest break when SOB. Two monitors (forehead and finger) indicate SpO2 in 60s, without improvement despite upping supplemental O2 to 4 L. Pt rolled back to room in chair. Hardwire monitor delayed in reading, but indicated SpO2 of 100%. Pt marched in place without and with O2 without drop in SpO2. PT to continue. Recommend montioring on forehead sensor throughout ambulation next visit.  Outcome Measures     Row Name 06/18/19 1309 06/17/19 153          How much help from another person do you currently need...    Turning from your back to your side while in flat bed without using bedrails?  4  -EJ  4  -EJ     Moving from lying on back to sitting on the side of a flat bed without bedrails?  3  -EJ  4  -EJ     Moving to and from a bed to a chair (including a wheelchair)?  3  -EJ  3  -EJ     Standing up from a chair using your arms (e.g., wheelchair, bedside chair)?  3  -EJ  3  -EJ     Climbing 3-5 steps with a railing?  3  -EJ  3  -EJ     To walk in hospital room?  3  -EJ  3  -EJ     AM-PAC 6 Clicks Score  19  -EJ  20  -EJ        Functional Assessment    Outcome Measure Options  AM-PAC 6 Clicks Basic Mobility (PT)  -EJ  AM-PAC 6 Clicks Basic Mobility (PT)  -EJ       User Key  (r) = Recorded By, (t) = Taken By, (c) = Cosigned By    Initials Name Provider Type    Rukhsana Shields PT Physical Therapist         Time Calculation:   PT Charges     Row Name 06/18/19 1309             Time Calculation    Start Time  1309  -EJ      PT Received On  06/18/19  -EJ      PT Goal Re-Cert Due Date  06/24/19  -EJ         Time Calculation- PT    Total Timed Code Minutes- PT  25 minute(s)  -EJ         Timed Charges    79394 - PT Therapeutic Activity Minutes  25   -EJ        User Key  (r) = Recorded By, (t) = Taken By, (c) = Cosigned By    Initials Name Provider Type    Rukhsana Shields, PT Physical Therapist        Therapy Charges for Today     Code Description Service Date Service Provider Modifiers Qty    32208097045  PT THER PROC EA 15 MIN 6/17/2019 Rukhsana Marie, PT GP 1    [id number]  PT THERAPEUTIC ACT EA 15 MIN 6/17/2019 Rukhsana Marie, PT GP 1    [id number]  PT THERAPEUTIC ACT EA 15 MIN 6/18/2019 Rukhsana Marie, PT GP 2          PT G-Codes  Outcome Measure Options: AM-PAC 6 Clicks Basic Mobility (PT)  AM-PAC 6 Clicks Score: 19  Score: 20    Rukhsana Garcia, PT  6/18/2019

## 2019-06-18 NOTE — PLAN OF CARE
Problem: Patient Care Overview  Goal: Plan of Care Review  Outcome: Ongoing (interventions implemented as appropriate)   06/18/19 0452   Plan of Care Review   Progress no change   OTHER   Outcome Summary Pt resting in bed. VSS. 2L NC. Patient alert x4 at beginning of shift but became increasingly confused throughout shift. Very restless. Called and voiced concerns about new onset confusion with hospilitist group. ABG's obtained. Increased oxygen from 2L to 4L. Increased rounding on patient. Denies pain. Reinforcement needed with plan of care. Will continue to monitor.   Coping/Psychosocial   Plan of Care Reviewed With patient       Problem: Pneumonia (Adult)  Goal: Signs and Symptoms of Listed Potential Problems Will be Absent, Minimized or Managed (Pneumonia)  Outcome: Ongoing (interventions implemented as appropriate)   06/18/19 0452   Goal/Outcome Evaluation   Problems Assessed (Pneumonia) all   Problems Present (Pneumonia) respiratory compromise       Problem: Fall Risk (Adult)  Goal: Absence of Fall  Outcome: Ongoing (interventions implemented as appropriate)   06/18/19 0452   Fall Risk (Adult)   Absence of Fall making progress toward outcome       Problem: Skin Injury Risk (Adult)  Goal: Skin Health and Integrity  Outcome: Ongoing (interventions implemented as appropriate)   06/18/19 0452   Skin Injury Risk (Adult)   Skin Health and Integrity making progress toward outcome       Problem: Cardiac Surgery (Adult)  Goal: Signs and Symptoms of Listed Potential Problems Will be Absent, Minimized or Managed (Cardiac Surgery)  Outcome: Ongoing (interventions implemented as appropriate)   06/18/19 0452   Goal/Outcome Evaluation   Problems Assessed (Cardiac Surgery) all   Problems Present (Cardiac Surgery) none

## 2019-06-18 NOTE — PLAN OF CARE
Problem: Patient Care Overview  Goal: Plan of Care Review   06/18/19 1445   Plan of Care Review   Progress no change   OTHER   Outcome Summary On/off NC sats in upper 90s, waiying on outpatient infusion plan. Still forgetful and confused at times.   Coping/Psychosocial   Plan of Care Reviewed With patient

## 2019-06-18 NOTE — PLAN OF CARE
Problem: Patient Care Overview  Goal: Plan of Care Review  Outcome: Ongoing (interventions implemented as appropriate)   06/18/19 3093   OTHER   Outcome Summary Pt ambulates 100 ft in [place], sits for rest break when SOB. Two monitors (forehead and finger) indicate SpO2 in 60s, without improvement despite upping supplemental O2 to 4 L. Pt rolled back to room in chair. Hardwire monitor delayed in reading, but indicated SpO2 of 100%. Pt marched in place without and with O2 without drop in SpO2. PT to continue. Recommend montioring on forehead sensor throughout ambulation next visit.   Coping/Psychosocial   Plan of Care Reviewed With patient

## 2019-06-18 NOTE — PROGRESS NOTES
INFECTIOUS DISEASE  Progress Note    Rodney Looney  1934  [id number]      Admission Date: 6/7/2019      Requesting Provider: No ref. provider found  Evaluating Physician: George Zayas MD    Reason for Consultation: Serratia bacteremia/pacemaker lead infection    History of present illness:    6/8/2019: Mr. Looney is an 84-year-old white male who is seen today for evaluation of Serratia bacteremia/pacemaker lead infection in the setting of a recent diagnosis of non-small cell lung cancer for which he underwent a right lower lobectomy at [facility] on 5/14.  His course at [facility] was complicated by Serratia bacteremia with 2 sets of positive blood cultures on 5/21 and 1 out of 2 sets positive on 5/23.  Subsequent blood cultures on 5/26 were negative.  A ZI revealed a mobile mass on the pacemaker lead, consistent with a pacemaker lead infection/endocarditis.  He was treated with intravenous Zosyn therapy and subsequently discharged on outpatient intravenous Zosyn.  Consideration was given for referral back to  for pacemaker extraction-his pacemaker was placed at .  Due to his underlying lung cancer and advanced age, he is being given consideration for chronic antibiotic suppression rather than pacemaker lead extraction.  He presented for follow-up in our office yesterday and was noted to have dyspnea with hypoxemia.  His O2 saturations were in the mid 80s while sitting.  He was not interested in readmission to [facility] and requested readmission to [facility].  He was evaluated in the emergency room where he was noted to have a right basilar pulmonary infiltrate by chest x-ray and chest CT scan.  His antibiotic therapy was switched from Zosyn to Merrem.  He has remained afebrile overnight.  He has a minimal cough with minimal sputum production.  He denies nausea, vomiting, and diarrhea.  He denies severe chest pain.  6/9/19: He remains  afebrile. He is less short of breath today.  No increased sputum production.  No diarrhea.   6/10/19: Scheduled for MBS today. He still complains of shortness of breath.  No sputum production with cough.  He remains afebrile.     6/11/19; doing well; no events overnight; no fever, rash, sore throat on oxygen    6/12/19; feels well; no events overnight; no fever, rash, sore throat      6/14/19; had to have surgical removal of lead/sternotomy; looks well; no events overnight; hemodynamically stable.    6/16/19; doing well; no events overnight; no complaints, transferred to floor, no diarrhea, rasj, sore throat, states he may need a pacemaker but currently is in afib    6/17/19; no events overnight; no complaints, denies diarrhea, rash, sore throat;     6/18/19; doing well; no events overnight; no fever, rash, sore throat  Past Medical History:   Diagnosis Date   • Cancer (CMS/HCC)    • Coronary artery disease    • Elevated cholesterol    • GERD (gastroesophageal reflux disease)    • History of BPH 2 yrs   • Hypertension    • Kidney disease    • Smoking        Past Surgical History:   Procedure Laterality Date   • CARDIAC SURGERY     • CORONARY ARTERY BYPASS GRAFT N/A 6/13/2019    Procedure: MEDIANSTERNOTOMY, REMOVAL OF RETAINED PACING LEAD;  Surgeon: Rohan Boyer MD;  Location: [facility];  Service: Cardiothoracic   • HERNIA REPAIR         History reviewed. No pertinent family history.    Social History     Socioeconomic History   • Marital status:      Spouse name: Not on file   • Number of children: Not on file   • Years of education: Not on file   • Highest education level: Not on file   Tobacco Use   • Smoking status: Former Smoker     Types: Cigarettes   • Smokeless tobacco: Never Used       No Known Allergies      Medication:    Current Facility-Administered Medications:   •  acetaminophen (TYLENOL) tablet 650 mg, 650 mg, Oral, Q4H PRN, Rohan Boyer MD  •  aspirin EC tablet 81 mg, 81 mg, Oral, Daily,  Chino Taylor MD, 81 mg at 06/18/19 0907  •  atorvastatin (LIPITOR) tablet 40 mg, 40 mg, Oral, Nightly, Barney Carrera PA, 40 mg at 06/17/19 2018  •  bisacodyl (DULCOLAX) EC tablet 10 mg, 10 mg, Oral, Daily PRN, Barney Carrera PA, 10 mg at 06/16/19 1040  •  bisacodyl (DULCOLAX) suppository 10 mg, 10 mg, Rectal, Daily PRN, Barney Carrera PA, 10 mg at 06/16/19 1541  •  calcium gluconate 2 g in sodium chloride 0.9 % 100 mL IVPB, 2 g, Intravenous, Once PRN, Barney Carrera PA  •  cholecalciferol (VITAMIN D3) tablet 1,000 Units, 1,000 Units, Oral, Daily, Mag Lopez MD, 1,000 Units at 06/18/19 0907  •  docusate sodium (COLACE) capsule 100 mg, 100 mg, Oral, BID PRN, Barney Carrera PA, 100 mg at 06/16/19 1040  •  ertapenem (INVanz) 1 g/100 mL 0.9% NS VTB (mbp), 1 g, Intravenous, Q24H, George Zayas MD, 1 g at 06/17/19 1618  •  famotidine (PEPCID) tablet 20 mg, 20 mg, Oral, BID PRN, Elan Chen MD, 20 mg at 06/11/19 2011  •  famotidine (PEPCID) tablet 20 mg, 20 mg, Oral, BID, Mag Lopez MD, 20 mg at 06/18/19 0908  •  ferrous sulfate tablet 325 mg, 325 mg, Oral, BID, Mag Lopez MD, 325 mg at 06/18/19 0908  •  finasteride (PROSCAR) tablet 5 mg, 5 mg, Oral, Daily, Mag Lopez MD, 5 mg at 06/18/19 0907  •  folic acid (FOLVITE) tablet 1 mg, 1 mg, Oral, Nightly, Mag Lopez MD, 1 mg at 06/17/19 2017  •  HYDROcodone-acetaminophen (NORCO) 7.5-325 MG per tablet 1 tablet, 1 tablet, Oral, Q4H PRN, Rohan Boyer MD, 1 tablet at 06/14/19 1705  •  magnesium hydroxide (MILK OF MAGNESIA) suspension 2400 mg/10mL 10 mL, 10 mL, Oral, Daily PRN, Barney Carrera PA, 10 mL at 06/16/19 1040  •  Magnesium Sulfate 2 gram Bolus, followed by 8 gram infusion (total Mg dose 10 grams)- Mg less than or equal to 1mg/dL, 2 g, Intravenous, PRN **OR** Magnesium Sulfate 2 gram / 50mL Infusion (GIVE X 3 BAGS TO EQUAL 6GM TOTAL DOSE) - Mg 1.1 - 1.5 mg/dl, 2 g, Intravenous, PRN **OR** Magnesium Sulfate 4  gram infusion- Mg 1.6-1.9 mg/dL, 4 g, Intravenous, PRN, Levar Pollard MD, Last Rate: 25 mL/hr at 06/14/19 1123, 4 g at 06/14/19 1123  •  melatonin tablet 5 mg, 5 mg, Oral, Nightly PRN, Mag Lopez MD  •  metoprolol tartrate (LOPRESSOR) tablet 50 mg, 50 mg, Oral, Q12H, Keyon Garcia MD, 50 mg at 06/18/19 0907  •  morphine injection 2 mg, 2 mg, Intravenous, Q30 Min PRN, Rohan Boyer MD, 2 mg at 06/14/19 1102  •  naloxone (NARCAN) injection 0.2 mg, 0.2 mg, Intravenous, PRN, Rohan Boyer MD  •  niCARdipine (CARDENE-IV) 40 mg/200 mL (0.2 mg/mL) in 0.9% NaCl infusion, 5-15 mg/hr, Intravenous, Continuous PRN, Barney Carrera PA  •  nitroglycerin 50 mg/250 mL (0.2 mg/mL) infusion, 5-200 mcg/min, Intravenous, Continuous PRN, Barney Carrera PA, Stopped at 06/14/19 0000  •  ondansetron (ZOFRAN) injection 4 mg, 4 mg, Intravenous, Q6H PRN, Barney Carrera PA  •  oxyCODONE-acetaminophen (PERCOCET) 5-325 MG per tablet 2 tablet, 2 tablet, Oral, Q4H PRN, Rohan Boyer MD, 2 tablet at 06/15/19 0154  •  potassium chloride (MICRO-K) CR capsule 40 mEq, 40 mEq, Oral, PRN **OR** potassium chloride (KLOR-CON) packet 40 mEq, 40 mEq, Oral, PRN, Barney Carrera PA  •  potassium chloride 20 mEq in 50 mL IVPB, 20 mEq, Intravenous, Q1H PRN **OR** potassium chloride 20 mEq in 50 mL IVPB, 20 mEq, Intravenous, Q1H PRN, Barney Carrera PA  •  ranolazine (RANEXA) 12 hr tablet 500 mg, 500 mg, Oral, Nightly, Mag Lopez MD, 500 mg at 06/17/19 2017  •  rivaroxaban (XARELTO) tablet 15 mg, 15 mg, Oral, Nightly, Mag Lopez MD, 15 mg at 06/17/19 2018  •  sennosides-docusate sodium (SENOKOT-S) 8.6-50 MG tablet 2 tablet, 2 tablet, Oral, BID, Barney Carrera PA, 2 tablet at 06/17/19 2017  •  tamsulosin (FLOMAX) 24 hr capsule 0.4 mg, 0.4 mg, Oral, Daily, Levar Pollard MD, 0.4 mg at 06/18/19 0908  •  vitamin B-12 (CYANOCOBALAMIN) tablet 100 mcg, 100 mcg, Oral, Daily, Mag Lopez MD, 100 mcg at  19 0908  •  vitamin C (ASCORBIC ACID) tablet 500 mg, 500 mg, Oral, BID, Chino Taylor MD, 500 mg at 19 0907    Antibiotics:  Anti-Infectives (From admission, onward)    Ordered     Dose/Rate Route Frequency Start Stop    19 1417  ertapenem (INVanz) 1 g/100 mL 0.9% NS VTB (mbp)     Rukhsana Martel, [facility] reviewed the order on 19 0806.   Ordering Provider:  George Zayas MD    1 g  over 30 Minutes Intravenous Every 24 Hours 19 1600 19 1559    19 1654  meropenem (MERREM) 1 g/100 mL 0.9% NS VTB (mbp)     Comments:  Changed from q8h per extended infusion guidelines   Ordering Provider:  Elan Chen MD    1 g  over 3 Hours Intravenous Every 12 Hours Scheduled 19 2100 19 1159    19 1434  meropenem (MERREM) 1 g/100 mL 0.9% NS VTB (mbp)     Ordering Provider:  Leopoldo Rogers MD    1 g  over 30 Minutes Intravenous Once 19 1436 19 1617                Physical Exam:   Vital Signs  Temp (24hrs), Av.7 °F (36.5 °C), Min:97.2 °F (36.2 °C), Max:98.1 °F (36.7 °C)    Temp  Min: 97.2 °F (36.2 °C)  Max: 98.1 °F (36.7 °C)  BP  Min: 108/67  Max: 120/76  Pulse  Min: 87  Max: 116  Resp  Min: 18  Max: 18  SpO2  Min: 69 %  Max: 89 %    GENERAL: Awake and alert, in no acute distress.   HEENT: Normocephalic, atraumatic.  PERRL. EOMI. No conjunctival injection. No icterus. Oropharynx clear without evidence of thrush or exudate    HEART: RRR; No murmur, rubs, gallops.   LUNGS: He has expiratory wheezes on right   ABDOMEN: Soft, nontender, nondistended. Positive bowel sounds. No rebound or guarding. No mass or HSM.  EXT:  No cyanosis, clubbing or edema. No cord.    MSK: FROM without joint effusions noted arms/legs.    SKIN: Warm and dry, thoracotomy incision with small amount of serous drainage  NEURO: Oriented to PPT. No focal deficits on motor/sensory exam at arms/legs.  PSYCHIATRIC: Normal insight and judgement. Cooperative with  PE    Laboratory Data    Results from last 7 days   Lab Units 06/17/19  0503 06/16/19  0408 06/15/19  0313   WBC 10*3/mm3 7.86 8.65 10.31   HEMOGLOBIN g/dL 8.2* 8.1* 8.5*   HEMATOCRIT % 28.0* 28.0* 29.6*   PLATELETS 10*3/mm3 160 160 158     Results from last 7 days   Lab Units 06/17/19  0503   SODIUM mmol/L 133*   POTASSIUM mmol/L 4.4   CHLORIDE mmol/L 98   CO2 mmol/L 24.0   BUN mg/dL 20   CREATININE mg/dL 1.22   GLUCOSE mg/dL 92   CALCIUM mg/dL 9.5     Results from last 7 days   Lab Units 06/15/19  0313   ALK PHOS U/L 77   BILIRUBIN mg/dL 0.5   ALT (SGPT) U/L 17   AST (SGOT) U/L 24                         Estimated Creatinine Clearance: 49.8 mL/min (by C-G formula based on SCr of 1.22 mg/dL).      Microbiology:      6/7:  BC no growth                           Radiology:  Imaging Results (last 72 hours)     Procedure Component Value Units Date/Time    CT Chest Without Contrast [[id number]] Collected:  06/07/19 1453     Updated:  06/07/19 1610    Narrative:       EXAMINATION: CT CHEST WO CONTRAST-06/07/2019:      INDICATION: Hypoxemia after recent lobectomy, bleeding from posterior  incision, recent bacteremia but no fever now; R09.02-Hypoxemia;  J18.1-Lobar pneumonia, unspecified organism, lobar pneumonia.     TECHNIQUE: Multiple axial CT imaging was obtained of the chest without  the administration of intravenous contrast.     The radiation dose reduction device was turned on for each scan per the  ALARA (As Low as Reasonably Achievable) protocol.     COMPARISON: NONE.     FINDINGS: There is a small amount of pleural fluid identified along the  right lateral chest wall with a fracture involving the right posterior  sixth rib. Findings are likely postoperative. There is consolidation  posteriorly within the right lung base concerning for infiltrate. There  is some chronic interstitial changes identified likely postoperative.  Superimposed infection is likely within the right lung base. Severe  emphysematous changes  seen within the lung fields bilaterally with  bronchiectatic changes centrally. Severe emphysematous changes seen  within the upper lung fields. Old healed granulomatous disease seen  within the chest. The cardiac chambers are within normal limits. No  pericardial effusion. No bulky hilar or axillary lymphadenopathy. The  visualized upper abdomen is unremarkable.       Impression:       Findings concerning for dense consolidation and infiltrate  superimposed on chronic and emphysematous change within the right lung.  There is a fracture involving the right posterior sixth rib likely  postsurgical with some pleural thickening and small pleural fluid seen  on the right.     D:  06/07/2019  E:  06/07/2019     This report was finalized on 6/7/2019 4:07 PM by Dr. Jo Ann Glaarza MD.       XR Chest 1 View [[id number]] Collected:  06/07/19 1306     Updated:  06/07/19 1423    Narrative:       EXAMINATION: XR CHEST 1 VW- 06/07/2019      INDICATION: SOA triage protocol      COMPARISON: NONE     FINDINGS: Portable chest reveals heart to be enlarged. Ill-defined  opacification seen at the right lung base with small right pleural  effusion. Deep line catheter on the right tip in the SVC. Cardiac pacer  leads in satisfactory position.       Impression:       Patchy ill-defined opacification seen within the right mid  and lower lung field with small right pleural effusion. Left lung is  clear.     D:  06/07/2019  E:  06/07/2019     This report was finalized on 6/7/2019 2:20 PM by Dr. Jo Ann Galarza MD.               Impression:   1.  Serratia bacteremia/Pacemaker lead infection- he has Serratia bacteremia with 2+ blood cultures of 5/21 and 1 out of 2 sets positive on 5/23 with subsequent blood cultures on 5/26- at [facility].  He had a mobile mass attached to the pacemaker lead by ZI at [facility] on 5/28.  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  He would require pacemaker lead  extraction to cure this process but due to his advanced age and underlying lung cancer, consideration is being given for chronic suppression instead of a higher risk of lead extraction.  Dr. Zayas has been following him at [facility] and I will confer with him regarding this issue when he returns on Tuesday.  In the meantime, we will leave him on intravenous Merrem.  2.  Right basilar infiltrate- this does not appear particularly different when compared to his chest x-ray at [facility] of 5/30.  3.  Non-small cell lung cancer-status post right lower lobe lobectomy, 5/14/2019  4.  Acute hypoxic respiratory failure-if he clinically worsens, we may need to assess for pulmonary embolism.  5.  Stage II-III chronic kidney disease.  His antibiotic therapy will need to be dose adjusted for his renal dysfunction.  6.  History of atrial fibrillation  7.  Coronary artery disease  8.  Status post pacemaker implantation-this was performed at   9.  Blood loss anemia    PLAN/RECOMMENDATIONS:   Cont  invanz 1 g iv daily  F/u wound pacemaker cultures      D/w family  Duration of abx probably 2 weeks following extraction  May be able to do infusions in office upon discharge             George Zayas MD  6/18/2019  2:56 PM

## 2019-06-18 NOTE — PROGRESS NOTES
Cardiothoracic Surgery Progress Note      POD # 5 s/p Sternotomy with lead extraction       LOS: 11 days      Subjective:  Patient reports that he is feeling well denies any complaints of chest pain or dyspnea.  Patient has been ambulating in the hallway.    Objective:  Vital Signs  Temp:  [97.2 °F (36.2 °C)-98.1 °F (36.7 °C)] 97.2 °F (36.2 °C)  Heart Rate:  [] 116  Resp:  [18] 18  BP: (108-120)/(67-76) 108/67    Physical Exam:   General Appearance: Well-developed, well-nourished no acute distress   Lungs: Clear to auscultation bilaterally no wheezes, rales or rhonchi.   Heart: Regular rate and rhythm no murmurs, rubs or gallops.  Sternum stable.   Skin: Incision c/d/i     Results:    Results from last 7 days   Lab Units 06/17/19  0503   WBC 10*3/mm3 7.86   HEMOGLOBIN g/dL 8.2*   HEMATOCRIT % 28.0*   PLATELETS 10*3/mm3 160     Results from last 7 days   Lab Units 06/17/19  0503   SODIUM mmol/L 133*   POTASSIUM mmol/L 4.4   CHLORIDE mmol/L 98   CO2 mmol/L 24.0   BUN mg/dL 20   CREATININE mg/dL 1.22   GLUCOSE mg/dL 92   CALCIUM mg/dL 9.5         Assessment:  POD # 5 s/p Sternotomy with lead extraction  Expected recovery    Plan:  Aggressive pulmonary toilet  Mobilize  Anticoagulation and management of atrial fibrillation per cardiology  Antibiotic as per infectious disease  Wean supplemental oxygen for SPO2 greater than 90%.  From a surgical perspective, the patient would be cleared for discharge however would ideally prefer the patient to no longer require supplemental oxygen prior to discharge.    JENN Blevins  CTSurgery  06/18/19   9:45 AM     Status as noted above.  Sternum is stable wounds are clean and dry.  Agree with treatment regimen as outlined above.  Nothing to add to current treatment plan.      Rohan Boyer MD  CTSurgery  06/18/19   3:04 PM

## 2019-06-19 NOTE — PROGRESS NOTES
CTS Progress Note      POD 6 s/p sternotomy with lead extraction    Subjective  Sitting up in bed.  Conversant.  No complaint.  Questioning discharge      Objective    Physical Exam:   Vital Signs   Temp:  [97.2 °F (36.2 °C)] 97.2 °F (36.2 °C)  Heart Rate:  [] 90  Resp:  [18] 18  BP: (108-132)/(67-85) 132/85   GEN: NAD   CV: Regular rate with frequent ectopy beat    RESP: Mostly clear with somewhat decreased at the bilateral bases   EXT: Warm with no significant peripheral edema   Incision: Sternum is stable incision is healing well clean and dry with no drainage     Results     Results from last 7 days   Lab Units 06/17/19  0503   WBC 10*3/mm3 7.86   HEMOGLOBIN g/dL 8.2*   HEMATOCRIT % 28.0*   PLATELETS 10*3/mm3 160     Results from last 7 days   Lab Units 06/17/19  0503   SODIUM mmol/L 133*   POTASSIUM mmol/L 4.4   CHLORIDE mmol/L 98   CO2 mmol/L 24.0   BUN mg/dL 20   CREATININE mg/dL 1.22   GLUCOSE mg/dL 92   CALCIUM mg/dL 9.5     Results from last 7 days   Lab Units 06/14/19  0341 06/13/19  1456   INR  1.40* 1.41*   APTT seconds  --  41.2*         Assessment/Plan   Postoperative day 6 status post sternotomy with lead extraction    Pacemaker infection s/p lead extraction     Right mid-lower lobe infiltrate     Serratia bacteremia     H/O NSC lung cancer s/p RL lobectomy     Stage III CKD, GFR 30-59 ml/min     CAD     Hypercholesterolemia    Hypertension    PAF (paroxysmal atrial fibrillation) (CMS/[facility])        Plan   Ongoing antibiotic therapy per infectious disease recommendations  Continue postop rehab with aggressive pulmonary toilet and increase activity levels  Wean O2 as tolerated  Hopefully discharge home soon    JENN Kuhn  06/19/19  7:41 AM    Stable course.  Patient ready for discharge from CTS standpoint.  Nothing to add to current treatment regimen. I have reviewed, verified, and confirmed the above history and current status.  I have examined the patient and confirmed the above  physical findings.    Rohna Boyer MD  CTSurgery  06/19/19   11:40 AM

## 2019-06-19 NOTE — PLAN OF CARE
Problem: Patient Care Overview  Goal: Plan of Care Review  Outcome: Ongoing (interventions implemented as appropriate)   06/19/19 2645   Plan of Care Review   Progress no change   OTHER   Outcome Summary pt sleeping without complaints with O2 @ 2l, confused at times, working on outpt infusion plans   Coping/Psychosocial   Plan of Care Reviewed With patient       Problem: Pneumonia (Adult)  Goal: Signs and Symptoms of Listed Potential Problems Will be Absent, Minimized or Managed (Pneumonia)  Outcome: Ongoing (interventions implemented as appropriate)      Problem: Fall Risk (Adult)  Goal: Absence of Fall  Outcome: Ongoing (interventions implemented as appropriate)      Problem: Skin Injury Risk (Adult)  Goal: Skin Health and Integrity  Outcome: Ongoing (interventions implemented as appropriate)      Problem: Cardiac Surgery (Adult)  Goal: Signs and Symptoms of Listed Potential Problems Will be Absent, Minimized or Managed (Cardiac Surgery)  Outcome: Ongoing (interventions implemented as appropriate)

## 2019-06-19 NOTE — PROGRESS NOTES
Continued Stay Note  [facility]     Patient Name: Rodney Looney  MRN: [id number]  Today's Date: 6/19/2019    Admit Date: 6/7/2019    Discharge Plan     Row Name 06/19/19 1040       Plan    Plan Comments  Cm spoke with pt who reports he prefers to do his outpt infusion at [facility] which is only 5 miles from his home. Once final IV orders are made CM will make arrangments.         Discharge Codes    No documentation.       Expected Discharge Date and Time     Expected Discharge Date Expected Discharge Time    Jun 19, 2019             Chary Alexander RN

## 2019-06-19 NOTE — PROGRESS NOTES
INFECTIOUS DISEASE  Progress Note    Rodney Looney  1934  [id number]      Admission Date: 6/7/2019      Requesting Provider: No ref. provider found  Evaluating Physician: George Zayas MD    Reason for Consultation: Serratia bacteremia/pacemaker lead infection    History of present illness:    6/8/2019: Mr. Looney is an 84-year-old white male who is seen today for evaluation of Serratia bacteremia/pacemaker lead infection in the setting of a recent diagnosis of non-small cell lung cancer for which he underwent a right lower lobectomy at [facility] on 5/14.  His course at [facility] was complicated by Serratia bacteremia with 2 sets of positive blood cultures on 5/21 and 1 out of 2 sets positive on 5/23.  Subsequent blood cultures on 5/26 were negative.  A ZI revealed a mobile mass on the pacemaker lead, consistent with a pacemaker lead infection/endocarditis.  He was treated with intravenous Zosyn therapy and subsequently discharged on outpatient intravenous Zosyn.  Consideration was given for referral back to  for pacemaker extraction-his pacemaker was placed at .  Due to his underlying lung cancer and advanced age, he is being given consideration for chronic antibiotic suppression rather than pacemaker lead extraction.  He presented for follow-up in our office yesterday and was noted to have dyspnea with hypoxemia.  His O2 saturations were in the mid 80s while sitting.  He was not interested in readmission to [facility] and requested readmission to [facility].  He was evaluated in the emergency room where he was noted to have a right basilar pulmonary infiltrate by chest x-ray and chest CT scan.  His antibiotic therapy was switched from Zosyn to Merrem.  He has remained afebrile overnight.  He has a minimal cough with minimal sputum production.  He denies nausea, vomiting, and diarrhea.  He denies severe chest pain.  6/9/19: He remains  afebrile. He is less short of breath today.  No increased sputum production.  No diarrhea.   6/10/19: Scheduled for MBS today. He still complains of shortness of breath.  No sputum production with cough.  He remains afebrile.     6/11/19; doing well; no events overnight; no fever, rash, sore throat on oxygen    6/12/19; feels well; no events overnight; no fever, rash, sore throat      6/14/19; had to have surgical removal of lead/sternotomy; looks well; no events overnight; hemodynamically stable.    6/16/19; doing well; no events overnight; no complaints, transferred to floor, no diarrhea, rasj, sore throat, states he may need a pacemaker but currently is in afib    6/17/19; no events overnight; no complaints, denies diarrhea, rash, sore throat;     6/18/19; doing well; no events overnight; no fever, rash, sore throat  6/19/19; no events overnight; feels well; no complaints, no diarrhea, rash, sore throat  Past Medical History:   Diagnosis Date   • Cancer (CMS/HCC)    • Coronary artery disease    • Elevated cholesterol    • GERD (gastroesophageal reflux disease)    • History of BPH 2 yrs   • Hypertension    • Kidney disease    • Smoking        Past Surgical History:   Procedure Laterality Date   • CARDIAC SURGERY     • CORONARY ARTERY BYPASS GRAFT N/A 6/13/2019    Procedure: MEDIANSTERNOTOMY, REMOVAL OF RETAINED PACING LEAD;  Surgeon: Rohan Boyer MD;  Location: [facility];  Service: Cardiothoracic   • HERNIA REPAIR         History reviewed. No pertinent family history.    Social History     Socioeconomic History   • Marital status:      Spouse name: Not on file   • Number of children: Not on file   • Years of education: Not on file   • Highest education level: Not on file   Tobacco Use   • Smoking status: Former Smoker     Types: Cigarettes   • Smokeless tobacco: Never Used       No Known Allergies      Medication:    Current Facility-Administered Medications:   •  acetaminophen (TYLENOL) tablet 650 mg,  650 mg, Oral, Q4H PRN, Rohan Boyer MD  •  aspirin EC tablet 81 mg, 81 mg, Oral, Daily, Chino Taylor MD, 81 mg at 06/19/19 0808  •  atorvastatin (LIPITOR) tablet 40 mg, 40 mg, Oral, Nightly, Barney Carrera PA, 40 mg at 06/18/19 2046  •  bisacodyl (DULCOLAX) EC tablet 10 mg, 10 mg, Oral, Daily PRN, Barney Carrera PA, 10 mg at 06/16/19 1040  •  bisacodyl (DULCOLAX) suppository 10 mg, 10 mg, Rectal, Daily PRN, Barney Carrera PA, 10 mg at 06/16/19 1541  •  calcium gluconate 2 g in sodium chloride 0.9 % 100 mL IVPB, 2 g, Intravenous, Once PRN, Barney Carrera PA  •  cholecalciferol (VITAMIN D3) tablet 1,000 Units, 1,000 Units, Oral, Daily, Mag Lopez MD, 1,000 Units at 06/19/19 0809  •  docusate sodium (COLACE) capsule 100 mg, 100 mg, Oral, BID PRN, Barney Carrera PA, 100 mg at 06/16/19 1040  •  ertapenem (INVanz) 1 g/100 mL 0.9% NS VTB (mbp), 1 g, Intravenous, Q24H, George Zayas MD, 1 g at 06/18/19 1508  •  famotidine (PEPCID) tablet 20 mg, 20 mg, Oral, BID PRN, Elan Chen MD, 20 mg at 06/11/19 2011  •  famotidine (PEPCID) tablet 20 mg, 20 mg, Oral, BID, Mag Lopez MD, 20 mg at 06/19/19 0808  •  ferrous sulfate tablet 325 mg, 325 mg, Oral, BID, Mag Lopez MD, 325 mg at 06/19/19 0808  •  finasteride (PROSCAR) tablet 5 mg, 5 mg, Oral, Daily, Mag Lopez MD, 5 mg at 06/19/19 0808  •  folic acid (FOLVITE) tablet 1 mg, 1 mg, Oral, Nightly, Mag Lopez MD, 1 mg at 06/18/19 2046  •  HYDROcodone-acetaminophen (NORCO) 7.5-325 MG per tablet 1 tablet, 1 tablet, Oral, Q4H PRN, Rohan Boyer MD, 1 tablet at 06/14/19 1705  •  magnesium hydroxide (MILK OF MAGNESIA) suspension 2400 mg/10mL 10 mL, 10 mL, Oral, Daily PRN, Barney Carrera PA, 10 mL at 06/16/19 1040  •  Magnesium Sulfate 2 gram Bolus, followed by 8 gram infusion (total Mg dose 10 grams)- Mg less than or equal to 1mg/dL, 2 g, Intravenous, PRN **OR** Magnesium Sulfate 2 gram / 50mL Infusion (GIVE X 3 BAGS TO  EQUAL 6GM TOTAL DOSE) - Mg 1.1 - 1.5 mg/dl, 2 g, Intravenous, PRN **OR** Magnesium Sulfate 4 gram infusion- Mg 1.6-1.9 mg/dL, 4 g, Intravenous, PRN, Levar Pollard MD, Last Rate: 25 mL/hr at 06/14/19 1123, 4 g at 06/14/19 1123  •  melatonin tablet 5 mg, 5 mg, Oral, Nightly PRN, Mag Lopez MD  •  metoprolol tartrate (LOPRESSOR) tablet 50 mg, 50 mg, Oral, Q12H, Keyon Garcia MD, 50 mg at 06/19/19 0808  •  morphine injection 2 mg, 2 mg, Intravenous, Q30 Min PRN, Rohan Boyer MD, 2 mg at 06/14/19 1102  •  naloxone (NARCAN) injection 0.2 mg, 0.2 mg, Intravenous, PRN, Rohan Boyer MD  •  niCARdipine (CARDENE-IV) 40 mg/200 mL (0.2 mg/mL) in 0.9% NaCl infusion, 5-15 mg/hr, Intravenous, Continuous PRN, Barney Carrera PA  •  nitroglycerin 50 mg/250 mL (0.2 mg/mL) infusion, 5-200 mcg/min, Intravenous, Continuous PRN, Barney Carrera PA, Stopped at 06/14/19 0000  •  ondansetron (ZOFRAN) injection 4 mg, 4 mg, Intravenous, Q6H PRN, Barney Carrera PA  •  oxyCODONE-acetaminophen (PERCOCET) 5-325 MG per tablet 2 tablet, 2 tablet, Oral, Q4H PRN, Rohan Boyer MD, 2 tablet at 06/15/19 0154  •  potassium chloride (MICRO-K) CR capsule 40 mEq, 40 mEq, Oral, PRN **OR** potassium chloride (KLOR-CON) packet 40 mEq, 40 mEq, Oral, PRN, Barney Carrera PA  •  potassium chloride 20 mEq in 50 mL IVPB, 20 mEq, Intravenous, Q1H PRN **OR** potassium chloride 20 mEq in 50 mL IVPB, 20 mEq, Intravenous, Q1H PRN, Barney Carrera PA  •  ranolazine (RANEXA) 12 hr tablet 500 mg, 500 mg, Oral, Nightly, Mag Lopez MD, 500 mg at 06/18/19 2046  •  rivaroxaban (XARELTO) tablet 15 mg, 15 mg, Oral, Nightly, Mag Lopez MD, 15 mg at 06/18/19 2046  •  sennosides-docusate sodium (SENOKOT-S) 8.6-50 MG tablet 2 tablet, 2 tablet, Oral, BID, Barney Carrera PA, 2 tablet at 06/19/19 0809  •  tamsulosin (FLOMAX) 24 hr capsule 0.4 mg, 0.4 mg, Oral, Daily, Levar Pollard MD, 0.4 mg at 06/19/19 0809  •  vitamin  B-12 (CYANOCOBALAMIN) tablet 100 mcg, 100 mcg, Oral, Daily, Mag Lopez MD, 100 mcg at 19 0808  •  vitamin C (ASCORBIC ACID) tablet 500 mg, 500 mg, Oral, BID, Chino Taylor MD, 500 mg at 19 0809    Antibiotics:  Anti-Infectives (From admission, onward)    Ordered     Dose/Rate Route Frequency Start Stop    19 1417  ertapenem (INVanz) 1 g/100 mL 0.9% NS VTB (mbp)     Rukhsana Martel [facility] reviewed the order on 19 0806.   Ordering Provider:  George Zayas MD    1 g  over 30 Minutes Intravenous Every 24 Hours 19 1600 19 1559    19 1654  meropenem (MERREM) 1 g/100 mL 0.9% NS VTB (mbp)     Comments:  Changed from q8h per extended infusion guidelines   Ordering Provider:  Elan Chen MD    1 g  over 3 Hours Intravenous Every 12 Hours Scheduled 19 2100 19 1159    19 1434  meropenem (MERREM) 1 g/100 mL 0.9% NS VTB (mbp)     Ordering Provider:  Leopoldo Rogers MD    1 g  over 30 Minutes Intravenous Once 19 1436 19 1617                Physical Exam:   Vital Signs  Temp (24hrs), Av.2 °F (36.8 °C), Min:98.2 °F (36.8 °C), Max:98.2 °F (36.8 °C)    Temp  Min: 98.2 °F (36.8 °C)  Max: 98.2 °F (36.8 °C)  BP  Min: 105/56  Max: 132/85  Pulse  Min: 90  Max: 93  Resp  Min: 17  Max: 17  SpO2  Min: 85 %  Max: 95 %    GENERAL: Awake and alert, in no acute distress.   HEENT: Normocephalic, atraumatic.  PERRL. EOMI. No conjunctival injection. No icterus. Oropharynx clear without evidence of thrush or exudate    HEART: RRR; No murmur, rubs, gallops.   LUNGS: He has expiratory wheezes on right   ABDOMEN: Soft, nontender, nondistended. Positive bowel sounds. No rebound or guarding. No mass or HSM.  EXT:  No cyanosis, clubbing or edema. No cord.    MSK: FROM without joint effusions noted arms/legs.    SKIN: Warm and dry, thoracotomy incision with small amount of serous drainage  NEURO: Oriented to PPT. No focal deficits on  motor/sensory exam at arms/legs.  PSYCHIATRIC: Normal insight and judgement. Cooperative with PE    Laboratory Data    Results from last 7 days   Lab Units 06/17/19  0503 06/16/19  0408 06/15/19  0313   WBC 10*3/mm3 7.86 8.65 10.31   HEMOGLOBIN g/dL 8.2* 8.1* 8.5*   HEMATOCRIT % 28.0* 28.0* 29.6*   PLATELETS 10*3/mm3 160 160 158     Results from last 7 days   Lab Units 06/17/19  0503   SODIUM mmol/L 133*   POTASSIUM mmol/L 4.4   CHLORIDE mmol/L 98   CO2 mmol/L 24.0   BUN mg/dL 20   CREATININE mg/dL 1.22   GLUCOSE mg/dL 92   CALCIUM mg/dL 9.5     Results from last 7 days   Lab Units 06/15/19  0313   ALK PHOS U/L 77   BILIRUBIN mg/dL 0.5   ALT (SGPT) U/L 17   AST (SGOT) U/L 24                         Estimated Creatinine Clearance: 49.8 mL/min (by C-G formula based on SCr of 1.22 mg/dL).      Microbiology:      6/7:  BC no growth                           Radiology:  Imaging Results (last 72 hours)     Procedure Component Value Units Date/Time    CT Chest Without Contrast [[id number]] Collected:  06/07/19 1453     Updated:  06/07/19 1610    Narrative:       EXAMINATION: CT CHEST WO CONTRAST-06/07/2019:      INDICATION: Hypoxemia after recent lobectomy, bleeding from posterior  incision, recent bacteremia but no fever now; R09.02-Hypoxemia;  J18.1-Lobar pneumonia, unspecified organism, lobar pneumonia.     TECHNIQUE: Multiple axial CT imaging was obtained of the chest without  the administration of intravenous contrast.     The radiation dose reduction device was turned on for each scan per the  ALARA (As Low as Reasonably Achievable) protocol.     COMPARISON: NONE.     FINDINGS: There is a small amount of pleural fluid identified along the  right lateral chest wall with a fracture involving the right posterior  sixth rib. Findings are likely postoperative. There is consolidation  posteriorly within the right lung base concerning for infiltrate. There  is some chronic interstitial changes identified likely  postoperative.  Superimposed infection is likely within the right lung base. Severe  emphysematous changes seen within the lung fields bilaterally with  bronchiectatic changes centrally. Severe emphysematous changes seen  within the upper lung fields. Old healed granulomatous disease seen  within the chest. The cardiac chambers are within normal limits. No  pericardial effusion. No bulky hilar or axillary lymphadenopathy. The  visualized upper abdomen is unremarkable.       Impression:       Findings concerning for dense consolidation and infiltrate  superimposed on chronic and emphysematous change within the right lung.  There is a fracture involving the right posterior sixth rib likely  postsurgical with some pleural thickening and small pleural fluid seen  on the right.     D:  06/07/2019  E:  06/07/2019     This report was finalized on 6/7/2019 4:07 PM by Dr. Jo Ann Galarza MD.       XR Chest 1 View [[id number]] Collected:  06/07/19 1306     Updated:  06/07/19 1423    Narrative:       EXAMINATION: XR CHEST 1 VW- 06/07/2019      INDICATION: SOA triage protocol      COMPARISON: NONE     FINDINGS: Portable chest reveals heart to be enlarged. Ill-defined  opacification seen at the right lung base with small right pleural  effusion. Deep line catheter on the right tip in the SVC. Cardiac pacer  leads in satisfactory position.       Impression:       Patchy ill-defined opacification seen within the right mid  and lower lung field with small right pleural effusion. Left lung is  clear.     D:  06/07/2019  E:  06/07/2019     This report was finalized on 6/7/2019 2:20 PM by Dr. Jo Ann Galarza MD.               Impression:   1.  Serratia bacteremia/Pacemaker lead infection- he has Serratia bacteremia with 2+ blood cultures of 5/21 and 1 out of 2 sets positive on 5/23 with subsequent blood cultures on 5/26- at [facility].  He had a mobile mass attached to the pacemaker lead by ZI at [facility] on 5/28.  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  He would require pacemaker lead extraction to cure this process but due to his advanced age and underlying lung cancer, consideration is being given for chronic suppression instead of a higher risk of lead extraction.  Dr. Zayas has been following him at [facility] and I will confer with him regarding this issue when he returns on Tuesday.  In the meantime, we will leave him on intravenous Merrem.  2.  Right basilar infiltrate- this does not appear particularly different when compared to his chest x-ray at [facility] of 5/30.  3.  Non-small cell lung cancer-status post right lower lobe lobectomy, 5/14/2019  4.  Acute hypoxic respiratory failure-if he clinically worsens, we may need to assess for pulmonary embolism.  5.  Stage II-III chronic kidney disease.  His antibiotic therapy will need to be dose adjusted for his renal dysfunction.  6.  History of atrial fibrillation  7.  Coronary artery disease  8.  Status post pacemaker implantation-this was performed at   9.  Blood loss anemia    PLAN/RECOMMENDATIONS:   Cont  invanz 1 g iv daily  F/u wound pacemaker cultures      D/w family  Duration of abx probably 2 weeks following extraction  Anticipate d/c home in 1-2 days  Ok to go home once abx arranged             George Zayas MD  6/19/2019  2:44 PM

## 2019-06-19 NOTE — PROGRESS NOTES
"Adult Nutrition  Assessment/PES    Patient Name:  Rodney Looney  YOB: 1934  MRN: [id number]  Admit Date:  6/7/2019    Assessment Date:  6/19/2019    Comments:      Reason for Assessment     Row Name 06/19/19 0633          Reason for Assessment    Reason For Assessment  follow-up protocol 15\"                   Nutrition Prescription Ordered     Row Name 06/19/19 0633          Nutrition Prescription PO    Current PO Diet  Soft Texture     Texture  Whole foods     Fluid Consistency  Thin                 Problem/Interventions:  Problem 1     Row Name 06/19/19 0634          Nutrition Diagnoses Problem 1    Problem 1  No Nutrition Diagnosis at this Time     Signs/Symptoms (evidenced by)  PO Intake     Percent (%) intake recorded  67 %     Over number of meals  3                       Education/Evaluation     Row Name 06/19/19 0634          Monitor/Evaluation    Monitor  Per protocol           Electronically signed by:  Lea Tran RD  06/19/19 6:34 AM  "

## 2019-06-19 NOTE — DISCHARGE PLACEMENT REQUEST
"Jeff Looney (84 y.o. Male)     Chary Alexander RN    [phone]    Pt wanting outpt infusion with [facility]. The order mentions [facility] will offer to do it and they did, pt refused.        Date of Birth Social Security Number Address Home Phone MRN    1934  [address] [place] [id number] [phone] [id number]    Oriental orthodox Marital Status          Latter-day        Admission Date Admission Type Admitting Provider Attending Provider Department, Room/Bed    19 Emergency Aroldo Us MD Repass, Gregory L, MD [facility] 4G, S451/1    Discharge Date Discharge Disposition Discharge Destination                       Attending Provider:  Aroldo Us MD    Allergies:  No Known Allergies    Isolation:  None   Infection:  None   Code Status:  CPR    Ht:  182.9 cm (72\")   Wt:  78.1 kg (172 lb 3.2 oz)    Admission Cmt:  None   Principal Problem:  Pacemaker infection s/p lead extraction  [Z95.0] More...                 Active Insurance as of 2019     Primary Coverage     Payor Plan Insurance Group Employer/Plan Group    HUMANA MEDICARE REPLACEMENT HUMANA MEDICARE REPL [id number]     Payor Plan Address Payor Plan Phone Number Payor Plan Fax Number Effective Dates    [address] [address] [phone]  2018 - None Entered    [facility] [address]       Subscriber Name Subscriber Birth Date Member ID       JEFF LOONEY 1934 [id number]                 Emergency Contacts      (Rel.) Home Phone Work Phone Mobile Phone    DICK LOONEY (Spouse) -- -- [phone]        [address] [address]  Phone:  [phone]  Fax:          Patient:     Jeff Looney MRN:  [id number]   [address] [facility] [address] :  1934  SSN:    Phone: [phone] Sex:  M      INSURANCE PAYOR PLAN GROUP # SUBSCRIBER ID   Primary:    HUMANA MEDICARE REPLACEMENT [id number] " [id number]   Admitting Diagnosis: Lung infiltrate [R91.8]  Order Date:  2019         Case Management  Consult       (Order ID: [id number])     Diagnosis:         Priority:  Routine Expected Date:   Expiration Date:        Interval:   Count:    Comments: 1)invanz 1 g iv daily  x 1-2 weeks  2)weekly picc care  3)check cbc, cmp, esr,  q Monday  4)fax ID note to [facility] [id number]  5)schedule f/u with me in 1 week  Reason for Consult? [facility] to offer inpat infusions     Specimen Type:   Specimen Source:   Specimen Taken Date:   Specimen Taken Time:                   Authorizing Provider:George Zayas MD  Authorizing Provider's NPI: [id number]  Order Entered By: George Zayas MD 2019  3:03 PM     Electronically signed by: George Zayas MD 2019  3:03 PM            Emergency Contact Information     Name Relation Home Work Mobile    DICK HANNA Spouse   [phone]          Insurance Information                HUMANA MEDICARE REPLACEMENT/HUMANA MEDICARE REPL Phone: [phone]    Subscriber: Rodney Hanna Subscriber#: [id number]    Group#: [id number] Precert#:              History & Physical      Elan Chen MD at 2019  3:09 PM              [facility]  HISTORY AND PHYSICAL    Patient Name: Rodney Hanna  : 1934  MRN: [id number]  Primary Care Physician: Dana Isidro DO  Date of admission: 2019      Subjective   Subjective     Chief Complaint:  Generalized weakness and hypoxia    HPI:  Rodney Hanna is a 84 y.o. male who reports past medical history of non-small cell lung cancer with recent lobectomy in May at [facility], recent diagnosis of Serratia infection and reported concern for pacemaker infection, reports history of stage III chronic kidney disease presents to the hospital emergency department from infectious disease clinic with complaint of moderate persistent  generalized weakness present for several weeks and low oxygen levels noted at infectious disease clinic reportedly in the 80s.  Upon arrival to the emergency department he received chest x-ray that was concerning for right-sided infiltrates possible pneumonia.  CT scan again showed right-sided infiltrates.  Infectious disease team recommends meropenem and plan to evaluate him in the morning.    Review of Systems   Constitutional: Positive for chills. Negative for fever.   HENT: Negative for sinus pressure and sinus pain.    Eyes: Negative.  Negative for visual disturbance.   Respiratory: Positive for cough and shortness of breath.    Cardiovascular: Negative for chest pain and palpitations.   Gastrointestinal: Negative for diarrhea, nausea and vomiting.   Endocrine: Negative.  Negative for cold intolerance and heat intolerance.   Genitourinary: Negative.  Negative for dysuria and hematuria.   Musculoskeletal: Negative.    Skin: Negative for rash and wound.   Allergic/Immunologic: Negative.    Neurological: Negative for light-headedness and headaches.   Hematological: Negative.  Negative for adenopathy. Does not bruise/bleed easily.   Psychiatric/Behavioral: Negative for behavioral problems and confusion.            Personal History     Past medical history: Cardiac pacemaker, recent lung cancer with recent lobectomy, recent Serratia infection, chronic kidney disease    Surgical history: Recent lobectomy    Family History: pertinent FHx was reviewed and unremarkable.     Social History:    Social History     Social History Narrative   • Not on file       Medications:    Available home medication information reviewed.      No Known Allergies    Objective   Objective     Vital Signs:   Temp:  [97.5 °F (36.4 °C)] 97.5 °F (36.4 °C)  Heart Rate:  [78] 78  Resp:  [20] 20  BP: (110)/(60) 110/60        Physical Exam   Constitutional: Awake, alert  Eyes: Pupils equal, sclerae anicteric, no conjunctival injection  HENT: NCAT,  mucous membranes moist  Neck: Supple, no thyromegaly, no lymphadenopathy, trachea midline  Respiratory: Right-sided rhonchi and rales noted, mild tachypnea, nonlabored breathing on nasal cannula oxygen   Cardiovascular: RRR, no murmurs, rubs, or gallops, palpable pedal pulses bilaterally  Gastrointestinal: Positive bowel sounds, soft, nontender, nondistended  Musculoskeletal: Elderly and debilitated in appearance, trace bilateral lower extremity edema, BMI 24   Psychiatric: Poor historian, appropriate affect, cooperative  Neurologic: Oriented x 3, strength symmetric in all extremities, Cranial Nerves grossly intact to confrontation, speech clear  Skin: No rashes      Results Reviewed:  I have personally reviewed current lab, radiology, and data and agree.    Results from last 7 days   Lab Units 06/07/19  1223   WBC 10*3/mm3 6.10   HEMOGLOBIN g/dL 8.6*   HEMATOCRIT % 29.8*   PLATELETS 10*3/mm3 282     Results from last 7 days   Lab Units 06/07/19  1435 06/07/19  1223   SODIUM mmol/L  --  134*   POTASSIUM mmol/L  --  4.8   CHLORIDE mmol/L  --  99   CO2 mmol/L  --  22.0   BUN mg/dL  --  21   CREATININE mg/dL  --  1.85*   GLUCOSE mg/dL  --  103*   CALCIUM mg/dL  --  9.4   ALT (SGPT) U/L  --  13   AST (SGOT) U/L  --  15   TROPONIN T ng/mL  --  <0.010   PROBNP pg/mL  --  2,107.0*   LACTATE mmol/L 1.6  --    PROCALCITONIN ng/mL  --  0.14     Estimated Creatinine Clearance: 34.3 mL/min (A) (by C-G formula based on SCr of 1.85 mg/dL (H)).  Brief Urine Lab Results     None        Imaging Results (last 24 hours)     Procedure Component Value Units Date/Time    CT Chest Without Contrast [[id number]] Collected:  06/07/19 1453     Updated:  06/07/19 1455    Narrative:       EXAMINATION: CT CHEST WO CONTRAST-      INDICATION: hypoxemia after recent lobectomy, bleeding from posterior  incision, recent bacteremia but no fever now; R09.02-Hypoxemia;  J18.1-Lobar pneumonia, unspecified organism lobar pneumonia     TECHNIQUE: Multiple  axial CT imaging is obtained of the chest without  the ministration of intravenous contrast.     The radiation dose reduction device was turned on for each scan per the  ALARA (As Low as Reasonably Achievable) protocol.     COMPARISON: NONE     FINDINGS: There is a small amount of pleural fluid identified along the  right lateral chest wall with a fracture involving the right posterior  sixth rib. Findings are likely postoperative. There is consolidation  posteriorly within the right lung base concerning for infiltrate. There  is some chronic interstitial changes identified likely postoperative.  Superimposed infection is likely within the right lung base. Severe  emphysematous changes seen within the lung fields bilaterally with  bronchiectatic changes centrally. Severe emphysematous changes seen  within the upper lung fields. Old healed granulomatous disease in the  chest. The cardiac chambers within normal limits. No pericardial  effusion. No bulky hilar or axillary lymphadenopathy. Visualized upper  abdomen is unremarkable.             Impression:       Findings concerning for dense consolidation and infiltrate  superimposed on chronic and emphysematous change within the right lung.  There is a fracture involving the right posterior sixth rib likely  postsurgical with some pleural thickening and small pleural fluid seen  on the right.          XR Chest 1 View [[id number]] Collected:  06/07/19 1306     Updated:  06/07/19 1423    Narrative:       EXAMINATION: XR CHEST 1 VW- 06/07/2019      INDICATION: SOA triage protocol      COMPARISON: NONE     FINDINGS: Portable chest reveals heart to be enlarged. Ill-defined  opacification seen at the right lung base with small right pleural  effusion. Deep line catheter on the right tip in the SVC. Cardiac pacer  leads in satisfactory position.       Impression:       Patchy ill-defined opacification seen within the right mid  and lower lung field with small right pleural  effusion. Left lung is  clear.     D:  06/07/2019  E:  06/07/2019     This report was finalized on 6/7/2019 2:20 PM by Dr. Jo Ann Galarza MD.                Assessment/Plan   Assessment / Plan     Active Hospital Problems    Diagnosis POA   • **Lung infiltrate [R91.8] Yes   • Serratia infection [A49.8] Yes   • Presence of cardiac pacemaker [Z95.0] Yes   • Non-small cell lung cancer and reports right lower lobe lobectomy in May at [facility] [C34.90] Unknown   • History of lobectomy of lung [Z90.2] Not Applicable   • Hypoxia [R09.02] Yes   • CKD (chronic kidney disease) stage 3, GFR 30-59 ml/min (CMS/[facility]) [N18.3] Yes     84-year-old male with history of non-small cell lung cancer reported and recent lobectomy in May at [facility] with recent reported Serratia bacteremia and concern reportedly for a cardiac pacemaker infection presents from infectious disease clinic with hypoxia in the 80s on vital signs and was found in the emergency room to have right lung infiltrate concerning for pneumonia.    Right lung infiltrate, possible right lung pneumonia:  Infectious disease recommends Merrem.  Blood cultures.    Hypoxia:  Likely due to lobectomy and possible pneumonia.  Plan for nasal cannula oxygen as needed and wean as tolerated.    Serratia infection:  Reportedly had recent Serratia bacteremia.  Trying to get outside records.  Infectious disease recommends Merrem.  Repeat blood cultures.    Reported concern for pacemaker infection:  Plan to follow-up blood cultures and ID recommendations.  Monitor on telemetry.  Merrem as per above    Elevated BNP:  Probably has chronic heart failure.  Does not appear to decompensated at this point.  Plan to get outside records.    Stage III chronic kidney disease:  Reports he sees Dr. Courtney outpatient.  Unsure of his baseline.  Plan to get outside records.    Anemia:   Notably patient had recent surgery.  Baseline hemoglobin unknown.  Denies recent bleeding.  Plan to  monitor.    Nursing staff currently working to get home medication list.  Chest x-ray images reviewed right-sided infiltrate noted, left lung is clear.  CT scan results reviewed also concerning for right-sided pneumonia.  Unclear how much of this could be postoperative findings.  Plan to repeat laboratory studies tomorrow.  Monitor renal function carefully.  Attempting to get outside records.    DVT prophylaxis: Subcutaneous heparin    CODE STATUS:    Code Status and Medical Interventions:   Ordered at: 06/07/19 1509     Code Status:    CPR     Medical Interventions (Level of Support Prior to Arrest):    Full       Admission Status:  I believe this patient meets INPATIENT status due to the need for care which can only be reasonably provided in an hospital setting with hypoxia, recent Serratia bacteremia needing IV antibiotics and possible pacemaker infection.  In such, I feel patient’s risk for adverse outcomes and need for care warrant INPATIENT evaluation and predict the patient’s care encounter to likely last beyond 2 midnights.      Electronically signed by Elan Chen MD, 06/07/19, 3:09 PM.        Electronically signed by Elan Chen MD at 6/7/2019  3:23 PM       Hospital Medications (active)       Dose Frequency Start End    acetaminophen (TYLENOL) tablet 650 mg 650 mg Every 4 Hours PRN 6/13/2019     Sig - Route: Take 2 tablets by mouth Every 4 (Four) Hours As Needed for Mild Pain . - Oral    aspirin EC tablet 81 mg 81 mg Daily 6/9/2019     Sig - Route: Take 1 tablet by mouth Daily. - Oral    atorvastatin (LIPITOR) tablet 40 mg 40 mg Nightly 6/13/2019     Sig - Route: Take 1 tablet by mouth Every Night. - Oral    bisacodyl (DULCOLAX) EC tablet 10 mg 10 mg Daily PRN 6/13/2019     Sig - Route: Take 2 tablets by mouth Daily As Needed for Constipation. - Oral    bisacodyl (DULCOLAX) suppository 10 mg 10 mg Daily PRN 6/14/2019     Sig - Route: Insert 1 suppository into the rectum Daily As  Needed for Constipation. - Rectal    calcium gluconate 2 g in sodium chloride 0.9 % 100 mL IVPB 2 g Once As Needed 6/13/2019     Sig - Route: Infuse 2 g into a venous catheter 1 (One) Time As Needed (for ionized Ca 0.8 - 1.13 mmol/L and symptomatic). - Intravenous    cholecalciferol (VITAMIN D3) tablet 1,000 Units 1,000 Units Daily 6/16/2019     Sig - Route: Take 1 tablet by mouth Daily. - Oral    docusate sodium (COLACE) capsule 100 mg 100 mg 2 Times Daily PRN 6/13/2019     Sig - Route: Take 1 capsule by mouth 2 (Two) Times a Day As Needed for Constipation. - Oral    ertapenem (INVanz) 1 g/100 mL 0.9% NS VTB (mbp) 1 g Every 24 Hours 6/14/2019 6/28/2019    Sig - Route: Infuse 100 mL into a venous catheter Daily. - Intravenous    famotidine (PEPCID) tablet 20 mg 20 mg 2 Times Daily PRN 6/7/2019     Sig - Route: Take 1 tablet by mouth 2 (Two) Times a Day As Needed for Heartburn. - Oral    famotidine (PEPCID) tablet 20 mg 20 mg 2 Times Daily 6/16/2019     Sig - Route: Take 1 tablet by mouth 2 (Two) Times a Day. - Oral    ferrous sulfate tablet 325 mg 325 mg 2 Times Daily 6/16/2019     Sig - Route: Take 1 tablet by mouth 2 (Two) Times a Day. - Oral    finasteride (PROSCAR) tablet 5 mg 5 mg Daily 6/16/2019     Sig - Route: Take 1 tablet by mouth Daily. - Oral    folic acid (FOLVITE) tablet 1 mg 1 mg Nightly 6/16/2019     Sig - Route: Take 1 tablet by mouth Every Night. - Oral    HYDROcodone-acetaminophen (NORCO) 7.5-325 MG per tablet 1 tablet 1 tablet Every 4 Hours PRN 6/13/2019 6/23/2019    Sig - Route: Take 1 tablet by mouth Every 4 (Four) Hours As Needed for Moderate Pain . - Oral    magnesium hydroxide (MILK OF MAGNESIA) suspension 2400 mg/10mL 10 mL 10 mL Daily PRN 6/14/2019     Sig - Route: Take 10 mL by mouth Daily As Needed for Constipation. - Oral    Magnesium Sulfate 2 gram / 50mL Infusion (GIVE X 3 BAGS TO EQUAL 6GM TOTAL DOSE) - Mg 1.1 - 1.5 mg/dl 2 g As Needed 6/14/2019     Sig - Route: Infuse 50 mL into a  "venous catheter As Needed (See Administration Instructions). - Intravenous    Linked Group 1:  \"Or\" Linked Group Details        Magnesium Sulfate 2 gram Bolus, followed by 8 gram infusion (total Mg dose 10 grams)- Mg less than or equal to 1mg/dL 2 g As Needed 6/14/2019     Sig - Route: Infuse 50 mL into a venous catheter As Needed (See Administration Instructions). - Intravenous    Linked Group 1:  \"Or\" Linked Group Details        Magnesium Sulfate 4 gram infusion- Mg 1.6-1.9 mg/dL 4 g As Needed 6/14/2019     Sig - Route: Infuse 100 mL into a venous catheter As Needed (See Administration Instructions). - Intravenous    Linked Group 1:  \"Or\" Linked Group Details        melatonin tablet 5 mg 5 mg Nightly PRN 6/18/2019     Sig - Route: Take 1 tablet by mouth At Night As Needed for Sleep. - Oral    metoprolol tartrate (LOPRESSOR) tablet 50 mg 50 mg Every 12 Hours Scheduled 6/16/2019     Sig - Route: Take 1 tablet by mouth Every 12 (Twelve) Hours. - Oral    morphine injection 2 mg 2 mg Every 30 Minutes PRN 6/13/2019 6/23/2019    Sig - Route: Infuse 1 mL into a venous catheter Every 30 (Thirty) Minutes As Needed for Severe Pain . - Intravenous    naloxone (NARCAN) injection 0.2 mg 0.2 mg As Needed 6/13/2019     Sig - Route: Infuse 0.5 mL into a venous catheter As Needed for Opioid Reversal or Respiratory Depression. - Intravenous    niCARdipine (CARDENE-IV) 40 mg/200 mL (0.2 mg/mL) in 0.9% NaCl infusion 5-15 mg/hr Continuous PRN 6/13/2019     Sig - Route: Infuse 5-15 mg/hr into a venous catheter Continuous As Needed (See Admin Instructions). - Intravenous    nitroglycerin 50 mg/250 mL (0.2 mg/mL) infusion 5-200 mcg/min Continuous PRN 6/13/2019     Sig - Route: Infuse 5-200 mcg/min into a venous catheter Continuous As Needed (see admin instructions). - Intravenous    ondansetron (ZOFRAN) injection 4 mg 4 mg Every 6 Hours PRN 6/13/2019     Sig - Route: Infuse 2 mL into a venous catheter Every 6 (Six) Hours As Needed for " "Nausea or Vomiting. - Intravenous    oxyCODONE-acetaminophen (PERCOCET) 5-325 MG per tablet 2 tablet 2 tablet Every 4 Hours PRN 6/13/2019 6/23/2019    Sig - Route: Take 2 tablets by mouth Every 4 (Four) Hours As Needed for Severe Pain . - Oral    potassium chloride (KLOR-CON) packet 40 mEq 40 mEq As Needed 6/13/2019     Sig - Route: Take 40 mEq by mouth As Needed (potassium replacement, see admin instructions). - Oral    Linked Group 2:  \"Or\" Linked Group Details        potassium chloride (MICRO-K) CR capsule 40 mEq 40 mEq As Needed 6/13/2019     Sig - Route: Take 4 capsules by mouth As Needed (potassium replacement.  see admin instructions). - Oral    Linked Group 2:  \"Or\" Linked Group Details        potassium chloride 20 mEq in 50 mL IVPB 20 mEq Every 1 Hour PRN 6/13/2019     Sig - Route: Infuse 50 mL into a venous catheter Every 1 (One) Hour As Needed (for K+ less than or equal to 3.1). - Intravenous    Linked Group 3:  \"Or\" Linked Group Details        potassium chloride 20 mEq in 50 mL IVPB 20 mEq Every 1 Hour PRN 6/13/2019     Sig - Route: Infuse 50 mL into a venous catheter Every 1 (One) Hour As Needed (for K+ 3.2 - 3.6). - Intravenous    Linked Group 3:  \"Or\" Linked Group Details        ranolazine (RANEXA) 12 hr tablet 500 mg 500 mg Nightly 6/16/2019     Sig - Route: Take 1 tablet by mouth Every Night. - Oral    rivaroxaban (XARELTO) tablet 15 mg 15 mg Nightly 6/16/2019     Sig - Route: Take 1 tablet by mouth Every Night. - Oral    sennosides-docusate sodium (SENOKOT-S) 8.6-50 MG tablet 2 tablet 2 tablet 2 Times Daily 6/13/2019     Sig - Route: Take 2 tablets by mouth 2 (Two) Times a Day. - Oral    tamsulosin (FLOMAX) 24 hr capsule 0.4 mg 0.4 mg Daily 6/14/2019     Sig - Route: Take 1 capsule by mouth Daily. - Oral    vitamin B-12 (CYANOCOBALAMIN) tablet 100 mcg 100 mcg Daily 6/16/2019     Sig - Route: Take 1 tablet by mouth Daily. - Oral    vitamin C (ASCORBIC ACID) tablet 500 mg 500 mg 2 Times Daily " 2019     Sig - Route: Take 1 tablet by mouth 2 (Two) Times a Day. - Oral             Physician Progress Notes (last 24 hours) (Notes from 2019  2:36 PM through 2019  2:36 PM)      Lupe Dutton APRN at 2019  1:24 PM              [facility]  PROGRESS NOTE    Patient Name: Rodney Looney  : 1934  MRN: [id number]    Date of Admission: 2019  Length of Stay: 12  Primary Care Physician: Dana Isidro DO    Subjective   Subjective     CC:  F/U pacemaker infection    HPI:  No complaints, no issues per nursing    Review of Systems  Gen-no fevers, no chills  CV-no chest pain, no palpitations  Resp-no cough, no dyspnea  GI-no N/V/D, no abd pain      Otherwise ROS is negative except as mentioned in the HPI.    Objective   Objective     Vital Signs:   Temp:  [98.2 °F (36.8 °C)] 98.2 °F (36.8 °C)  Heart Rate:  [87-93] 93  Resp:  [17] 17  BP: (105-132)/(56-85) 105/56        Physical Exam:  Constitutional: No acute distress, awake, alert, up in chair, wife at bedside  HENT: NCAT, mucous membranes moist  Respiratory: Clear anteriorly, respiratory effort normal   Cardiovascular: RRR, no murmurs, rubs, or gallops, palpable pedal pulses bilaterally  Gastrointestinal: Positive bowel sounds, soft, nontender, nondistended  Musculoskeletal: No bilateral ankle edema  Psychiatric: Appropriate affect, cooperative  Neurologic: Oriented x 3, ALONSO, speech clear  Skin: No rashes, right chest wall deep line    Results Reviewed:  I have personally reviewed current lab, radiology, and data and agree.    Results from last 7 days   Lab Units 19  0503 19  0408 06/15/19  0313 19  0341  19  1456   WBC 10*3/mm3 7.86 8.65 10.31 9.79   < > 10.22   HEMOGLOBIN g/dL 8.2* 8.1* 8.5* 8.1*   < > 8.4*   HEMATOCRIT % 28.0* 28.0* 29.6* 27.9*   < > 28.9*   PLATELETS 10*3/mm3 160 160 158 166   < > 219   INR   --   --   --  1.40*  --  1.41*    < > = values in this  interval not displayed.     Results from last 7 days   Lab Units 06/17/19  0503 06/16/19  0408 06/15/19  0313 06/14/19  0341   SODIUM mmol/L 133* 133* 130* 132*   POTASSIUM mmol/L 4.4 4.5 5.9* 4.9   CHLORIDE mmol/L 98 98 98 100   CO2 mmol/L 24.0 24.0 24.0 20.0*   BUN mg/dL 20 22 19 19   CREATININE mg/dL 1.22 1.33* 1.30* 1.26   GLUCOSE mg/dL 92 91 129* 133*   CALCIUM mg/dL 9.5 9.3 9.6 9.0   ALT (SGPT) U/L  --   --  17  --    AST (SGOT) U/L  --   --  24  --    PROBNP pg/mL  --   --   --  1,641.0     Estimated Creatinine Clearance: 49.8 mL/min (by C-G formula based on SCr of 1.22 mg/dL).    Microbiology Results Abnormal     Procedure Component Value - Date/Time    Fungus Culture - Tissue, Heart [[id number]] Collected:  06/13/19 1326    Lab Status:  Preliminary result Specimen:  Tissue from Heart Updated:  06/18/19 1416     Fungus Culture No fungus isolated at less than 1 week    AFB Culture - Tissue, Heart [[id number]] Collected:  06/13/19 1326    Lab Status:  Preliminary result Specimen:  Tissue from Heart Updated:  06/18/19 1416     AFB Culture No AFB isolated at less than 1 week     AFB Stain No acid fast bacilli seen on concentrated smear    Anaerobic Culture - Tissue, Heart [[id number]] Collected:  06/13/19 1326    Lab Status:  Final result Specimen:  Tissue from Heart Updated:  06/18/19 1126     Anaerobic Culture No anaerobes isolated at 5 days    Tissue / Bone Culture - Tissue, Heart [[id number]] Collected:  06/13/19 1326    Lab Status:  Final result Specimen:  Tissue from Heart Updated:  06/16/19 0832     Tissue Culture No growth at 3 days     Gram Stain Rare (1+) WBCs seen      No organisms seen    Wound Culture - Wound, Heart [[id number]] Collected:  06/13/19 1049    Lab Status:  Final result Specimen:  Wound from Heart Updated:  06/16/19 0832     Wound Culture No growth at 3 days     Gram Stain Many (4+) Red blood cells      Occasional WBCs seen      No organisms seen    Blood Culture - Blood, Arm, Right  [[id number]] Collected:  06/07/19 1425    Lab Status:  Final result Specimen:  Blood from Arm, Right Updated:  06/12/19 1500     Blood Culture No growth at 5 days    Blood Culture - Blood, Arm, Left [[id number]] Collected:  06/07/19 1430    Lab Status:  Final result Specimen:  Blood from Arm, Left Updated:  06/12/19 1500     Blood Culture No growth at 5 days          Imaging Results (last 24 hours)     ** No results found for the last 24 hours. **          Results for orders placed during the hospital encounter of 06/07/19   Adult Transthoracic Echo Complete W/ Cont if Necessary Per Protocol    Narrative · Estimated EF appears to be in the range of 61 - 65%.  · Left ventricular systolic function is normal.  · Right ventricular cavity is mildly dilated.  · Left atrial cavity size is mild-to-moderately dilated.  · Mild aortic valve regurgitation is present.  · Calculated right ventricular systolic pressure from tricuspid   regurgitation is 69 mmHg.  · Moderate to severe tricuspid valve regurgitation is present.  · Thin fibrinous mobile echogenic structures possibly attached to the   right atrial lead, versus prominent Chiari network. ZI recommended for   follow-up if clinically appropriate..          I have reviewed the medications:  Scheduled Meds:    aspirin 81 mg Oral Daily   atorvastatin 40 mg Oral Nightly   cholecalciferol 1,000 Units Oral Daily   ertapenem 1 g Intravenous Q24H   famotidine 20 mg Oral BID   ferrous sulfate 325 mg Oral BID   finasteride 5 mg Oral Daily   folic acid 1 mg Oral Nightly   metoprolol tartrate 50 mg Oral Q12H   ranolazine 500 mg Oral Nightly   rivaroxaban 15 mg Oral Nightly   sennosides-docusate sodium 2 tablet Oral BID   tamsulosin 0.4 mg Oral Daily   vitamin B-12 100 mcg Oral Daily   vitamin C 500 mg Oral BID     Continuous Infusions:    niCARdipine 5-15 mg/hr    nitroglycerin 5-200 mcg/min Last Rate: Stopped (06/14/19 0000)     PRN Meds:.•  acetaminophen  •  bisacodyl  •  bisacodyl  •   calcium gluconate IVPB  •  docusate sodium  •  famotidine  •  HYDROcodone-acetaminophen  •  magnesium hydroxide  •  magnesium sulfate **OR** magnesium sulfate **OR** magnesium sulfate  •  melatonin  •  Morphine  •  naloxone  •  niCARdipine  •  nitroglycerin  •  ondansetron  •  oxyCODONE-acetaminophen  •  potassium chloride **OR** potassium chloride  •  potassium chloride **OR** potassium chloride      Assessment/Plan   Assessment / Plan     Active Hospital Problems    Diagnosis POA   • **Pacemaker infection s/p lead extraction  [Z95.0] Yes     On 6/13/19 per Dr. Oneal      • PAF (paroxysmal atrial fibrillation) (CMS/HCC) [I48.0] Yes   • CAD  [I25.10] Yes   • Hypercholesterolemia [E78.00] Yes   • Hypertension [I10] Yes   • Right mid-lower lobe infiltrate  [R91.8] Yes   • Serratia bacteremia  [A49.8] Yes   • H/O NSC lung cancer s/p RL lobectomy  [C34.90] Yes     At [facility] in May [address]      • Stage III CKD, GFR 30-59 ml/min  [N18.3] Yes     Managed by Dr. Courtney             Brief Hospital Course to date:  Rodney Looney is a 84 y.o. male with history of HTN, HLD, PAF, CKD 3, GERD, BPH, bradycardia s/p PPM in 2007, and non-small cell lung cancer s/p recent right lower lobectomy on 5/14/19 at [facility] complicated by Serratia bacteremia and ZI showing a mobile mass on a pacemaker lead consistent with pacemaker lead infection/endocarditis (hadbeen discharged from [facility] on outpatient IV Zosyn), who presents from ID clinic due to dyspnea and hypoxia into the mid 80's. In the ER, CT chest showed dense consolidation and infiltrate in the right lung. Admitted to hospitalist service. ID felt his right lung infiltrate was not significantly different from imaging done while at [facility] recently. He was continued on Merrem per ID recommendations. Lead extraction was discussed and patient ultimately underwent median sternotomy with right ventricular pacemaker lead extraction on 6/13 by Dr. Boyer. He was monitored  closely in the ICU afterward, and transferred back to the floor with hospitalists resuming care on 6/16.      PLAN:  --Now on Invanz per ID. Plan for 2 weeks of therapy from date of lead extraction (6/13/19) Pacemaker lead cultures NGTD. Blood cultures on admission were negative.  --Pt interested in abx infusions at [facility]  --CXR continues to show persistent opacities in the right lung with a small pleural effusion. Encourage incentive spirometry. Continue nebs and wean O2 as tolerated. Ambulate today  --Afib persistent/ permanent. Increase metoprolol to 50mg and stop amio per cards in favor of rate control. Patient asymptomatic. Xarelto restarted and CT removed.  --PT/OT- will need home health at [facility]    DVT Prophylaxis:  Mechanical, Xarelto     Disposition: I expect the patient to be discharged home with HH when antibiotics arranged and hopefully O2 weaned.  Possibly later today or tomorrow    CODE STATUS:   Code Status and Medical Interventions:   Ordered at: 06/13/19 1432     Code Status:    CPR     Medical Interventions (Level of Support Prior to Arrest):    Full         Electronically signed by LINDA Hester, 06/19/19, 1:38 PM.        Electronically signed by Lupe Dutton APRN at 6/19/2019  1:44 PM     Rohan Boyer MD at 6/19/2019  7:41 AM            CTS Progress Note      POD 6 s/p sternotomy with lead extraction    Subjective  Sitting up in bed.  Conversant.  No complaint.  Questioning discharge      Objective    Physical Exam:   Vital Signs   Temp:  [97.2 °F (36.2 °C)] 97.2 °F (36.2 °C)  Heart Rate:  [] 90  Resp:  [18] 18  BP: (108-132)/(67-85) 132/85   GEN: NAD   CV: Regular rate with frequent ectopy beat    RESP: Mostly clear with somewhat decreased at the bilateral bases   EXT: Warm with no significant peripheral edema   Incision: Sternum is stable incision is healing well clean and dry with no drainage     Results     Results from last 7 days   Lab Units 06/17/19  0503   WBC 10*3/mm3  7.86   HEMOGLOBIN g/dL 8.2*   HEMATOCRIT % 28.0*   PLATELETS 10*3/mm3 160     Results from last 7 days   Lab Units 06/17/19  0503   SODIUM mmol/L 133*   POTASSIUM mmol/L 4.4   CHLORIDE mmol/L 98   CO2 mmol/L 24.0   BUN mg/dL 20   CREATININE mg/dL 1.22   GLUCOSE mg/dL 92   CALCIUM mg/dL 9.5     Results from last 7 days   Lab Units 06/14/19  0341 06/13/19  1456   INR  1.40* 1.41*   APTT seconds  --  41.2*         Assessment/Plan   Postoperative day 6 status post sternotomy with lead extraction    Pacemaker infection s/p lead extraction     Right mid-lower lobe infiltrate     Serratia bacteremia     H/O NSC lung cancer s/p RL lobectomy     Stage III CKD, GFR 30-59 ml/min     CAD     Hypercholesterolemia    Hypertension    PAF (paroxysmal atrial fibrillation) (CMS/HCC)        Plan   Ongoing antibiotic therapy per infectious disease recommendations  Continue postop rehab with aggressive pulmonary toilet and increase activity levels  Wean O2 as tolerated  Hopefully discharge home soon    JENN Kuhn  06/19/19  7:41 AM    Stable course.  Patient ready for discharge from CTS standpoint.  Nothing to add to current treatment regimen. I have reviewed, verified, and confirmed the above history and current status.  I have examined the patient and confirmed the above physical findings.    Rohan Boyer MD  CTSurgery  06/19/19   11:40 AM                   Electronically signed by Rohan Boyer MD at 6/19/2019 11:40 AM     George Zayas MD at 6/18/2019  2:56 PM          INFECTIOUS DISEASE  Progress Note    Rodney Looney  1934  [id number]      Admission Date: 6/7/2019      Requesting Provider: No ref. provider found  Evaluating Physician: George Zayas MD    Reason for Consultation: Serratia bacteremia/pacemaker lead infection    History of present illness:    6/8/2019: Mr. Looney is an 84-year-old white male who is seen today for evaluation of Serratia bacteremia/pacemaker lead infection in the  setting of a recent diagnosis of non-small cell lung cancer for which he underwent a right lower lobectomy at [facility] on 5/14.  His course at [facility] was complicated by Serratia bacteremia with 2 sets of positive blood cultures on 5/21 and 1 out of 2 sets positive on 5/23.  Subsequent blood cultures on 5/26 were negative.  A ZI revealed a mobile mass on the pacemaker lead, consistent with a pacemaker lead infection/endocarditis.  He was treated with intravenous Zosyn therapy and subsequently discharged on outpatient intravenous Zosyn.  Consideration was given for referral back to  for pacemaker extraction-his pacemaker was placed at .  Due to his underlying lung cancer and advanced age, he is being given consideration for chronic antibiotic suppression rather than pacemaker lead extraction.  He presented for follow-up in our office yesterday and was noted to have dyspnea with hypoxemia.  His O2 saturations were in the mid 80s while sitting.  He was not interested in readmission to [facility] and requested readmission to [facility].  He was evaluated in the emergency room where he was noted to have a right basilar pulmonary infiltrate by chest x-ray and chest CT scan.  His antibiotic therapy was switched from Zosyn to Merrem.  He has remained afebrile overnight.  He has a minimal cough with minimal sputum production.  He denies nausea, vomiting, and diarrhea.  He denies severe chest pain.  6/9/19: He remains afebrile. He is less short of breath today.  No increased sputum production.  No diarrhea.   6/10/19: Scheduled for [facility] today. He still complains of shortness of breath.  No sputum production with cough.  He remains afebrile.     6/11/19; doing well; no events overnight; no fever, rash, sore throat on oxygen    6/12/19; feels well; no events overnight; no fever, rash, sore throat      6/14/19; had to have surgical removal of lead/sternotomy; looks well;  no events overnight; hemodynamically stable.    6/16/19; doing well; no events overnight; no complaints, transferred to floor, no diarrhea, rasj, sore throat, states he may need a pacemaker but currently is in afib    6/17/19; no events overnight; no complaints, denies diarrhea, rash, sore throat;     6/18/19; doing well; no events overnight; no fever, rash, sore throat  Past Medical History:   Diagnosis Date   • Cancer (CMS/HCC)    • Coronary artery disease    • Elevated cholesterol    • GERD (gastroesophageal reflux disease)    • History of BPH 2 yrs   • Hypertension    • Kidney disease    • Smoking        Past Surgical History:   Procedure Laterality Date   • CARDIAC SURGERY     • CORONARY ARTERY BYPASS GRAFT N/A 6/13/2019    Procedure: MEDIANSTERNOTOMY, REMOVAL OF RETAINED PACING LEAD;  Surgeon: Rohan Boyer MD;  Location: [facility];  Service: Cardiothoracic   • HERNIA REPAIR         History reviewed. No pertinent family history.    Social History     Socioeconomic History   • Marital status:      Spouse name: Not on file   • Number of children: Not on file   • Years of education: Not on file   • Highest education level: Not on file   Tobacco Use   • Smoking status: Former Smoker     Types: Cigarettes   • Smokeless tobacco: Never Used       No Known Allergies      Medication:    Current Facility-Administered Medications:   •  acetaminophen (TYLENOL) tablet 650 mg, 650 mg, Oral, Q4H PRN, Rohan Boyer MD  •  aspirin EC tablet 81 mg, 81 mg, Oral, Daily, Chino Taylor MD, 81 mg at 06/18/19 0907  •  atorvastatin (LIPITOR) tablet 40 mg, 40 mg, Oral, Nightly, Barney Carrera PA, 40 mg at 06/17/19 2018  •  bisacodyl (DULCOLAX) EC tablet 10 mg, 10 mg, Oral, Daily PRN, Barney Carrera PA, 10 mg at 06/16/19 1040  •  bisacodyl (DULCOLAX) suppository 10 mg, 10 mg, Rectal, Daily PRN, Barney Carrera PA, 10 mg at 06/16/19 1541  •  calcium gluconate 2 g in sodium chloride 0.9 % 100 mL IVPB, 2 g, Intravenous, Once  PRN, Barney Carrera PA  •  cholecalciferol (VITAMIN D3) tablet 1,000 Units, 1,000 Units, Oral, Daily, Mag Lopez MD, 1,000 Units at 06/18/19 0907  •  docusate sodium (COLACE) capsule 100 mg, 100 mg, Oral, BID PRN, Barney Carrera PA, 100 mg at 06/16/19 1040  •  ertapenem (INVanz) 1 g/100 mL 0.9% NS VTB (mbp), 1 g, Intravenous, Q24H, George Zayas MD, 1 g at 06/17/19 1618  •  famotidine (PEPCID) tablet 20 mg, 20 mg, Oral, BID PRN, Elan Chen MD, 20 mg at 06/11/19 2011  •  famotidine (PEPCID) tablet 20 mg, 20 mg, Oral, BID, Mag Lopez MD, 20 mg at 06/18/19 0908  •  ferrous sulfate tablet 325 mg, 325 mg, Oral, BID, Mag Lopez MD, 325 mg at 06/18/19 0908  •  finasteride (PROSCAR) tablet 5 mg, 5 mg, Oral, Daily, Mag Lopez MD, 5 mg at 06/18/19 0907  •  folic acid (FOLVITE) tablet 1 mg, 1 mg, Oral, Nightly, Mag Lopez MD, 1 mg at 06/17/19 2017  •  HYDROcodone-acetaminophen (NORCO) 7.5-325 MG per tablet 1 tablet, 1 tablet, Oral, Q4H PRN, Rohan Boyer MD, 1 tablet at 06/14/19 1705  •  magnesium hydroxide (MILK OF MAGNESIA) suspension 2400 mg/10mL 10 mL, 10 mL, Oral, Daily PRN, Barney Carrera PA, 10 mL at 06/16/19 1040  •  Magnesium Sulfate 2 gram Bolus, followed by 8 gram infusion (total Mg dose 10 grams)- Mg less than or equal to 1mg/dL, 2 g, Intravenous, PRN **OR** Magnesium Sulfate 2 gram / 50mL Infusion (GIVE X 3 BAGS TO EQUAL 6GM TOTAL DOSE) - Mg 1.1 - 1.5 mg/dl, 2 g, Intravenous, PRN **OR** Magnesium Sulfate 4 gram infusion- Mg 1.6-1.9 mg/dL, 4 g, Intravenous, PRN, Levar Pollard MD, Last Rate: 25 mL/hr at 06/14/19 1123, 4 g at 06/14/19 1123  •  melatonin tablet 5 mg, 5 mg, Oral, Nightly PRN, Mag Lopez MD  •  metoprolol tartrate (LOPRESSOR) tablet 50 mg, 50 mg, Oral, Q12H, Keyon Garcia MD, 50 mg at 06/18/19 0907  •  morphine injection 2 mg, 2 mg, Intravenous, Q30 Min PRN, Rohan Boyer MD, 2 mg at 06/14/19 1102  •  naloxone (NARCAN)  injection 0.2 mg, 0.2 mg, Intravenous, PRN, Rohan Boyer MD  •  niCARdipine (CARDENE-IV) 40 mg/200 mL (0.2 mg/mL) in 0.9% NaCl infusion, 5-15 mg/hr, Intravenous, Continuous PRN, Barney Carrera PA  •  nitroglycerin 50 mg/250 mL (0.2 mg/mL) infusion, 5-200 mcg/min, Intravenous, Continuous PRN, Barney Carrera PA, Stopped at 06/14/19 0000  •  ondansetron (ZOFRAN) injection 4 mg, 4 mg, Intravenous, Q6H PRN, Barney Carrera PA  •  oxyCODONE-acetaminophen (PERCOCET) 5-325 MG per tablet 2 tablet, 2 tablet, Oral, Q4H PRN, Rohan Boyer MD, 2 tablet at 06/15/19 0154  •  potassium chloride (MICRO-K) CR capsule 40 mEq, 40 mEq, Oral, PRN **OR** potassium chloride (KLOR-CON) packet 40 mEq, 40 mEq, Oral, PRN, Barney Carrera PA  •  potassium chloride 20 mEq in 50 mL IVPB, 20 mEq, Intravenous, Q1H PRN **OR** potassium chloride 20 mEq in 50 mL IVPB, 20 mEq, Intravenous, Q1H PRN, Barney Carrera PA  •  ranolazine (RANEXA) 12 hr tablet 500 mg, 500 mg, Oral, Nightly, Mag Lopez MD, 500 mg at 06/17/19 2017  •  rivaroxaban (XARELTO) tablet 15 mg, 15 mg, Oral, Nightly, Mag Lopez MD, 15 mg at 06/17/19 2018  •  sennosides-docusate sodium (SENOKOT-S) 8.6-50 MG tablet 2 tablet, 2 tablet, Oral, BID, Barney Carrera PA, 2 tablet at 06/17/19 2017  •  tamsulosin (FLOMAX) 24 hr capsule 0.4 mg, 0.4 mg, Oral, Daily, Levar Pollard MD, 0.4 mg at 06/18/19 0908  •  vitamin B-12 (CYANOCOBALAMIN) tablet 100 mcg, 100 mcg, Oral, Daily, Lopez, Mag V, MD, 100 mcg at 06/18/19 0908  •  vitamin C (ASCORBIC ACID) tablet 500 mg, 500 mg, Oral, BID, Chino Taylor MD, 500 mg at 06/18/19 0907    Antibiotics:  Anti-Infectives (From admission, onward)    Ordered     Dose/Rate Route Frequency Start Stop    06/14/19 1417  ertapenem (INVanz) 1 g/100 mL 0.9% NS VTB (mbp)     [facility] reviewed the order on 06/17/19 0806.   Ordering Provider:  George Zayas MD    1 g  over 30 Minutes Intravenous Every 24 Hours  19 1600 19 1559    19 1654  meropenem (MERREM) 1 g/100 mL 0.9% NS VTB (mbp)     Comments:  Changed from q8h per extended infusion guidelines   Ordering Provider:  Elan Chen MD    1 g  over 3 Hours Intravenous Every 12 Hours Scheduled 19 2100 19 1159    19 1434  meropenem (MERREM) 1 g/100 mL 0.9% NS VTB (mbp)     Ordering Provider:  Leopoldo Rogers MD    1 g  over 30 Minutes Intravenous Once 19 1436 19 1617                Physical Exam:   Vital Signs  Temp (24hrs), Av.7 °F (36.5 °C), Min:97.2 °F (36.2 °C), Max:98.1 °F (36.7 °C)    Temp  Min: 97.2 °F (36.2 °C)  Max: 98.1 °F (36.7 °C)  BP  Min: 108/67  Max: 120/76  Pulse  Min: 87  Max: 116  Resp  Min: 18  Max: 18  SpO2  Min: 69 %  Max: 89 %    GENERAL: Awake and alert, in no acute distress.   HEENT: Normocephalic, atraumatic.  PERRL. EOMI. No conjunctival injection. No icterus. Oropharynx clear without evidence of thrush or exudate    HEART: RRR; No murmur, rubs, gallops.   LUNGS: He has expiratory wheezes on right   ABDOMEN: Soft, nontender, nondistended. Positive bowel sounds. No rebound or guarding. No mass or HSM.  EXT:  No cyanosis, clubbing or edema. No cord.    MSK: FROM without joint effusions noted arms/legs.    SKIN: Warm and dry, thoracotomy incision with small amount of serous drainage  NEURO: Oriented to PPT. No focal deficits on motor/sensory exam at arms/legs.  PSYCHIATRIC: Normal insight and judgement. Cooperative with PE    Laboratory Data    Results from last 7 days   Lab Units 19  0503 19  0408 06/15/19  0313   WBC 10*3/mm3 7.86 8.65 10.31   HEMOGLOBIN g/dL 8.2* 8.1* 8.5*   HEMATOCRIT % 28.0* 28.0* 29.6*   PLATELETS 10*3/mm3 160 160 158     Results from last 7 days   Lab Units 19  0503   SODIUM mmol/L 133*   POTASSIUM mmol/L 4.4   CHLORIDE mmol/L 98   CO2 mmol/L 24.0   BUN mg/dL 20   CREATININE mg/dL 1.22   GLUCOSE mg/dL 92   CALCIUM mg/dL 9.5     Results from  last 7 days   Lab Units 06/15/19  0313   ALK PHOS U/L 77   BILIRUBIN mg/dL 0.5   ALT (SGPT) U/L 17   AST (SGOT) U/L 24                         Estimated Creatinine Clearance: 49.8 mL/min (by C-G formula based on SCr of 1.22 mg/dL).      Microbiology:      6/7:  BC no growth                           Radiology:  Imaging Results (last 72 hours)     Procedure Component Value Units Date/Time    CT Chest Without Contrast [[id number]] Collected:  06/07/19 1453     Updated:  06/07/19 1610    Narrative:       EXAMINATION: CT CHEST WO CONTRAST-06/07/2019:      INDICATION: Hypoxemia after recent lobectomy, bleeding from posterior  incision, recent bacteremia but no fever now; R09.02-Hypoxemia;  J18.1-Lobar pneumonia, unspecified organism, lobar pneumonia.     TECHNIQUE: Multiple axial CT imaging was obtained of the chest without  the administration of intravenous contrast.     The radiation dose reduction device was turned on for each scan per the  ALARA (As Low as Reasonably Achievable) protocol.     COMPARISON: NONE.     FINDINGS: There is a small amount of pleural fluid identified along the  right lateral chest wall with a fracture involving the right posterior  sixth rib. Findings are likely postoperative. There is consolidation  posteriorly within the right lung base concerning for infiltrate. There  is some chronic interstitial changes identified likely postoperative.  Superimposed infection is likely within the right lung base. Severe  emphysematous changes seen within the lung fields bilaterally with  bronchiectatic changes centrally. Severe emphysematous changes seen  within the upper lung fields. Old healed granulomatous disease seen  within the chest. The cardiac chambers are within normal limits. No  pericardial effusion. No bulky hilar or axillary lymphadenopathy. The  visualized upper abdomen is unremarkable.       Impression:       Findings concerning for dense consolidation and infiltrate  superimposed on  chronic and emphysematous change within the right lung.  There is a fracture involving the right posterior sixth rib likely  postsurgical with some pleural thickening and small pleural fluid seen  on the right.     D:  06/07/2019  E:  06/07/2019     This report was finalized on 6/7/2019 4:07 PM by Dr. Jo Ann Galarza MD.       XR Chest 1 View [[id number]] Collected:  06/07/19 1306     Updated:  06/07/19 1423    Narrative:       EXAMINATION: XR CHEST 1 VW- 06/07/2019      INDICATION: SOA triage protocol      COMPARISON: NONE     FINDINGS: Portable chest reveals heart to be enlarged. Ill-defined  opacification seen at the right lung base with small right pleural  effusion. Deep line catheter on the right tip in the SVC. Cardiac pacer  leads in satisfactory position.       Impression:       Patchy ill-defined opacification seen within the right mid  and lower lung field with small right pleural effusion. Left lung is  clear.     D:  06/07/2019  E:  06/07/2019     This report was finalized on 6/7/2019 2:20 PM by Dr. Jo Ann Galarza MD.               Impression:   1.  Serratia bacteremia/Pacemaker lead infection- he has Serratia bacteremia with 2+ blood cultures of 5/21 and 1 out of 2 sets positive on 5/23 with subsequent blood cultures on 5/26- at [facility].  He had a mobile mass attached to the pacemaker lead by ZI at [facility] on 5/28.  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  He would require pacemaker lead extraction to cure this process but due to his advanced age and underlying lung cancer, consideration is being given for chronic suppression instead of a higher risk of lead extraction.  Dr. Zayas has been following him at [facility] and I will confer with him regarding this issue when he returns on Tuesday.  In the meantime, we will leave him on intravenous Merrem.  2.  Right basilar infiltrate- this does not appear particularly different when compared to  his chest x-ray at [facility] of 5/30.  3.  Non-small cell lung cancer-status post right lower lobe lobectomy, 5/14/2019  4.  Acute hypoxic respiratory failure-if he clinically worsens, we may need to assess for pulmonary embolism.  5.  Stage II-III chronic kidney disease.  His antibiotic therapy will need to be dose adjusted for his renal dysfunction.  6.  History of atrial fibrillation  7.  Coronary artery disease  8.  Status post pacemaker implantation-this was performed at   9.  Blood loss anemia    PLAN/RECOMMENDATIONS:   Cont  invanz 1 g iv daily  F/u wound pacemaker cultures      D/w family  Duration of abx probably 2 weeks following extraction  May be able to do infusions in office upon discharge             George Zayas MD  6/18/2019  2:56 PM                  Electronically signed by George Zayas MD at 6/18/2019  2:59 PM       Consult Notes (last 48 hours) (Notes from 6/17/2019  2:36 PM through 6/19/2019  2:36 PM)     No notes of this type exist for this encounter.

## 2019-06-19 NOTE — THERAPY TREATMENT NOTE
Acute Care - Physical Therapy Treatment Note  [facility]     Patient Name: Rodney Looney  : 1934  MRN: [id number]  Today's Date: 2019  Onset of Illness/Injury or Date of Surgery: 19  Date of Referral to PT: 19  Referring Physician: MD Pollard    Admit Date: 2019    Visit Dx:    ICD-10-CM ICD-9-CM   1. Hypoxemia R09.02 799.02   2. Pneumonia of right lower lobe due to infectious organism (CMS/HCC) J18.1 486   3. Impaired functional mobility, balance, gait, and endurance Z74.09 V49.89   4. Impaired mobility and ADLs Z74.09 799.89   5. Oropharyngeal dysphagia R13.12 787.22   6. Complication associated with cardiac pacemaker lead, initial encounter T82.9XXA 996.72   7. Pacemaker infection s/p lead extraction  Z95.0 V45.01     Patient Active Problem List   Diagnosis   • Right mid-lower lobe infiltrate    • Serratia bacteremia    • Pacemaker infection s/p lead extraction    • H/O NSC lung cancer s/p RL lobectomy    • History of lobectomy of lung   • Hypoxia   • Stage III CKD, GFR 30-59 ml/min    • CAD    • Hypercholesterolemia   • Hypertension   • PAF (paroxysmal atrial fibrillation) (CMS/HCC)       Therapy Treatment    Rehabilitation Treatment Summary     Row Name 19 1035             Treatment Time/Intention    Discipline  physical therapist  -EJ      Document Type  therapy note (daily note) tx at 5912-4057  -EJ      Subjective Information  no complaints  -EJ      Mode of Treatment  individual therapy  -EJ      Patient/Family Observations  spouse arrives when pt in sitting rest break in [place]  -EJ      Care Plan Review  care plan/treatment goals reviewed;risks/benefits reviewed;patient/other agree to care plan  -EJ      Therapy Frequency (PT Clinical Impression)  daily  -EJ      Patient Effort  good  -EJ      Existing Precautions/Restrictions  fall;oxygen therapy device and L/min;cardiac  -EJ      Treatment Considerations/Comments  SpO2 monitored throughout ambulation with forehead  sensor, portable monitor  -EJ      Patient Response to Treatment  SpO2 drops to 85% while ambulating on RA. Saturation returns to 92 over 30-40 seconds of sitting rest with supplemental 2L O2. SpO2 stable >91% during ambulation to return to room.  -EJ      Recorded by [EJ] Rukhsana Marie, PT 06/19/19 1201      Row Name 06/19/19 1035             Vital Signs    Pre Systolic BP Rehab  105  -EJ      Pre Treatment Diastolic BP  56  -EJ      Post Systolic BP Rehab  98  -EJ      Post Treatment Diastolic BP  88  -EJ      Pretreatment Heart Rate (beats/min)  91  -EJ      Posttreatment Heart Rate (beats/min)  90  -EJ      Pre SpO2 (%)  92  -EJ      O2 Delivery Pre Treatment  supplemental O2  -EJ      Intra SpO2 (%)  85  -EJ      O2 Delivery Intra Treatment  room air  -EJ      Post SpO2 (%)  93  -EJ      O2 Delivery Post Treatment  nasal cannula  -EJ      Pre Patient Position  Supine  -EJ      Intra Patient Position  Standing  -EJ      Post Patient Position  Sitting  -EJ      Recorded by [EJ] Rukhsana Marie, PT 06/19/19 1201      Row Name 06/19/19 1035             Cognitive Assessment/Intervention- PT/OT    Affect/Mental Status (Cognitive)  WFL  -EJ      Orientation Status (Cognition)  oriented to;person;place;situation time not assessed  -EJ      Follows Commands (Cognition)  WFL  -EJ      Cognitive Function (Cognitive)  safety deficit  -EJ      Safety Deficit (Cognitive)  safety precautions follow-through/compliance;safety precautions awareness;insight into deficits/self awareness;awareness of need for assistance  -EJ      Personal Safety Interventions  fall prevention program maintained;gait belt;nonskid shoes/slippers when out of bed  -EJ      Recorded by [EJ] Rukhsana Marie, PT 06/19/19 1201      Row Name 06/19/19 1035             Bed Mobility Assessment/Treatment    Bed Mobility Assessment/Treatment  supine-sit  -EJ      Supine-Sit [place] (Bed Mobility)  conditional independence  -EJ      Recorded by  [EJ] Rukhsana Maire, PT 06/19/19 1201      Row Name 06/19/19 1035             Transfer Assessment/Treatment    Transfer Assessment/Treatment  stand-sit transfer;sit-stand transfer  -EJ      Comment (Transfers)  Pt SBA when standing from bed. He requires min A from rolling chair in hallway  -EJ      Recorded by [EJ] Rukhsana Marie, PT 06/19/19 1201      Row Name 06/19/19 1035             Sit-Stand Transfer    Sit-Stand G[place]pie (Transfers)  stand by assist;verbal cues  -EJ      Assistive Device (Sit-Stand Transfers)  walker, front-wheeled  -EJ      Recorded by [EJ] Rukhsana Marie, PT 06/19/19 1201      Row Name 06/19/19 1035             Stand-Sit Transfer    Stand-Sit G[place]pie (Transfers)  stand by assist;verbal cues  -EJ      Assistive Device (Stand-Sit Transfers)  walker, front-wheeled  -EJ      Recorded by [EJ] Rukhsana Marie, PT 06/19/19 1201      Row Name 06/19/19 1035             Gait/Stairs Assessment/Training    56184 - Gait Training Minutes   15  -EJ      Gait/Stairs Assessment/Training  gait/ambulation independence;gait/ambulation assistive device;distance ambulated;gait pattern  -EJ      Gillespie Level (Gait)  minimum assist (75% patient effort);1 person assist;1 person to manage equipment  -EJ      Assistive Device (Gait)  walker, front-wheeled  -EJ      Distance in Feet (Gait)  98+98 with sitting rest break between bouts. Pt on RA initially, returned on 2L  -EJ      Pattern (Gait)  step-through  -EJ      Deviations/Abnormal Patterns (Gait)  bilateral deviations;gait speed decreased;stride length decreased  -EJ      Bilateral Gait Deviations  forward flexed posture;heel strike decreased;weight shift ability decreased  -EJ      Comment (Gait/Stairs)  Pt ambulates in [place] with forward flexed posture. Pt is cued for upright posture, PLB with improvement in PLB. SpO2 monitored throughout.  -EJ      Recorded by [EJ] Rukhsana Marie, PT 06/19/19 1201      Row Name 06/19/19 1035              Therapeutic Exercise    66482 - PT Therapeutic Activity Minutes  8  -EJ      Recorded by [EJ] Rukhsana Marie, PT 06/19/19 1201      Row Name 06/19/19 1035             Pain Scale: Numbers Pre/Post-Treatment    Pain Scale: Numbers, Pretreatment  0/10 - no pain  -EJ      Pain Scale: Numbers, Post-Treatment  0/10 - no pain  -EJ      Recorded by [EJ] Rukhsana Marie, PT 06/19/19 1201      Row Name                Wound 06/07/19 1735 Right thoracic spine incision;surgical    Wound - Properties Group Date first assessed: 06/07/19 [TL] Time first assessed: 1735 [TL] Side: Right [TL] Location: thoracic spine [TL2] Type: incision;surgical [TL] Recorded by:  [TL] Wesley DIXON RN 06/07/19 1735 [TL2] Wesley DIXON RN 06/07/19 1736    Row Name                Wound 06/13/19 0951 Left anterior chest incision;surgical    Wound - Properties Group Date first assessed: 06/13/19 [JS] Time first assessed: 0951 [JS] Side: Left [JS] Orientation: anterior [MH] Location: chest [JS] Type: incision;surgical [JS] Recorded by:  [JS] Josh Hatr RN 06/13/19 1157 [MH] Lg Max RN 06/13/19 2330    Row Name                Wound 06/13/19 1930 Right lateral other (see notes) other (see comments)    Wound - Properties Group Date first assessed: 06/13/19 [MH] Time first assessed: 1930 [MH] Present On Admission : yes [MH] Side: Right [MH] Orientation: lateral [MH] Location: other (see notes) [MH], lateral chest from previous chest tube  Type: other (see comments) [MH], previous chest tube insertion  Recorded by:  [MH] Lg Max RN 06/13/19 2319    Row Name                Wound 06/14/19 0800 midline chest incision    Wound - Properties Group Date first assessed: 06/14/19 [BH] Time first assessed: 0800 [BH] Orientation: midline [BH] Location: chest [BH] Type: incision [BH] Additional Comments: midsternal incision from surgery yesterday, 6/14 [BH] Recorded by:  [BH] Christine Kaufman, RN 06/14/19 1979    Row Name  06/19/19 1035             Coping    Observed Emotional State  cooperative;calm  -EJ      Verbalized Emotional State  acceptance  -EJ      Recorded by [EJ] Rukhsana Marie, PT 06/19/19 1201      Row Name 06/19/19 1035             Outcome Summary/Treatment Plan (PT)    Daily Summary of Progress (PT)  progress toward functional goals is good  -EJ      Patient/Family Concerns, Equipment Needs at Discharge (PT)  O2  -EJ      Anticipated Discharge Disposition (PT)  home with home health;home with assist  -EJ      Recorded by [EJ] Rukhsana Marie, PT 06/19/19 1201        User Key  (r) = Recorded By, (t) = Taken By, (c) = Cosigned By    Initials Name Effective Dates Discipline    EJ Rukhsana Marie, PT 11/20/18 -  PT    Christine Yang, RN 01/19/18 -  Nurse    Lg Baker, RN 06/27/16 -  Nurse    Wesley BARRIOS, RN 08/07/17 -  Nurse    Josh Solis RN 01/30/19 -  Nurse          Wound 06/07/19 1735 Right thoracic spine incision;surgical (Active)   Dressing Appearance open to air 6/19/2019  8:30 AM   Base scab 6/19/2019  8:30 AM   Periwound ecchymotic 6/19/2019  8:30 AM   Periwound Temperature warm 6/19/2019  8:30 AM   Periwound Skin Turgor soft 6/19/2019  8:30 AM   Drainage Amount none 6/19/2019  8:30 AM       Wound 06/13/19 0951 Left anterior chest incision;surgical (Active)   Dressing Appearance dry;intact;no drainage 6/19/2019  8:30 AM   Closure VIJAY 6/19/2019  8:30 AM   Base dressing in place, unable to visualize 6/19/2019  8:30 AM   Drainage Amount none 6/19/2019  8:30 AM       Wound 06/13/19 1930 Right lateral other (see notes) other (see comments) (Active)   Dressing Appearance open to air 6/19/2019  8:30 AM   Closure Open to air 6/19/2019  8:30 AM   Base pink 6/19/2019  8:30 AM   Periwound Temperature warm 6/19/2019  8:30 AM   Periwound Skin Turgor soft 6/19/2019  8:30 AM   Drainage Amount none 6/19/2019  8:30 AM       Wound 06/14/19 0800 midline chest incision (Active)   Dressing  Appearance open to air 6/19/2019  8:30 AM   Closure Liquid skin adhesive 6/19/2019  8:30 AM   Base scab 6/19/2019  8:30 AM   Periwound Temperature warm 6/19/2019  8:30 AM   Periwound Skin Turgor soft 6/19/2019  8:30 AM   Drainage Amount none 6/19/2019  8:30 AM           Physical Therapy Education     Title: PT OT SLP Therapies (In Progress)     Topic: Physical Therapy (Done)     Point: Mobility training (Done)     Learning Progress Summary           Patient Acceptance, E, VU,NR by RENETTA at 6/19/2019 10:35 AM    Acceptance, E, VU,NR by RENETTA at 6/18/2019  1:09 PM    Comment:  education on PLB    Acceptance, E, VU,NR by RENETTA at 6/17/2019  3:35 PM    Acceptance, E, VU,NR by ZARI at 6/15/2019  9:33 AM    Acceptance, E, NR by FRED at 6/14/2019  9:30 AM    Acceptance, E, NR by BHASKAR at 6/8/2019 11:54 AM    Comment:  Educated pt on completing AP, HS, hip abd, and SLR twice throughout the day (one more time tonight).                   Point: Home exercise program (Done)     Learning Progress Summary           Patient Acceptance, E, VU,NR by RENETTA at 6/19/2019 10:35 AM    Acceptance, E, VU,NR by RENETTA at 6/17/2019  3:35 PM    Acceptance, E, NR by FRED at 6/14/2019  9:30 AM    Acceptance, E, NR by BHASKAR at 6/8/2019 11:54 AM    Comment:  Educated pt on completing AP, HS, hip abd, and SLR twice throughout the day (one more time tonight).                   Point: Body mechanics (Done)     Learning Progress Summary           Patient Acceptance, E, VU,NR by RENETTA at 6/19/2019 10:35 AM    Acceptance, E, VU,NR by RENETTA at 6/18/2019  1:09 PM    Comment:  education on PLB    Acceptance, E, VU,NR by RENETTA at 6/17/2019  3:35 PM    Acceptance, E, VU,NR by ZARI at 6/15/2019  9:33 AM    Acceptance, E, NR by FRED at 6/14/2019  9:30 AM                   Point: Precautions (Done)     Learning Progress Summary           Patient Acceptance, E, VU,NR by REENTTA at 6/19/2019 10:35 AM    Acceptance, EMAK,NR by RENETTA at 6/18/2019  1:09 PM    Comment:  education on PLB    CARMEN Helton,  VU,NR by EJ at 6/17/2019  3:35 PM    Acceptance, E, VU,NR by ZARI at 6/15/2019  9:33 AM    Acceptance, E, NR by JB1 at 6/14/2019  9:30 AM    Acceptance, E, NR by BHASKAR at 6/8/2019 11:54 AM    Comment:  Educated pt on completing AP, HS, hip abd, and SLR twice throughout the day (one more time tonight).                               User Key     Initials Effective Dates Name Provider Type Discipline    JB 06/19/15 -  Kirstie Serra, PT Physical Therapist PT    EJ 11/20/18 -  Rukhsana Marie, PT Physical Therapist PT    LM 06/15/16 -  Aria Gong, PT Physical Therapist PT    ZARI 08/30/18 -  Mily Chapa, PT Physical Therapist PT                PT Recommendation and Plan  Anticipated Discharge Disposition (PT): home with home health, home with assist  Therapy Frequency (PT Clinical Impression): daily  Outcome Summary/Treatment Plan (PT)  Daily Summary of Progress (PT): progress toward functional goals is good  Patient/Family Concerns, Equipment Needs at Discharge (PT): O2  Anticipated Discharge Disposition (PT): home with home health, home with assist  Plan of Care Reviewed With: patient  Progress: improving  Outcome Summary: Pt ambulates 98'+98' in [place] with sitting rest break between bouts,  using RWx, on RA first bout, 2L 2nd bout. Pt desats to 85% when ambuating on room air. SpO2 monitored throughout ambulation with forehead sensor with portable monitor.  Outcome Measures     Row Name 06/19/19 1035 06/18/19 1309 06/17/19 1535       How much help from another person do you currently need...    Turning from your back to your side while in flat bed without using bedrails?  4  -EJ  4  -EJ  4  -EJ    Moving from lying on back to sitting on the side of a flat bed without bedrails?  4  -EJ  3  -EJ  4  -EJ    Moving to and from a bed to a chair (including a wheelchair)?  3  -EJ  3  -EJ  3  -EJ    Standing up from a chair using your arms (e.g., wheelchair, bedside chair)?  3  -EJ  3  -EJ  3  -EJ    Climbing 3-5 steps  with a railing?  3  -EJ  3  -EJ  3  -EJ    To walk in hospital room?  3  -EJ  3  -EJ  3  -EJ    AM-PAC 6 Clicks Score  20  -EJ  19  -EJ  20  -EJ       Functional Assessment    Outcome Measure Options  AM-PAC 6 Clicks Basic Mobility (PT)  -EJ  AM-PAC 6 Clicks Basic Mobility (PT)  -EJ  AM-PAC 6 Clicks Basic Mobility (PT)  -EJ      User Key  (r) = Recorded By, (t) = Taken By, (c) = Cosigned By    Initials Name Provider Type    EJ Rukhsana Marie PT Physical Therapist         Time Calculation:   PT Charges     Row Name 06/19/19 1035             Time Calculation    Start Time  1035  -EJ      PT Received On  06/19/19  -EJ      PT Goal Re-Cert Due Date  06/24/19  -EJ         Time Calculation- PT    Total Timed Code Minutes- PT  23 minute(s)  -EJ         Timed Charges    96494 - Gait Training Minutes   15  -EJ      97230 - PT Therapeutic Activity Minutes  8  -EJ        User Key  (r) = Recorded By, (t) = Taken By, (c) = Cosigned By    Initials Name Provider Type    EJ Rukhsana Marie, PT Physical Therapist        Therapy Charges for Today     Code Description Service Date Service Provider Modifiers Qty    91316462114 HC PT THERAPEUTIC ACT EA 15 MIN 6/18/2019 Rukhsana Marie, PT GP 2    48786977004 HC GAIT TRAINING EA 15 MIN 6/19/2019 Rukhsana Marie, PT GP 1    24628263581 HC PT THERAPEUTIC ACT EA 15 MIN 6/19/2019 Rukhsana Marie, PT GP 1          PT G-Codes  Outcome Measure Options: AM-PAC 6 Clicks Basic Mobility (PT)  AM-PAC 6 Clicks Score: 20  Score: 20    Rukhsana Garcia, PT  6/19/2019

## 2019-06-19 NOTE — PROGRESS NOTES
Continued Stay Note   [place]     Patient Name: Rodney Looney  MRN: [id number]  Today's Date: 6/19/2019    Admit Date: 6/7/2019    Discharge Plan     Row Name 06/19/19 1437       Plan    Plan Comments  CM spoke with outpt scheduling with [facility], [phone], who has requested the order and referral for outpt infusion be faxed to them, this was completed. Pt reports he will continue to follow his outpt PT with [facility] as well.         Discharge Codes    No documentation.       Expected Discharge Date and Time     Expected Discharge Date Expected Discharge Time    Jun 19, 2019             Chary Alexander RN

## 2019-06-19 NOTE — PROGRESS NOTES
[facility] Medicine Services  PROGRESS NOTE    Patient Name: Rodney Looney  : 1934  MRN: [id number]    Date of Admission: 2019  Length of Stay: 12  Primary Care Physician: Dana Isidro DO    Subjective   Subjective     CC:  F/U pacemaker infection    HPI:  No complaints, no issues per nursing    Review of Systems  Gen-no fevers, no chills  CV-no chest pain, no palpitations  Resp-no cough, no dyspnea  GI-no N/V/D, no abd pain      Otherwise ROS is negative except as mentioned in the HPI.    Objective   Objective     Vital Signs:   Temp:  [98.2 °F (36.8 °C)] 98.2 °F (36.8 °C)  Heart Rate:  [87-93] 93  Resp:  [17] 17  BP: (105-132)/(56-85) 105/56        Physical Exam:  Constitutional: No acute distress, awake, alert, up in chair, wife at bedside  HENT: NCAT, mucous membranes moist  Respiratory: Clear anteriorly, respiratory effort normal   Cardiovascular: RRR, no murmurs, rubs, or gallops, palpable pedal pulses bilaterally  Gastrointestinal: Positive bowel sounds, soft, nontender, nondistended  Musculoskeletal: No bilateral ankle edema  Psychiatric: Appropriate affect, cooperative  Neurologic: Oriented x 3, ALONSO, speech clear  Skin: No rashes, right chest wall deep line    Results Reviewed:  I have personally reviewed current lab, radiology, and data and agree.    Results from last 7 days   Lab Units 19  0503 19  0408 06/15/19  0313 19  0341  19  1456   WBC 10*3/mm3 7.86 8.65 10.31 9.79   < > 10.22   HEMOGLOBIN g/dL 8.2* 8.1* 8.5* 8.1*   < > 8.4*   HEMATOCRIT % 28.0* 28.0* 29.6* 27.9*   < > 28.9*   PLATELETS 10*3/mm3 160 160 158 166   < > 219   INR   --   --   --  1.40*  --  1.41*    < > = values in this interval not displayed.     Results from last 7 days   Lab Units 19  0503 19  0408 06/15/19  0313 19  0341   SODIUM mmol/L 133* 133* 130* 132*   POTASSIUM mmol/L 4.4 4.5 5.9* 4.9   CHLORIDE mmol/L 98 98 98 100   CO2 mmol/L 24.0  24.0 24.0 20.0*   BUN mg/dL 20 22 19 19   CREATININE mg/dL 1.22 1.33* 1.30* 1.26   GLUCOSE mg/dL 92 91 129* 133*   CALCIUM mg/dL 9.5 9.3 9.6 9.0   ALT (SGPT) U/L  --   --  17  --    AST (SGOT) U/L  --   --  24  --    PROBNP pg/mL  --   --   --  1,641.0     Estimated Creatinine Clearance: 49.8 mL/min (by C-G formula based on SCr of 1.22 mg/dL).    Microbiology Results Abnormal     Procedure Component Value - Date/Time    Fungus Culture - Tissue, Heart [[id number]] Collected:  06/13/19 1326    Lab Status:  Preliminary result Specimen:  Tissue from Heart Updated:  06/18/19 1416     Fungus Culture No fungus isolated at less than 1 week    AFB Culture - Tissue, Heart [[id number]] Collected:  06/13/19 1326    Lab Status:  Preliminary result Specimen:  Tissue from Heart Updated:  06/18/19 1416     AFB Culture No AFB isolated at less than 1 week     AFB Stain No acid fast bacilli seen on concentrated smear    Anaerobic Culture - Tissue, Heart [[id number]] Collected:  06/13/19 1326    Lab Status:  Final result Specimen:  Tissue from Heart Updated:  06/18/19 1126     Anaerobic Culture No anaerobes isolated at 5 days    Tissue / Bone Culture - Tissue, Heart [[id number]] Collected:  06/13/19 1326    Lab Status:  Final result Specimen:  Tissue from Heart Updated:  06/16/19 0832     Tissue Culture No growth at 3 days     Gram Stain Rare (1+) WBCs seen      No organisms seen    Wound Culture - Wound, Heart [[id number]] Collected:  06/13/19 1049    Lab Status:  Final result Specimen:  Wound from Heart Updated:  06/16/19 0832     Wound Culture No growth at 3 days     Gram Stain Many (4+) Red blood cells      Occasional WBCs seen      No organisms seen    Blood Culture - Blood, Arm, Right [[id number]] Collected:  06/07/19 1425    Lab Status:  Final result Specimen:  Blood from Arm, Right Updated:  06/12/19 1500     Blood Culture No growth at 5 days    Blood Culture - Blood, Arm, Left [[id number]] Collected:  06/07/19 1430    Lab Status:   Final result Specimen:  Blood from Arm, Left Updated:  06/12/19 1500     Blood Culture No growth at 5 days          Imaging Results (last 24 hours)     ** No results found for the last 24 hours. **          Results for orders placed during the hospital encounter of 06/07/19   Adult Transthoracic Echo Complete W/ Cont if Necessary Per Protocol    Narrative · Estimated EF appears to be in the range of 61 - 65%.  · Left ventricular systolic function is normal.  · Right ventricular cavity is mildly dilated.  · Left atrial cavity size is mild-to-moderately dilated.  · Mild aortic valve regurgitation is present.  · Calculated right ventricular systolic pressure from tricuspid   regurgitation is 69 mmHg.  · Moderate to severe tricuspid valve regurgitation is present.  · Thin fibrinous mobile echogenic structures possibly attached to the   right atrial lead, versus prominent Chiari network. ZI recommended for   follow-up if clinically appropriate..          I have reviewed the medications:  Scheduled Meds:    aspirin 81 mg Oral Daily   atorvastatin 40 mg Oral Nightly   cholecalciferol 1,000 Units Oral Daily   ertapenem 1 g Intravenous Q24H   famotidine 20 mg Oral BID   ferrous sulfate 325 mg Oral BID   finasteride 5 mg Oral Daily   folic acid 1 mg Oral Nightly   metoprolol tartrate 50 mg Oral Q12H   ranolazine 500 mg Oral Nightly   rivaroxaban 15 mg Oral Nightly   sennosides-docusate sodium 2 tablet Oral BID   tamsulosin 0.4 mg Oral Daily   vitamin B-12 100 mcg Oral Daily   vitamin C 500 mg Oral BID     Continuous Infusions:    niCARdipine 5-15 mg/hr    nitroglycerin 5-200 mcg/min Last Rate: Stopped (06/14/19 0000)     PRN Meds:.•  acetaminophen  •  bisacodyl  •  bisacodyl  •  calcium gluconate IVPB  •  docusate sodium  •  famotidine  •  HYDROcodone-acetaminophen  •  magnesium hydroxide  •  magnesium sulfate **OR** magnesium sulfate **OR** magnesium sulfate  •  melatonin  •  Morphine  •  naloxone  •  niCARdipine  •   nitroglycerin  •  ondansetron  •  oxyCODONE-acetaminophen  •  potassium chloride **OR** potassium chloride  •  potassium chloride **OR** potassium chloride      Assessment/Plan   Assessment / Plan     Active Hospital Problems    Diagnosis POA   • **Pacemaker infection s/p lead extraction  [Z95.0] Yes     On 6/13/19 per Dr. Oneal      • PAF (paroxysmal atrial fibrillation) (CMS/HCC) [I48.0] Yes   • CAD  [I25.10] Yes   • Hypercholesterolemia [E78.00] Yes   • Hypertension [I10] Yes   • Right mid-lower lobe infiltrate  [R91.8] Yes   • Serratia bacteremia  [A49.8] Yes   • H/O NSC lung cancer s/p RL lobectomy  [C34.90] Yes     At [facility] in May [address]      • Stage III CKD, GFR 30-59 ml/min  [N18.3] Yes     Managed by Dr. Courtney             Brief Hospital Course to date:  Rodney Looney is a 84 y.o. male with history of HTN, HLD, PAF, CKD 3, GERD, BPH, bradycardia s/p PPM in 2007, and non-small cell lung cancer s/p recent right lower lobectomy on 5/14/19 at [facility] complicated by Serratia bacteremia and ZI showing a mobile mass on a pacemaker lead consistent with pacemaker lead infection/endocarditis (opal discharged from [facility] on outpatient IV Zosyn), who presents from ID clinic due to dyspnea and hypoxia into the mid 80's. In the ER, CT chest showed dense consolidation and infiltrate in the right lung. Admitted to hospitalist service. ID felt his right lung infiltrate was not significantly different from imaging done while at [facility] recently. He was continued on Merrem per ID recommendations. Lead extraction was discussed and patient ultimately underwent median sternotomy with right ventricular pacemaker lead extraction on 6/13 by Dr. Boyer. He was monitored closely in the ICU afterward, and transferred back to the floor with hospitalists resuming care on 6/16.      PLAN:  --Now on Invanz per ID. Plan for 2 weeks of therapy from date of lead extraction (6/13/19) Pacemaker lead cultures NGTD. Blood  cultures on admission were negative.  --Pt interested in abx infusions at [facility]  --CXR continues to show persistent opacities in the right lung with a small pleural effusion. Encourage incentive spirometry. Continue nebs and wean O2 as tolerated. Ambulate today  --Afib persistent/ permanent. Increase metoprolol to 50mg and stop amio per cards in favor of rate control. Patient asymptomatic. Xarelto restarted and CT removed.  --PT/OT- will need home health at [place]    DVT Prophylaxis:  Mechanical, Xarelto     Disposition: I expect the patient to be discharged home with  when antibiotics arranged and hopefully O2 weaned.  Possibly later today or tomorrow    CODE STATUS:   Code Status and Medical Interventions:   Ordered at: 06/13/19 1432     Code Status:    CPR     Medical Interventions (Level of Support Prior to Arrest):    Full         Electronically signed by LINDA Hester, 06/19/19, 1:38 PM.

## 2019-06-19 NOTE — PLAN OF CARE
Problem: Patient Care Overview  Goal: Plan of Care Review  Outcome: Ongoing (interventions implemented as appropriate)   06/19/19 1035   Plan of Care Review   Progress improving   OTHER   Outcome Summary Pt ambulates 98'+98' in [place] with sitting rest break between bouts, using RWx, on RA first bout, 2L 2nd bout. Pt desats to 85% when ambuating on room air. SpO2 monitored throughout ambulation with forehead sensor with portable monitor.   Coping/Psychosocial   Plan of Care Reviewed With patient

## 2019-06-20 NOTE — DISCHARGE SUMMARY
[facility] Medicine Services  DISCHARGE SUMMARY    Patient Name: Rodney Looney  : 1934  MRN: [id number]    Date of Admission: 2019  Date of Discharge:  2019  Primary Care Physician: Dana Isidro DO    Consults     Date and Time Order Name Status Description    6/10/2019 0030 Inpatient Cardiac Electrophysiologist Consult Completed     6/10/2019 0030 Inpatient Cardiology Consult      2019 1654 Inpatient Infectious Diseases Consult Completed           Hospital Course     Presenting Problem:   Lung infiltrate [R91.8]    Active Hospital Problems    Diagnosis  POA   • **Pacemaker infection s/p lead extraction  [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          Hospital Course:  Rodney Looney is a 84 y.o. male with recent diagnosis of non-small cell lung cancer for which he underwent a right lower lobectomy at [facility] on May 14.  His hospital course was complicated by Serratia bacteremia and ZI revealed a mobile mass on pacemaker lead, consistent with lead infection/endocarditis.  He was discharged from that hospitalization on IV Zosyn and was following with infectious disease.  He was to follow-up with  for possible pacemaker extraction as that is where the pacemaker was originally placed.  He was in the infectious disease office for follow-up and was found to be hypoxic and was sent to the ED.  ED eval revealed a right basilar infiltrate and his antibiotics were switched from Zosyn to Merrem.  He was admitted to the hospitalist service and infectious disease was  consulted.  Cardiology was consulted for possible pacemaker extraction.  It was felt that the only way to clear his infection is for full lead extraction.  It was attempted to remove infected lead in the EP lab, but was unsuccessful due to the adherence of the tip of the lead to the right ventricle.  Infected pacemaker and atrial leads were removed and he was taken to the operating room for median sternotomy and removal of RV pacing lead.  He tolerated the procedure well with minimal blood loss.  He remained on IV antibiotics while hospitalized and was switched to Invanz.  He continued to show improvement and meds were adjusted for improved rate control.  Amiodarone was discontinued, Xarelto was restarted after surgery.  He is felt to be stable and ready for discharge home with ID follow-up.  He will be receiving daily Invanz via outpatient infusion at [facility].      Discharge Follow Up Recommendations for labs/diagnostics:   Follow up with Cardiolgy 2 weeks, CTS 2 weeks, and ID in 1 week.      Day of Discharge     HPI:   Patient sitting up in chair discussed discharge plan. Denies any pain.    Review of Systems  Gen- No fevers, chills  CV- No chest pain, palpitations  Resp- No cough, dyspnea  GI- No N/V/D, abd pain      Otherwise ROS is negative except as mentioned in the HPI.    Vital Signs:   Temp:  [97.6 °F (36.4 °C)-98.7 °F (37.1 °C)] 97.6 °F (36.4 °C)  Heart Rate:  [] 103  Resp:  [16-20] 20  BP: (106-122)/(68-74) 122/68     Physical Exam:  Constitutional: No acute distress, awake, alert  HENT: NCAT, mucous membranes moist  Respiratory: Clear to auscultation bilaterally, respiratory effort normal, currently on 2L  per nasal cannula.   Cardiovascular: irregular A-fib, no murmurs, rubs, or gallops, palpable pedal pulses bilaterally  Gastrointestinal: Positive bowel sounds, soft, nontender, nondistended  Musculoskeletal: No bilateral ankle edema  Psychiatric: Appropriate affect,  cooperative  Neurologic: Oriented x 3, strength symmetric in all extremities, ALONSO, speech clear  Skin: No rashes, old pacer, sternal incision and deep line site all intact.    Pertinent  and/or Most Recent Results     Results from last 7 days   Lab Units 06/17/19  0503 06/16/19  0408 06/15/19  0313 06/14/19  0341 06/13/19  1828 06/13/19  1456 06/13/19  1226   WBC 10*3/mm3 7.86 8.65 10.31 9.79 9.51 10.22  --    HEMOGLOBIN g/dL 8.2* 8.1* 8.5* 8.1* 7.8* 8.4*  --    HEMOGLOBIN, POC g/dL  --   --   --   --   --   --  10.2*   HEMATOCRIT % 28.0* 28.0* 29.6* 27.9* 26.1* 28.9*  --    HEMATOCRIT POC %  --   --   --   --   --   --  30*   PLATELETS 10*3/mm3 160 160 158 166 190 219  --    SODIUM mmol/L 133* 133* 130* 132* 136 134*  --    POTASSIUM mmol/L 4.4 4.5 5.9* 4.9 4.6 4.5  --    CHLORIDE mmol/L 98 98 98 100 104 104  --    CO2 mmol/L 24.0 24.0 24.0 20.0* 20.0* 19.0*  --    BUN mg/dL 20 22 19 19 21 22  --    CREATININE mg/dL 1.22 1.33* 1.30* 1.26 1.40* 1.35*  --    GLUCOSE mg/dL 92 91 129* 133* 127* 115*  --    CALCIUM mg/dL 9.5 9.3 9.6 9.0 8.6 8.7  --      Results from last 7 days   Lab Units 06/15/19  0313 06/14/19  0341 06/13/19  1456   BILIRUBIN mg/dL 0.5  --   --    ALK PHOS U/L 77  --   --    ALT (SGPT) U/L 17  --   --    AST (SGOT) U/L 24  --   --    PROTIME Seconds  --  16.5* 16.6*   INR   --  1.40* 1.41*   APTT seconds  --   --  41.2*           Invalid input(s): TG, LDLCALC, LDLREALC  Results from last 7 days   Lab Units 06/14/19  0341   PROBNP pg/mL 1,641.0       Brief Urine Lab Results     None          Microbiology Results Abnormal     Procedure Component Value - Date/Time    Fungus Culture - Tissue, Heart [[id number]] Collected:  06/13/19 1326    Lab Status:  Preliminary result Specimen:  Tissue from Heart Updated:  06/18/19 1416     Fungus Culture No fungus isolated at less than 1 week    AFB Culture - Tissue, Heart [[id number]] Collected:  06/13/19 1326    Lab Status:  Preliminary result Specimen:  Tissue from  Heart Updated:  06/18/19 1416     AFB Culture No AFB isolated at less than 1 week     AFB Stain No acid fast bacilli seen on concentrated smear    Anaerobic Culture - Tissue, Heart [[id number]] Collected:  06/13/19 1326    Lab Status:  Final result Specimen:  Tissue from Heart Updated:  06/18/19 1126     Anaerobic Culture No anaerobes isolated at 5 days    Tissue / Bone Culture - Tissue, Heart [[id number]] Collected:  06/13/19 1326    Lab Status:  Final result Specimen:  Tissue from Heart Updated:  06/16/19 0832     Tissue Culture No growth at 3 days     Gram Stain Rare (1+) WBCs seen      No organisms seen    Wound Culture - Wound, Heart [[id number]] Collected:  06/13/19 1049    Lab Status:  Final result Specimen:  Wound from Heart Updated:  06/16/19 0832     Wound Culture No growth at 3 days     Gram Stain Many (4+) Red blood cells      Occasional WBCs seen      No organisms seen    Blood Culture - Blood, Arm, Right [[id number]] Collected:  06/07/19 1425    Lab Status:  Final result Specimen:  Blood from Arm, Right Updated:  06/12/19 1500     Blood Culture No growth at 5 days    Blood Culture - Blood, Arm, Left [[id number]] Collected:  06/07/19 1430    Lab Status:  Final result Specimen:  Blood from Arm, Left Updated:  06/12/19 1500     Blood Culture No growth at 5 days          Imaging Results (all)     Procedure Component Value Units Date/Time    XR Chest 1 View [[id number]] Collected:  06/15/19 0811     Updated:  06/18/19 1246    Narrative:          EXAMINATION: XR CHEST 1 VW - 06/15/2019      INDICATION:  R09.02-Hypoxemia; J18.1-Lobar pneumonia, unspecified  organism; Z74.09-Other reduced mobility; R13.12-Dysphagia, oropharyngeal  phase; T82.9XXA-Unspecified complication of cardiac and vascular  prosthetic device, implant and graft, initial encounter.      COMPARISON: Chest x-ray 06/14/2019     FINDINGS: Interval removal of esophagogastric tube with support hardware  otherwise unchanged. Cardiac silhouette enlarged  and similar to prior  with persistent right lower lung opacifications and effusion without  change. No pneumothorax or new parenchymal findings.           Impression:       Interval removal of esophagogastric tube; otherwise no  significant interval change.     DICTATED:   06/15/2019  EDITED/ls :   06/15/2019      This report was finalized on 6/18/2019 12:43 PM by Dr. Duy Carrero.       XR Chest PA & Lateral [[id number]] Collected:  06/17/19 1056     Updated:  06/17/19 1417    Narrative:       EXAMINATION: XR CHEST PA AND LATERAL-      INDICATION: Postoperative right lower lobectomy; R09.02-Hypoxemia;  J18.1-Lobar pneumonia, unspecified organism; Z74.09-Other reduced  mobility; R13.12-Dysphagia, oropharyngeal phase; T82.9XXA-Unspecified  complication of cardiac and vascular prosthetic device, implant and  graft, initial encounter.      COMPARISON: 06/15/2019.     FINDINGS: A Port-A-Cath is well positioned. There is right basilar  airspace disease. There are postoperative changes on the right. A right  pleural effusion has resolved. There are postinflammatory changes on the  left. There is no pneumothorax.       Impression:       There are postoperative changes involving the right lung.  There is no pneumothorax. There is patchy airspace disease at the right  lung base.  The right pleural effusion is no longer present.     D:  06/17/2019  E:  06/17/2019     This report was finalized on 6/17/2019 2:14 PM by Dr. Wesley Santamaria MD.       XR Chest 1 View [[id number]] Collected:  06/14/19 0818     Updated:  06/14/19 2107    Narrative:       EXAMINATION: XR CHEST 1 VW- 06/14/2019      INDICATION: RLL infiltrate, hypoxia; R09.02-Hypoxemia; J18.1-Lobar  pneumonia, unspecified organism; Z74.09-Other reduced mobility;  R13.12-Dysphagia, oropharyngeal phase; T82.9XXA-Unspecified complication  of cardiac and vascular prosthetic device, implant and graft, initial  encounter      COMPARISON: 06/13/2019     FINDINGS: ET tube has been  removed. NG tube is seen in the stomach.  Right IJ catheter tip remains in the SVC. Heart is enlarged. Vasculature  appears upper limits of normal. There is mildly increased bibasilar  effusion/atelectasis and stable pulmonary interstitial disease  elsewhere. No pneumothorax is seen.           Impression:       Right basilar effusion appears mildly increased from  yesterday's study. This may just represent the difference between  semiupright film technique yesterday and upright film technique today.  No other significant interval change is identified.        D:  06/14/2019  E:  06/14/2019     This report was finalized on 6/14/2019 9:04 PM by DR. Sinan Dixon MD.       Fiberoptic Endo (fees) [[id number]] Resulted:  06/14/19 1517     Updated:  06/14/19 1517    Narrative:       This procedure was auto-finalized with no dictation required.    XR Chest 1 View [[id number]] Collected:  06/13/19 1619     Updated:  06/14/19 1433    Narrative:          EXAMINATION: XR CHEST 1 VW - 06/13/2019     INDICATION:  R09.02-Hypoxemia; J18.1-Lobar pneumonia, unspecified  organism; Z74.09-Other reduced mobility;  R13.12-Dysphagia,  oropharyngeal phase; T82.9XXA-Unspecified complication of cardiac and  vascular prosthetic device, implant and graft, initial encounter.     COMPARISON: 06/07/2019     FINDINGS: Portable chest reveals the heart to be borderline enlarged.   There is a small right pleural effusion with mild increased markings  seen within the right lung base. The left lung base is demonstrating  mild increased markings. The lines and tubes are in satisfactory  position. Degenerative change is seen within the spine. Patient is  status post median sternotomy.           Impression:       New median sternotomy in the interval with ill-defined  opacification seen within the right lung base and mild increased  markings at the left lung base. No change in the small right pleural  effusion with lines and tubes in satisfactory position.      DICTATED:   06/13/2019  EDITED/ls :   06/13/2019      This report was finalized on 6/14/2019 2:30 PM by Dr. Jo Ann Galarza MD.       FL Video Swallow With Speech [[id number]] Collected:  06/11/19 1136     Updated:  06/11/19 1637    Narrative:       EXAMINATION: FL VIDEO SWALLOW W SPEECH-     INDICATION: dysphagia; R09.02-Hypoxemia; J18.1-Lobar pneumonia,  unspecified organism; Z74.09-Other reduced mobility; Z74.09-Other  reduced mobility     TECHNIQUE: 48 seconds of fluoroscopic time was used for this exam. 1  associated image was saved. The patient was evaluated in the seated  lateral position while taking a variety of consistencies of barium by  mouth under the direction of speech pathology.     COMPARISON: NONE     FINDINGS: There was no penetration and no aspiration with any of the  media attempted.          Impression:       Fluoroscopy provided for a modified barium swallow. Please  see speech therapy report for full details and recommendations.         This report was finalized on 6/11/2019 4:33 PM by Dr. Wesley Santamaria MD.       CT Chest Without Contrast [[id number]] Collected:  06/07/19 1453     Updated:  06/07/19 1610    Narrative:       EXAMINATION: CT CHEST WO CONTRAST-06/07/2019:      INDICATION: Hypoxemia after recent lobectomy, bleeding from posterior  incision, recent bacteremia but no fever now; R09.02-Hypoxemia;  J18.1-Lobar pneumonia, unspecified organism, lobar pneumonia.     TECHNIQUE: Multiple axial CT imaging was obtained of the chest without  the administration of intravenous contrast.     The radiation dose reduction device was turned on for each scan per the  ALARA (As Low as Reasonably Achievable) protocol.     COMPARISON: NONE.     FINDINGS: There is a small amount of pleural fluid identified along the  right lateral chest wall with a fracture involving the right posterior  sixth rib. Findings are likely postoperative. There is consolidation  posteriorly within the right lung base  concerning for infiltrate. There  is some chronic interstitial changes identified likely postoperative.  Superimposed infection is likely within the right lung base. Severe  emphysematous changes seen within the lung fields bilaterally with  bronchiectatic changes centrally. Severe emphysematous changes seen  within the upper lung fields. Old healed granulomatous disease seen  within the chest. The cardiac chambers are within normal limits. No  pericardial effusion. No bulky hilar or axillary lymphadenopathy. The  visualized upper abdomen is unremarkable.       Impression:       Findings concerning for dense consolidation and infiltrate  superimposed on chronic and emphysematous change within the right lung.  There is a fracture involving the right posterior sixth rib likely  postsurgical with some pleural thickening and small pleural fluid seen  on the right.     D:  06/07/2019  E:  06/07/2019     This report was finalized on 6/7/2019 4:07 PM by Dr. Jo Ann Galarza MD.       XR Chest 1 View [[id number]] Collected:  06/07/19 1306     Updated:  06/07/19 1423    Narrative:       EXAMINATION: XR CHEST 1 VW- 06/07/2019      INDICATION: SOA triage protocol      COMPARISON: NONE     FINDINGS: Portable chest reveals heart to be enlarged. Ill-defined  opacification seen at the right lung base with small right pleural  effusion. Deep line catheter on the right tip in the SVC. Cardiac pacer  leads in satisfactory position.       Impression:       Patchy ill-defined opacification seen within the right mid  and lower lung field with small right pleural effusion. Left lung is  clear.     D:  06/07/2019  E:  06/07/2019     This report was finalized on 6/7/2019 2:20 PM by Dr. Jo Ann Galarza MD.                       Results for orders placed during the hospital encounter of 06/07/19   Adult Transthoracic Echo Complete W/ Cont if Necessary Per Protocol    Narrative · Estimated EF appears to be in the range of 61 - 65%.  ·  Left ventricular systolic function is normal.  · Right ventricular cavity is mildly dilated.  · Left atrial cavity size is mild-to-moderately dilated.  · Mild aortic valve regurgitation is present.  · Calculated right ventricular systolic pressure from tricuspid   regurgitation is 69 mmHg.  · Moderate to severe tricuspid valve regurgitation is present.  · Thin fibrinous mobile echogenic structures possibly attached to the   right atrial lead, versus prominent Chiari network. ZI recommended for   follow-up if clinically appropriate..           Order Current Status    AFB Culture - Tissue, Heart Preliminary result    Fungus Culture - Tissue, Heart Preliminary result        Discharge Details        Discharge Medications      New Medications      Instructions Start Date   ascorbic acid 500 MG tablet  Commonly known as:  VITAMIN C   500 mg, Oral, 2 Times Daily      ertapenem 1 gm/100ml solution IV  Commonly known as:  INVanz   1 g, Intravenous, Every 24 Hours, Per outpatient infusion at [facility].      HYDROcodone-acetaminophen 7.5-325 MG per tablet  Commonly known as:  NORCO   1 tablet, Oral, Every 6 Hours PRN      metoprolol tartrate 50 MG tablet  Commonly known as:  LOPRESSOR   50 mg, Oral, Every 12 Hours Scheduled         Continue These Medications      Instructions Start Date   aspirin 81 MG EC tablet   81 mg, Oral, Daily      CALCIUM 600 PO   1 tablet, Oral, Daily      cholecalciferol 1000 units tablet  Commonly known as:  VITAMIN D3   1,000 Units, Oral, Daily      dutasteride 0.5 MG capsule  Commonly known as:  AVODART   0.5 mg, Oral, Nightly      famotidine 20 MG tablet  Commonly known as:  PEPCID   20 mg, Oral, 2 Times Daily      ferrous sulfate 325 (65 FE) MG tablet   325 mg, Oral, 2 Times Daily      folic acid 1 MG tablet  Commonly known as:  FOLVITE   1 mg, Oral, Nightly      pravastatin 80 MG tablet  Commonly known as:  PRAVACHOL   80 mg, Oral, Nightly      ranolazine 500 MG 12 hr  tablet  Commonly known as:  RANEXA   500 mg, Oral, Nightly      rivaroxaban 15 MG tablet  Commonly known as:  XARELTO   15 mg, Oral, Nightly      tamsulosin 0.4 MG capsule 24 hr capsule  Commonly known as:  FLOMAX   1 capsule, Oral, Nightly      vitamin B-12 100 MCG tablet  Commonly known as:  CYANOCOBALAMIN   100 mcg, Oral, Daily         Stop These Medications    metoprolol succinate XL 50 MG 24 hr tablet  Commonly known as:  TOPROL-XL            No Known Allergies      Discharge Disposition:  Home or Self Care    Discharge Diet:  Diet Order   Procedures   • Diet Soft Texture; [employer]; Thin         Discharge Activity:   Activity Instructions     Activity as Tolerated              CODE STATUS:    Code Status and Medical Interventions:   Ordered at: 06/13/19 1433     Code Status:    CPR     Medical Interventions (Level of Support Prior to Arrest):    Full         No future appointments.    Additional Instructions for the Follow-ups that You Need to Schedule     Ambulatory Referral to Physical Therapy Evaluate and treat; (as tolerated)   As directed      Specialty needed:  Evaluate and treat    Weight Bearing Status:   (as tolerated)         Discharge Follow-up with PCP   As directed       Currently Documented PCP:    Dana Isidro DO    PCP Phone Number:    [phone]     Follow Up Details:  one week         Discharge Follow-up with Specified Provider: 2-3 weeks follow up with CTS; 2 Weeks   As directed      To:  2-3 weeks follow up with CTS    Follow Up:  2 Weeks         Discharge Follow-up with Specified Provider: Cardiology; 2 Weeks   As directed      To:  Cardiology    Follow Up:  2 Weeks         Discharge Follow-up with Specified Provider: Infectious Disease; 1 Week   As directed      To:  Infectious Disease    Follow Up:  1 Week               Time Spent on Discharge: 40 minutes    Electronically signed by LINDA Hester, 06/20/19, 11:54 AM.

## 2019-06-20 NOTE — THERAPY DISCHARGE NOTE
Acute Care - Speech Language Pathology   Swallow Treatment Note/Discharge   [facility]     Patient Name: Rodney Looney  : 1934  MRN: [id number]  Today's Date: 2019  Onset of Illness/Injury or Date of Surgery: 19     Referring Physician: MD Pollard      Admit Date: 2019    Visit Dx:      ICD-10-CM ICD-9-CM   1. Hypoxemia R09.02 799.02   2. Pneumonia of right lower lobe due to infectious organism (CMS/HCC) J18.1 486   3. Impaired functional mobility, balance, gait, and endurance Z74.09 V49.89   4. Impaired mobility and ADLs Z74.09 799.89   5. Oropharyngeal dysphagia R13.12 787.22   6. Complication associated with cardiac pacemaker lead, initial encounter T82.9XXA 996.72   7. Pacemaker infection s/p lead extraction  Z95.0 V45.01   8. Hypoxia R09.02 799.02     Patient Active Problem List   Diagnosis   • Right mid-lower lobe infiltrate    • Serratia bacteremia    • Pacemaker infection s/p lead extraction    • H/O NSC lung cancer s/p RL lobectomy    • History of lobectomy of lung   • Hypoxia   • Stage III CKD, GFR 30-59 ml/min    • CAD    • Hypercholesterolemia   • Hypertension   • PAF (paroxysmal atrial fibrillation) (CMS/HCC)       Therapy Treatment  Rehabilitation Treatment Summary     Row Name 19 1524 19 0907          Treatment Time/Intention    Discipline  speech language pathologist  -AC  physical therapy assistant  -AS     Document Type  discharge treatment;other (see comments) pt/caregiver instruction  -AC  therapy note (daily note)  -AS     Subjective Information  no complaints  -AC  complains of;weakness;dyspnea  -AS     Mode of Treatment  --  physical therapy  -AS     Patient/Family Observations  Pt alert, cooperative. Wife @ bedside. Pt preparing to d/c the hospital any minute.   -AC  no family at bedside, patient sitting UIC and agreed to therapy  -AS     Care Plan Review  evaluation/treatment results reviewed;care plan/treatment goals reviewed;risks/benefits  reviewed;current/potential barriers reviewed;patient/other agree to care plan  -AC  --     Care Plan Review, Other Participant(s)  spouse  -AC  --     Patient Effort  good  -AC  good  -AS     Comment  Pt preparing to d/c home. Spouse reported pt not consistently using chin tuck and not doing swallowing exercise program. Provided edu re: rationale for chin tuck and therapy program, risks of aspiration, and OP services. They stated understanding. Encouraged pt to f/u w/ OP SLP for further tx. Spoke to  who agreed to arrange.  -AC  monitor vital signs due to decreased O2 sats  -AS     Existing Precautions/Restrictions  --  fall;oxygen therapy device and L/min  -AS     Recorded by [AC] Dana Crane MS CCC-SLP 06/20/19 1538 [AS] Marian Cra, [facility] 06/20/19 1030     Row Name 06/20/19 0907             Vital Signs    Pre SpO2 (%)  92  -AS      O2 Delivery Pre Treatment  supplemental O2  -AS      Intra SpO2 (%)  87  -AS      O2 Delivery Intra Treatment  supplemental O2  -AS      Post SpO2 (%)  92  -AS      O2 Delivery Post Treatment  supplemental O2  -AS      Pre Patient Position  Sitting  -AS      Intra Patient Position  Standing  -AS      Post Patient Position  Sitting  -AS      Recorded by [AS] Marian Car, PTA 06/20/19 1030      Row Name 06/20/19 0907             Cognitive Assessment/Intervention- PT/OT    Affect/Mental Status (Cognitive)  WFL  -AS      Orientation Status (Cognition)  oriented x 3  -AS      Follows Commands (Cognition)  WFL  -AS      Safety Deficit (Cognitive)  mild deficit;insight into deficits/self awareness;safety precautions awareness;safety precautions follow-through/compliance  -AS      Personal Safety Interventions  fall prevention program maintained;gait belt;nonskid shoes/slippers when out of bed;other (see comments) exit alarm  -AS      Recorded by [AS] Marian Car, PTA 06/20/19 1030      Row Name 06/20/19 0907             Bed Mobility Assessment/Treatment     Comment (Bed Mobility)  not tested, patient sitting UIC  -AS      Recorded by [AS] Marian Car, PTA 06/20/19 1030      Row Name 06/20/19 0907             Sit-Stand Transfer    Sit-Stand [place] (Transfers)  stand by assist  -AS      Assistive Device (Sit-Stand Transfers)  walker, front-wheeled  -AS      Recorded by [AS] Marian Car, PTA 06/20/19 1030      Row Name 06/20/19 0907             Stand-Sit Transfer    Stand-Sit [place] (Transfers)  stand by assist  -AS      Assistive Device (Stand-Sit Transfers)  walker, front-wheeled  -AS      Recorded by [AS] Marian Car, [facility] 06/20/19 1030      Row Name 06/20/19 0907             Gait/Stairs Assessment/Training    61470 - Gait Training Minutes   15  -AS      Gait/Stairs Assessment/Training  gait/ambulation assistive device  -AS      [place] Level (Gait)  verbal cues;minimum assist (75% patient effort)  -AS      Assistive Device (Gait)  walker, front-wheeled  -AS      Distance in Feet (Gait)  120  -AS      Pattern (Gait)  step-through  -AS      Deviations/Abnormal Patterns (Gait)  bilateral deviations;base of support, narrow;reza decreased;gait speed decreased  -AS      Bilateral Gait Deviations  forward flexed posture;heel strike decreased  -AS      Comment (Gait/Stairs)  patient ambulated 120 feet with rolling walker and O2 for support, verbal cues to stay inside walker and to improve posture. Cues for breathing technique. SaO2 87%-92%.  -AS      Recorded by [AS] Marian Car, PTA 06/20/19 1030      Row Name 06/20/19 0907             Motor Skills Assessment/Interventions    Additional Documentation  Therapeutic Exercise (Group)  -AS      Recorded by [AS] Marian Car, PTA 06/20/19 1030      Row Name 06/20/19 0907             Therapeutic Exercise    17156 - PT Therapeutic Exercise Minutes  8  -AS      Recorded by [AS] Marian Car, [facility] 06/20/19 1030      Row Name 06/20/19 0907             Therapeutic Exercise     Upper Extremity Range of Motion (Therapeutic Exercise)  shoulder flexion/extension, bilateral;shoulder abduction/adduction, bilateral;elbow flexion/extension, bilateral  -AS      Lower Extremity Range of Motion (Therapeutic Exercise)  hip flexion/extension, bilateral;knee flexion/extension, bilateral;ankle dorsiflexion/plantar flexion, bilateral  -AS      Exercise Type (Therapeutic Exercise)  AROM (active range of motion)  -AS      Position (Therapeutic Exercise)  seated  -AS      Sets/Reps (Therapeutic Exercise)  1/10  -AS      Recorded by [AS] Marian Car, [facility] 06/20/19 1030      Row Name 06/20/19 0907             Positioning and Restraints    Pre-Treatment Position  sitting in chair/recliner  -AS      Post Treatment Position  chair  -AS      In Chair  reclined;call light within reach;encouraged to call for assist;exit alarm on  -AS      Recorded by [AS] Marian Car, [facility] 06/20/19 1030      Row Name 06/20/19 0907             Pain Scale: Numbers Pre/Post-Treatment    Pain Scale: Numbers, Pretreatment  0/10 - no pain  -AS      Pain Scale: Numbers, Post-Treatment  0/10 - no pain  -AS      Recorded by [AS] Marian Car, [facility] 06/20/19 1030      Row Name 06/20/19 1524             Pain Scale: FACES Pre/Post-Treatment    Pain: FACES Scale, Pretreatment  0-->no hurt  -AC      Pain: FACES Scale, Post-Treatment  0-->no hurt  -AC      Recorded by [AC] Dana Crane, MS CCC-SLP 06/20/19 1538      Row Name                Wound 06/07/19 1735 Right thoracic spine incision;surgical    Wound - Properties Group Date first assessed: 06/07/19 [TL] Time first assessed: 1735 [TL] Side: Right [TL] Location: thoracic spine [TL2] Type: incision;surgical [TL] Recorded by:  [TL] Wesley DIXON RN 06/07/19 1735 [TL2] Wesley DIXON RN 06/07/19 1736    Row Name                Wound 06/13/19 0951 Left anterior chest incision;surgical    Wound - Properties Group Date first assessed: 06/13/19 [JS] Time first assessed: 0951  [JS] Side: Left [JS] Orientation: anterior [MH] Location: chest [JS] Type: incision;surgical [JS] Recorded by:  [JS] Josh Hart RN 06/13/19 1157 [MH] Lg Max RN 06/13/19 2330    Row Name                Wound 06/13/19 1930 Right lateral other (see notes) other (see comments)    Wound - Properties Group Date first assessed: 06/13/19 [MH] Time first assessed: 1930 [MH] Present On Admission : yes [MH] Side: Right [MH] Orientation: lateral [MH] Location: other (see notes) [MH], lateral chest from previous chest tube  Type: other (see comments) [MH], previous chest tube insertion  Recorded by:  [MH] Lg Max RN 06/13/19 2319    Row Name                Wound 06/14/19 0800 midline chest incision    Wound - Properties Group Date first assessed: 06/14/19 [BH] Time first assessed: 0800 [BH] Orientation: midline [BH] Location: chest [BH] Type: incision [BH] Additional Comments: midsternal incision from surgery yesterday, 6/14 [BH] Recorded by:  [BH] Christine Kaufman RN 06/14/19 1203    Row Name 06/20/19 1524             Outcome Summary/Treatment Plan (SLP)    Daily Summary of Progress (SLP)  progress toward functional goals as expected  -AC      Plan for Continued Treatment (SLP)  Cont soft/whole diet and thin liquid. Must tuck chin w/ liquids and alternate bites/sips. Pt would benefit from OP SLP services for oropharyngeal dysphagia.  -AC      Anticipated Dischage Disposition  home with OP services  -AC      Reason for Discharge  discharge from this facility  -AC      Recorded by [AC] Dana Crane, MS CCC-SLP 06/20/19 1538        User Key  (r) = Recorded By, (t) = Taken By, (c) = Cosigned By    Initials Name Effective Dates Discipline    AS Marian Car, NELSON 06/22/15 -  PT    AC Dana Crane, MS CCC-SLP 07/27/17 -  SLP     Christine Kaufman RN 01/19/18 -  Nurse    Lg Baker RN 06/27/16 -  Nurse    Wesley BARRIOS RN 08/07/17 -  Nurse    Josh Solis, CHARLIE 01/30/19 -   Nurse        Outcome Summary  Outcome Summary/Treatment Plan (SLP)  Daily Summary of Progress (SLP): progress toward functional goals as expected (06/20/19 1524 : Dana Crane, MS CCC-SLP)  Plan for Continued Treatment (SLP): Cont soft/whole diet and thin liquid. Must tuck chin w/ liquids and alternate bites/sips. Pt would benefit from OP SLP services for oropharyngeal dysphagia. (06/20/19 1524 : Dana Crane, MS CCC-SLP)  Anticipated Dischage Disposition: home with OP services (06/20/19 1524 : Dana Crane, MS CCC-SLP)  Reason for Discharge: discharge from this facility (06/20/19 1524 : Dana Crane, MS CCC-SLP)  SLP GOALS     Row Name 06/20/19 1524             Oral Nutrition/Hydration Goal 1 (SLP)    Oral Nutrition/Hydration Goal 1, SLP  LTG: Pt will tolerate regular diet and thin liquids w/ no overt s/s of aspiration w/ 100% accuracy w/o cues.   -AC      Time Frame (Oral Nutrition/Hydration Goal 1, SLP)  by discharge  -AC      Progress/Outcomes (Oral Nutrition/Hydration Goal 1, SLP)  discharged from facility  -AC         Lingual Strengthening Goal 1 (SLP)    Increase Lingual Tone/Sensation/Control/Coordination/Movement  lingual movement exercises  -AC      [place]/Accuracy (Lingual Strengthening Goal 1, SLP)  with minimal cues (75-90% accuracy)  -AC      Time Frame (Lingual Strengthening Goal 1, SLP)  short term goal (STG);by discharge  -AC      Barriers (Lingual Strengthening Goal 1, SLP)  Wife confirmed pt still hand handout w/ exercises. Encouraged independent practice & f/u w/ OP SLP.  -AC      Progress/Outcomes (Lingual Strengthening Goal 1, SLP)  discharged from facility  -AC         Pharyngeal Strengthening Exercise Goal 1 (SLP)    Increase Timing  prepping - 3 second prep or suck swallow or 3-step swallow  -AC      Increase Superior Movement of the Hyolaryngeal Complex  Mendelsohn  -AC      Increase Anterior Movement of the Hyolaryngeal Complex  chin tuck against resistance (CTAR)  -AC       Increase Closure at Entrance to Airway/Closure of Airway at Glottis  super-supraglottic swallow  -AC      Increase Squeeze/Positive Pressure Generation  effortful pitch glide (falsetto + pharyngeal squeeze)  -AC      Increase Tongue Base Retraction  hard effortful swallow  -AC      [place]/Accuracy (Pharyngeal Strengthening Goal 1, SLP)  independently (over 90% accuracy)  -AC      Time Frame (Pharyngeal Strengthening Goal 1, SLP)  short term goal (STG);by discharge  -AC      Barriers (Pharyngeal Strengthening Goal 1, SLP)  Wife confirmed pt still hand handout w/ exercises. Encouraged independent practice & f/u w/ OP SLP.  -AC      Progress/Outcomes (Pharyngeal Strengthening Goal 1, SLP)  discharged from facility  -AC         Swallow Compensatory Strategies Goal 1 (SLP)    Activity (Swallow Compensatory Strategies/Techniques Goal 1, SLP)  compensatory strategies;postural techniques;during meal intake;alternate food/liquid intake;chin tuck posture  -AC      [place]/Accuracy (Swallow Compensatory Strategies/Techniques Goal 1, SLP)  independently (over 90% accuracy)  -AC      Time Frame (Swallow Compensatory Strategies/Techniques Goal 1, SLP)  short term goal (STG);by discharge  -AC      Barriers (Swallow Compensatory Strategies/Techniques Goal 1, SLP)  Pt demonstrated understanding recommendation for chin tuck, but admitted that he forgets to do so @ times.   -AC      Progress/Outcomes (Swallow Compensatory Strategies/Techniques Goal 1, SLP)  discharged from facility  -AC        User Key  (r) = Recorded By, (t) = Taken By, (c) = Cosigned By    Initials Name Provider Type    Dana Wilkins MS CCC-SLP Speech and Language Pathologist          EDUCATION  The patient has been educated in the following areas:   Dysphagia (Swallowing Impairment) Oral Care/Hydration Modified Diet Instruction.    SLP Recommendation and Plan  Daily Summary of Progress (SLP): progress toward functional goals as expected     Plan for  Continued Treatment (SLP): Cont soft/whole diet and thin liquid. Must tuck chin w/ liquids and alternate bites/sips. Pt would benefit from OP SLP services for oropharyngeal dysphagia.  Anticipated Dischage Disposition: home with OP services        Reason for Discharge: discharge from this facility           Time Calculation:   Time Calculation- SLP     Row Name 06/20/19 1541             Time Calculation- SLP    SLP Start Time  1524  -      Total Timed Code Minutes- SLP  9 minute(s)  -      SLP Received On  06/20/19  -        User Key  (r) = Recorded By, (t) = Taken By, (c) = Cosigned By    Initials Name Provider Type     Dana Crane MS CCC-SLP Speech and Language Pathologist          Therapy Charges for Today     Code Description Service Date Service Provider Modifiers Qty    [id number]  ST SELF CARE/MGMT/TRAIN EA 15 MIN 6/20/2019 Dana Crane MS CCC-SLP GN 1               SLP Discharge Summary  Anticipated Dischage Disposition: home with OP services  Reason for Discharge: discharge from this facility  Progress Toward Achieving Short/long Term Goals: goals partially met within established timelines    Dana Crane MS CCC-SLP  6/20/2019

## 2019-06-20 NOTE — PLAN OF CARE
Problem: Patient Care Overview  Goal: Plan of Care Review  Outcome: Ongoing (interventions implemented as appropriate)   06/20/19 5772   Plan of Care Review   Progress improving   Coping/Psychosocial   Plan of Care Reviewed With patient       Problem: Pneumonia (Adult)  Goal: Signs and Symptoms of Listed Potential Problems Will be Absent, Minimized or Managed (Pneumonia)  Outcome: Ongoing (interventions implemented as appropriate)      Problem: Fall Risk (Adult)  Goal: Absence of Fall  Outcome: Ongoing (interventions implemented as appropriate)      Problem: Skin Injury Risk (Adult)  Goal: Skin Health and Integrity  Outcome: Ongoing (interventions implemented as appropriate)

## 2019-06-20 NOTE — PLAN OF CARE
Problem: Patient Care Overview  Goal: Plan of Care Review  Outcome: Outcome(s) achieved Date Met: 06/20/19 06/20/19 9461   Coping/Psychosocial   Plan of Care Reviewed With patient;spouse   SLP caregiver instruction completed prior to d/c. Will recommend OP SLP services for mild oropharyngeal dysphagia. Please see note for further details and recommendations.

## 2019-06-20 NOTE — PLAN OF CARE
Problem: Patient Care Overview  Goal: Plan of Care Review  Outcome: Ongoing (interventions implemented as appropriate)   06/20/19 1030   Plan of Care Review   Progress improving   OTHER   Outcome Summary patient ambulated 120 feet with rolling walker and O2 for support, verbal cues to stay inside walker and to improve posture. Cues for breathing technique. SaO2 87%-92%.   Coping/Psychosocial   Plan of Care Reviewed With patient

## 2019-06-20 NOTE — PROGRESS NOTES
Case Management Discharge Note    Final Note: OP IV abx approved and arranged at [facility].  First OP infusion on 6/21 at 3pm.  Pt will arrange his own OP PT (orders provided).  Per discussion with RN, pt's RA O2 sats have been maintaing >88% today (at rest and with ambulation), therefore he will not qualify/need home O2 per Medicare guidelines.        Destination      No service has been selected for the patient.      Durable Medical Equipment      No service has been selected for the patient.      Dialysis/Infusion      No service has been selected for the patient.      Home Medical Care      No service has been selected for the patient.      Therapy      No service has been selected for the patient.      Community Resources      No service has been selected for the patient.             Final Discharge Disposition Code: 01 - home or self-care

## 2019-06-20 NOTE — PROGRESS NOTES
INFECTIOUS DISEASE  Progress Note    Rodney Looney  1934  [id number]      Admission Date: 6/7/2019      Requesting Provider: No ref. provider found  Evaluating Physician: George Zayas MD    Reason for Consultation: Serratia bacteremia/pacemaker lead infection    History of present illness:    6/8/2019: Mr. Looney is an 84-year-old white male who is seen today for evaluation of Serratia bacteremia/pacemaker lead infection in the setting of a recent diagnosis of non-small cell lung cancer for which he underwent a right lower lobectomy at [facility] on 5/14.  His course at [facility] was complicated by Serratia bacteremia with 2 sets of positive blood cultures on 5/21 and 1 out of 2 sets positive on 5/23.  Subsequent blood cultures on 5/26 were negative.  A ZI revealed a mobile mass on the pacemaker lead, consistent with a pacemaker lead infection/endocarditis.  He was treated with intravenous Zosyn therapy and subsequently discharged on outpatient intravenous Zosyn.  Consideration was given for referral back to  for pacemaker extraction-his pacemaker was placed at .  Due to his underlying lung cancer and advanced age, he is being given consideration for chronic antibiotic suppression rather than pacemaker lead extraction.  He presented for follow-up in our office yesterday and was noted to have dyspnea with hypoxemia.  His O2 saturations were in the mid 80s while sitting.  He was not interested in readmission to [facility] and requested readmission to [facility].  He was evaluated in the emergency room where he was noted to have a right basilar pulmonary infiltrate by chest x-ray and chest CT scan.  His antibiotic therapy was switched from Zosyn to Merrem.  He has remained afebrile overnight.  He has a minimal cough with minimal sputum production.  He denies nausea, vomiting, and diarrhea.  He denies severe chest pain.  6/9/19: He remains  afebrile. He is less short of breath today.  No increased sputum production.  No diarrhea.   6/10/19: Scheduled for MBS today. He still complains of shortness of breath.  No sputum production with cough.  He remains afebrile.     6/11/19; doing well; no events overnight; no fever, rash, sore throat on oxygen    6/12/19; feels well; no events overnight; no fever, rash, sore throat      6/14/19; had to have surgical removal of lead/sternotomy; looks well; no events overnight; hemodynamically stable.    6/16/19; doing well; no events overnight; no complaints, transferred to floor, no diarrhea, rasj, sore throat, states he may need a pacemaker but currently is in afib    6/17/19; no events overnight; no complaints, denies diarrhea, rash, sore throat;     6/18/19; doing well; no events overnight; no fever, rash, sore throat  6/19/19; no events overnight; feels well; no complaints, no diarrhea, rash, sore throat  6/20/19; no events overnight ; doing well; no fever, rash, sore throat  Past Medical History:   Diagnosis Date   • Cancer (CMS/HCC)    • Coronary artery disease    • Elevated cholesterol    • GERD (gastroesophageal reflux disease)    • History of BPH 2 yrs   • Hypertension    • Kidney disease    • Smoking        Past Surgical History:   Procedure Laterality Date   • CARDIAC SURGERY     • CORONARY ARTERY BYPASS GRAFT N/A 6/13/2019    Procedure: MEDIANSTERNOTOMY, REMOVAL OF RETAINED PACING LEAD;  Surgeon: Rohan Boyer MD;  Location: [facility];  Service: Cardiothoracic   • HERNIA REPAIR         History reviewed. No pertinent family history.    Social History     Socioeconomic History   • Marital status:      Spouse name: Not on file   • Number of children: Not on file   • Years of education: Not on file   • Highest education level: Not on file   Tobacco Use   • Smoking status: Former Smoker     Types: Cigarettes   • Smokeless tobacco: Never Used       No Known Allergies      Medication:    Current  Facility-Administered Medications:   •  acetaminophen (TYLENOL) tablet 650 mg, 650 mg, Oral, Q4H PRN, Rohan Boyer MD  •  aspirin EC tablet 81 mg, 81 mg, Oral, Daily, Chino Taylor MD, 81 mg at 06/20/19 0931  •  atorvastatin (LIPITOR) tablet 40 mg, 40 mg, Oral, Nightly, Barney Carrera PA, 40 mg at 06/19/19 2041  •  bisacodyl (DULCOLAX) EC tablet 10 mg, 10 mg, Oral, Daily PRN, Barney Carrera PA, 10 mg at 06/16/19 1040  •  bisacodyl (DULCOLAX) suppository 10 mg, 10 mg, Rectal, Daily PRN, Barney Carrera PA, 10 mg at 06/16/19 1541  •  calcium gluconate 2 g in sodium chloride 0.9 % 100 mL IVPB, 2 g, Intravenous, Once PRN, Barney Carrera PA  •  cholecalciferol (VITAMIN D3) tablet 1,000 Units, 1,000 Units, Oral, Daily, Mag Lopez MD, 1,000 Units at 06/20/19 0931  •  docusate sodium (COLACE) capsule 100 mg, 100 mg, Oral, BID PRN, Barney Carrera PA, 100 mg at 06/16/19 1040  •  ertapenem (INVanz) 1 g/100 mL 0.9% NS VTB (mbp), 1 g, Intravenous, Q24H, George Zayas MD, 1 g at 06/20/19 1230  •  famotidine (PEPCID) tablet 20 mg, 20 mg, Oral, BID PRN, Elan Chen MD, 20 mg at 06/11/19 2011  •  famotidine (PEPCID) tablet 20 mg, 20 mg, Oral, BID, Mag Lopez MD, 20 mg at 06/20/19 0931  •  ferrous sulfate tablet 325 mg, 325 mg, Oral, BID, Mag Lopez MD, 325 mg at 06/20/19 0931  •  finasteride (PROSCAR) tablet 5 mg, 5 mg, Oral, Daily, Mag Lopez MD, 5 mg at 06/20/19 0931  •  folic acid (FOLVITE) tablet 1 mg, 1 mg, Oral, Nightly, Mag Lopez MD, 1 mg at 06/19/19 2041  •  HYDROcodone-acetaminophen (NORCO) 7.5-325 MG per tablet 1 tablet, 1 tablet, Oral, Q4H PRN, Rohan Boyer MD, 1 tablet at 06/14/19 1705  •  magnesium hydroxide (MILK OF MAGNESIA) suspension 2400 mg/10mL 10 mL, 10 mL, Oral, Daily PRN, Barney Carrera PA, 10 mL at 06/16/19 1040  •  Magnesium Sulfate 2 gram Bolus, followed by 8 gram infusion (total Mg dose 10 grams)- Mg less than or equal to 1mg/dL, 2 g,  Intravenous, PRN **OR** Magnesium Sulfate 2 gram / 50mL Infusion (GIVE X 3 BAGS TO EQUAL 6GM TOTAL DOSE) - Mg 1.1 - 1.5 mg/dl, 2 g, Intravenous, PRN **OR** Magnesium Sulfate 4 gram infusion- Mg 1.6-1.9 mg/dL, 4 g, Intravenous, PRN, Levar Pollard MD, Last Rate: 25 mL/hr at 06/14/19 1123, 4 g at 06/14/19 1123  •  melatonin tablet 5 mg, 5 mg, Oral, Nightly PRN, Mag Lopez MD  •  metoprolol tartrate (LOPRESSOR) tablet 50 mg, 50 mg, Oral, Q12H, Keyon Garcia MD, 50 mg at 06/20/19 0931  •  morphine injection 2 mg, 2 mg, Intravenous, Q30 Min PRN, Rohan Boyer MD, 2 mg at 06/14/19 1102  •  naloxone (NARCAN) injection 0.2 mg, 0.2 mg, Intravenous, PRN, Rohan Boyer MD  •  niCARdipine (CARDENE-IV) 40 mg/200 mL (0.2 mg/mL) in 0.9% NaCl infusion, 5-15 mg/hr, Intravenous, Continuous PRN, Barney Carrera PA  •  nitroglycerin 50 mg/250 mL (0.2 mg/mL) infusion, 5-200 mcg/min, Intravenous, Continuous PRN, Barney Carrera PA, Stopped at 06/14/19 0000  •  ondansetron (ZOFRAN) injection 4 mg, 4 mg, Intravenous, Q6H PRN, Barney Carrera PA  •  oxyCODONE-acetaminophen (PERCOCET) 5-325 MG per tablet 2 tablet, 2 tablet, Oral, Q4H PRN, Rohan Boyer MD, 2 tablet at 06/15/19 0154  •  potassium chloride (MICRO-K) CR capsule 40 mEq, 40 mEq, Oral, PRN **OR** potassium chloride (KLOR-CON) packet 40 mEq, 40 mEq, Oral, PRN, Barney Carrera PA  •  potassium chloride 20 mEq in 50 mL IVPB, 20 mEq, Intravenous, Q1H PRN **OR** potassium chloride 20 mEq in 50 mL IVPB, 20 mEq, Intravenous, Q1H PRN, Barney Carrera PA  •  ranolazine (RANEXA) 12 hr tablet 500 mg, 500 mg, Oral, Nightly, Mag Lopez MD, 500 mg at 06/19/19 2041  •  rivaroxaban (XARELTO) tablet 15 mg, 15 mg, Oral, Nightly, Mag Lopez MD, 15 mg at 06/19/19 2041  •  sennosides-docusate sodium (SENOKOT-S) 8.6-50 MG tablet 2 tablet, 2 tablet, Oral, BID, Barney Carrera PA, 2 tablet at 06/20/19 0931  •  tamsulosin (FLOMAX) 24 hr capsule 0.4 mg, 0.4 mg,  Oral, Daily, Levar Pollard MD, 0.4 mg at 19 0931  •  vitamin B-12 (CYANOCOBALAMIN) tablet 100 mcg, 100 mcg, Oral, Daily, Mag Lopez MD, 100 mcg at 19 09  •  vitamin C (ASCORBIC ACID) tablet 500 mg, 500 mg, Oral, BID, Chino Taylor MD, 500 mg at 19 0931    Antibiotics:  Anti-Infectives (From admission, onward)    Ordered     Dose/Rate Route Frequency Start Stop    19 1154  ertapenem (INVanz) 1 g/100 mL 0.9% NS VTB (mbp)     Ordering Provider:  Lupe Dutton APRN    1 g  over 30 Minutes Intravenous Every 24 Hours 19 0000      19 1417  ertapenem (INVanz) 1 g/100 mL 0.9% NS VTB (mbp)     Rukhsana Martel [facility] reviewed the order on 19 0806.   Ordering Provider:  George Zayas MD    1 g  over 30 Minutes Intravenous Every 24 Hours 19 1600 19 1559    19 1654  meropenem (MERREM) 1 g/100 mL 0.9% NS VTB (mbp)     Comments:  Changed from q8h per extended infusion guidelines   Ordering Provider:  Elan Chen MD    1 g  over 3 Hours Intravenous Every 12 Hours Scheduled 19 2100 19 1159    19 1434  meropenem (MERREM) 1 g/100 mL 0.9% NS VTB (mbp)     Ordering Provider:  Leopoldo Rogers MD    1 g  over 30 Minutes Intravenous Once 19 1436 19 1617                Physical Exam:   Vital Signs  Temp (24hrs), Av.3 °F (36.8 °C), Min:97.6 °F (36.4 °C), Max:98.7 °F (37.1 °C)    Temp  Min: 97.6 °F (36.4 °C)  Max: 98.7 °F (37.1 °C)  BP  Min: 106/70  Max: 122/68  Pulse  Min: 70  Max: 103  Resp  Min: 16  Max: 20  SpO2  Min: 82 %  Max: 92 %    GENERAL: Awake and alert, in no acute distress.   HEENT: Normocephalic, atraumatic.  PERRL. EOMI. No conjunctival injection. No icterus. Oropharynx clear without evidence of thrush or exudate    HEART: RRR; No murmur, rubs, gallops.   LUNGS: He has expiratory wheezes on right   ABDOMEN: Soft, nontender, nondistended. Positive bowel sounds. No rebound or  guarding. No mass or HSM.  EXT:  No cyanosis, clubbing or edema. No cord.    MSK: FROM without joint effusions noted arms/legs.    SKIN: Warm and dry, thoracotomy incision with small amount of serous drainage  NEURO: Oriented to PPT. No focal deficits on motor/sensory exam at arms/legs.  PSYCHIATRIC: Normal insight and judgement. Cooperative with PE    Laboratory Data    Results from last 7 days   Lab Units 06/17/19  0503 06/16/19  0408 06/15/19  0313   WBC 10*3/mm3 7.86 8.65 10.31   HEMOGLOBIN g/dL 8.2* 8.1* 8.5*   HEMATOCRIT % 28.0* 28.0* 29.6*   PLATELETS 10*3/mm3 160 160 158     Results from last 7 days   Lab Units 06/17/19  0503   SODIUM mmol/L 133*   POTASSIUM mmol/L 4.4   CHLORIDE mmol/L 98   CO2 mmol/L 24.0   BUN mg/dL 20   CREATININE mg/dL 1.22   GLUCOSE mg/dL 92   CALCIUM mg/dL 9.5     Results from last 7 days   Lab Units 06/15/19  0313   ALK PHOS U/L 77   BILIRUBIN mg/dL 0.5   ALT (SGPT) U/L 17   AST (SGOT) U/L 24                         Estimated Creatinine Clearance: 49.8 mL/min (by C-G formula based on SCr of 1.22 mg/dL).      Microbiology:      6/7:  BC no growth                           Radiology:  Imaging Results (last 72 hours)     Procedure Component Value Units Date/Time    CT Chest Without Contrast [[id number]] Collected:  06/07/19 1453     Updated:  06/07/19 1610    Narrative:       EXAMINATION: CT CHEST WO CONTRAST-06/07/2019:      INDICATION: Hypoxemia after recent lobectomy, bleeding from posterior  incision, recent bacteremia but no fever now; R09.02-Hypoxemia;  J18.1-Lobar pneumonia, unspecified organism, lobar pneumonia.     TECHNIQUE: Multiple axial CT imaging was obtained of the chest without  the administration of intravenous contrast.     The radiation dose reduction device was turned on for each scan per the  ALARA (As Low as Reasonably Achievable) protocol.     COMPARISON: NONE.     FINDINGS: There is a small amount of pleural fluid identified along the  right lateral chest wall  with a fracture involving the right posterior  sixth rib. Findings are likely postoperative. There is consolidation  posteriorly within the right lung base concerning for infiltrate. There  is some chronic interstitial changes identified likely postoperative.  Superimposed infection is likely within the right lung base. Severe  emphysematous changes seen within the lung fields bilaterally with  bronchiectatic changes centrally. Severe emphysematous changes seen  within the upper lung fields. Old healed granulomatous disease seen  within the chest. The cardiac chambers are within normal limits. No  pericardial effusion. No bulky hilar or axillary lymphadenopathy. The  visualized upper abdomen is unremarkable.       Impression:       Findings concerning for dense consolidation and infiltrate  superimposed on chronic and emphysematous change within the right lung.  There is a fracture involving the right posterior sixth rib likely  postsurgical with some pleural thickening and small pleural fluid seen  on the right.     D:  06/07/2019  E:  06/07/2019     This report was finalized on 6/7/2019 4:07 PM by Dr. Jo Ann Galarza MD.       XR Chest 1 View [[id number]] Collected:  06/07/19 1306     Updated:  06/07/19 1423    Narrative:       EXAMINATION: XR CHEST 1 VW- 06/07/2019      INDICATION: SOA triage protocol      COMPARISON: NONE     FINDINGS: Portable chest reveals heart to be enlarged. Ill-defined  opacification seen at the right lung base with small right pleural  effusion. Deep line catheter on the right tip in the SVC. Cardiac pacer  leads in satisfactory position.       Impression:       Patchy ill-defined opacification seen within the right mid  and lower lung field with small right pleural effusion. Left lung is  clear.     D:  06/07/2019  E:  06/07/2019     This report was finalized on 6/7/2019 2:20 PM by Dr. Jo Ann Galarza MD.               Impression:   1.  Serratia bacteremia/Pacemaker lead  infection- he has Serratia bacteremia with 2+ blood cultures of 5/21 and 1 out of 2 sets positive on 5/23 with subsequent blood cultures on 5/26- at [facility].  He had a mobile mass attached to the pacemaker lead by ZI at [facility] on 5/28.  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  He would require pacemaker lead extraction to cure this process but due to his advanced age and underlying lung cancer, consideration is being given for chronic suppression instead of a higher risk of lead extraction.  Dr. Zayas has been following him at [facility] and I will confer with him regarding this issue when he returns on Tuesday.  In the meantime, we will leave him on intravenous Merrem.  2.  Right basilar infiltrate- this does not appear particularly different when compared to his chest x-ray at [facility] of 5/30.  3.  Non-small cell lung cancer-status post right lower lobe lobectomy, 5/14/2019  4.  Acute hypoxic respiratory failure-if he clinically worsens, we may need to assess for pulmonary embolism.  5.  Stage II-III chronic kidney disease.  His antibiotic therapy will need to be dose adjusted for his renal dysfunction.  6.  History of atrial fibrillation  7.  Coronary artery disease  8.  Status post pacemaker implantation-this was performed at   9.  Blood loss anemia    PLAN/RECOMMENDATIONS:   Cont  invanz 1 g iv daily  F/u wound pacemaker cultures      D/w family  Ok to go home today  Duration of abx probably 2 weeks following extraction  Anticipate d/c home today  Ok to go home once abx arranged     f/u in 1 week  D/w [facility] office,   Dp/cm time 20 minutes        George Zayas MD  6/20/2019  3:04 PM

## 2019-06-20 NOTE — DISCHARGE PLACEMENT REQUEST
"PATRICIA HUMPHREY, RN    [phone]            Jeff Looney (84 y.o. Male)     Date of Birth Social Security Number Address Home Phone MRN    1934  [address] [place] [id number] [phone] [id number]    Mandaeism Marital Status          Orthodoxy        Admission Date Admission Type Admitting Provider Attending Provider Department, Room/Bed    19 Emergency Aroldo Us MD Repass, Gregory L, MD [facility] 4G, S451/1    Discharge Date Discharge Disposition Discharge Destination         Home or Self Care              Attending Provider:  Aroldo Us MD    Allergies:  No Known Allergies    Isolation:  None   Infection:  None   Code Status:  CPR    Ht:  182.9 cm (72\")   Wt:  78.1 kg (172 lb 3.2 oz)    Admission Cmt:  None   Principal Problem:  Pacemaker infection s/p lead extraction  [Z95.0] More...                 Active Insurance as of 2019     Primary Coverage     Payor Plan Insurance Group Employer/Plan Group    HUMANA MEDICARE REPLACEMENT HUMANA MEDICARE REPL [id number]     Payor Plan Address Payor Plan Phone Number Payor Plan Fax Number Effective Dates    [address] [address] [phone]  2018 - None Entered    [facility] [phone]       Subscriber Name Subscriber Birth Date Member ID       JEFF LOONEY 1934 [id number]                 Emergency Contacts      (Rel.) Home Phone Work Phone Mobile Phone    DICK LOONEY (Spouse) -- -- [phone]                      [address] [address]  Dept. Phone:  [phone]  Dept. Fax:   Date Ordered: 2019         Patient:  Jeff Looney MRN:  [id number]   [address] [facility] [address] :  1934  SSN:    Phone: [phone] Sex:  M     Weight: 78.1 kg (172 lb 3.2 oz)         Ht Readings from Last 1 Encounters:   19 182.9 cm (72\")         Oxygen Therapy         (Order ID: " [id number])    Diagnosis:  Hypoxemia (R09.02 [ICD-10-CM] 799.02 [ICD-9-CM])  Hypoxia (R09.02 [ICD-10-CM] 799.02 [ICD-9-CM])   Quantity:  1     Delivery Modality: Nasal Cannula  Liters Per Minute: 2  Duration: Continuous  Equipment:  Oxygen Concentrator &  &  Portable Gaseous Oxygen System & Portable Oxygen Contents Gaseous &  Conserving Regulator  Length of Need (99 Months = Lifetime): 12 Months        Authorizing Provider's Phone: [phone]   Verbal Order Mode: Telephone with readback   Authorizing Provider: Lupe Dutton APRN  Authorizing Provider's NPI: [id number]     Order Entered By: Elaina Davalos 6/20/2019 12:11 PM     Electronically signed by: Lupe Dutton APRN 6/20/2019  2:36 PM                Problem List           Codes Noted - Resolved       Hospital    PAF (paroxysmal atrial fibrillation) (CMS/[facility]) ICD-10-CM: I48.0  ICD-9-CM: 427.31 6/16/2019 - Present    CAD  ICD-10-CM: I25.10  ICD-9-CM: 414.00 6/13/2019 - Present    Hypercholesterolemia ICD-10-CM: E78.00  ICD-9-CM: 272.0 6/13/2019 - Present    Hypertension ICD-10-CM: I10  ICD-9-CM: 401.9 6/13/2019 - Present    Right mid-lower lobe infiltrate  ICD-10-CM: R91.8  ICD-9-CM: 793.19 6/7/2019 - Present    Serratia bacteremia  ICD-10-CM: A49.8  ICD-9-CM: 041.85 6/7/2019 - Present    * (Principal) Pacemaker infection s/p lead extraction  ICD-10-CM: Z95.0  ICD-9-CM: V45.01 6/7/2019 - Present    H/O NSC lung cancer s/p RL lobectomy  ICD-10-CM: C34.90  ICD-9-CM: 162.9 6/7/2019 - Present    Stage III CKD, GFR 30-59 ml/min  ICD-10-CM: N18.3  ICD-9-CM: 585.3 6/7/2019 - Present       Non-Hospital    History of lobectomy of lung ICD-10-CM: Z90.2  ICD-9-CM: V12.59 6/7/2019 - Present    Hypoxia ICD-10-CM: R09.02  ICD-9-CM: 799.02 6/7/2019 - Present             History & Physical      Elan Chen MD at 6/7/2019  3:09 PM              [facility]  HISTORY AND PHYSICAL    Patient Name: Rodney  Aure  : 1934  MRN: [id number]  Primary Care Physician: Dana Isidro DO  Date of admission: 2019      Subjective   Subjective     Chief Complaint:  Generalized weakness and hypoxia    HPI:  Rodney Looney is a 84 y.o. male who reports past medical history of non-small cell lung cancer with recent lobectomy in May at [facility], recent diagnosis of Serratia infection and reported concern for pacemaker infection, reports history of stage III chronic kidney disease presents to the hospital emergency department from infectious disease clinic with complaint of moderate persistent generalized weakness present for several weeks and low oxygen levels noted at infectious disease clinic reportedly in the 80s.  Upon arrival to the emergency department he received chest x-ray that was concerning for right-sided infiltrates possible pneumonia.  CT scan again showed right-sided infiltrates.  Infectious disease team recommends meropenem and plan to evaluate him in the morning.    Review of Systems   Constitutional: Positive for chills. Negative for fever.   HENT: Negative for sinus pressure and sinus pain.    Eyes: Negative.  Negative for visual disturbance.   Respiratory: Positive for cough and shortness of breath.    Cardiovascular: Negative for chest pain and palpitations.   Gastrointestinal: Negative for diarrhea, nausea and vomiting.   Endocrine: Negative.  Negative for cold intolerance and heat intolerance.   Genitourinary: Negative.  Negative for dysuria and hematuria.   Musculoskeletal: Negative.    Skin: Negative for rash and wound.   Allergic/Immunologic: Negative.    Neurological: Negative for light-headedness and headaches.   Hematological: Negative.  Negative for adenopathy. Does not bruise/bleed easily.   Psychiatric/Behavioral: Negative for behavioral problems and confusion.            Personal History     Past medical history: Cardiac pacemaker, recent lung cancer with recent  lobectomy, recent Serratia infection, chronic kidney disease    Surgical history: Recent lobectomy    Family History: pertinent FHx was reviewed and unremarkable.     Social History:    Social History     Social History Narrative   • Not on file       Medications:    Available home medication information reviewed.      No Known Allergies    Objective   Objective     Vital Signs:   Temp:  [97.5 °F (36.4 °C)] 97.5 °F (36.4 °C)  Heart Rate:  [78] 78  Resp:  [20] 20  BP: (110)/(60) 110/60        Physical Exam   Constitutional: Awake, alert  Eyes: Pupils equal, sclerae anicteric, no conjunctival injection  HENT: NCAT, mucous membranes moist  Neck: Supple, no thyromegaly, no lymphadenopathy, trachea midline  Respiratory: Right-sided rhonchi and rales noted, mild tachypnea, nonlabored breathing on nasal cannula oxygen   Cardiovascular: RRR, no murmurs, rubs, or gallops, palpable pedal pulses bilaterally  Gastrointestinal: Positive bowel sounds, soft, nontender, nondistended  Musculoskeletal: Elderly and debilitated in appearance, trace bilateral lower extremity edema, BMI 24   Psychiatric: Poor historian, appropriate affect, cooperative  Neurologic: Oriented x 3, strength symmetric in all extremities, Cranial Nerves grossly intact to confrontation, speech clear  Skin: No rashes      Results Reviewed:  I have personally reviewed current lab, radiology, and data and agree.    Results from last 7 days   Lab Units 06/07/19  1223   WBC 10*3/mm3 6.10   HEMOGLOBIN g/dL 8.6*   HEMATOCRIT % 29.8*   PLATELETS 10*3/mm3 282     Results from last 7 days   Lab Units 06/07/19  1435 06/07/19  1223   SODIUM mmol/L  --  134*   POTASSIUM mmol/L  --  4.8   CHLORIDE mmol/L  --  99   CO2 mmol/L  --  22.0   BUN mg/dL  --  21   CREATININE mg/dL  --  1.85*   GLUCOSE mg/dL  --  103*   CALCIUM mg/dL  --  9.4   ALT (SGPT) U/L  --  13   AST (SGOT) U/L  --  15   TROPONIN T ng/mL  --  <0.010   PROBNP pg/mL  --  2,107.0*   LACTATE mmol/L 1.6  --     PROCALCITONIN ng/mL  --  0.14     Estimated Creatinine Clearance: 34.3 mL/min (A) (by C-G formula based on SCr of 1.85 mg/dL (H)).  Brief Urine Lab Results     None        Imaging Results (last 24 hours)     Procedure Component Value Units Date/Time    CT Chest Without Contrast [[id number]] Collected:  06/07/19 1453     Updated:  06/07/19 1455    Narrative:       EXAMINATION: CT CHEST WO CONTRAST-      INDICATION: hypoxemia after recent lobectomy, bleeding from posterior  incision, recent bacteremia but no fever now; R09.02-Hypoxemia;  J18.1-Lobar pneumonia, unspecified organism lobar pneumonia     TECHNIQUE: Multiple axial CT imaging is obtained of the chest without  the ministration of intravenous contrast.     The radiation dose reduction device was turned on for each scan per the  ALARA (As Low as Reasonably Achievable) protocol.     COMPARISON: NONE     FINDINGS: There is a small amount of pleural fluid identified along the  right lateral chest wall with a fracture involving the right posterior  sixth rib. Findings are likely postoperative. There is consolidation  posteriorly within the right lung base concerning for infiltrate. There  is some chronic interstitial changes identified likely postoperative.  Superimposed infection is likely within the right lung base. Severe  emphysematous changes seen within the lung fields bilaterally with  bronchiectatic changes centrally. Severe emphysematous changes seen  within the upper lung fields. Old healed granulomatous disease in the  chest. The cardiac chambers within normal limits. No pericardial  effusion. No bulky hilar or axillary lymphadenopathy. Visualized upper  abdomen is unremarkable.             Impression:       Findings concerning for dense consolidation and infiltrate  superimposed on chronic and emphysematous change within the right lung.  There is a fracture involving the right posterior sixth rib likely  postsurgical with some pleural thickening and  small pleural fluid seen  on the right.          XR Chest 1 View [[id number]] Collected:  06/07/19 1306     Updated:  06/07/19 1423    Narrative:       EXAMINATION: XR CHEST 1 VW- 06/07/2019      INDICATION: SOA triage protocol      COMPARISON: NONE     FINDINGS: Portable chest reveals heart to be enlarged. Ill-defined  opacification seen at the right lung base with small right pleural  effusion. Deep line catheter on the right tip in the SVC. Cardiac pacer  leads in satisfactory position.       Impression:       Patchy ill-defined opacification seen within the right mid  and lower lung field with small right pleural effusion. Left lung is  clear.     D:  06/07/2019  E:  06/07/2019     This report was finalized on 6/7/2019 2:20 PM by Dr. Jo Ann Galarza MD.                Assessment/Plan   Assessment / Plan     Active Hospital Problems    Diagnosis POA   • **Lung infiltrate [R91.8] Yes   • Serratia infection [A49.8] Yes   • Presence of cardiac pacemaker [Z95.0] Yes   • Non-small cell lung cancer and reports right lower lobe lobectomy in May at [facility] [C34.90] Unknown   • History of lobectomy of lung [Z90.2] Not Applicable   • Hypoxia [R09.02] Yes   • CKD (chronic kidney disease) stage 3, GFR 30-59 ml/min (CMS/HCC) [N18.3] Yes     84-year-old male with history of non-small cell lung cancer reported and recent lobectomy in May at [facility] with recent reported Serratia bacteremia and concern reportedly for a cardiac pacemaker infection presents from infectious disease clinic with hypoxia in the 80s on vital signs and was found in the emergency room to have right lung infiltrate concerning for pneumonia.    Right lung infiltrate, possible right lung pneumonia:  Infectious disease recommends Merrem.  Blood cultures.    Hypoxia:  Likely due to lobectomy and possible pneumonia.  Plan for nasal cannula oxygen as needed and wean as tolerated.    Serratia infection:  Reportedly had recent Serratia bacteremia.   Trying to get outside records.  Infectious disease recommends Merrem.  Repeat blood cultures.    Reported concern for pacemaker infection:  Plan to follow-up blood cultures and ID recommendations.  Monitor on telemetry.  Merrem as per above    Elevated BNP:  Probably has chronic heart failure.  Does not appear to decompensated at this point.  Plan to get outside records.    Stage III chronic kidney disease:  Reports he sees Dr. Courtney outpatient.  Unsure of his baseline.  Plan to get outside records.    Anemia:   Notably patient had recent surgery.  Baseline hemoglobin unknown.  Denies recent bleeding.  Plan to monitor.    Nursing staff currently working to get home medication list.  Chest x-ray images reviewed right-sided infiltrate noted, left lung is clear.  CT scan results reviewed also concerning for right-sided pneumonia.  Unclear how much of this could be postoperative findings.  Plan to repeat laboratory studies tomorrow.  Monitor renal function carefully.  Attempting to get outside records.    DVT prophylaxis: Subcutaneous heparin    CODE STATUS:    Code Status and Medical Interventions:   Ordered at: 06/07/19 1509     Code Status:    CPR     Medical Interventions (Level of Support Prior to Arrest):    Full       Admission Status:  I believe this patient meets INPATIENT status due to the need for care which can only be reasonably provided in an hospital setting with hypoxia, recent Serratia bacteremia needing IV antibiotics and possible pacemaker infection.  In such, I feel patient’s risk for adverse outcomes and need for care warrant INPATIENT evaluation and predict the patient’s care encounter to likely last beyond 2 midnights.      Electronically signed by Elan Chen MD, 06/07/19, 3:09 PM.        Electronically signed by Elan Chen MD at 6/7/2019  3:23 PM             Date/Time  Temp  Pulse  Resp  BP  Device (Oxygen Therapy)  SpO2   06/20/19 1155  --  91  --  --  room air resting   82 Abnormal

## 2019-06-20 NOTE — THERAPY TREATMENT NOTE
Acute Care - Physical Therapy Treatment Note  [facility]     Patient Name: Rodney Looney  : 1934  MRN: [id number]  Today's Date: 2019  Onset of Illness/Injury or Date of Surgery: 19  Date of Referral to PT: 19  Referring Physician: MD Pollard    Admit Date: 2019    Visit Dx:    ICD-10-CM ICD-9-CM   1. Hypoxemia R09.02 799.02   2. Pneumonia of right lower lobe due to infectious organism (CMS/HCC) J18.1 486   3. Impaired functional mobility, balance, gait, and endurance Z74.09 V49.89   4. Impaired mobility and ADLs Z74.09 799.89   5. Oropharyngeal dysphagia R13.12 787.22   6. Complication associated with cardiac pacemaker lead, initial encounter T82.9XXA 996.72   7. Pacemaker infection s/p lead extraction  Z95.0 V45.01     Patient Active Problem List   Diagnosis   • Right mid-lower lobe infiltrate    • Serratia bacteremia    • Pacemaker infection s/p lead extraction    • H/O NSC lung cancer s/p RL lobectomy    • History of lobectomy of lung   • Hypoxia   • Stage III CKD, GFR 30-59 ml/min    • CAD    • Hypercholesterolemia   • Hypertension   • PAF (paroxysmal atrial fibrillation) (CMS/HCC)       Therapy Treatment    Rehabilitation Treatment Summary     Row Name 19 0907             Treatment Time/Intention    Discipline  physical therapy assistant  -AS      Document Type  therapy note (daily note)  -AS      Subjective Information  complains of;weakness;dyspnea  -AS      Mode of Treatment  physical therapy  -AS      Patient/Family Observations  no family at bedside, patient sitting UIC and agreed to therapy  -AS      Patient Effort  good  -AS      Comment  monitor vital signs due to decreased O2 sats  -AS      Existing Precautions/Restrictions  fall;oxygen therapy device and L/min  -AS      Recorded by [AS] Marian Car, PTA 19 1030      Row Name 19 0907             Vital Signs    Pre SpO2 (%)  92  -AS      O2 Delivery Pre Treatment  supplemental O2  -AS       Intra SpO2 (%)  87  -AS      O2 Delivery Intra Treatment  supplemental O2  -AS      Post SpO2 (%)  92  -AS      O2 Delivery Post Treatment  supplemental O2  -AS      Pre Patient Position  Sitting  -AS      Intra Patient Position  Standing  -AS      Post Patient Position  Sitting  -AS      Recorded by [AS] Marian Car, [facility] 06/20/19 1030      Row Name 06/20/19 0907             Cognitive Assessment/Intervention- PT/OT    Affect/Mental Status (Cognitive)  WFL  -AS      Orientation Status (Cognition)  oriented x 3  -AS      Follows Commands (Cognition)  WFL  -AS      Safety Deficit (Cognitive)  mild deficit;insight into deficits/self awareness;safety precautions awareness;safety precautions follow-through/compliance  -AS      Personal Safety Interventions  fall prevention program maintained;gait belt;nonskid shoes/slippers when out of bed;other (see comments) exit alarm  -AS      Recorded by [AS] Marian Car, [facility] 06/20/19 1030      Row Name 06/20/19 0907             Bed Mobility Assessment/Treatment    Comment (Bed Mobility)  not tested, patient sitting UIC  -AS      Recorded by [AS] Marian Car, [facility] 06/20/19 1030      Row Name 06/20/19 0907             Sit-Stand Transfer    Sit-Stand Stanton (Transfers)  stand by assist  -AS      Assistive Device (Sit-Stand Transfers)  walker, front-wheeled  -AS      Recorded by [AS] Marian Car, [facility] 06/20/19 1030      Row Name 06/20/19 0907             Stand-Sit Transfer    Stand-Sit Stanton (Transfers)  stand by assist  -AS      Assistive Device (Stand-Sit Transfers)  walker, front-wheeled  -AS      Recorded by [AS] Marian Car [facility] 06/20/19 1030      Row Name 06/20/19 0907             Gait/Stairs Assessment/Training    07270 - Gait Training Minutes   15  -AS      Gait/Stairs Assessment/Training  gait/ambulation assistive device  -AS      Stanton Level (Gait)  verbal cues;minimum assist (75% patient effort)  -AS      Assistive  Device (Gait)  walker, front-wheeled  -AS      Distance in Feet (Gait)  120  -AS      Pattern (Gait)  step-through  -AS      Deviations/Abnormal Patterns (Gait)  bilateral deviations;base of support, narrow;reza decreased;gait speed decreased  -AS      Bilateral Gait Deviations  forward flexed posture;heel strike decreased  -AS      Comment (Gait/Stairs)  patient ambulated 120 feet with rolling walker and O2 for support, verbal cues to stay inside walker and to improve posture. Cues for breathing technique. SaO2 87%-92%.  -AS      Recorded by [AS] Marian Car PTA 06/20/19 1030      Row Name 06/20/19 0907             Motor Skills Assessment/Interventions    Additional Documentation  Therapeutic Exercise (Group)  -AS      Recorded by [AS] Marian Car PTA 06/20/19 1030      Row Name 06/20/19 0907             Therapeutic Exercise    15865 - PT Therapeutic Exercise Minutes  8  -AS      Recorded by [AS] Marian Car PTA 06/20/19 1030      Row Name 06/20/19 0907             Therapeutic Exercise    Upper Extremity Range of Motion (Therapeutic Exercise)  shoulder flexion/extension, bilateral;shoulder abduction/adduction, bilateral;elbow flexion/extension, bilateral  -AS      Lower Extremity Range of Motion (Therapeutic Exercise)  hip flexion/extension, bilateral;knee flexion/extension, bilateral;ankle dorsiflexion/plantar flexion, bilateral  -AS      Exercise Type (Therapeutic Exercise)  AROM (active range of motion)  -AS      Position (Therapeutic Exercise)  seated  -AS      Sets/Reps (Therapeutic Exercise)  1/10  -AS      Recorded by [AS] Marian Car PTA 06/20/19 1030      Row Name 06/20/19 0907             Positioning and Restraints    Pre-Treatment Position  sitting in chair/recliner  -AS      Post Treatment Position  chair  -AS      In Chair  reclined;call light within reach;encouraged to call for assist;exit alarm on  -AS      Recorded by [AS] Marian Car, [facility] 06/20/19 1030       Row Name 06/20/19 0907             Pain Scale: Numbers Pre/Post-Treatment    Pain Scale: Numbers, Pretreatment  0/10 - no pain  -AS      Pain Scale: Numbers, Post-Treatment  0/10 - no pain  -AS      Recorded by [AS] Marian Car PTA 06/20/19 1030      Row Name                Wound 06/07/19 1735 Right thoracic spine incision;surgical    Wound - Properties Group Date first assessed: 06/07/19 [TL] Time first assessed: 1735 [TL] Side: Right [TL] Location: thoracic spine [TL2] Type: incision;surgical [TL] Recorded by:  [TL] Wesley DIXON RN 06/07/19 1735 [TL2] Wesley DIXON RN 06/07/19 1736    Row Name                Wound 06/13/19 0951 Left anterior chest incision;surgical    Wound - Properties Group Date first assessed: 06/13/19 [JS] Time first assessed: 0951 [JS] Side: Left [JS] Orientation: anterior [MH] Location: chest [JS] Type: incision;surgical [JS] Recorded by:  [JS] Josh Hart RN 06/13/19 1157 [MH] Lg Max RN 06/13/19 2330    Row Name                Wound 06/13/19 1930 Right lateral other (see notes) other (see comments)    Wound - Properties Group Date first assessed: 06/13/19 [MH] Time first assessed: 1930 [MH] Present On Admission : yes [MH] Side: Right [MH] Orientation: lateral [MH] Location: other (see notes) [MH], lateral chest from previous chest tube  Type: other (see comments) [MH], previous chest tube insertion  Recorded by:  [MH] Lg Max RN 06/13/19 2319    Row Name                Wound 06/14/19 0800 midline chest incision    Wound - Properties Group Date first assessed: 06/14/19 [BH] Time first assessed: 0800 [BH] Orientation: midline [BH] Location: chest [BH] Type: incision [BH] Additional Comments: midsternal incision from surgery yesterday, 6/14 [BH] Recorded by:  [BH] Christine Kaufman RN 06/14/19 1203      User Key  (r) = Recorded By, (t) = Taken By, (c) = Cosigned By    Initials Name Effective Dates Discipline    AS Marian Car, PTA 06/22/15 -   PT    Christine Yang RN 01/19/18 -  Nurse    Lg Baker, RN 06/27/16 -  Nurse    MARÍA DIXON, Wesley Alston RN 08/07/17 -  Nurse    Josh Solis, RN 01/30/19 -  Nurse          Wound 06/07/19 1735 Right thoracic spine incision;surgical (Active)   Dressing Appearance open to air 6/19/2019  8:00 PM   Base scab 6/19/2019  8:00 PM   Periwound ecchymotic 6/19/2019  8:00 PM   Periwound Temperature warm 6/19/2019  8:00 PM   Periwound Skin Turgor soft 6/19/2019  8:00 PM   Drainage Amount none 6/19/2019  8:00 PM       Wound 06/13/19 0951 Left anterior chest incision;surgical (Active)   Dressing Appearance dry;intact;no drainage 6/19/2019  8:00 PM   Closure VIJAY 6/19/2019  8:00 PM   Base dressing in place, unable to visualize 6/19/2019  8:00 PM   Drainage Amount none 6/19/2019  8:00 PM       Wound 06/13/19 1930 Right lateral other (see notes) other (see comments) (Active)   Dressing Appearance open to air 6/19/2019  8:00 PM   Closure Open to air 6/19/2019  8:00 PM   Base pink 6/19/2019  8:00 PM   Drainage Amount none 6/19/2019  8:00 PM       Wound 06/14/19 0800 midline chest incision (Active)   Dressing Appearance open to air 6/19/2019  8:00 PM   Closure Liquid skin adhesive 6/19/2019  8:00 PM   Base scab 6/19/2019  8:00 PM   Drainage Amount none 6/19/2019  8:00 PM           Physical Therapy Education     Title: PT OT SLP Therapies (In Progress)     Topic: Physical Therapy (In Progress)     Point: Mobility training (In Progress)     Learning Progress Summary           Patient Acceptance, E, NR by AS at 6/20/2019 10:30 AM    Acceptance, E, VU,NR by RENETTA at 6/19/2019 10:35 AM    Acceptance, E, VU,NR by RENETTA at 6/18/2019  1:09 PM    Comment:  education on PLB    Acceptance, E, VU,NR by RENETTA at 6/17/2019  3:35 PM    Acceptance, E, VU,NR by ZARI at 6/15/2019  9:33 AM    Acceptance, E, NR by JBCristela at 6/14/2019  9:30 AM    Acceptance, E, NR by LM at 6/8/2019 11:54 AM    Comment:  Educated pt on completing AP, HS, hip abd, and SLR  twice throughout the day (one more time tonight).                   Point: Home exercise program (In Progress)     Learning Progress Summary           Patient Acceptance, E, NR by AS at 6/20/2019 10:30 AM    Acceptance, E, VU,NR by EJ at 6/19/2019 10:35 AM    Acceptance, E, VU,NR by EJ at 6/17/2019  3:35 PM    Acceptance, E, NR by JB1 at 6/14/2019  9:30 AM    Acceptance, E, NR by LM at 6/8/2019 11:54 AM    Comment:  Educated pt on completing AP, HS, hip abd, and SLR twice throughout the day (one more time tonight).                   Point: Body mechanics (In Progress)     Learning Progress Summary           Patient Acceptance, E, NR by AS at 6/20/2019 10:30 AM    Acceptance, E, VU,NR by EJ at 6/19/2019 10:35 AM    Acceptance, E, VU,NR by EJ at 6/18/2019  1:09 PM    Comment:  education on PLB    Acceptance, E, VU,NR by RENETTA at 6/17/2019  3:35 PM    Acceptance, E, VU,NR by ZARI at 6/15/2019  9:33 AM    Acceptance, E, NR by JBCristela at 6/14/2019  9:30 AM                   Point: Precautions (In Progress)     Learning Progress Summary           Patient Acceptance, E, NR by AS at 6/20/2019 10:30 AM    Acceptance, E, VU,NR by EJ at 6/19/2019 10:35 AM    Acceptance, E, VU,NR by RENETTA at 6/18/2019  1:09 PM    Comment:  education on PLB    Acceptance, E, VU,NR by RENETTA at 6/17/2019  3:35 PM    Acceptance, E, VU,NR by ZARI at 6/15/2019  9:33 AM    Acceptance, E, NR by FRED at 6/14/2019  9:30 AM    Acceptance, E, NR by BHASKAR at 6/8/2019 11:54 AM    Comment:  Educated pt on completing AP, HS, hip abd, and SLR twice throughout the day (one more time tonight).                               User Key     Initials Effective Dates Name Provider Type Discipline    [facility] 06/19/15 -  Kirstie Serra, PT Physical Therapist PT    EJ 11/20/18 -  Rukhsana Marie, PT Physical Therapist PT    AS 06/22/15 -  Marian Car, PTA Physical Therapy Assistant PT    LM 06/15/16 -  Aria Gong, PT Physical Therapist PT    ZARI 08/30/18 -  Mily Chapa, PT  Physical Therapist PT                PT Recommendation and Plan     Plan of Care Reviewed With: patient  Progress: improving  Outcome Summary: patient ambulated 120 feet with rolling walker and O2 for support, verbal cues to stay inside walker and to improve posture. Cues for breathing technique. SaO2 87%-92%.  Outcome Measures     Row Name 06/20/19 0907 06/19/19 1035 06/18/19 1309       How much help from another person do you currently need...    Turning from your back to your side while in flat bed without using bedrails?  4  -AS  4  -EJ  4  -EJ    Moving from lying on back to sitting on the side of a flat bed without bedrails?  4  -AS  4  -EJ  3  -EJ    Moving to and from a bed to a chair (including a wheelchair)?  3  -AS  3  -EJ  3  -EJ    Standing up from a chair using your arms (e.g., wheelchair, bedside chair)?  3  -AS  3  -EJ  3  -EJ    Climbing 3-5 steps with a railing?  3  -AS  3  -EJ  3  -EJ    To walk in hospital room?  3  -AS  3  -EJ  3  -EJ    AM-PAC 6 Clicks Score  20  -AS  20  -EJ  19  -EJ       Functional Assessment    Outcome Measure Options  AM-PAC 6 Clicks Basic Mobility (PT)  -AS  AM-PAC 6 Clicks Basic Mobility (PT)  -EJ  AM-PAC 6 Clicks Basic Mobility (PT)  -EJ    Row Name 06/17/19 1535             How much help from another person do you currently need...    Turning from your back to your side while in flat bed without using bedrails?  4  -EJ      Moving from lying on back to sitting on the side of a flat bed without bedrails?  4  -EJ      Moving to and from a bed to a chair (including a wheelchair)?  3  -EJ      Standing up from a chair using your arms (e.g., wheelchair, bedside chair)?  3  -EJ      Climbing 3-5 steps with a railing?  3  -EJ      To walk in hospital room?  3  -EJ      AM-PAC 6 Clicks Score  20  -EJ         Functional Assessment    Outcome Measure Options  AM-PAC 6 Clicks Basic Mobility (PT)  -EJ        User Key  (r) = Recorded By, (t) = Taken By, (c) = Cosigned By     Initials Name Provider Type    Rukhsana Shields, PT Physical Therapist    AS Marian Car, NELSON Physical Therapy Assistant         Time Calculation:   PT Charges     Row Name 06/20/19 0907             Time Calculation    Start Time  0907  -AS      PT Received On  06/20/19  -AS      PT Goal Re-Cert Due Date  06/24/19  -AS         Timed Charges    46103 - PT Therapeutic Exercise Minutes  8  -AS      11878 - Gait Training Minutes   15  -AS        User Key  (r) = Recorded By, (t) = Taken By, (c) = Cosigned By    Initials Name Provider Type    AS Marian Car PTA Physical Therapy Assistant        Therapy Charges for Today     Code Description Service Date Service Provider Modifiers Qty    85706830780 HC PT THER PROC EA 15 MIN 6/20/2019 Marian Car, NELSON GP 1    56254911571 HC GAIT TRAINING EA 15 MIN 6/20/2019 Marian Car PTA GP 1          PT G-Codes  Outcome Measure Options: AM-PAC 6 Clicks Basic Mobility (PT)  AM-PAC 6 Clicks Score: 20  Score: 20    Marian Car PTA  6/20/2019

## 2019-06-20 NOTE — PROGRESS NOTES
Continued Stay Note   [facility]     Patient Name: Rodney Looney  MRN: [id number]  Today's Date: 6/20/2019    Admit Date: 6/7/2019    Discharge Plan     Row Name 06/20/19 1059       Plan    Plan  HOME    Patient/Family in Agreement with Plan  yes    Final Discharge Disposition Code  01 - home or self-care    Final Note  OP IV abx approved and arranged at [facility].  First OP infusion on 6/21 at 3pm.  Pt will arrange his own OP PT (orders provided).  Home O2 to be provided per [facility].  CM spoke with Miguel Angel and a portable tank will be delviered to pt's room prior to DC today.        Discharge Codes    No documentation.       Expected Discharge Date and Time     Expected Discharge Date Expected Discharge Time    Jun 20, 2019             Elaina Davalos

## 2019-06-20 NOTE — PROGRESS NOTES
CTS Progress Note      POD 7 s/p sternotomy with lead extraction    Subjective  Patient states that overall he is doing well and looking forward to going home.  Patient denies any chest pain, shortness of breath or fever chills.  Patient is been ambulating routinely in the hallway.      Objective    Physical Exam:   Vital Signs   Temp:  [97.6 °F (36.4 °C)-98.7 °F (37.1 °C)] 97.6 °F (36.4 °C)  Heart Rate:  [78-93] 78  Resp:  [16-20] 20  BP: (106-122)/(68-74) 122/68   GEN: NAD   CV: Regular rate and rhythm no murmurs, rubs or gallops.  Sternum stable.    RESP: Clear to auscultation bilaterally no wheezes, rales or rhonchi   EXT: Warm good color well-perfused no bilateral lower extremity edema   Incision: Sternal incision is clean, dry and intact     Results     Results from last 7 days   Lab Units 06/17/19  0503   WBC 10*3/mm3 7.86   HEMOGLOBIN g/dL 8.2*   HEMATOCRIT % 28.0*   PLATELETS 10*3/mm3 160     Results from last 7 days   Lab Units 06/17/19  0503   SODIUM mmol/L 133*   POTASSIUM mmol/L 4.4   CHLORIDE mmol/L 98   CO2 mmol/L 24.0   BUN mg/dL 20   CREATININE mg/dL 1.22   GLUCOSE mg/dL 92   CALCIUM mg/dL 9.5     Results from last 7 days   Lab Units 06/14/19  0341 06/13/19  1456   INR  1.40* 1.41*   APTT seconds  --  41.2*         Assessment/Plan   Postoperative day 7 status post sternotomy with lead extraction    Pacemaker infection s/p lead extraction     Right mid-lower lobe infiltrate     Serratia bacteremia     H/O NSC lung cancer s/p RL lobectomy     Stage III CKD, GFR 30-59 ml/min     CAD     Hypercholesterolemia    Hypertension    PAF (paroxysmal atrial fibrillation) (CMS/HCC)        Plan   Continue with IV antibiotics per infectious disease.  Appears as if the patient is to continue IV antibiotic therapy via PICC line on an outpatient basis  Aggressive pulmonary toilet  Wean off supplemental oxygen for SPO2 greater than 90%  Cleared for discharge from a surgical perspective, follow-up in office with   Rosalind in 2 to 3 weeks.    JENN Blevins  CTSurgery  06/20/19   9:29 AM     As above.  Patient may well require home O2 at 2 L per nasal cannula for a few weeks.  Surgical incision is healing sternum is stable there is no erythema no drainage.  Patient is not febrile. I have reviewed, verified, and confirmed the above history and current status.  I have examined the patient and confirmed the above physical findings.Above plan and treatment regimen discussed in detail with patient.  Options of treatment, attendant risks vs benefits, and my recommendations were discussed and all questions answered.    Rohan Boyer MD  CTSurgery  06/20/19   10:52 AM

## 2019-06-21 NOTE — PROGRESS NOTES
Continued Stay Note  [facility]     Patient Name: Rodney Looney  MRN: [id number]  Today's Date: 6/21/2019    Admit Date: 6/7/2019    Discharge Plan     Row Name 06/21/19 1428       Plan    Plan Comments  CM has been advised that [facility] does not currently provide OP SLP as rec for pt upon hospital DC.  JEREMIAH spoke with pt's wife and suggested [facility] OP SLP, however, wife does not feel that she can transport pt for OP IV abx and OP PT/OT in [facility] and add SLP in [facility].  She requested SLP mail some written instructions that she can reinforce with pt at home.  JEREMIAH spoke with YOSELIN Gomez who will forward the requested info to pt's home address.          Discharge Codes    No documentation.       Expected Discharge Date and Time     Expected Discharge Date Expected Discharge Time    Jun 20, 2019             Elaina Davalos

## 2019-06-21 NOTE — DISCHARGE PLACEMENT REQUEST
"Jeff Looney (84 y.o. Male)     Date of Birth Social Security Number Address Home Phone MRN    1934  [address] [facility] [address] [phone] [id number]    [employer]beterian Marital Status          Protestant        Admission Date Admission Type Admitting Provider Attending Provider Department, Room/Bed    19 Emergency Repass, Aroldo DIXON MD  [address], [address]/1    Discharge Date Discharge Disposition Discharge Destination        2019 Home or Self Care              Attending Provider:  (none)   Allergies:  No Known Allergies    Isolation:  None   Infection:  None   Code Status:  Prior    Ht:  182.9 cm (72\")   Wt:  78.1 kg (172 lb 3.2 oz)    Admission Cmt:  None   Principal Problem:  Pacemaker infection s/p lead extraction  [Z95.0] More...                 Active Insurance as of 2019     Primary Coverage     Payor Plan Insurance Group Employer/Plan Group    HUMANA MEDICARE REPLACEMENT HUMANA MEDICARE REPL [id number]     Payor Plan Address Payor Plan Phone Number Payor Plan Fax Number Effective Dates    [address] [address] [phone]  2018 - None Entered    Formerly [facility] [id number]       Subscriber Name Subscriber Birth Date Member ID       JEFF LOONEY 1934 [id number]                 Emergency Contacts      (Rel.) Home Phone Work Phone Mobile Phone    DICK LOONEY (Spouse) -- -- [phone]                        [address] [address]  Phone:  [phone]  Fax:   Date: 2019      Ambulatory Referral to Speech Therapy     Patient:  Jeff Looney MRN:  [id number]   [address] [facility] [address] :  1934  SSN:    Phone: [phone] Sex:  M      INSURANCE PAYOR PLAN GROUP # SUBSCRIBER ID   Primary:    HUMANA MEDICARE REPLACEMENT [id number]      Referring Provider Information:  LUCILLE HUANG Phone: [phone] Fax:       Referral " Information:   # Visits:  1 Referral Type: Therapy [AE1]   Urgency:  Routine Referral Reason: Specialty Services Required   Start Date: 2019 End Date:  To be determined by Insurer   Diagnosis: Oropharyngeal dysphagia (R13.12 [ICD-10-CM] 787.22 [ICD-9-CM])      Refer to Dept:   Refer to Provider:   Refer to Facility:             This document serves as a request of services and does not constitute Insurance authorization or approval of services.  To determine eligibility, please contact the members Insurance carrier to verify and review coverage.     If you have medical questions regarding this request for services. Please contact [address] at [phone] between the hours of 8:00am - 5:00pm (Mon-Fri).         Authorizing Provider: Lupe Dutton APRN  Authorizing Provider's NPI: [id number]                Discharge Summary      Lupe Dutton APRN at 2019  9:25 AM     Attestation signed by Aroldo Us MD at 2019  2:58 PM (Updated)      Attending Cosignature     I supervised care of the patient on day of discharge with direct care provided by the advanced care provider (APC).    I personally saw and evaluated the patient on day of discharge.  Both he and wife were in agreement with discharge and all questions and concerns had been addressed.     Electronically signed by Aroldo Us MD, 19, 2:57 PM.                          [facility] Medicine Services  DISCHARGE SUMMARY    Patient Name: Rodney Looney  : 1934  MRN: [id number]    Date of Admission: 2019  Date of Discharge:  2019  Primary Care Physician: Dana Isidro DO    Consults     Date and Time Order Name Status Description    6/10/2019 0030 Inpatient Cardiac Electrophysiologist Consult Completed     6/10/2019 0030 Inpatient Cardiology Consult      2019 Inpatient Infectious Diseases Consult Completed           Hospital Course     Presenting Problem:    Lung infiltrate [R91.8]    Active Hospital Problems    Diagnosis  POA   • **Pacemaker infection s/p lead extraction  [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          Hospital Course:  Rodney Looney is a 84 y.o. male with recent diagnosis of non-small cell lung cancer for which he underwent a right lower lobectomy at [facility] on May 14.  His hospital course was complicated by Serratia bacteremia and ZI revealed a mobile mass on pacemaker lead, consistent with lead infection/endocarditis.  He was discharged from that hospitalization on IV Zosyn and was following with infectious disease.  He was to follow-up with  for possible pacemaker extraction as that is where the pacemaker was originally placed.  He was in the infectious disease office for follow-up and was found to be hypoxic and was sent to the ED.  ED eval revealed a right basilar infiltrate and his antibiotics were switched from Zosyn to Merrem.  He was admitted to the hospitalist service and infectious disease was consulted.  Cardiology was consulted for possible pacemaker extraction.  It was felt that the only way to clear his infection is for full lead extraction.  It was attempted to remove infected lead in the EP lab, but was unsuccessful due to the adherence of the tip of the lead to the right ventricle.  Infected pacemaker and atrial leads were removed and he was taken to the operating room for median sternotomy and removal of RV pacing lead.  He tolerated the procedure well with minimal blood loss.  He remained on IV antibiotics while hospitalized and  was switched to Invanz.  He continued to show improvement and meds were adjusted for improved rate control.  Amiodarone was discontinued, Xarelto was restarted after surgery.  He is felt to be stable and ready for discharge home with ID follow-up.  He will be receiving daily Invanz via outpatient infusion at [facility].      Discharge Follow Up Recommendations for labs/diagnostics:   Follow up with Cardiolgy 2 weeks, CTS 2 weeks, and ID in 1 week.      Day of Discharge     HPI:   Patient sitting up in chair discussed discharge plan. Denies any pain.    Review of Systems  Gen- No fevers, chills  CV- No chest pain, palpitations  Resp- No cough, dyspnea  GI- No N/V/D, abd pain      Otherwise ROS is negative except as mentioned in the HPI.    Vital Signs:   Temp:  [97.6 °F (36.4 °C)-98.7 °F (37.1 °C)] 97.6 °F (36.4 °C)  Heart Rate:  [] 103  Resp:  [16-20] 20  BP: (106-122)/(68-74) 122/68     Physical Exam:  Constitutional: No acute distress, awake, alert  HENT: NCAT, mucous membranes moist  Respiratory: Clear to auscultation bilaterally, respiratory effort normal, currently on 2L  per nasal cannula.   Cardiovascular: irregular A-fib, no murmurs, rubs, or gallops, palpable pedal pulses bilaterally  Gastrointestinal: Positive bowel sounds, soft, nontender, nondistended  Musculoskeletal: No bilateral ankle edema  Psychiatric: Appropriate affect, cooperative  Neurologic: Oriented x 3, strength symmetric in all extremities, ALONSO, speech clear  Skin: No rashes, old pacer, sternal incision and deep line site all intact.    Pertinent  and/or Most Recent Results     Results from last 7 days   Lab Units 06/17/19  0503 06/16/19  0408 06/15/19  0313 06/14/19  0341 06/13/19  1828 06/13/19  1456 06/13/19  1226   WBC 10*3/mm3 7.86 8.65 10.31 9.79 9.51 10.22  --    HEMOGLOBIN g/dL 8.2* 8.1* 8.5* 8.1* 7.8* 8.4*  --    HEMOGLOBIN, POC g/dL  --   --   --   --   --   --  10.2*   HEMATOCRIT % 28.0* 28.0* 29.6* 27.9* 26.1* 28.9*  --     HEMATOCRIT POC %  --   --   --   --   --   --  30*   PLATELETS 10*3/mm3 160 160 158 166 190 219  --    SODIUM mmol/L 133* 133* 130* 132* 136 134*  --    POTASSIUM mmol/L 4.4 4.5 5.9* 4.9 4.6 4.5  --    CHLORIDE mmol/L 98 98 98 100 104 104  --    CO2 mmol/L 24.0 24.0 24.0 20.0* 20.0* 19.0*  --    BUN mg/dL 20 22 19 19 21 22  --    CREATININE mg/dL 1.22 1.33* 1.30* 1.26 1.40* 1.35*  --    GLUCOSE mg/dL 92 91 129* 133* 127* 115*  --    CALCIUM mg/dL 9.5 9.3 9.6 9.0 8.6 8.7  --      Results from last 7 days   Lab Units 06/15/19  0313 06/14/19  0341 06/13/19  1456   BILIRUBIN mg/dL 0.5  --   --    ALK PHOS U/L 77  --   --    ALT (SGPT) U/L 17  --   --    AST (SGOT) U/L 24  --   --    PROTIME Seconds  --  16.5* 16.6*   INR   --  1.40* 1.41*   APTT seconds  --   --  41.2*           Invalid input(s): TG, LDLCALC, LDLREALC  Results from last 7 days   Lab Units 06/14/19  0341   PROBNP pg/mL 1,641.0       Brief Urine Lab Results     None          Microbiology Results Abnormal     Procedure Component Value - Date/Time    Fungus Culture - Tissue, Heart [[id number]] Collected:  06/13/19 1326    Lab Status:  Preliminary result Specimen:  Tissue from Heart Updated:  06/18/19 1416     Fungus Culture No fungus isolated at less than 1 week    AFB Culture - Tissue, Heart [[id number]] Collected:  06/13/19 1326    Lab Status:  Preliminary result Specimen:  Tissue from Heart Updated:  06/18/19 1416     AFB Culture No AFB isolated at less than 1 week     AFB Stain No acid fast bacilli seen on concentrated smear    Anaerobic Culture - Tissue, Heart [[id number]] Collected:  06/13/19 1326    Lab Status:  Final result Specimen:  Tissue from Heart Updated:  06/18/19 1126     Anaerobic Culture No anaerobes isolated at 5 days    Tissue / Bone Culture - Tissue, Heart [[id number]] Collected:  06/13/19 1326    Lab Status:  Final result Specimen:  Tissue from Heart Updated:  06/16/19 0832     Tissue Culture No growth at 3 days     Gram Stain Rare (1+)  WBCs seen      No organisms seen    Wound Culture - Wound, Heart [[id number]] Collected:  06/13/19 1049    Lab Status:  Final result Specimen:  Wound from Heart Updated:  06/16/19 0832     Wound Culture No growth at 3 days     Gram Stain Many (4+) Red blood cells      Occasional WBCs seen      No organisms seen    Blood Culture - Blood, Arm, Right [[id number]] Collected:  06/07/19 1425    Lab Status:  Final result Specimen:  Blood from Arm, Right Updated:  06/12/19 1500     Blood Culture No growth at 5 days    Blood Culture - Blood, Arm, Left [[id number]] Collected:  06/07/19 1430    Lab Status:  Final result Specimen:  Blood from Arm, Left Updated:  06/12/19 1500     Blood Culture No growth at 5 days          Imaging Results (all)     Procedure Component Value Units Date/Time    XR Chest 1 View [[id number]] Collected:  06/15/19 0811     Updated:  06/18/19 1246    Narrative:          EXAMINATION: XR CHEST 1 VW - 06/15/2019      INDICATION:  R09.02-Hypoxemia; J18.1-Lobar pneumonia, unspecified  organism; Z74.09-Other reduced mobility; R13.12-Dysphagia, oropharyngeal  phase; T82.9XXA-Unspecified complication of cardiac and vascular  prosthetic device, implant and graft, initial encounter.      COMPARISON: Chest x-ray 06/14/2019     FINDINGS: Interval removal of esophagogastric tube with support hardware  otherwise unchanged. Cardiac silhouette enlarged and similar to prior  with persistent right lower lung opacifications and effusion without  change. No pneumothorax or new parenchymal findings.           Impression:       Interval removal of esophagogastric tube; otherwise no  significant interval change.     DICTATED:   06/15/2019  EDITED/ls :   06/15/2019      This report was finalized on 6/18/2019 12:43 PM by Dr. Duy Carrero.       XR Chest PA & Lateral [[id number]] Collected:  06/17/19 1056     Updated:  06/17/19 1417    Narrative:       EXAMINATION: XR CHEST PA AND LATERAL-      INDICATION: Postoperative right lower  lobectomy; R09.02-Hypoxemia;  J18.1-Lobar pneumonia, unspecified organism; Z74.09-Other reduced  mobility; R13.12-Dysphagia, oropharyngeal phase; T82.9XXA-Unspecified  complication of cardiac and vascular prosthetic device, implant and  graft, initial encounter.      COMPARISON: 06/15/2019.     FINDINGS: A Port-A-Cath is well positioned. There is right basilar  airspace disease. There are postoperative changes on the right. A right  pleural effusion has resolved. There are postinflammatory changes on the  left. There is no pneumothorax.       Impression:       There are postoperative changes involving the right lung.  There is no pneumothorax. There is patchy airspace disease at the right  lung base.  The right pleural effusion is no longer present.     D:  06/17/2019  E:  06/17/2019     This report was finalized on 6/17/2019 2:14 PM by Dr. Wesley Santamaria MD.       XR Chest 1 View [[id number]] Collected:  06/14/19 0818     Updated:  06/14/19 2107    Narrative:       EXAMINATION: XR CHEST 1 VW- 06/14/2019      INDICATION: RLL infiltrate, hypoxia; R09.02-Hypoxemia; J18.1-Lobar  pneumonia, unspecified organism; Z74.09-Other reduced mobility;  R13.12-Dysphagia, oropharyngeal phase; T82.9XXA-Unspecified complication  of cardiac and vascular prosthetic device, implant and graft, initial  encounter      COMPARISON: 06/13/2019     FINDINGS: ET tube has been removed. NG tube is seen in the stomach.  Right IJ catheter tip remains in the SVC. Heart is enlarged. Vasculature  appears upper limits of normal. There is mildly increased bibasilar  effusion/atelectasis and stable pulmonary interstitial disease  elsewhere. No pneumothorax is seen.           Impression:       Right basilar effusion appears mildly increased from  yesterday's study. This may just represent the difference between  semiupright film technique yesterday and upright film technique today.  No other significant interval change is identified.        D:   06/14/2019  E:  06/14/2019     This report was finalized on 6/14/2019 9:04 PM by DR. Sinan Dixon MD.       Fiberoptic Endo (fees) [[id number]] Resulted:  06/14/19 1517     Updated:  06/14/19 1517    Narrative:       This procedure was auto-finalized with no dictation required.    XR Chest 1 View [[id number]] Collected:  06/13/19 1619     Updated:  06/14/19 1433    Narrative:          EXAMINATION: XR CHEST 1 VW - 06/13/2019     INDICATION:  R09.02-Hypoxemia; J18.1-Lobar pneumonia, unspecified  organism; Z74.09-Other reduced mobility;  R13.12-Dysphagia,  oropharyngeal phase; T82.9XXA-Unspecified complication of cardiac and  vascular prosthetic device, implant and graft, initial encounter.     COMPARISON: 06/07/2019     FINDINGS: Portable chest reveals the heart to be borderline enlarged.   There is a small right pleural effusion with mild increased markings  seen within the right lung base. The left lung base is demonstrating  mild increased markings. The lines and tubes are in satisfactory  position. Degenerative change is seen within the spine. Patient is  status post median sternotomy.           Impression:       New median sternotomy in the interval with ill-defined  opacification seen within the right lung base and mild increased  markings at the left lung base. No change in the small right pleural  effusion with lines and tubes in satisfactory position.     DICTATED:   06/13/2019  EDITED/ls :   06/13/2019      This report was finalized on 6/14/2019 2:30 PM by Dr. Jo Ann Galarza MD.       FL Video Swallow With Speech [[id number]] Collected:  06/11/19 1136     Updated:  06/11/19 1637    Narrative:       EXAMINATION: FL VIDEO SWALLOW W SPEECH-     INDICATION: dysphagia; R09.02-Hypoxemia; J18.1-Lobar pneumonia,  unspecified organism; Z74.09-Other reduced mobility; Z74.09-Other  reduced mobility     TECHNIQUE: 48 seconds of fluoroscopic time was used for this exam. 1  associated image was saved. The patient was  evaluated in the seated  lateral position while taking a variety of consistencies of barium by  mouth under the direction of speech pathology.     COMPARISON: NONE     FINDINGS: There was no penetration and no aspiration with any of the  media attempted.          Impression:       Fluoroscopy provided for a modified barium swallow. Please  see speech therapy report for full details and recommendations.         This report was finalized on 6/11/2019 4:33 PM by Dr. Wesley Santamaria MD.       CT Chest Without Contrast [[id number]] Collected:  06/07/19 1453     Updated:  06/07/19 1610    Narrative:       EXAMINATION: CT CHEST WO CONTRAST-06/07/2019:      INDICATION: Hypoxemia after recent lobectomy, bleeding from posterior  incision, recent bacteremia but no fever now; R09.02-Hypoxemia;  J18.1-Lobar pneumonia, unspecified organism, lobar pneumonia.     TECHNIQUE: Multiple axial CT imaging was obtained of the chest without  the administration of intravenous contrast.     The radiation dose reduction device was turned on for each scan per the  ALARA (As Low as Reasonably Achievable) protocol.     COMPARISON: NONE.     FINDINGS: There is a small amount of pleural fluid identified along the  right lateral chest wall with a fracture involving the right posterior  sixth rib. Findings are likely postoperative. There is consolidation  posteriorly within the right lung base concerning for infiltrate. There  is some chronic interstitial changes identified likely postoperative.  Superimposed infection is likely within the right lung base. Severe  emphysematous changes seen within the lung fields bilaterally with  bronchiectatic changes centrally. Severe emphysematous changes seen  within the upper lung fields. Old healed granulomatous disease seen  within the chest. The cardiac chambers are within normal limits. No  pericardial effusion. No bulky hilar or axillary lymphadenopathy. The  visualized upper abdomen is unremarkable.        Impression:       Findings concerning for dense consolidation and infiltrate  superimposed on chronic and emphysematous change within the right lung.  There is a fracture involving the right posterior sixth rib likely  postsurgical with some pleural thickening and small pleural fluid seen  on the right.     D:  06/07/2019  E:  06/07/2019     This report was finalized on 6/7/2019 4:07 PM by Dr. Jo Ann Galarza MD.       XR Chest 1 View [[id number]] Collected:  06/07/19 1306     Updated:  06/07/19 1423    Narrative:       EXAMINATION: XR CHEST 1 VW- 06/07/2019      INDICATION: SOA triage protocol      COMPARISON: NONE     FINDINGS: Portable chest reveals heart to be enlarged. Ill-defined  opacification seen at the right lung base with small right pleural  effusion. Deep line catheter on the right tip in the SVC. Cardiac pacer  leads in satisfactory position.       Impression:       Patchy ill-defined opacification seen within the right mid  and lower lung field with small right pleural effusion. Left lung is  clear.     D:  06/07/2019  E:  06/07/2019     This report was finalized on 6/7/2019 2:20 PM by Dr. Jo Ann Galarza MD.                       Results for orders placed during the hospital encounter of 06/07/19   Adult Transthoracic Echo Complete W/ Cont if Necessary Per Protocol    Narrative · Estimated EF appears to be in the range of 61 - 65%.  · Left ventricular systolic function is normal.  · Right ventricular cavity is mildly dilated.  · Left atrial cavity size is mild-to-moderately dilated.  · Mild aortic valve regurgitation is present.  · Calculated right ventricular systolic pressure from tricuspid   regurgitation is 69 mmHg.  · Moderate to severe tricuspid valve regurgitation is present.  · Thin fibrinous mobile echogenic structures possibly attached to the   right atrial lead, versus prominent Chiari network. ZI recommended for   follow-up if clinically appropriate..           Order Current  Status    AFB Culture - Tissue, Heart Preliminary result    Fungus Culture - Tissue, Heart Preliminary result        Discharge Details        Discharge Medications      New Medications      Instructions Start Date   ascorbic acid 500 MG tablet  Commonly known as:  VITAMIN C   500 mg, Oral, 2 Times Daily      ertapenem 1 gm/100ml solution IV  Commonly known as:  INVanz   1 g, Intravenous, Every 24 Hours, Per outpatient infusion at [facility].      HYDROcodone-acetaminophen 7.5-325 MG per tablet  Commonly known as:  NORCO   1 tablet, Oral, Every 6 Hours PRN      metoprolol tartrate 50 MG tablet  Commonly known as:  LOPRESSOR   50 mg, Oral, Every 12 Hours Scheduled         Continue These Medications      Instructions Start Date   aspirin 81 MG EC tablet   81 mg, Oral, Daily      CALCIUM 600 PO   1 tablet, Oral, Daily      cholecalciferol 1000 units tablet  Commonly known as:  VITAMIN D3   1,000 Units, Oral, Daily      dutasteride 0.5 MG capsule  Commonly known as:  AVODART   0.5 mg, Oral, Nightly      famotidine 20 MG tablet  Commonly known as:  PEPCID   20 mg, Oral, 2 Times Daily      ferrous sulfate 325 (65 FE) MG tablet   325 mg, Oral, 2 Times Daily      folic acid 1 MG tablet  Commonly known as:  FOLVITE   1 mg, Oral, Nightly      pravastatin 80 MG tablet  Commonly known as:  PRAVACHOL   80 mg, Oral, Nightly      ranolazine 500 MG 12 hr tablet  Commonly known as:  RANEXA   500 mg, Oral, Nightly      rivaroxaban 15 MG tablet  Commonly known as:  XARELTO   15 mg, Oral, Nightly      tamsulosin 0.4 MG capsule 24 hr capsule  Commonly known as:  FLOMAX   1 capsule, Oral, Nightly      vitamin B-12 100 MCG tablet  Commonly known as:  CYANOCOBALAMIN   100 mcg, Oral, Daily         Stop These Medications    metoprolol succinate XL 50 MG 24 hr tablet  Commonly known as:  TOPROL-XL            No Known Allergies      Discharge Disposition:  Home or Self Care    Discharge Diet:  Diet Order   Procedures   • Diet Soft  Texture; [employer]; Thin         Discharge Activity:   Activity Instructions     Activity as Tolerated              CODE STATUS:    Code Status and Medical Interventions:   Ordered at: 06/13/19 1432     Code Status:    CPR     Medical Interventions (Level of Support Prior to Arrest):    Full         No future appointments.    Additional Instructions for the Follow-ups that You Need to Schedule     Ambulatory Referral to Physical Therapy Evaluate and treat; (as tolerated)   As directed      Specialty needed:  Evaluate and treat    Weight Bearing Status:   (as tolerated)         Discharge Follow-up with PCP   As directed       Currently Documented PCP:    Dana Isidro DO    PCP Phone Number:    [phone]     Follow Up Details:  one week         Discharge Follow-up with Specified Provider: 2-3 weeks follow up with CTS; 2 Weeks   As directed      To:  2-3 weeks follow up with CTS    Follow Up:  2 Weeks         Discharge Follow-up with Specified Provider: Cardiology; 2 Weeks   As directed      To:  Cardiology    Follow Up:  2 Weeks         Discharge Follow-up with Specified Provider: Infectious Disease; 1 Week   As directed      To:  Infectious Disease    Follow Up:  1 Week               Time Spent on Discharge: 40 minutes    Electronically signed by LINDA Hester, 06/20/19, 11:54 AM.        Electronically signed by Aroldo Us MD at 6/20/2019  2:58 PM

## 2019-06-22 NOTE — OUTREACH NOTE
Prep Survey      Responses   Facility patient discharged from?  [place]   Is patient eligible?  Yes   Discharge diagnosis  Pacemaker infection s/p lead extraction via sternotomy   Does the patient have one of the following disease processes/diagnoses(primary or secondary)?  Cardiothoracic surgery   Does the patient have Home health ordered?  No   Is there a DME ordered?  Yes   What DME was ordered?  [employer] via [employer]   Prep survey completed?  Yes          Katy Alfonso RN

## 2019-06-24 NOTE — OUTREACH NOTE
CT Surgery Week 1 Survey      Responses   Facility patient discharged from?  [place]   Does the patient have one of the following disease processes/diagnoses(primary or secondary)?  Cardiothoracic surgery   Is there a successful TCM telephone encounter documented?  No   Week 1 attempt successful?  Yes   Call start time  1353   Revoke  Readmitted   Call end time  1357            Nicole Olson RN

## 2019-07-02 PROBLEM — J96.01 ACUTE RESPIRATORY FAILURE WITH HYPOXIA (HCC): Status: ACTIVE | Noted: 2019-01-01

## 2019-07-02 PROBLEM — R60.0 EDEMA OF BOTH LOWER EXTREMITIES: Status: ACTIVE | Noted: 2019-01-01

## 2019-07-02 PROBLEM — J96.11 CHRONIC RESPIRATORY FAILURE WITH HYPOXIA, ON HOME OXYGEN THERAPY (HCC): Chronic | Status: ACTIVE | Noted: 2019-01-01

## 2019-07-02 PROBLEM — N18.9 ACUTE RENAL FAILURE SUPERIMPOSED ON CHRONIC KIDNEY DISEASE (HCC): Status: ACTIVE | Noted: 2019-01-01

## 2019-07-02 PROBLEM — J96.21 ACUTE ON CHRONIC RESPIRATORY FAILURE WITH HYPOXIA (HCC): Status: ACTIVE | Noted: 2019-01-01

## 2019-07-02 PROBLEM — R79.89 ELEVATED BRAIN NATRIURETIC PEPTIDE (BNP) LEVEL: Status: ACTIVE | Noted: 2019-01-01

## 2019-07-02 PROBLEM — R91.8 OPACITY OF LUNG ON IMAGING STUDY: Status: ACTIVE | Noted: 2019-01-01

## 2019-07-02 PROBLEM — N17.9 ACUTE RENAL FAILURE SUPERIMPOSED ON CHRONIC KIDNEY DISEASE (HCC): Status: ACTIVE | Noted: 2019-01-01

## 2019-07-02 PROBLEM — Z99.81 CHRONIC RESPIRATORY FAILURE WITH HYPOXIA, ON HOME OXYGEN THERAPY (HCC): Chronic | Status: ACTIVE | Noted: 2019-01-01

## 2019-07-02 PROBLEM — K21.9 GERD (GASTROESOPHAGEAL REFLUX DISEASE): Chronic | Status: ACTIVE | Noted: 2019-01-01

## 2019-07-02 PROBLEM — S31.809A BUTTOCK WOUND: Status: ACTIVE | Noted: 2019-01-01

## 2019-07-02 PROBLEM — L76.34 POSTPROCEDURAL SEROMA OF SKIN AND SUBCUTANEOUS TISSUE FOLLOWING OTHER PROCEDURE: Status: ACTIVE | Noted: 2019-01-01
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
## 2019-07-04 PROBLEM — Z72.0 TOBACCO ABUSE: Status: ACTIVE | Noted: 2019-01-01

## 2019-07-04 PROBLEM — J43.1 PANLOBULAR EMPHYSEMA (HCC): Status: ACTIVE | Noted: 2019-01-01

## 2019-07-04 PROBLEM — J98.19 TRAPPED LUNG: Status: ACTIVE | Noted: 2019-01-01

## 2019-07-06 PROBLEM — E87.70 VOLUME OVERLOAD: Status: ACTIVE | Noted: 2019-01-01

## 2019-07-06 PROBLEM — Z72.0 TOBACCO ABUSE: Status: RESOLVED | Noted: 2019-01-01 | Resolved: 2019-01-01

## 2019-07-06 PROBLEM — I10 HYPERTENSION: Status: RESOLVED | Noted: 2019-01-01 | Resolved: 2019-01-01

## 2019-07-10 NOTE — ANESTHESIA PROCEDURE NOTES
Airway  Urgency: elective    Airway not difficult    General Information and Staff    Patient location during procedure: OR    Indications and Patient Condition  Indications for airway management: airway protection    Preoxygenated: yes  MILS not maintained throughout  Mask difficulty assessment: 1 - vent by mask    Final Airway Details  Final airway type: endotracheal airway      Successful airway: ETT - double lumen right  Cuffed: yes   Successful intubation technique: direct laryngoscopy  Endotracheal tube insertion site: oral  Blade: Quintin  Blade size: 3  ETT DL size (fr): 41  Cormack-Lehane Classification: grade I - full view of glottis  Placement verified by: chest auscultation and capnometry   Number of attempts at approach: 1

## 2019-07-10 NOTE — ANESTHESIA POSTPROCEDURE EVALUATION
Patient: Rodney Looney    Procedure Summary     Date:  07/10/19 Room / Location:   [facility] [address] [place] [address] [facility]    Anesthesia Start:  0853 Anesthesia Stop:  1013    Procedures:       THORACOSCOPY VIDEO ASSISTED, Chest tube placement (Right Chest)      BRONCHOSCOPY (Right Bronchus)      BRONCHOSCOPY (N/A Bronchus)      THORACOSCOPY VIDEO ASSISTED with chest tube placement right (Right Chest) Diagnosis:       Trapped lung      (Trapped lung [J98.19])      (Trapped lung [J98.19])    Surgeon:  Rohan Boyer MD Provider:  Ghanshyam Coyne MD    Anesthesia Type:  general ASA Status:  4          Anesthesia Type: general  Last vitals  BP   112/88 (07/10/19 1013)   Temp   97.3 °F (36.3 °C) (07/10/19 1013)   Pulse   100 (07/10/19 1013)   Resp   24 (07/10/19 1013)     SpO2   100 % (07/10/19 1013)     Post Anesthesia Care and Evaluation    Patient location during evaluation: PACU  Patient participation: complete - patient participated  Level of consciousness: awake  Pain score: 0  Pain management: adequate  Airway patency: patent  Anesthetic complications: No anesthetic complications  PONV Status: none  Cardiovascular status: acceptable and blood pressure returned to baseline  Respiratory status: face mask

## 2019-07-10 NOTE — ANESTHESIA PROCEDURE NOTES
Arterial Line      Patient reassessed immediately prior to procedure    Patient location during procedure: pre-op  Start time: 7/10/2019 8:25 AM  Stop Time:7/10/2019 8:40 AM       Line placed for hemodynamic monitoring.  Performed By   Anesthesiologist: Ghanshyam Coyne MD  Preanesthetic Checklist  Completed: patient identified, site marked, surgical consent, pre-op evaluation, timeout performed, IV checked, risks and benefits discussed and monitors and equipment checked  Arterial Line Prep   Sterile Tech: cap, gloves and sterile barriers  Prep: ChloraPrep  Patient monitoring: blood pressure monitoring, continuous pulse oximetry and EKG  Arterial Line Procedure   Laterality:right  Location:  radial artery  Catheter size: 20 G   Guidance: ultrasound guided  Number of attempts: 2  Successful placement: yes  Post Assessment   Dressing Type: line sutured, occlusive dressing applied, secured with tape and wrist guard applied.   Complications no  Circ/Move/Sens Assessment: normal and unchanged.   Patient Tolerance: patient tolerated the procedure well with no apparent complications

## 2019-07-10 NOTE — ANESTHESIA PREPROCEDURE EVALUATION
Anesthesia Evaluation     Patient summary reviewed and Nursing notes reviewed   no history of anesthetic complications:  NPO Solid Status: > 8 hours  NPO Liquid Status: > 2 hours           Airway   Mallampati: I  TM distance: >3 FB  Neck ROM: full  No difficulty expected  Dental    (+) poor dentition    Pulmonary    (+) lung cancer, COPD moderate, decreased breath sounds,   Cardiovascular   Exercise tolerance: poor (<4 METS)    Rhythm: regular  Rate: normal    (+) hypertension well controlled, CAD, dysrhythmias Atrial Fib, hyperlipidemia,       Neuro/Psych  GI/Hepatic/Renal/Endo    (+)  GERD well controlled,  renal disease CRI,     Musculoskeletal     Abdominal   (+) obese,     Abdomen: soft.   Substance History      OB/GYN          Other      history of cancer remission    ROS/Med Hx Other: LUNG                Anesthesia Plan    ASA 4     general     intravenous induction   Anesthetic plan, all risks, benefits, and alternatives have been provided, discussed and informed consent has been obtained with: patient.    Plan discussed with CRNA.

## 2019-07-23 PROBLEM — K26.9 DUODENAL ULCER: Status: ACTIVE | Noted: 2019-01-01

## 2019-07-23 PROBLEM — D64.9 ANEMIA: Status: ACTIVE | Noted: 2019-01-01

## 2019-07-23 NOTE — ANESTHESIA POSTPROCEDURE EVALUATION
Patient: Rodney Looney    Procedure Summary     Date:  07/23/19 Room / Location:  [facility] ENDOSCOPY 1 /  [facility]    Anesthesia Start:  1012 Anesthesia Stop:  1036    Procedure:  ESOPHAGOGASTRODUODENOSCOPY (N/A ) Diagnosis:       Anemia, unspecified type      (Anemia, unspecified type [D64.9])    Surgeon:  Raafel Maldonado MD Provider:  Elan Daniels MD    Anesthesia Type:  general ASA Status:  3          Anesthesia Type: general  Last vitals  BP   103/69   Temp   97   Pulse   86   Resp   18    SpO2   100     Post Anesthesia Care and Evaluation    Patient location during evaluation: PACU  Patient participation: waiting for patient participation  Level of consciousness: awake  Pain score: 0  Pain management: adequate  Airway patency: patent  Anesthetic complications: No anesthetic complications  PONV Status: none  Cardiovascular status: hemodynamically stable and acceptable  Respiratory status: nonlabored ventilation, acceptable and nasal cannula  Hydration status: acceptable

## 2019-07-23 NOTE — ANESTHESIA PREPROCEDURE EVALUATION
Anesthesia Evaluation     Patient summary reviewed and Nursing notes reviewed   no history of anesthetic complications:  NPO Solid Status: > 8 hours  NPO Liquid Status: > 8 hours           Airway   Mallampati: II  TM distance: >3 FB  Neck ROM: full  No difficulty expected  Dental      Pulmonary - normal exam   (+) lung cancer, COPD,   Cardiovascular - normal exam    (+) hypertension, CAD, CABG, dysrhythmias Atrial Fib, hyperlipidemia,     ROS comment: · Estimated EF appears to be in the range of 61 - 65%.  · Left ventricular systolic function is normal.  · Right ventricular cavity is mildly dilated.  · Left atrial cavity size is mild-to-moderately dilated.  · Mild aortic valve regurgitation is present.  · Calculated right ventricular systolic pressure from tricuspid regurgitation is 69 mmHg.  · Moderate to severe tricuspid valve regurgitation is present.  · Thin fibrinous mobile echogenic structures possibly attached to the right atrial lead, versus prominent Chiari network. ZI recommended for follow-up if clinically appropriate..       Neuro/Psych  GI/Hepatic/Renal/Endo    (+)  GERD,      Musculoskeletal     Abdominal    Substance History      OB/GYN          Other      history of cancer                  Anesthesia Plan    ASA 3     general     intravenous induction   Anesthetic plan, all risks, benefits, and alternatives have been provided, discussed and informed consent has been obtained with: patient.    Plan discussed with CRNA.

## 2019-08-15 NOTE — PROGRESS NOTES
08/15/2019  Patient Information  Rodney Looney                                                                                          [address]  [place] [address]   1934  'PCP/Referring Physician'  Dana Isidro, DO  [phone]  No ref. provider found    Chief Complaint   Patient presents with   • Post-op     Hospital follow up s/p right VAT's with drainage of loculated pleural effusion 7/10/19.   • Pleural Effusion     CC: I am feeling better but I still get short of breath    History of Present Illness: 84-year-old  male being seen in follow-up of several recent surgical interventions.  This man underwent right thoracotomy and lobectomy for non-small cell carcinoma.  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  Attempted removal of the pacing leads in the EP lab was unsuccessful and the patient was taken to the operating room lead removal via median sternotomy.  Postoperatively he did well.  He subsequently was noted to have a seroma on his right thoracotomy incision.  This was drained as an outpatient following which he became syncopal and fell striking his head.  He was readmitted following the fall.  Work-up for acute on chronic respiratory insufficiency included chest x-ray and CT scan of the chest.  The patient had a persistent loculated right pleural effusion.  He underwent right VATS with drainage of the effusion and pleurodesis.  He had a prolonged postoperative course but was ultimately discharged and has done reasonably well.  He is slowly returning to full normal activity.  He states that he is weak, he gets tired easily, and is short of breath with activity.  He has not had fever, chills, or night sweats.  He denies cough or hemoptysis.    Patient Active Problem List   Diagnosis   • Right mid-lower lobe infiltrate    • Serratia bacteremia    • Pacemaker infection s/p lead  extraction    • H/O Stage [id number] NSC lung cancer s/p RL lobectomy 5/17/19   • History of lobectomy of lung   • Hypoxia   • Stage III CKD, GFR 30-59 ml/min    • Coronary artery disease involving native coronary artery of native heart   • Hypercholesterolemia   • Essential hypertension   • Paroxysmal atrial fibrillation (CMS/HCC)   • GERD (gastroesophageal reflux disease)   • Fall   • Postprocedural seroma of skin and subcutaneous tissue following other procedure   • Acute renal failure superimposed on chronic kidney disease (CMS/HCC)   • Acute on chronic respiratory failure with hypoxia (CMS/HCC)   • Buttock wound   • Edema of both lower extremities   • Elevated brain natriuretic peptide (BNP) level   • Chronic respiratory failure with hypoxia, on home oxygen therapy (CMS/HCC)   • 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)   • Emphysema (CMS/HCC)   • Volume overload   • Anemia   • Duodenal ulcer     Past Medical History:   Diagnosis Date   • Atrial fibrillation (CMS/HCC)    • Buttock wound 7/2/2019    Bilateral buttock ulcerations/POA   • Cancer (CMS/HCC)    • Chronic respiratory failure with hypoxia, on home oxygen therapy (CMS/HCC) 7/2/2019   • Coronary artery disease    • Elevated cholesterol    • GERD (gastroesophageal reflux disease)    • History of BPH 2 yrs   • Hypertension    • Kidney disease    • Smoking      Past Surgical History:   Procedure Laterality Date   • BRONCHOSCOPY Right 7/10/2019    Procedure: BRONCHOSCOPY;  Surgeon: Rohan Boyer MD;  Location:  [facility];  Service: Cardiothoracic   • CARDIAC SURGERY     • CORONARY ARTERY BYPASS GRAFT N/A 6/13/2019    Procedure: MEDIANSTERNOTOMY, REMOVAL OF RETAINED PACING LEAD;  Surgeon: Rohan Boyer MD;  Location:  [facility];  Service: Cardiothoracic   • ENDOSCOPY N/A 7/23/2019    Procedure: ESOPHAGOGASTRODUODENOSCOPY;  Surgeon: Rafael Maldonado MD;  Location:  [facility];  Service: Gastroenterology   • HERNIA REPAIR     • LUNG LOBECTOMY       RIGHT LOWER   • THORACOSCOPY Right 7/10/2019    Procedure: THORACOSCOPY VIDEO ASSISTED, CHEST TUBE PLACEMENT, EVACUATION OF RIGHT CHEST WALL HEMATOMA;  Surgeon: Rohan Boyer MD;  Location[facility];  Service: Cardiothoracic       Current Outpatient Medications:   •  acetaminophen (TYLENOL) 325 MG tablet, Take 2 tablets by mouth Every 4 (Four) Hours As Needed for Mild Pain ., Disp: , Rfl:   •  aspirin 81 MG EC tablet, Take 81 mg by mouth Daily., Disp: , Rfl:   •  Calcium Carbonate (CALCIUM 600 PO), Take 1 tablet by mouth Daily., Disp: , Rfl:   •  cholecalciferol (VITAMIN D3) 1000 units tablet, Take 1,000 Units by mouth Daily., Disp: , Rfl:   •  dutasteride (AVODART) 0.5 MG capsule, Take 0.5 mg by mouth Every Night., Disp: , Rfl:   •  ferrous sulfate 325 (65 FE) MG tablet, Take 325 mg by mouth 2 (Two) Times a Day., Disp: , Rfl:   •  folic acid (FOLVITE) 1 MG tablet, Take 1 mg by mouth Every Night., Disp: , Rfl:   •  ipratropium-albuterol (DUO-NEB) 0.5-2.5 mg/3 ml nebulizer, Take 3 mL by nebulization Every 4 (Four) Hours As Needed for Shortness of Air (CBS PROTOCOL)., Disp: 360 mL, Rfl:   •  metoprolol tartrate (LOPRESSOR) 25 MG tablet, Take 0.5 tablets by mouth Daily., Disp: , Rfl:   •  pantoprazole (PROTONIX) 40 MG EC tablet, Take 1 tablet by mouth 2 (Two) Times a Day Before Meals., Disp: , Rfl:   •  polyethylene glycol (MIRALAX) pack packet, Take 17 g by mouth Daily., Disp: , Rfl:   •  pravastatin (PRAVACHOL) 80 MG tablet, Take 80 mg by mouth Every Night., Disp: , Rfl:   •  rivaroxaban (XARELTO) 15 MG tablet, Take 15 mg by mouth Every Night., Disp: , Rfl:   •  tamsulosin (FLOMAX) 0.4 MG capsule 24 hr capsule, Take 1 capsule by mouth Every Night., Disp: , Rfl:   •  vitamin B-12 (CYANOCOBALAMIN) 100 MCG tablet, Take 100 mcg by mouth Daily., Disp: , Rfl:   •  vitamin C (VITAMIN C) 500 MG tablet, Take 1 tablet by mouth 2 (Two) Times a Day. (Patient taking differently: Take 500 mg by mouth Daily.), Disp: 60  tablet, Rfl: 1  •  HYDROcodone-acetaminophen (NORCO) 7.5-325 MG per tablet, Take 1 tablet by mouth Every 6 (Six) Hours As Needed for Moderate Pain ., Disp: 30 tablet, Rfl: 0  No Known Allergies  Social History     Socioeconomic History   • Marital status:      Spouse name: Not on file   • Number of children: 3   • Years of education: Not on file   • Highest education level: Not on file   Occupational History   • Occupation: retired/farmer   Tobacco Use   • Smoking status: Former Smoker     Packs/day: 0.75     Years: 65.00     Pack years: 48.75     Types: Cigarettes     Last attempt to quit: 2019     Years since quittin.2   • Smokeless tobacco: Never Used   Substance and Sexual Activity   • Alcohol use: No     Frequency: Never   • Drug use: No   • Sexual activity: Defer   Social History Narrative    .  Lives with wife.  Up with walker since recent surgeries.  Uses 2LNC at all times now. [facility] in [facility]     Family History   Problem Relation Age of Onset   • Colon cancer Mother    • Heart failure Father    • Dementia Father      Review of Systems   Constitution: Positive for decreased appetite and malaise/fatigue. Negative for chills, fever, night sweats and weight loss.   HENT: Positive for hearing loss. Negative for odynophagia and sore throat.    Eyes: Negative for blurred vision, vision loss in left eye, vision loss in right eye and visual disturbance.   Cardiovascular: Positive for leg swelling. Negative for chest pain, dyspnea on exertion, orthopnea and palpitations.        Near syncopal episode causing him to fall   Respiratory: Positive for shortness of breath. Negative for cough, hemoptysis and wheezing.    Endocrine: Negative for cold intolerance, heat intolerance, polydipsia, polyphagia and polyuria.   Hematologic/Lymphatic: Does not bruise/bleed easily.   Skin: Negative for itching and rash.   Musculoskeletal: Positive for myalgias. Negative for joint pain and joint swelling.  "  Gastrointestinal: Negative for abdominal pain, constipation, diarrhea, hematemesis, hematochezia, melena, nausea and vomiting.   Genitourinary: Negative for dysuria, frequency and hematuria.   Neurological: Negative for focal weakness, headaches, numbness and seizures.   Psychiatric/Behavioral: Negative for altered mental status and suicidal ideas. The patient is not nervous/anxious.    All other systems reviewed and are negative.    Vitals:    08/15/19 1048   BP: 116/76   BP Location: Right arm   Patient Position: Sitting   Pulse: 97   Temp: 97.8 °F (36.6 °C)   SpO2: 99%   Weight: 73.9 kg (163 lb)   Height: 182.9 cm (72\")      Physical Exam   Constitutional: He is oriented to person, place, and time. He appears well-developed and well-nourished. No distress.   Elderly  male arrived in a wheelchair.  The patient is in good spirits, he is cooperative, and states that he feels well.  He complains of generalized weakness.   HENT:   Head: Normocephalic.   Right Ear: External ear normal.   Left Ear: External ear normal.   Nose: Nose normal.   Eyes: Pupils are equal, round, and reactive to light.   Neck: Normal range of motion. Neck supple. No JVD present. No tracheal deviation present. No thyromegaly present.   Cardiovascular: Normal rate, regular rhythm and intact distal pulses.   Murmur heard.  1/6 systolic murmur left sternal border, no diastolic murmur, S1 and S2 normal   Pulmonary/Chest: Effort normal and breath sounds normal. No respiratory distress. He has no wheezes. He has no rales. He exhibits no tenderness.   No rales, rhonchi, or wheeze.   Abdominal: Soft. Bowel sounds are normal. He exhibits no distension and no mass. There is no tenderness.   Musculoskeletal: Normal range of motion. He exhibits no edema.   Lymphadenopathy:     He has no cervical adenopathy.   Neurological: He is alert and oriented to person, place, and time. He has normal reflexes. No cranial nerve deficit.   Skin: Skin is warm " and dry. No rash noted. No erythema.   Psychiatric: He has a normal mood and affect.       Labs/Imaging: PA and lateral chest x-ray reviewed.  The patient still has increased markings in the lower aspect of the remaining right lung.  There is some pleural thickening.  This x-ray is significantly improved when compared to x-rays during the most recent hospitalization.    Assessment: Stable elderly  male status post right VATS for loculated pleural effusion (probably secondary to previous pulmonary embolus and subsequent hemothorax).    Plan: The patient will return to this clinic in 3 months with a repeat chest x-ray.  He is to continue his current gram of increasing physical activity and returning to full normal activity as tolerated.    Patient Active Problem List   Diagnosis   • Right mid-lower lobe infiltrate    • Serratia bacteremia    • Pacemaker infection s/p lead extraction    • H/O Stage [id number] NSC lung cancer s/p RL lobectomy 5/17/19   • History of lobectomy of lung   • Hypoxia   • Stage III CKD, GFR 30-59 ml/min    • Coronary artery disease involving native coronary artery of native heart   • Hypercholesterolemia   • Essential hypertension   • Paroxysmal atrial fibrillation (CMS/HCC)   • GERD (gastroesophageal reflux disease)   • Fall   • Postprocedural seroma of skin and subcutaneous tissue following other procedure   • Acute renal failure superimposed on chronic kidney disease (CMS/HCC)   • Acute on chronic respiratory failure with hypoxia (CMS/HCC)   • Buttock wound   • Edema of both lower extremities   • Elevated brain natriuretic peptide (BNP) level   • Chronic respiratory failure with hypoxia, on home oxygen therapy (CMS/HCC)   • 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

## 2019-08-26 NOTE — TELEPHONE ENCOUNTER
Spoke with pt regarding heart monitor results. The pt states he has not had any episodes of syncope or near syncope. I advised the patient to call us if anything changes and that he may need to be referred to EP. Reminded pt of f/u apt date and time. He agreed and verbalized understanding.

## 2019-08-26 NOTE — TELEPHONE ENCOUNTER
----- Message from Genaro Connor MD sent at 8/26/2019 12:42 PM EDT -----  Overall his A. fib is well controlled with rates less than 100, most of the time.  He had 3 pauses over 3 seconds, mostly at night, please call the patient's with these results, if he has had any syncope we may need to refer him to EP.  Thanks

## 2019-08-27 NOTE — TELEPHONE ENCOUNTER
Called spouse to discuss heart monitor results with her. She also states the patient has not had any episodes of syncope or near syncope. I advised if anything changes to call and let us know. I reminded of apt date and time. Wife agreeable and verbalized understanding.

## 2019-08-30 NOTE — TELEPHONE ENCOUNTER
Patient's wife called with concerns about the patient's BP.    She states that PT came to their home to work with the patient, and his BP was 88/60 at rest. After some light exercise his BP was 80/60.    She states that the patient takes care of his own medications, but she thinks he is taking too much metoprolol tartrate.    I reviewed with the patient's wife what medications and the doses the patient should be taking.    She stated that she will go through and make sure all of his medications are correct.    Told her to monitor the patient's BP and HR this weekend and update office on Tuesday.    She verbalized understanding.

## 2019-09-05 NOTE — PROGRESS NOTES
OFFICE VISIT  NOTE  [facility] CARDIOLOGY      Name: Rodney Looney    Date: 2019  MRN:  [id number]  :  1934      REFERRING/PRIMARY PROVIDER:  No ref. provider found    Chief Complaint   Patient presents with   • Atrial Fibrillation     1. Bradycardia  a. S/p BSC PPM implant, [facility],   b. Generator change, , RA lead capped - data deficit  2. Pacemaker lead infection  a. Serratia bacteremia, 2019, treated at [facility]. ZI, [place], 2019: Pacer wire noted, with mobile mass attached, cannot rule out vegetation, moderate TR  c. TTE, [place], 2019: EF 55 +/- 5%, mild LVH, normal diastolic function, severe TR, mild AI, cannot rule out vegetation on pacer wire  d. S/p PPM extraction, 19, [facility]  e. S/p removal of retained RV pacing lead, Dr. Boyer  3. Atrial fibrillation  a. CHADSVASc = 3, on Xarelto  b. Initiated on amiodarone, 2019, [place]  c. Echocardiogram 19: EF 61-65%, LA size mild to moderately dilated, mild AI, moderate to severe TR, thin fibrinous mobile echogenic structures possibly attached to the RA lead versus prominent Chiari network  4. Hypertension  5. Hyperlipidemia  6. CKD, stage III  7. NSCLC  a. S/p RLL lobectomy, 2019  8. GERD, EGD 19 showed duodenal ulcer treated.   9. BPH  10. Surgical history  a. PPM implant  b. Hernia repair    HPI: Rodney Looney is a 84 y.o. male who presents today for paroxysmal atrial fibrillation, sick sinus syndrome, pacemaker device infection status post removal 2019.  History of stage I non-small cell lung cancer and right lower lobe lobectomy at [facility] by Dr. Quiros 2019.  Postoperative course was complicated by Serratia bacteremia and an infected pacemaker lead.  He subsequently underwent pacemaker and lead explantation by Dr. Boyer 2019.  Readmitted on 2019 after having fallen.  Had a large chest wall hematoma/seroma, acute on chronic hypoxic respiratory failure,  congestive heart failure, and loculated right-sided pleural effusion.  Was taken to the operating room on 7/10/2019 by Dr. Boyer and underwent a thorascopic drainage of loculated fluid on the right side, drainage of right chest wall hematoma, and placement of thoracic drains.  During his hospital stay he had bouts of rapid A. fib followed by nocturnal bradycardia.  He underwent EGD on 7/23/2019 for melena, found duodenal ulcer which was treated, Xarelto was held until 7/27/19.  He was discharged on metoprolol 12.5 mg daily.  Recent 14-day Holter monitor showed several 3-second pauses but all nocturnal, asymptomatic.  He is slowly regaining his strength, he is working with physical therapy.  Denies chest pain, shortness of breath, dizziness or lightheadedness.    Past Medical History:   Diagnosis Date   • Atrial fibrillation (CMS/HCC)    • Buttock wound 7/2/2019    Bilateral buttock ulcerations/POA   • Cancer (CMS/HCC)    • Chronic respiratory failure with hypoxia, on home oxygen therapy (CMS/[facility]) 7/2/2019   • Coronary artery disease    • Elevated cholesterol    • GERD (gastroesophageal reflux disease)    • History of BPH 2 yrs   • Hypertension    • Kidney disease    • Smoking        Past Surgical History:   Procedure Laterality Date   • BRONCHOSCOPY Right 7/10/2019    Procedure: BRONCHOSCOPY;  Surgeon: Rohan Boyer MD;  Location:  [facility];  Service: Cardiothoracic   • CARDIAC SURGERY     • CORONARY ARTERY BYPASS GRAFT N/A 6/13/2019    Procedure: MEDIANSTERNOTOMY, REMOVAL OF RETAINED PACING LEAD;  Surgeon: Rohan Boyer MD;  Location:  [facility];  Service: Cardiothoracic   • ENDOSCOPY N/A 7/23/2019    Procedure: ESOPHAGOGASTRODUODENOSCOPY;  Surgeon: Rafael Maldonado MD;  Location:  [facility];  Service: Gastroenterology   • HERNIA REPAIR     • LUNG LOBECTOMY      RIGHT LOWER   • THORACOSCOPY Right 7/10/2019    Procedure: THORACOSCOPY VIDEO ASSISTED, CHEST TUBE PLACEMENT, EVACUATION OF RIGHT CHEST WALL  HEMATOMA;  Surgeon: Rohan Boyer MD;  Location: [facility];  Service: Cardiothoracic       Social History     Socioeconomic History   • Marital status:      Spouse name: Not on file   • Number of children: 3   • Years of education: Not on file   • Highest education level: Not on file   Occupational History   • Occupation: retired/farmer   Tobacco Use   • Smoking status: Former Smoker     Packs/day: 0.75     Years: 65.00     Pack years: 48.75     Types: Cigarettes     Last attempt to quit: 2019     Years since quittin.3   • Smokeless tobacco: Never Used   Substance and Sexual Activity   • Alcohol use: No     Frequency: Never   • Drug use: No   • Sexual activity: Defer   Social History Narrative    .  Lives with wife.  Up with walker since recent surgeries.  Uses 2LNC at all times now. Lives in [facility]       Family History   Problem Relation Age of Onset   • Colon cancer Mother    • Heart failure Father    • Dementia Father         ROS:   Constitutional no fever,  no weight loss   Skin no rash, no subcutaneous nodules   Otolaryngeal no difficulty swallowing   Cardiovascular See HPI   Pulmonary no cough, no sputum production   Gastrointestinal no constipation, no diarrhea   Genitourinary no dysuria, no hematuria   Hematologic no easy bruisability, no abnormal bleeding   Musculoskeletal no muscle pain   Neurologic no dizziness, no falls         No Known Allergies      Current Outpatient Medications:   •  acetaminophen (TYLENOL) 325 MG tablet, Take 2 tablets by mouth Every 4 (Four) Hours As Needed for Mild Pain ., Disp: , Rfl:   •  aspirin 81 MG EC tablet, Take 81 mg by mouth Daily., Disp: , Rfl:   •  Calcium Carbonate (CALCIUM 600 PO), Take 1 tablet by mouth Daily., Disp: , Rfl:   •  cholecalciferol (VITAMIN D3) 1000 units tablet, Take 500 Units by mouth Daily., Disp: , Rfl:   •  dutasteride (AVODART) 0.5 MG capsule, Take 0.5 mg by mouth Every Night., Disp: , Rfl:   •  famotidine (PEPCID) 20 MG  "tablet, Take 20 mg by mouth 2 (Two) Times a Day., Disp: , Rfl:   •  ferrous sulfate 325 (65 FE) MG tablet, Take 325 mg by mouth 2 (Two) Times a Day., Disp: , Rfl:   •  folic acid (FOLVITE) 1 MG tablet, Take 1 mg by mouth Every Night., Disp: , Rfl:   •  polyethylene glycol (MIRALAX) pack packet, Take 17 g by mouth Daily., Disp: , Rfl:   •  pravastatin (PRAVACHOL) 80 MG tablet, Take 80 mg by mouth Every Night., Disp: , Rfl:   •  Probiotic Product (ALIGN) chewable tablet, Chew 2 (Two) Times a Day., Disp: , Rfl:   •  rivaroxaban (XARELTO) 15 MG tablet, Take 15 mg by mouth Every Night., Disp: , Rfl:   •  tamsulosin (FLOMAX) 0.4 MG capsule 24 hr capsule, Take 1 capsule by mouth Every Night., Disp: , Rfl:   •  vitamin B-12 (CYANOCOBALAMIN) 100 MCG tablet, Take 100 mcg by mouth Daily., Disp: , Rfl:   •  vitamin C (VITAMIN C) 500 MG tablet, Take 1 tablet by mouth 2 (Two) Times a Day. (Patient taking differently: Take 500 mg by mouth Daily.), Disp: 60 tablet, Rfl: 1  •  HYDROcodone-acetaminophen (NORCO) 7.5-325 MG per tablet, Take 1 tablet by mouth Every 6 (Six) Hours As Needed for Moderate Pain ., Disp: 30 tablet, Rfl: 0  •  ipratropium-albuterol (DUO-NEB) 0.5-2.5 mg/3 ml nebulizer, Take 3 mL by nebulization Every 4 (Four) Hours As Needed for Shortness of Air (CBS PROTOCOL)., Disp: 360 mL, Rfl:   •  metoprolol tartrate (LOPRESSOR) 25 MG tablet, Take 1/2 tablet in the morning, Disp: 60 tablet, Rfl: 11    Vitals:    09/06/19 1424   BP: 94/58   BP Location: Right arm   Patient Position: Sitting   Pulse: 82   Weight: 70.5 kg (155 lb 6.4 oz)   Height: 182.9 cm (72\")     Body mass index is 21.08 kg/m².    PHYSICAL EXAM:    General Appearance:   · well developed  · well nourished  HENT:   · oropharynx moist  · lips not cyanotic  Neck:  · thyroid not enlarged  · supple  Respiratory:  · no respiratory distress  · normal breath sounds  · no rales  Cardiovascular:  · no jugular venous distention  · Irregular irregular rhythm  · apical " impulse normal  · S1 normal, S2 normal  · no S3, no S4   · no murmur  · no rub, no thrill  · carotid pulses normal; no bruit  · pedal pulses normal  · lower extremity edema: none    Gastrointestinal:   · bowel sounds normal  · non-tender  · no hepatomegaly, no splenomegaly  Musculoskeletal:  · no clubbing of fingers.   · normocephalic, head atraumatic  Skin:   · warm, dry  Psychiatric:  · judgement and insight appropriate  · normal mood and affect    RESULTS:   Procedures    Results for orders placed during the hospital encounter of 06/07/19   Adult Transthoracic Echo Complete W/ Cont if Necessary Per Protocol    Narrative · Estimated EF appears to be in the range of 61 - 65%.  · Left ventricular systolic function is normal.  · Right ventricular cavity is mildly dilated.  · Left atrial cavity size is mild-to-moderately dilated.  · Mild aortic valve regurgitation is present.  · Calculated right ventricular systolic pressure from tricuspid   regurgitation is 69 mmHg.  · Moderate to severe tricuspid valve regurgitation is present.  · Thin fibrinous mobile echogenic structures possibly attached to the   right atrial lead, versus prominent Chiari network. ZI recommended for   follow-up if clinically appropriate..            Labs:  Lab Results   Component Value Date    AST 29 07/15/2019    ALT 21 07/15/2019     No results found for: HGBA1C  No components found for: CREATINININE  eGFR Non  Amer   Date Value Ref Range Status   07/21/2019 62 >60 mL/min/1.73 Final   07/20/2019 64 >60 mL/min/1.73 Final   07/18/2019 65 >60 mL/min/1.73 Final         ASSESSMENT:  Problem List Items Addressed This Visit        Cardiovascular and Mediastinum    Pacemaker infection s/p lead extraction  - Primary    Overview     · On 6/13/19 per Dr. Oneal          Coronary artery disease involving native coronary artery of native heart    Hypercholesterolemia    Essential hypertension    Paroxysmal atrial fibrillation (CMS/HCC)    Overview      · Chads Vasc= 3            Digestive    Duodenal ulcer    Complication of Procedure: ESOPHAGOGASTRODUODENOSCOPY [ESOPHAGOGASTRODUODENOSCOPY] (07/23/2019)    Relevant Medications    famotidine (PEPCID) 20 MG tablet          PLAN:    1.  Tachybradycardia syndrome:  Recent Holter monitor reviewed, asymptomatic nocturnal pauses  Overall heart rate controlled in the mid 90s.  Continue metoprolol at current dose, 12.5 mg daily, new prescription sent.  CHADSVASC score of 3  Continue Xarelto for stroke prevention, monitor for signs of bleeding closely with recent duodenal ulcer.    2.  Status post pacemaker lead extraction:  No indication for reimplantation at this time    3.  CAD:  Continue standard prevention with medical therapy.  Discontinue Ranexa as the patient is chest pain-free currently.    Return to clinic in 3 months.    Thank you for the opportunity to share in the care of your patient; please do not hesitate to call me with any questions.     Genaro Connor MD, [facility]  Office: [phone] [address] [facility]  [address] [place] [address]

## 2019-09-26 PROBLEM — K92.2 LOWER GI BLEED: Status: ACTIVE | Noted: 2019-01-01

## 2019-09-28 PROBLEM — D50.0 CHRONIC BLOOD LOSS ANEMIA: Status: ACTIVE | Noted: 2019-01-01

## 2019-09-28 NOTE — ANESTHESIA PREPROCEDURE EVALUATION
Anesthesia Evaluation                  Airway   Mallampati: I  TM distance: >3 FB  Neck ROM: full  No difficulty expected  Dental - normal exam     Pulmonary - normal exam   (+) a smoker Former, lung cancer, COPD (HOME O2),   Cardiovascular - normal exam    (+) hypertension, CAD, CABG, dysrhythmias Atrial Fib, hyperlipidemia,       Neuro/Psych  GI/Hepatic/Renal/Endo      Musculoskeletal     Abdominal  - normal exam    Bowel sounds: normal.   Substance History      OB/GYN          Other      history of cancer (LUNG)                    Anesthesia Plan    ASA 4     general   (PROPOFOL)  intravenous induction   Anesthetic plan, all risks, benefits, and alternatives have been provided, discussed and informed consent has been obtained with: patient.    Plan discussed with CRNA.

## 2019-09-28 NOTE — ANESTHESIA POSTPROCEDURE EVALUATION
Patient: Rodney Looney    Procedure Summary     Date:  09/28/19 Room / Location:  [facility] ENDOSCOPY 1 /  [facility]    Anesthesia Start:  1129 Anesthesia Stop:      Procedure:  ESOPHAGOGASTRODUODENOSCOPY (N/A ) Diagnosis:       Chronic blood loss anemia      (Chronic blood loss anemia [D50.0])    Surgeon:  Brunner, Mark I, MD Provider:  Dylan Hi MD    Anesthesia Type:  general ASA Status:  4          Anesthesia Type: general  Last vitals  BP 92/56   Temp 97.8   Pulse   95   Resp   18   SpO2   95%     Post Anesthesia Care and Evaluation    Patient location during evaluation: PACU  Patient participation: complete - patient participated  Level of consciousness: awake and alert  Pain score: 0  Pain management: adequate  Airway patency: patent  Anesthetic complications: No anesthetic complications  PONV Status: none  Cardiovascular status: hemodynamically stable and acceptable  Respiratory status: nonlabored ventilation, acceptable and nasal cannula  Hydration status: acceptable

## 2019-10-01 NOTE — PROGRESS NOTES
[facility] Medicine Services  PROGRESS NOTE    Patient Name: Rodney Looney  : 1934  MRN: [id number]    Date of Admission: 2019  Primary Care Physician: Dana Isidro DO    Subjective   Subjective     CC:  F/U Anemia    HPI:  Patient seen and examined.  Nursing notes reviewed.  No acute events overnight.  States his breathing is about the same, no worse.  No signs or symptoms of GI bleeding.    Review of Systems  Gen- No fevers, chills  CV- No chest pain, palpitations  Resp- No cough, dyspnea  GI- No N/V/D, abd pain    All other systems reviewed and negative except per HPI.     Objective   Objective     Vital Signs:   Temp:  [97.3 °F (36.3 °C)-97.9 °F (36.6 °C)] 97.7 °F (36.5 °C)  Heart Rate:  [] 92  Resp:  [16-18] 18  BP: ()/(52-89) 92/52        Physical Exam:  Constitutional: No acute distress, awake, alert  HENT: NCAT, mucous membranes moist  Respiratory: Occasional wheeze, no rales or rhonchi  Cardiovascular: IRR, +murmur, No rubs or gallops, palpable pedal pulses bilaterally  Gastrointestinal: Positive bowel sounds, soft, nontender, nondistended  Musculoskeletal: No bilateral ankle edema  Psychiatric: Appropriate affect, cooperative  Neurologic: Oriented x 3, strength symmetric in all extremities, Cranial Nerves grossly intact to confrontation, speech clear  Skin: No rashes    Results Reviewed:    Results from last 7 days   Lab Units 10/01/19  0519  0744 19  2355  19  0745   WBC 10*3/mm3 4.85 4.92  --   --  5.55   HEMOGLOBIN g/dL 7.9* 8.3*  8.3* 7.6*   < > 8.4*   HEMATOCRIT % 26.8* 28.1*  28.2* 25.4*   < > 28.1*   PLATELETS 10*3/mm3 166 163  --   --  195    < > = values in this interval not displayed.     Results from last 7 days   Lab Units 10/01/19  0522 19  0647 19  0807  19  1432   SODIUM mmol/L 137 137 137   < > 139   POTASSIUM mmol/L 4.9 4.7 5.0   < > 4.7   CHLORIDE mmol/L 104 104 101   < > 103   CO2  mmol/L 24.0 25.0 28.0   < > 25.0   BUN mg/dL 30* 29* 31*   < > 33*   CREATININE mg/dL 1.46* 1.76* 1.98*   < > 1.59*   GLUCOSE mg/dL 83 89 83   < > 98   CALCIUM mg/dL 8.9 8.9 9.1   < > 9.9   ALT (SGPT) U/L  --   --   --   --  5   AST (SGOT) U/L  --   --   --   --  14   PROBNP pg/mL  --   --   --   --  2,501.0*    < > = values in this interval not displayed.     Estimated Creatinine Clearance: 40.6 mL/min (A) (by C-G formula based on SCr of 1.46 mg/dL (H)).    Microbiology Results Abnormal     None          Imaging Results (last 24 hours)     ** No results found for the last 24 hours. **          Results for orders placed during the hospital encounter of 06/07/19   Adult Transthoracic Echo Complete W/ Cont if Necessary Per Protocol    Narrative · Estimated EF appears to be in the range of 61 - 65%.  · Left ventricular systolic function is normal.  · Right ventricular cavity is mildly dilated.  · Left atrial cavity size is mild-to-moderately dilated.  · Mild aortic valve regurgitation is present.  · Calculated right ventricular systolic pressure from tricuspid   regurgitation is 69 mmHg.  · Moderate to severe tricuspid valve regurgitation is present.  · Thin fibrinous mobile echogenic structures possibly attached to the   right atrial lead, versus prominent Chiari network. ZI recommended for   follow-up if clinically appropriate..          I have reviewed the medications:  Scheduled Meds:    cholecalciferol 500 Units Oral Daily   digoxin 125 mcg Oral Daily   ferric gluconate 125 mg Intravenous Once   finasteride 5 mg Oral Daily   folic acid 1 mg Oral Nightly   melatonin 5 mg Oral Nightly   metoprolol tartrate 50 mg Oral Q12H   [START ON 10/2/2019] pantoprazole 40 mg Oral Q AM   rivaroxaban 15 mg Oral Daily With Dinner   sodium chloride 10 mL Intravenous Q12H   tamsulosin 0.4 mg Oral Nightly   vitamin B-12 100 mcg Oral Daily   vitamin C 500 mg Oral Daily     Continuous Infusions:   PRN Meds:.•  acetaminophen **OR**  acetaminophen **OR** acetaminophen  •  influenza vaccine  •  sodium chloride  •  sodium chloride  •  sodium chloride      Assessment/Plan   Assessment / Plan     Active Hospital Problems    Diagnosis  POA   • **GI bleed [K92.2]  Yes   • Chronic blood loss anemia [D50.0]  Unknown   • Duodenal ulcer [K26.9]  Yes   • Acute on chronic respiratory failure with hypoxia (CMS/HCC) [J96.21]  Yes   • Acute on chronic anemia [D64.9]  Yes   • Paroxysmal atrial fibrillation (CMS/HCC) [I48.0]  Yes   • Essential hypertension [I10]  Yes   • Stage III CKD, GFR 30-59 ml/min  [N18.3]  Yes   • H/O Stage D1oP0A5 NSC lung cancer s/p RL lobectomy 5/17/19 [C34.90]  Yes      Resolved Hospital Problems   No resolved problems to display.        Brief Hospital Course to date:  Rodney Looney is a 84 y.o. male with history of hypertension, persistent atrial fibrillation on Xarelto, sick sinus syndrome s/p PPM and subsequent explantation secondary to Serratia bacteremia, chronic respiratory failure on O2, stage I NSCLC for which he underwent a RLL lobectomy at [facility] by Dr. Quiros 5/2019 with complicated post-op course requiring prolonged hospital stay, diastolic congestive heart failure and CKD who presented to [facility] with complaints of increased SOA and weakness. Pt was found to be anemic on presentation with HgB of 6.  Of note, he was admitted back in July for anemia and underwent EGD at that time which showed duodenal ulcer.      Plan:    Symptomatic Anemia secondary to GI bleed  - Protonix changed to oral, will need indefinitely per GI  -- H&H stable  - s/p 2 total units of PRBC transfused  - EGD showed mild gastritis with healing ulcer.  No active bleeding.  -Xarelto restarted per cardiology 9/29  - Fall precautions     Acute on Chronic respiratory failure   - likely exacerbated by worsening anemia  --improving  --1x dose of Lasix ordered per Cards   -wean oxygen back to baseline 3.5-4L per patient  --Ejection fraction noted to  be 61 to 65% in June. he does have pulmonary hypertension  --PT/OT recommends IP pulmonary rehab on discharge. Patient states he wants to go home with HH.     Proxysmal atrial fibrillation  - Hold ASA secondary to GI bleed, Xarelto resumed  -Pt restarted on Metoprolol 50mg BID, BP borderline, will monitor closely  -Pt also started on Digoxin 125mcg  -Dig level ordered and low (to be expected)  - Could consider outpatient evaluation for KRISTY occlusion device  -Per Cards, if HR stays <100 ok to discharge, will monitor today     CKD 3  -Cr trending down  -BMP in AM     Lung cancer  - S/P right lower lobe lobectomy on 5/17/2019 at [facility]  - Follows outpatient with oncology     Hypertension  -BP stable, monitor closely since BB increased     BPH  -Continue Flomax  - Continue Avodart      DVT Prophylaxis:  Xarelto    Disposition: I expect the patient to be discharged tomorrow with HH    CODE STATUS:   Code Status and Medical Interventions:   Ordered at: 09/26/19 2015     Code Status:    CPR     Medical Interventions (Level of Support Prior to Arrest):    Full         Electronically signed by Candice Segura DO, 10/01/19, 11:41 AM.

## 2019-10-01 NOTE — PLAN OF CARE
Problem: Patient Care Overview  Goal: Plan of Care Review  Outcome: Ongoing (interventions implemented as appropriate)   10/01/19 9900   Coping/Psychosocial   Plan of Care Reviewed With patient   OTHER   Outcome Summary Pt more dyspneic & short of breath today. HR tachy this AM, metoprolol dose adjusted and pt tolerated well. VSS. Maintaining O2 sats on 3-4 liters. Plan still to go home with wife at d/c. No s/sx distress, will monitor.

## 2019-10-01 NOTE — THERAPY TREATMENT NOTE
Patient Name: Rodney Looney  : 1934    MRN: [id number]                              Today's Date: 10/1/2019       Admit Date: 2019    Visit Dx:     ICD-10-CM ICD-9-CM   1. Symptomatic anemia D64.9 285.9   2. Tachycardia R00.0 785.0   3. Elevated serum creatinine R79.89 790.99   4. Dehydration E86.0 276.51   5. Upper GI bleed K92.2 578.9   6. Impaired mobility and ADLs Z74.09 799.89   7. Chronic blood loss anemia D50.0 280.0   8. Acute on chronic respiratory failure with hypoxia (CMS/HCC) J96.21 518.84     799.02   9. Emphysema (CMS/HCC) J43.1 492.8     Patient Active Problem List   Diagnosis   • Right mid-lower lobe infiltrate    • Serratia bacteremia    • Pacemaker infection s/p lead extraction    • H/O Stage [id number] NSC lung cancer s/p RL lobectomy 19   • History of lobectomy of lung   • Hypoxia   • Stage III CKD, GFR 30-59 ml/min    • Coronary artery disease involving native coronary artery of native heart   • Hypercholesterolemia   • Essential hypertension   • Paroxysmal atrial fibrillation (CMS/HCC)   • GERD (gastroesophageal reflux disease)   • Fall   • Postprocedural seroma of skin and subcutaneous tissue following other procedure   • Acute renal failure superimposed on chronic kidney disease (CMS/HCC)   • Acute on chronic respiratory failure with hypoxia (CMS/HCC)   • Buttock wound   • Edema of both lower extremities   • Elevated brain natriuretic peptide (BNP) level   • Chronic respiratory failure with hypoxia (CMS/HCC)   • 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)   • Emphysema (CMS/HCC)   • Volume overload   • Acute on chronic anemia   • Duodenal ulcer   • GI bleed   • Chronic blood loss anemia     Past Medical History:   Diagnosis Date   • Atrial fibrillation (CMS/HCC)    • Buttock wound 2019    Bilateral buttock ulcerations/POA   • Cancer (CMS/HCC)    • Chronic respiratory failure with hypoxia, on home oxygen therapy (CMS/HCC) 2019   • Coronary artery disease     • Elevated cholesterol    • GERD (gastroesophageal reflux disease)    • History of BPH 2 yrs   • Hypertension    • Kidney disease    • Smoking      Past Surgical History:   Procedure Laterality Date   • BRONCHOSCOPY Right 7/10/2019    Procedure: BRONCHOSCOPY;  Surgeon: Rohan Boyer MD;  Location:  [facility];  Service: Cardiothoracic   • CARDIAC SURGERY     • CORONARY ARTERY BYPASS GRAFT N/A 6/13/2019    Procedure: MEDIANSTERNOTOMY, REMOVAL OF RETAINED PACING LEAD;  Surgeon: Rohan Boyer MD;  Location:  [facility];  Service: Cardiothoracic   • ENDOSCOPY N/A 7/23/2019    Procedure: ESOPHAGOGASTRODUODENOSCOPY;  Surgeon: Rafael Maldonado MD;  Location:  [facility];  Service: Gastroenterology   • ENDOSCOPY N/A 9/28/2019    Procedure: ESOPHAGOGASTRODUODENOSCOPY;  Surgeon: Brunner, Mark I, MD;  Location:  [facility];  Service: Gastroenterology   • HERNIA REPAIR     • LUNG LOBECTOMY      RIGHT LOWER   • THORACOSCOPY Right 7/10/2019    Procedure: THORACOSCOPY VIDEO ASSISTED, CHEST TUBE PLACEMENT, EVACUATION OF RIGHT CHEST WALL HEMATOMA;  Surgeon: Rohan Boyer MD;  Location:  [facility];  Service: Cardiothoracic     General Information     Row Name 10/01/19 1050          PT Evaluation Time/Intention    Document Type  therapy note (daily note)  -KG     Mode of Treatment  physical therapy  -KG     Row Name 10/01/19 1050          General Information    Patient Profile Reviewed?  yes  -KG     Existing Precautions/Restrictions  fall;oxygen therapy device and L/min  -KG     Row Name 10/01/19 1050          Cognitive Assessment/Intervention- PT/OT    Orientation Status (Cognition)  oriented x 3  -KG     Row Name 10/01/19 1050          Safety Issues, Functional Mobility    Impairments Affecting Function (Mobility)  endurance/activity tolerance;shortness of breath  -KG       User Key  (r) = Recorded By, (t) = Taken By, (c) = Cosigned By    Initials Name Provider Type    KG Jo Ann Brooke Physical Therapist         Mobility     Row Name 10/01/19 1051          Sit-Stand Transfer    Sit-Stand [place] (Transfers)  supervision  -KG     Assistive Device (Sit-Stand Transfers)  walker, front-wheeled  -KG     Row Name 10/01/19 1051          Gait/Stairs Assessment/Training    Gait/Stairs Assessment/Training  gait/ambulation assistive device  -KG     [place] Level (Gait)  contact guard  -KG     Assistive Device (Gait)  walker, front-wheeled  -KG     Distance in Feet (Gait)  125 ft  -KG     Pattern (Gait)  step-to  -KG     Deviations/Abnormal Patterns (Gait)  base of support, narrow;festinating/shuffling;stride length decreased  -KG     Bilateral Gait Deviations  forward flexed posture;heel strike decreased  -KG     Comment (Gait/Stairs)  Improved gait to 125 feet, RW, CGA, 1 rest break.  Cues for upright posture and diaphramatic breathing.  4 L O2.    -KG       User Key  (r) = Recorded By, (t) = Taken By, (c) = Cosigned By    Initials Name Provider Type    KG Jo Ann Brooke Physical Therapist        Obj/Interventions     [facility] Name 10/01/19 1053          Therapeutic Exercise    Position (Therapeutic Exercise)  supine  -KG     Sets/Reps (Therapeutic Exercise)  x15  -KG     Comment (Therapeutic Exercise)  Postural weakness and right rib scar tissue/ decreased right rib expansion targeted with mobility and breathing exercises: diaphramatic breathing R arm above head, lateral trunk SB, trunk rotation and scapular retraction.  Also, supine bridges x10 for leg strength  -KG       User Key  (r) = Recorded By, (t) = Taken By, (c) = Cosigned By    Initials Name Provider Type    Jo Ann Brumfield Physical Therapist        Goals/Plan    No documentation.       Clinical Impression     [facility] Name 10/01/19 1055          Pain Assessment    Additional Documentation  Pain Scale: Numbers Pre/Post-Treatment (Group)  -KG     [facility] Name 10/01/19 1055          Pain Scale: Numbers Pre/Post-Treatment    Pain Scale: Numbers, Pretreatment  0/10 -  no pain  -KG     Pain Scale: Numbers, Post-Treatment  0/10 - no pain  -KG     Row Name 10/01/19 1055          Physical Therapy Clinical Impression    Criteria for Skilled Interventions Met (PT Clinical Impression)  yes;treatment indicated  -KG     Rehab Potential (PT Clinical Summary)  good, to achieve stated therapy goals  -KG     Row Name 10/01/19 1055          Vital Signs    Pre SpO2 (%)  94  -KG     O2 Delivery Pre Treatment  supplemental O2  -KG     O2 Delivery Intra Treatment  supplemental O2  -KG     Post SpO2 (%)  97  -KG     O2 Delivery Post Treatment  supplemental O2  -KG     Row Name 10/01/19 1055          Positioning and Restraints    Pre-Treatment Position  in bed  -KG     Post Treatment Position  bed  -KG     In Bed  encouraged to call for assist;exit alarm on;call light within reach;fowlers  -KG       User Key  (r) = Recorded By, (t) = Taken By, (c) = Cosigned By    Initials Name Provider Type    Jo Ann Brumfield Physical Therapist        Outcome Measures     Row Name 10/01/19 1058          How much help from another person do you currently need...    Turning from your back to your side while in flat bed without using bedrails?  4  -KG     Moving from lying on back to sitting on the side of a flat bed without bedrails?  4  -KG     Moving to and from a bed to a chair (including a wheelchair)?  3  -KG     Standing up from a chair using your arms (e.g., wheelchair, bedside chair)?  4  -KG     Climbing 3-5 steps with a railing?  2  -KG     To walk in hospital room?  3  -KG     AM-PAC 6 Clicks Score (PT)  20  -KG     [facility] Name 10/01/19 1058          Functional Assessment    Outcome Measure Options  AM-PAC 6 Clicks Basic Mobility (PT)  -KG       User Key  (r) = Recorded By, (t) = Taken By, (c) = Cosigned By    Initials Name Provider Type    Jo Ann Brumfield Physical Therapist        Physical Therapy Education     Title: PT OT SLP Therapies (Done)     Topic: Physical Therapy (Done)     Point: Mobility  training (Done)     Learning Progress Summary           Patient Acceptance, E, VU by KG at 10/1/2019 10:56 AM    Acceptance, E, VU by KG at 9/30/2019 11:31 AM    Acceptance, E, DU,NR by KR at 9/27/2019  9:18 AM                   Point: Home exercise program (Done)     Learning Progress Summary           Patient Acceptance, E, VU by KG at 10/1/2019 10:56 AM    Acceptance, E, VU by KG at 9/30/2019 11:31 AM                   Point: Body mechanics (Done)     Learning Progress Summary           Patient Acceptance, E, VU by KG at 10/1/2019 10:56 AM    Acceptance, E, VU by KG at 9/30/2019 11:31 AM    Acceptance, E, DU,NR by KR at 9/27/2019  9:18 AM                   Point: Precautions (Done)     Learning Progress Summary           Patient Acceptance, E, VU by KG at 10/1/2019 10:56 AM    Acceptance, E, VU by KG at 9/30/2019 11:31 AM    Acceptance, E, DU,NR by KR at 9/27/2019  9:18 AM                               User Key     Initials Effective Dates Name Provider Type Discipline    KR 04/03/18 -  Anastacia Cash, PT Physical Therapist PT    KG 08/28/19 -  Jo Ann Brooke Physical Therapist PT              PT Recommendation and Plan     Plan of Care Reviewed With: patient  Progress: improving  Outcome Summary: Postural weakness and right rib scar tissue/ decreased right rib expansion that limit breathing effectiveness targeted with mobility and breathing exercises.  Improved gait distance to 125 ft, 1 rest break for diaphramatic breathing.  Significantly limited by SOA.  4 L O2.       Time Calculation:   PT Charges     Row Name 10/01/19 1059             Time Calculation    Start Time  1025  -KG      PT Received On  10/01/19  -KG      PT Goal Re-Cert Due Date  10/07/19  -KG         Time Calculation- PT    Total Timed Code Minutes- PT  23 minute(s)  -KG         Timed Charges    91651 - PT Therapeutic Exercise Minutes  8  -KG      79903 - Gait Training Minutes   15  -KG        User Key  (r) = Recorded By, (t) = Taken By, (c)  = Cosigned By    Initials Name Provider Type    SAMUEL Jo Ann Brooke Physical Therapist        Therapy Charges for Today     Code Description Service Date Service Provider Modifiers Qty    75908181917 HC PT THER PROC EA 15 MIN 9/30/2019 Jo Ann Brooke GP 1    13896962695 HC GAIT TRAINING EA 15 MIN 9/30/2019 Jo Ann Brooke GP 1    21079417903 HC PT THER PROC EA 15 MIN 10/1/2019 Jo Ann Brooke GP 1    06711954130 HC GAIT TRAINING EA 15 MIN 10/1/2019 Jo Ann Brooke GP 1          PT G-Codes  Outcome Measure Options: AM-PAC 6 Clicks Basic Mobility (PT)  AM-PAC 6 Clicks Score (PT): 20  AM-PAC 6 Clicks Score (OT): 20    Jo Ann Brooke  10/1/2019

## 2019-10-01 NOTE — PLAN OF CARE
Problem: Patient Care Overview  Goal: Plan of Care Review  Outcome: Ongoing (interventions implemented as appropriate)   10/01/19 0820   Coping/Psychosocial   Plan of Care Reviewed With patient   Plan of Care Review   Progress improving   OTHER   Outcome Summary Pt. with improved independence with balance, transfer and ADL task completion, but continues with increased HR and dropping 02 sats with activity, but AB helps and taking many rest periods.

## 2019-10-01 NOTE — PROGRESS NOTES
"[facility] at [facility] Progress Note     LOS: 5 days   Patient Care Team:  Dana Isidro DO as PCP - General (Family Medicine)  PCP:  Dana Isidro DO    Chief Complaint:  afib    SUBJECTIVE:   Pt rested well last night after several restless nights, feels much better. Denies any CP, palpitations. No dyspnea at rest. Is on 2.5L O2 at home, 4L at present.       Review of Systems:   All systems have been reviewed and are negative with the exception of those mentioned above.      OBJECTIVE:    Vital Sign Min/Max for last 24 hours  Temp  Min: 97 °F (36.1 °C)  Max: 97.9 °F (36.6 °C)   BP  Min: 103/70  Max: 136/80   Pulse  Min: 56  Max: 118   Resp  Min: 16  Max: 18   SpO2  Min: 92 %  Max: 99 %   Flow (L/min)  Min: 3  Max: 3   Weight  Min: 76.3 kg (168 lb 3.2 oz)  Max: 76.3 kg (168 lb 3.2 oz)     Flowsheet Rows      First Filed Value   Admission Height  182.9 cm (72\") Documented at 09/26/2019 1412   Admission Weight  77.1 kg (170 lb) Documented at 09/26/2019 1412          Telemetry: afib 90s-100s      Intake/Output Summary (Last 24 hours) at 10/1/2019 0821  Last data filed at 10/1/2019 0801  Gross per 24 hour   Intake 960 ml   Output 1400 ml   Net -440 ml     Intake & Output (last 3 days)       09/28 0701 - 09/29 0700 09/29 0701 - 09/30 0700 09/30 0701 - 10/01 0700 10/01 0701 - 10/02 0700    P.O. 480 840 960     I.V. (mL/kg) 1200 (15.6)       Blood        Total Intake(mL/kg) 1680 (21.8) 840 (10.9) 960 (12.6)     Urine (mL/kg/hr) 1975 (1.1) 2750 (1.5) 1225 (0.7) 550 (5.4)    Stool   0     Total Output 1975 2750 1225 550    Net -295 -1910 -265 -550            Urine Unmeasured Occurrence 1 x       Stool Unmeasured Occurrence 1 x  1 x            Physical Exam:  Physical Exam   Constitutional: He is oriented to person, place, and time. He appears well-developed and well-nourished. No distress.   Cardiovascular: Normal rate, regular rhythm, normal heart sounds and intact distal pulses. "   No murmur heard.  Pulses:       Radial pulses are 2+ on the right side, and 2+ on the left side.        Dorsalis pedis pulses are 2+ on the right side, and 2+ on the left side.        Posterior tibial pulses are 2+ on the right side, and 2+ on the left side.   Pulmonary/Chest: Effort normal. He has decreased breath sounds (worse at bases). He has no wheezes. He has no rales.   4L O2 nc   Abdominal: Soft. Bowel sounds are normal. There is no tenderness. There is no guarding.   Musculoskeletal: He exhibits no edema or tenderness.   Neurological: He is alert and oriented to person, place, and time.   Skin: Skin is warm and dry. No rash noted.   Psychiatric: He has a normal mood and affect.   Nursing note and vitals reviewed.       LABS/DIAGNOSTIC DATA:  Results from last 7 days   Lab Units 10/01/19  0522 09/30/19  0744 09/29/19  2355  09/28/19  0745   WBC 10*3/mm3 4.85 4.92  --   --  5.55   HEMOGLOBIN g/dL 7.9* 8.3*  8.3* 7.6*   < > 8.4*   HEMATOCRIT % 26.8* 28.1*  28.2* 25.4*   < > 28.1*   PLATELETS 10*3/mm3 166 163  --   --  195    < > = values in this interval not displayed.     Lab Results   Lab Value Date/Time    TROPONINT <0.010 07/04/2019 0043    TROPONINT <0.010 07/02/2019 1952    TROPONINT 0.014 07/02/2019 1606    TROPONINT <0.010 07/02/2019 1350    TROPONINT <0.010 06/07/2019 1223         Results from last 7 days   Lab Units 10/01/19  0522 09/30/19  0647 09/29/19  0807  09/26/19  1432   SODIUM mmol/L 137 137 137   < > 139   POTASSIUM mmol/L 4.9 4.7 5.0   < > 4.7   CHLORIDE mmol/L 104 104 101   < > 103   CO2 mmol/L 24.0 25.0 28.0   < > 25.0   BUN mg/dL 30* 29* 31*   < > 33*   CREATININE mg/dL 1.46* 1.76* 1.98*   < > 1.59*   CALCIUM mg/dL 8.9 8.9 9.1   < > 9.9   BILIRUBIN mg/dL  --   --   --   --  0.4   ALK PHOS U/L  --   --   --   --  83   ALT (SGPT) U/L  --   --   --   --  5   AST (SGOT) U/L  --   --   --   --  14   GLUCOSE mg/dL 83 89 83   < > 98    < > = values in this interval not displayed.                        Medication Review:     cholecalciferol 500 Units Oral Daily   digoxin 125 mcg Oral Daily   finasteride 5 mg Oral Daily   folic acid 1 mg Oral Nightly   melatonin 5 mg Oral Nightly   metoprolol tartrate 25 mg Oral Q12H   pantoprazole 40 mg Intravenous Q12H   rivaroxaban 15 mg Oral Daily With Dinner   sodium chloride 10 mL Intravenous Q12H   tamsulosin 0.4 mg Oral Nightly   vitamin B-12 100 mcg Oral Daily   vitamin C 500 mg Oral Daily              GI bleed    H/O Stage [id number] NSC lung cancer s/p RL lobectomy 5/17/19    Stage III CKD, GFR 30-59 ml/min     Essential hypertension    Paroxysmal atrial fibrillation (CMS/HCC)    Acute on chronic respiratory failure with hypoxia (CMS/HCC)    Acute on chronic anemia    Duodenal ulcer    Chronic blood loss anemia      ASSESSMENT/PLAN:  1.  Atrial fibrillation and tachycardia-bradycardia syndrome; adjust meds for better heart rate control.  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  Per GI okay to resume anticoagulation.    - Xarelto 15 mg daily restarted 9/29  - If recurrent bleeding, could consider outpatient evaluation for KRISTY occlusion device  - Blood pressure dropped with metoprolol 50, dose decreased to 25 mg twice daily by hospitalist  - Start low-dose digoxin, monitor level closely, goal heart rate less than 100  - rates 90s-low 100s at present. Bp has improved, so we will increase metoprolol back to 50mg BID     2.  CAD; no chest pain at this time.  -Holding aspirin, due to recurrent GI bleeding     3.  Acute on chronic hypoxic respiratory failure; slow improvement.  Improving  -Ejection fraction noted to be 61 to 65% in June he does have pulmonary hypertension with RVSP to 69 mmHg.  A moderate to severe TR  - PT/OT rec inpt rehab upon dc, however pt prefers home w home health. CM working on this.     4.  GI bleeding and anemia  -Status post  "transfusion.  GI following, hemoglobin stable    5. Lung Ca  - s/p RLL lobectomy [facility] 5/2019  - follows outpt w oncology     6. HTN  - metoprolol tartrate 50mg bid      Electronically signed by Roxann Brown PA-C, 10/01/19, 8:20 AM.     ___________________________________________________________  The patient was seen and examined by me.  Agree and verified/discussed key components of E/M as outlined by the Nurse practitioner/PA.    /89 (BP Location: Left arm, Patient Position: Lying)   Pulse 97   Temp 97.7 °F (36.5 °C) (Oral)   Resp 16   Ht 182.9 cm (72\")   Wt 76.3 kg (168 lb 3.2 oz)   SpO2 95%   BMI 22.81 kg/m²     General Appearance:   · well developed  · well nourished  Neck:  · thyroid not enlarged  · supple  Respiratory:  · no respiratory distress  · normal breath sounds  · no rales  Cardiovascular:  · no jugular venous distention  · Irregular irregular rhythm  · apical impulse normal  · S1 normal, S2 normal  · no S3, no S4   · no murmur  · no rub, no thrill  · carotid pulses normal; no bruit  · pedal pulses normal  · lower extremity edema: none  .   Skin:   · warm, dry        Plan:    Heart rates elevated this morning 100-120 after getting dressed  Blood pressure improved, increase metoprolol to 50 mg twice daily  Tolerating Xarelto, hemoglobin stable  Stopped aspirin this admission  Stop Ranexa, no angina currently  Monitor heart rates today, okay for discharge home with heart rate consistently less than 100    Follow-up with me 4 to 6 weeks after discharge.    Genaro Connor MD, [facility]     "

## 2019-10-01 NOTE — PROGRESS NOTES
"GI Daily Progress Note  Subjective:    Chief Complaint:  Follow up symptomatic anemia     Feeling fairly well; sitting up in bed eating breakfast.   Denies any melena.  Dyspnea improving.   Asking about discharge.       Objective:    /89 (BP Location: Left arm, Patient Position: Lying)   Pulse 97   Temp 97.7 °F (36.5 °C) (Oral)   Resp 16   Ht 182.9 cm (72\")   Wt 76.3 kg (168 lb 3.2 oz)   SpO2 95%   BMI 22.81 kg/m²     Physical Exam   Constitutional: He is oriented to person, place, and time.   Pulmonary/Chest: Effort normal and breath sounds normal. No respiratory distress.   Abdominal: Soft. Bowel sounds are normal. There is no tenderness.   Musculoskeletal: He exhibits no edema.   Neurological: He is alert and oriented to person, place, and time.   Psychiatric: His behavior is normal.       Lab  Lab Results   Component Value Date    WBC 4.85 10/01/2019    HGB 7.9 (L) 10/01/2019    HGB 8.3 (L) 09/30/2019    HGB 8.3 (L) 09/30/2019    MCV 97.8 (H) 10/01/2019     10/01/2019    INR 1.29 (H) 07/18/2019    INR 1.43 (H) 07/10/2019    INR 1.73 (H) 07/09/2019    INR 2.33 (H) 07/08/2019    INR 1.40 (H) 06/14/2019       Lab Results   Component Value Date    GLUCOSE 83 10/01/2019    BUN 30 (H) 10/01/2019    CREATININE 1.46 (H) 10/01/2019    EGFRIFNONA 46 (L) 10/01/2019    BCR 20.5 10/01/2019    CO2 24.0 10/01/2019    CALCIUM 8.9 10/01/2019    ALBUMIN 2.70 (L) 09/30/2019    ALKPHOS 83 09/26/2019    BILITOT 0.4 09/26/2019    ALT 5 09/26/2019    AST 14 09/26/2019        Ref. Range 6/9/2019 04:50   Iron Latest Ref Range: 59 - 158 mcg/dL 18 (L)   Ferritin Latest Ref Range: 30.00 - 400.00 ng/mL 63.94   Iron Saturation Latest Ref Range: 20 - 50 % 5 (L)   Transferrin Latest Ref Range: 200 - 360 mg/dL 253   TIBC Latest Ref Range: 298 - 536 mcg/dL 377     Assessment:    Duodenal ulcer, healed.     Mild gastritis   Normocytic anemia, iron deficiency   Chronic anticoagulation use     Plan:    >>> H&H stable.  No " evidence of ongoing GI bleeding.   Will give IV Iron x 1   >>> Discontinue IV BID PPI and begin daily po PPI indefinitely     Will sign off.  Please call for questions or concerns.       JENN Silverman  10/01/19  9:05 AM

## 2019-10-01 NOTE — PLAN OF CARE
Problem: Patient Care Overview  Goal: Plan of Care Review   10/01/19 1056   Coping/Psychosocial   Plan of Care Reviewed With patient   Plan of Care Review   Progress improving   OTHER   Outcome Summary Postural weakness and right rib scar tissue/ decreased right rib expansion that limit breathing effectiveness targeted with mobility and breathing exercises. Improved gait distance to 125 ft, 1 rest break for diaphramatic breathing. Significantly limited by SOA. 4 L O2.

## 2019-10-01 NOTE — THERAPY TREATMENT NOTE
Acute Care - Occupational Therapy Treatment Note  [facility]     Patient Name: Rodney Looney  : 1934  MRN: [id number]  Today's Date: 10/1/2019  Onset of Illness/Injury or Date of Surgery: 19  Date of Referral to OT: 19       Admit Date: 2019       ICD-10-CM ICD-9-CM   1. Symptomatic anemia D64.9 285.9   2. Tachycardia R00.0 785.0   3. Elevated serum creatinine R79.89 790.99   4. Dehydration E86.0 276.51   5. Upper GI bleed K92.2 578.9   6. Impaired mobility and ADLs Z74.09 799.89   7. Chronic blood loss anemia D50.0 280.0   8. Acute on chronic respiratory failure with hypoxia (CMS/HCC) J96.21 518.84     799.02   9. 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 19   • History of lobectomy of lung   • Hypoxia   • Stage III CKD, GFR 30-59 ml/min    • Coronary artery disease involving native coronary artery of native heart   • Hypercholesterolemia   • Essential hypertension   • Paroxysmal atrial fibrillation (CMS/HCC)   • GERD (gastroesophageal reflux disease)   • Fall   • Postprocedural seroma of skin and subcutaneous tissue following other procedure   • Acute renal failure superimposed on chronic kidney disease (CMS/HCC)   • Acute on chronic respiratory failure with hypoxia (CMS/HCC)   • Buttock wound   • Edema of both lower extremities   • Elevated brain natriuretic peptide (BNP) level   • Chronic respiratory failure with hypoxia (CMS/HCC)   • 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)   • Emphysema (CMS/HCC)   • Volume overload   • Acute on chronic anemia   • Duodenal ulcer   • GI bleed   • Chronic blood loss anemia     Past Medical History:   Diagnosis Date   • Atrial fibrillation (CMS/HCC)    • Buttock wound 2019    Bilateral buttock ulcerations/POA   • Cancer (CMS/HCC)     • Chronic respiratory failure with hypoxia, on home oxygen therapy (CMS/[facility]) 7/2/2019   • Coronary artery disease    • Elevated cholesterol    • GERD (gastroesophageal reflux disease)    • History of BPH 2 yrs   • Hypertension    • Kidney disease    • Smoking      Past Surgical History:   Procedure Laterality Date   • BRONCHOSCOPY Right 7/10/2019    Procedure: BRONCHOSCOPY;  Surgeon: Rohan Boyer MD;  Location:  [facility];  Service: Cardiothoracic   • CARDIAC SURGERY     • CORONARY ARTERY BYPASS GRAFT N/A 6/13/2019    Procedure: MEDIANSTERNOTOMY, REMOVAL OF RETAINED PACING LEAD;  Surgeon: Rohan Boyer MD;  Location:  [facility];  Service: Cardiothoracic   • ENDOSCOPY N/A 7/23/2019    Procedure: ESOPHAGOGASTRODUODENOSCOPY;  Surgeon: Rafael Maldonado MD;  Location:  [facility];  Service: Gastroenterology   • ENDOSCOPY N/A 9/28/2019    Procedure: ESOPHAGOGASTRODUODENOSCOPY;  Surgeon: Brunner, Mark I, MD;  Location:  [facility];  Service: Gastroenterology   • HERNIA REPAIR     • LUNG LOBECTOMY      RIGHT LOWER   • THORACOSCOPY Right 7/10/2019    Procedure: THORACOSCOPY VIDEO ASSISTED, CHEST TUBE PLACEMENT, EVACUATION OF RIGHT CHEST WALL HEMATOMA;  Surgeon: Rohan Boyer MD;  Location:  [facility];  Service: Cardiothoracic       Therapy Treatment    Rehabilitation Treatment Summary     Row Name 10/01/19 0820             Treatment Time/Intention    Discipline  occupational therapist  -ZARI      Document Type  therapy note (daily note)  -ZARI      Subjective Information  no complaints  -ZARI      Mode of Treatment  individual therapy;occupational therapy  -ZARI      Patient/Family Observations  Pt. supine in bed on tele, 02. No family present.  -ZARI      Care Plan Review  care plan/treatment goals reviewed;risks/benefits reviewed;patient/other agree to care plan  -ZARI      Therapy Frequency (OT Eval)  daily  -ZARI      Patient Effort  good  -ZARI      Existing Precautions/Restrictions  fall;oxygen therapy device and L/min   -ZARI      Recorded by [ZARI] Amy Montoya, OT 10/01/19 0905      Row Name 10/01/19 0820             Vital Signs    Pre Systolic BP Rehab  119  -ZARI      Pre Treatment Diastolic BP  89  -ZARI      Post Systolic BP Rehab  108  -ZARI      Post Treatment Diastolic BP  80  -ZARI      Pretreatment Heart Rate (beats/min)  102  -ZARI      Intratreatment Heart Rate (beats/min)  -- up to 150's with standing task  -ZARI      Posttreatment Heart Rate (beats/min)  103  -ZARI      Pre SpO2 (%)  92  -ZARI      O2 Delivery Pre Treatment  supplemental O2  -ZARI      Intra SpO2 (%)  -- as low as 85% with activity, needed AB to increase  -ZARI      O2 Delivery Intra Treatment  supplemental O2  -ZARI      Post SpO2 (%)  90  -ZARI      O2 Delivery Post Treatment  supplemental O2  -ZARI      Pre Patient Position  Supine  -ZARI      Intra Patient Position  Sitting standing  -ZARI      Post Patient Position  Supine  -ZARI      Recorded by [ZARI] Amy Montoya, OT 10/01/19 0905      Row Name 10/01/19 0820             Cognitive Assessment/Intervention- PT/OT    Orientation Status (Cognition)  oriented to;person  -ZARI      Follows Commands (Cognition)  WFL  -ZARI      Recorded by [ZARI] Amy Montoya, OT 10/01/19 0905      Row Name 10/01/19 0820             Safety Issues, Functional Mobility    Impairments Affecting Function (Mobility)  endurance/activity tolerance;shortness of breath  -ZARI      Recorded by [ZARI] Amy Montoya, OT 10/01/19 0905      Row Name 10/01/19 0820             Bed Mobility Assessment/Treatment    Bed Mobility Assessment/Treatment  sit-supine  -ZARI      Scooting/Bridging [place] (Bed Mobility)  conditional independence  -ZARI      Supine-Sit [place] (Bed Mobility)  conditional independence  -ZARI      Sit-Supine [place] (Bed Mobility)  conditional independence  -ZARI      Assistive Device (Bed Mobility)  bed rails;head of bed elevated  -ZARI      Recorded by [ZARI] Amy Montoya, OT 10/01/19 0905      Row Name 10/01/19 0820             Functional  Mobility    Functional Mobility- Comment  defer to PT, focuse on other task  -ZARI      Recorded by [ZARI] Amy Montoya, OT 10/01/19 0905      Row Name 10/01/19 0820             Transfer Assessment/Treatment    Transfer Assessment/Treatment  sit-stand transfer;stand-sit transfer  -ZARI      Recorded by [ZARI] Amy Montoya, OT 10/01/19 0905      Row Name 10/01/19 0820             Sit-Stand Transfer    Sit-Stand [place] (Transfers)  supervision  -ZARI      Recorded by [ZARI] Amy Montoya, OT 10/01/19 0905      Row Name 10/01/19 0820             Stand-Sit Transfer    Stand-Sit [place] (Transfers)  supervision  -ZARI      Recorded by [ZARI] Amy Montoya, OT 10/01/19 0905      Row Name 10/01/19 0820             ADL Assessment/Intervention    97910 - OT Self Care/Mgmt Minutes  13  -ZARI      BADL Assessment/Intervention  lower body dressing  -ZARI      Recorded by [ZARI] Amy Montoya, OT 10/01/19 0905      Row Name 10/01/19 0820             Lower Body Dressing Assessment/Training    Lower Body Dressing [place] Level  doff;don;pants/bottoms;undergarment straightened socks  -ZARI      Lower Body Dressing Position  edge of bed sitting;unsupported standing  -ZARI      Comment (Lower Body Dressing)  Pt. needed to sit and rest extended time with AB between each portion of task.  Pt. wanting to talk at time instead of using AB.  -ZARI      Recorded by [ZARI] Amy Montoya, OT 10/01/19 0905      Row Name 10/01/19 0820             BADL Safety/Performance    Impairments, BADL Safety/Performance  endurance/activity tolerance;shortness of breath  -ZARI      Recorded by [ZARI] Amy Montoya, OT 10/01/19 0905      Row Name 10/01/19 0820             Motor Skills Assessment/Interventions    Additional Documentation  Therapeutic Exercise (Group);Therapeutic Exercise Interventions (Group)  -ZARI      Recorded by [ZARI] Amy Montoya, OT 10/01/19 0905      Row Name 10/01/19 0820             Therapeutic Exercise    65941 - OT Therapeutic  Exercise Minutes  12  -ZARI      52882 - OT Therapeutic Activity Minutes  4  -ZARI      Recorded by [ZARI] Amy Montoya, OT 10/01/19 0905      Row Name 10/01/19 0820             Therapeutic Exercise    Upper Extremity Range of Motion (Therapeutic Exercise)  shoulder flexion/extension, bilateral;shoulder horizontal abduction/adduction, bilateral;elbow flexion/extension, bilateral 6 tband exercises BUE (yellow)  -ZARI      Exercise Type (Therapeutic Exercise)  resistive exercises  -ZARI      Position (Therapeutic Exercise)  seated  -ZARI      Sets/Reps (Therapeutic Exercise)  1/10  -ZARI      Expected Outcome (Therapeutic Exercise)  improve functional tolerance, self-care activity;improve functional tolerance, household activity  -ZARI      Comment (Therapeutic Exercise)  Pt. needed rest periods with AB between each.  -ZARI      Recorded by [ZARI] Amy Montoya, OT 10/01/19 0905      Row Name 10/01/19 0820             Balance    Balance  dynamic sitting balance  -ZARI      Recorded by [ZARI] Amy Montoya, OT 10/01/19 0905      Row Name 10/01/19 0820             Dynamic Sitting Balance    Level of [place], Reaches Outside Midline (Sitting, Dynamic Balance)  independent ADL task, TE  -ZARI      Recorded by [ZARI] Amy Montoya, OT 10/01/19 0905      Row Name 10/01/19 0820             Dynamic Standing Balance    Level of [place], Reaches Outside Midline (Standing, Dynamic Balance)  supervision ADL task  -ZARI      Recorded by [ZARI] Amy Montoya, OT 10/01/19 0905      Row Name 10/01/19 0820             Positioning and Restraints    Pre-Treatment Position  in bed  -ZARI      Post Treatment Position  bed  -ZARI      In Bed  supine;call light within reach;encouraged to call for assist;exit alarm on pt. declined chair due to sore buttocks  -ZARI      Recorded by [ZARI] Amy Montoya, OT 10/01/19 0905      Row Name 10/01/19 0820             Pain Scale: Numbers Pre/Post-Treatment    Pain Scale: Numbers, Pretreatment  0/10 - no pain  -ZARI       Pain Scale: Numbers, Post-Treatment  0/10 - no pain  -ZARI      Recorded by [ZARI] Amy Montoya, OT 10/01/19 0905      Row Name 10/01/19 0820             Plan of Care Review    Plan of Care Reviewed With  patient  -ZARI      Recorded by [ZARI] Amy Montoya, OT 10/01/19 0905      Row Name 10/01/19 0820             Outcome Summary/Treatment Plan (OT)    Daily Summary of Progress (OT)  progress toward functional goals is good  -ZARI      Barriers to Overall Progress (OT)  increase HR and SOA  -ZARI      Plan for Continued Treatment (OT)  cont OT POC  -ZARI      Anticipated Discharge Disposition (OT)  inpatient rehabilitation facility pulmonary rehab inpatient vs OP  -ZARI      Recorded by [ZARI] Amy Montoya, YASMIN 10/01/19 0905        User Key  (r) = Recorded By, (t) = Taken By, (c) = Cosigned By    Initials Name Effective Dates Discipline    Amy Tejeda, OT 06/08/18 -  OT           Rehab Goal Summary     Row Name 10/01/19 0820             Transfer Goal 1 (OT)    Progress/Outcome (Transfer Goal 1, OT)  goal partially met met from EOB   -ZARI         Bathing Goal 1 (OT)    Progress/Outcomes (Bathing Goal 1, OT)  goal ongoing  -ZARI         Dressing Goal 1 (OT)    Progress/Outcome (Dressing Goal 1, OT)  goal partially met met pants and undergarment  -ZARI          Activity Tolerance Goal 1 (OT)    Progress/Outcome (Activity Tolerance Goal 1, OT)  continuing progress toward goal  -ZARI        User Key  (r) = Recorded By, (t) = Taken By, (c) = Cosigned By    Initials Name Provider Type Discipline    Amy Tejeda, OT Occupational Therapist OT        Occupational Therapy Education     Title: PT OT SLP Therapies (Done)     Topic: Occupational Therapy (Done)     Point: ADL training (Done)     Description: Instruct learner(s) on proper safety adaptation and remediation techniques during self care or transfers.   Instruct in proper use of assistive devices.    Learning Progress Summary           Patient Acceptance, E,D, VU,NR by ZARI at  10/1/2019  8:20 AM    Comment:  AB with ADL task and TE, UE tband exercises.    Acceptance, E, VU,NR by SD at 9/27/2019  8:56 AM    Comment:  Pt. educated on role of OT. Pt. is agreeable with OT POC.                   Point: Home exercise program (Done)     Description: Instruct learner(s) on appropriate technique for monitoring, assisting and/or progressing therapeutic exercises/activities.    Learning Progress Summary           Patient Acceptance, E,D, VU,NR by ZARI at 10/1/2019  8:20 AM    Comment:  AB with ADL task and TE, UE tband exercises.    Acceptance, E, VU,NR by SD at 9/27/2019  8:56 AM    Comment:  Pt. educated on role of OT. Pt. is agreeable with OT POC.                   Point: Precautions (Done)     Description: Instruct learner(s) on prescribed precautions during self-care and functional transfers.    Learning Progress Summary           Patient Acceptance, E, VU,NR by SD at 9/27/2019  8:56 AM    Comment:  Pt. educated on role of OT. Pt. is agreeable with OT POC.                   Point: Body mechanics (Done)     Description: Instruct learner(s) on proper positioning and spine alignment during self-care, functional mobility activities and/or exercises.    Learning Progress Summary           Patient Acceptance, E, VU,NR by SD at 9/27/2019  8:56 AM    Comment:  Pt. educated on role of OT. Pt. is agreeable with OT POC.                               User Key     Initials Effective Dates Name Provider Type Discipline    ZARI 06/08/18 -  Amy Montoya, OT Occupational Therapist OT    SD 06/08/18 -  Maddi Medel, OT Occupational Therapist OT                OT Recommendation and Plan  Outcome Summary/Treatment Plan (OT)  Daily Summary of Progress (OT): progress toward functional goals is good  Barriers to Overall Progress (OT): increase HR and SOA  Plan for Continued Treatment (OT): cont OT POC  Anticipated Discharge Disposition (OT): inpatient rehabilitation facility(pulmonary rehab inpatient vs  OP)  Therapy Frequency (OT Eval): daily  Daily Summary of Progress (OT): progress toward functional goals is good  Plan of Care Review  Plan of Care Reviewed With: patient  Plan of Care Reviewed With: patient  Outcome Summary: Pt. with improved independence with balance, transfer and ADL task completion, but continues with increased HR and dropping 02 sats with activity, but AB helps and taking many rest periods.  Outcome Measures     Row Name 10/01/19 0820             How much help from another is currently needed...    Putting on and taking off regular lower body clothing?  3  -ZARI      Bathing (including washing, rinsing, and drying)  3  -ZARI      Toileting (which includes using toilet bed pan or urinal)  4  -ZARI      Putting on and taking off regular upper body clothing  3  -ZARI      Taking care of personal grooming (such as brushing teeth)  3  -ZARI      Eating meals  4  -ZARI      AM-PAC 6 Clicks Score (OT)  20  -ZARI         Functional Assessment    Outcome Measure Options  AM-PAC 6 Clicks Daily Activity (OT)  -ZARI        User Key  (r) = Recorded By, (t) = Taken By, (c) = Cosigned By    Initials Name Provider Type    Amy Tejeda OT Occupational Therapist           Time Calculation:   Time Calculation- OT     Row Name 10/01/19 0820             Time Calculation- OT    OT Start Time  0820  -ZARI      OT Received On  10/01/19  -ZARI      OT Goal Re-Cert Due Date  10/07/19  -ZARI         Timed Charges    16337 - OT Therapeutic Exercise Minutes  12  -ZARI      94054 - OT Therapeutic Activity Minutes  4  -ZARI      61208 - OT Self Care/Mgmt Minutes  13  -ZARI        User Key  (r) = Recorded By, (t) = Taken By, (c) = Cosigned By    Initials Name Provider Type    Amy Tejeda OT Occupational Therapist        Therapy Charges for Today     Code Description Service Date Service Provider Modifiers Qty    [id number]  OT THER PROC EA 15 MIN 10/1/2019 Amy Montoya OT GO 1    [id number]  OT SELF CARE/MGMT/TRAIN EA 15 MIN  10/1/2019 Amy Montoya, OT GO 1               Amy Montoya, OT  10/1/2019

## 2019-10-01 NOTE — PLAN OF CARE
Problem: Patient Care Overview  Goal: Plan of Care Review  Outcome: Ongoing (interventions implemented as appropriate)   10/01/19 0500   OTHER   Outcome Summary No new changes or complaints noted throughout the night. Pt. remained pleasant and cooperative throughout the night. VS remained stable. Will continue to monitor and provide care.

## 2019-10-02 PROBLEM — K92.2 GI BLEED: Status: RESOLVED | Noted: 2019-01-01 | Resolved: 2019-01-01

## 2019-10-02 PROBLEM — J96.21 ACUTE ON CHRONIC RESPIRATORY FAILURE WITH HYPOXIA (HCC): Status: RESOLVED | Noted: 2019-01-01 | Resolved: 2019-01-01

## 2019-10-02 PROBLEM — D64.9 ACUTE ON CHRONIC ANEMIA: Status: RESOLVED | Noted: 2019-01-01 | Resolved: 2019-01-01

## 2019-10-02 PROBLEM — D50.0 CHRONIC BLOOD LOSS ANEMIA: Status: RESOLVED | Noted: 2019-01-01 | Resolved: 2019-01-01

## 2019-10-02 NOTE — PLAN OF CARE
Problem: Patient Care Overview  Goal: Plan of Care Review  Outcome: Ongoing (interventions implemented as appropriate)   10/02/19 9742   OTHER   Outcome Summary PT. complained of inability to sleep. Nurse practitioner was called and ordered a one time dose of diphenhydramine. Metoprolol was held again due to low systolic BP's. No other changes were noted and pt. remained pleasant and cooperative. Will continue to monitor and provide care.

## 2019-10-02 NOTE — DISCHARGE INSTR - APPOINTMENTS
You have an appointment to be seen by your PCP, Dr. Isidro, on Monday, October 7, 2019 at 10:30 AM.  Dana Isidro DO  PCP - General Family Medicine [phone] [address] [place], [place] [id number]

## 2019-10-02 NOTE — PROGRESS NOTES
Case Management Discharge Note    Final Note: Patient is being discharged home today with his wife to [facility] - [facility] will be following for skilled nursing, PT and OT. Jose M has been notified of todays discharge and patient denies any other needs or concerns regarding discharge - tristan [phone]     Destination      No service has been selected for the patient.      Durable Medical Equipment      No service has been selected for the patient.      Dialysis/Infusion      No service has been selected for the patient.      Home Medical Care - Selection Complete      Service Provider Request Status Selected Services Address Phone Number Fax Number    [facility] Selected Home Health Services [address] [facility] [id number] [phone]      Therapy      No service has been selected for the patient.      Community Resources      No service has been selected for the patient.             Final Discharge Disposition Code: 06 - home with home health care

## 2019-10-02 NOTE — PLAN OF CARE
Problem: Fall Risk (Adult)  Goal: Identify Related Risk Factors and Signs and Symptoms  Outcome: Outcome(s) achieved Date Met: 10/02/19    Goal: Absence of Fall  Outcome: Outcome(s) achieved Date Met: 10/02/19      Problem: Patient Care Overview  Goal: Plan of Care Review  Outcome: Outcome(s) achieved Date Met: 10/02/19    Goal: Individualization and Mutuality  Outcome: Outcome(s) achieved Date Met: 10/02/19    Goal: Discharge Needs Assessment  Outcome: Outcome(s) achieved Date Met: 10/02/19      Problem: Anemia (Adult)  Goal: Identify Related Risk Factors and Signs and Symptoms  Outcome: Outcome(s) achieved Date Met: 10/02/19    Goal: Symptom Improvement  Outcome: Outcome(s) achieved Date Met: 10/02/19      Problem: Skin Injury Risk (Adult)  Goal: Identify Related Risk Factors and Signs and Symptoms  Outcome: Outcome(s) achieved Date Met: 10/02/19    Goal: Skin Health and Integrity  Outcome: Outcome(s) achieved Date Met: 10/02/19

## 2019-10-02 NOTE — PROGRESS NOTES
"[facility] Progress Note     LOS: 6 days   Patient Care Team:  Dana Isidro DO as PCP - General (Family Medicine)  PCP:  Dana Isidro DO    Chief Complaint:  afib    SUBJECTIVE:   No new complaints this morning, doing well with physical therapy.  Denies palpitations, chest pain or shortness of breath currently.      Review of Systems:   All systems have been reviewed and are negative with the exception of those mentioned above.      OBJECTIVE:    Vital Sign Min/Max for last 24 hours  Temp  Min: 97.4 °F (36.3 °C)  Max: 97.7 °F (36.5 °C)   BP  Min: 102/62  Max: 130/79   Pulse  Min: 82  Max: 101   Resp  Min: 16  Max: 17   SpO2  Min: 90 %  Max: 93 %   Flow (L/min)  Min: 3  Max: 4   Weight  Min: 75.9 kg (167 lb 6.4 oz)  Max: 75.9 kg (167 lb 6.4 oz)     Flowsheet Rows      First Filed Value   Admission Height  182.9 cm (72\") Documented at 09/26/2019 1412   Admission Weight  77.1 kg (170 lb) Documented at 09/26/2019 1412          Telemetry: afib 90s-100s      Intake/Output Summary (Last 24 hours) at 10/2/2019 1459  Last data filed at 10/2/2019 1410  Gross per 24 hour   Intake 240 ml   Output 2650 ml   Net -2410 ml     Intake & Output (last 3 days)       09/29 0701 - 09/30 0700 09/30 0701 - 10/01 0700 10/01 0701 - 10/02 0700 10/02 0701 - 10/03 0700    P.O. 840 960  240    I.V. (mL/kg)        Total Intake(mL/kg) 840 (10.9) 960 (12.6)  240 (3.2)    Urine (mL/kg/hr) 2750 (1.5) 1225 (0.7) 2400 (1.3) 1150 (1.9)    Stool  0 0     Total Output 2750 1225 2400 1150    Net -1910 -265 -2400 -910            Urine Unmeasured Occurrence   1 x     Stool Unmeasured Occurrence  1 x 1 x            Physical Exam:  General Appearance:   · well developed  · well nourished  Neck:  · thyroid not enlarged  · supple  Respiratory:  · no respiratory distress  · normal breath sounds  · no rales  Cardiovascular:  · no jugular venous distention  · Irregular irregular rhythm  · apical impulse " normal  · S1 normal, S2 normal  · no S3, no S4   · no murmur  · no rub, no thrill  · carotid pulses normal; no bruit  · pedal pulses normal  · lower extremity edema: none    Skin:   warm, dry    LABS/DIAGNOSTIC DATA:  Results from last 7 days   Lab Units 10/02/19  0442 10/01/19  0522 09/30/19  0744   WBC 10*3/mm3 4.23 4.85 4.92   HEMOGLOBIN g/dL 8.2* 7.9* 8.3*  8.3*   HEMATOCRIT % 27.7* 26.8* 28.1*  28.2*   PLATELETS 10*3/mm3 153 166 163     Lab Results   Lab Value Date/Time    TROPONINT <0.010 07/04/2019 0043    TROPONINT <0.010 07/02/2019 1952    TROPONINT 0.014 07/02/2019 1606    TROPONINT <0.010 07/02/2019 1350    TROPONINT <0.010 06/07/2019 1223         Results from last 7 days   Lab Units 10/02/19  0442 10/01/19  0522 09/30/19  0647  09/26/19  1432   SODIUM mmol/L 138 137 137   < > 139   POTASSIUM mmol/L 5.0 4.9 4.7   < > 4.7   CHLORIDE mmol/L 103 104 104   < > 103   CO2 mmol/L 28.0 24.0 25.0   < > 25.0   BUN mg/dL 29* 30* 29*   < > 33*   CREATININE mg/dL 1.51* 1.46* 1.76*   < > 1.59*   CALCIUM mg/dL 9.3 8.9 8.9   < > 9.9   BILIRUBIN mg/dL  --   --   --   --  0.4   ALK PHOS U/L  --   --   --   --  83   ALT (SGPT) U/L  --   --   --   --  5   AST (SGOT) U/L  --   --   --   --  14   GLUCOSE mg/dL 92 83 89   < > 98    < > = values in this interval not displayed.                       Medication Review:     cholecalciferol 500 Units Oral Daily   digoxin 125 mcg Oral Daily   finasteride 5 mg Oral Daily   folic acid 1 mg Oral Nightly   melatonin 5 mg Oral Nightly   metoprolol tartrate 50 mg Oral Q12H   pantoprazole 40 mg Oral Q AM   rivaroxaban 15 mg Oral Daily With Dinner   sodium chloride 10 mL Intravenous Q12H   tamsulosin 0.4 mg Oral Nightly   vitamin B-12 100 mcg Oral Daily   vitamin C 500 mg Oral Daily              H/O Stage [id number] NSC lung cancer s/p RL lobectomy 5/17/19    Stage III CKD, GFR 30-59 ml/min     Essential hypertension    Paroxysmal atrial fibrillation (CMS/HCC)    Duodenal  ulcer      ASSESSMENT/PLAN:  1.  Atrial fibrillation and tachycardia-bradycardia syndrome; adjust meds for better heart rate control.  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  Per GI okay to resume anticoagulation.    - Xarelto 15 mg daily restarted 9/29  - If recurrent bleeding, could consider outpatient evaluation for KRISTY occlusion device  - Blood pressure dropped with metoprolol 50, dose decreased to 25 mg twice daily by hospitalist  - Started low-dose digoxin, monitor level closely, goal heart rate less than 100  - rates 90s-low 100s at present. Bp has improved, so we will increase metoprolol back to 50mg BID     2.  CAD; no chest pain at this time.  -Holding aspirin, due to recurrent GI bleeding     3.  Acute on chronic hypoxic respiratory failure; slow improvement.  Improving  -Ejection fraction noted to be 61 to 65% in June he does have pulmonary hypertension with RVSP to 69 mmHg.  A moderate to severe TR  - PT/OT rec inpt rehab upon dc, however pt prefers home w home health. CM working on this.     4.  GI bleeding and anemia  -Status post transfusion.  GI following, hemoglobin stable    5. Lung Ca  - s/p RLL lobectomy [facility] 5/2019  - follows outpt w oncology     6. HTN  - metoprolol tartrate 50mg bid    Okay for discharge home on current medical therapy.  Blood pressure tolerating increased dose of metoprolol, okay to continue.  Continue Xarelto at 15 mg daily  Continue digoxin.  Digoxin level normal.    Genaro Connor MD

## 2019-10-02 NOTE — DISCHARGE SUMMARY
[facility] Medicine Services  DISCHARGE SUMMARY    Patient Name: Rodney Looney  : 1934  MRN: [id number]    Date of Admission: 2019  Date of Discharge:  10/02/2019  Primary Care Physician: Dana Isidro DO    Consults     Date and Time Order Name Status Description    2019 Inpatient Gastroenterology Consult Completed     2019 Inpatient Cardiology Consult Completed           Hospital Course     Presenting Problem:   Lower GI bleed [K92.2]    Active Hospital Problems    Diagnosis  POA   • Duodenal ulcer [K26.9]  Yes   • Paroxysmal atrial fibrillation (CMS/HCC) [I48.0]  Yes   • Essential hypertension [I10]  Yes   • Stage III CKD, GFR 30-59 ml/min  [N18.3]  Yes   • H/O Stage [id number] NSC lung cancer s/p RL lobectomy 19 [C34.90]  Yes      Resolved Hospital Problems    Diagnosis Date Resolved POA   • **GI bleed [K92.2] 10/02/2019 Yes   • Chronic blood loss anemia [D50.0] 10/02/2019 Unknown   • Acute on chronic respiratory failure with hypoxia (CMS/HCC) [J96.21] 10/02/2019 Yes   • Acute on chronic anemia [D64.9] 10/02/2019 Yes          Hospital Course:  Rodney Looney is a 84 y.o. male with PMH significant for HTN, persistent atrial fibrillation on Xarelto, stage I non-small cell lung cancer S/P right lower lobe lobectomy at [facility] May 2019 with complicated postop course including Serratia bacteremia and infected pacemaker lead which ultimately was removed by Dr. Boyer.  Additionally, he was readmitted to [facility] on 2019 secondary to large chest wall hematoma/seroma S/P fall.  Additionally he had acute on chronic hypoxic respiratory failure, CHF, and circumferential right pleural space disease consisting of loculated fluid collections.  During that admission, he was also noted to have GI bleed requiring 2 units PRBCs and was evaluated by GI who did EGD showing duodenal ulcer.  He was ultimately discharged on 2019 and was  instructed to hold Xarelto for 5 days, continue baby ASA, and continue PPI twice daily.  He notes that he has been compliant with all of his medications.  He has resumed Xarelto since that time.  He does note that he has become progressively weaker since his discharge, with an acute change in the past 4 days.  He also notes that he has become more short of breath over the last 3 to 4 days requiring an increase in his supplemental oxygen from 2.5-3.5L until today when he had to increase to 4L after taking a shower and being unable to recover.  He states that he was evaluated by his oncologist earlier today and was found to have a hemoglobin of approximately 6.  He was advised to report emergently to the ED for evaluation.  Upon arrival to the ED, he is found to be anemic with hemoglobin of 6.7 hematocrit 23.5.  Occult stool was positive.  He was transfused 1 unit PRBCs as well as given IV Protonix.  He denied any chest pain.  He does admit to continued increased shortness of breath as well as intermittent dizziness.  Additionally, he does admit to having dark stool but felt that it was related to his iron supplement.  He was admitted to hospital medicine for further evaluation.  Patient was admitted to the medical floor.  He was continued on IV Protonix twice a day.  GI was consulted as he was recently evaluated and had a EGD showing a duodenal ulcer.  His Xarelto was placed on hold.  Cardiology was also consulted to see the patient.  Patient received a total of 2 units of packed red blood cells during admission.  He underwent EGD 9/29/2019 that showed gastritis with a healing ulcer.  No active bleeding.  His Xarelto was restarted.  His metoprolol has been increased this admission to better control his heart rate.  There have been times when he has been hypotensive but this is much improved.  He also had further elevation in creatinine that improved with hydration.  PT/OT recommended inpatient pulmonary rehab on  discharge.  The patient declined and wants to go home with home health.   Pt also started on low-dose digoxin.  Patient has had no further bleeding.  His hemoglobin has remained stable.  Recommended to indefinitely discontinue aspirin and Ranexa.  Recommend to indefinitely continue PPI.  OK to discharge home from cardiology standpoint as long as patient's heart rate has remained consistently less than 100, and it has.  Per patient, he states he wears 3 to 4 L of O2 at home.  It is documented his baseline is 2.5L but he states he really wears 3 to 4 L at home.  He does request his flu shot prior to discharge and this will be administered.  Patient will be discharged home today with home health in improved and stable condition.    Discharge Follow Up Recommendations for labs/diagnostics:   Follow-up with PCP Dr. Isidro in 3-5 days  Follow-up with Dr. Genaro Connor, Cardiology, in 4-6 weeks    Day of Discharge     HPI:   Patient seen and examined.  Nursing notes reviewed.  No acute events overnight.  Patient states he feels great this morning.  He states his breathing is at baseline.  No cough.  In regards to his anemia, he denies any episodes of melena or bright red blood per rectum.  Patient states he is ready to go home.    Review of Systems  Gen- No fevers, chills  CV- No chest pain, palpitations  Resp- No cough, dyspnea  GI- No N/V/D, abd pain    Otherwise ROS is negative except as mentioned in the HPI.    Vital Signs:   Temp:  [97.4 °F (36.3 °C)-97.7 °F (36.5 °C)] 97.4 °F (36.3 °C)  Heart Rate:  [] 96  Resp:  [16-18] 17  BP: ()/(52-79) 130/79     Physical Exam:  Constitutional: No acute distress, awake, alert  HENT: NCAT, mucous membranes moist  Respiratory: Clear to auscultation bilaterally, respiratory effort normal   Cardiovascular: IRR, +murmur,No rubs, or gallops, palpable pedal pulses bilaterally  Gastrointestinal: Positive bowel sounds, soft, nontender, nondistended  Musculoskeletal: No  bilateral ankle edema  Psychiatric: Appropriate affect, cooperative  Neurologic: Oriented x 3, strength symmetric in all extremities, Cranial Nerves grossly intact to confrontation, speech clear  Skin: No rashes    Pertinent  and/or Most Recent Results     Results from last 7 days   Lab Units 10/02/19  0442 10/01/19  0522 09/30/19  0744 09/30/19  0647 09/29/19  2355 09/29/19  1556 09/29/19  0807 09/29/19  0001  09/28/19  0819 09/28/19  0745  09/27/19  0532 09/27/19  0250 09/26/19  1432   WBC 10*3/mm3 4.23 4.85 4.92  --   --   --   --   --   --   --  5.55  --  4.49  --  6.13   HEMOGLOBIN g/dL 8.2* 7.9* 8.3*  8.3*  --  7.6* 8.2* 8.2* 8.0*   < >  --  8.4*   < > 7.5*  --  6.7*   HEMATOCRIT % 27.7* 26.8* 28.1*  28.2*  --  25.4* 27.5* 26.9* 27.0*   < >  --  28.1*   < > 26.1*  --  23.5*   PLATELETS 10*3/mm3 153 166 163  --   --   --   --   --   --   --  195  --  214  --  252   SODIUM mmol/L 138 137  --  137  --   --  137  --   --   --  137  --   --  139 139   POTASSIUM mmol/L 5.0 4.9  --  4.7  --   --  5.0  --   --  5.6* 4.4  --   --  5.2 4.7   CHLORIDE mmol/L 103 104  --  104  --   --  101  --   --   --  102  --   --  106 103   CO2 mmol/L 28.0 24.0  --  25.0  --   --  28.0  --   --   --  25.0  --   --  25.0 25.0   BUN mg/dL 29* 30*  --  29*  --   --  31*  --   --   --  27*  --   --  28* 33*   CREATININE mg/dL 1.51* 1.46*  --  1.76*  --   --  1.98*  --   --   --  1.42*  --   --  1.42* 1.59*   GLUCOSE mg/dL 92 83  --  89  --   --  83  --   --   --  93  --   --  106* 98   CALCIUM mg/dL 9.3 8.9  --  8.9  --   --  9.1  --   --   --  9.0  --   --  8.8 9.9    < > = values in this interval not displayed.     Results from last 7 days   Lab Units 09/26/19  1432   BILIRUBIN mg/dL 0.4   ALK PHOS U/L 83   ALT (SGPT) U/L 5   AST (SGOT) U/L 14           Invalid input(s): TG, LDLCALC, LDLREALC  Results from last 7 days   Lab Units 09/26/19  1432   PROBNP pg/mL 2,501.0*       Brief Urine Lab Results  (Last result in the past 365 days)       Color   Clarity   Blood   Leuk Est   Nitrite   Protein   CREAT   Urine HCG        07/03/19 1121 Yellow Clear Negative Negative Negative 100 mg/dL (2+)               Microbiology Results Abnormal     None          Imaging Results (all)     Procedure Component Value Units Date/Time    XR Chest 1 View [[id number]] Collected:  09/27/19 0800     Updated:  09/27/19 1459    Narrative:       EXAMINATION: XR CHEST 1 VW-      INDICATION: Shortness of breath; D64.9-Anemia, unspecified;  R00.0-Tachycardia, unspecified; R79.89-Other specified abnormal findings  of blood chemistry; E86.0-Dehydration; K92.2-Gastrointestinal  hemorrhage, unspecified.      COMPARISON: Chest radiograph 08/15/2019.     FINDINGS: Single portable chest radiograph was submitted for review. The  heart is prominent in size, similar to prior. Postsurgical changes to  the mediastinum are identified. Continued evolution of right mid and  lower lobe airspace changes are noted, overall similar when compared to  08/15/2019. No appreciable pneumothorax identified. Coarsened  interstitial lung changes consistent with chronic lung disease is  identified. Visualized left lung is clear. Visualized upper abdomen is  unrevealing. No acute osseous abnormality identified. There is elevation  of the right hemidiaphragm.           Impression:       1. Redemonstration of multifocal airspace disease predominantly  involving the right middle and lower right lower lobe with associated  right lower lobe volume loss and elevation of the right hemidiaphragm.  2. Mild cardiomegaly with mild interstitial edema superimposed on  chronic lung changes.     D:  09/27/2019  E:  09/27/2019     This report was finalized on 9/27/2019 2:56 PM by Dr. Aroldo Adams MD.             Results for orders placed during the hospital encounter of 07/02/19   Duplex Venous Upper Extremity - Left CAR    Narrative · Normal left upper extremity venous duplex scan.          Results for orders placed  during the hospital encounter of 07/02/19   Duplex Venous Upper Extremity - Left CAR    Narrative · Normal left upper extremity venous duplex scan.          Results for orders placed during the hospital encounter of 06/07/19   Adult Transthoracic Echo Complete W/ Cont if Necessary Per Protocol    Narrative · Estimated EF appears to be in the range of 61 - 65%.  · Left ventricular systolic function is normal.  · Right ventricular cavity is mildly dilated.  · Left atrial cavity size is mild-to-moderately dilated.  · Mild aortic valve regurgitation is present.  · Calculated right ventricular systolic pressure from tricuspid   regurgitation is 69 mmHg.  · Moderate to severe tricuspid valve regurgitation is present.  · Thin fibrinous mobile echogenic structures possibly attached to the   right atrial lead, versus prominent Chiari network. ZI recommended for   follow-up if clinically appropriate..            Discharge Details        Discharge Medications      New Medications      Instructions Start Date   digoxin 125 MCG tablet  Commonly known as:  LANOXIN   125 mcg, Oral, Daily   Start Date:  10/3/2019     pantoprazole 40 MG EC tablet  Commonly known as:  PROTONIX   40 mg, Oral, Daily      sodium chloride 0.65 % nasal spray   1 spray, Nasal, As Needed         Changes to Medications      Instructions Start Date   metoprolol tartrate 50 MG tablet  Commonly known as:  LOPRESSOR  What changed:    · medication strength  · how much to take  · when to take this   50 mg, Oral, Every 12 Hours Scheduled         Continue These Medications      Instructions Start Date   CALCIUM 600 PO   1 tablet, Oral, Daily      cholecalciferol 1000 units tablet  Commonly known as:  VITAMIN D3   500 Units, Oral, Daily      dutasteride 0.5 MG capsule  Commonly known as:  AVODART   0.5 mg, Oral, Nightly      folic acid 1 MG tablet  Commonly known as:  FOLVITE   1 mg, Oral, Nightly      rivaroxaban 15 MG tablet  Commonly known as:  XARELTO   15 mg,  Oral, Nightly      tamsulosin 0.4 MG capsule 24 hr capsule  Commonly known as:  FLOMAX   1 capsule, Oral, Nightly      vitamin B-12 100 MCG tablet  Commonly known as:  CYANOCOBALAMIN   100 mcg, Oral, Daily      vitamin C 500 MG tablet  Commonly known as:  ASCORBIC ACID   500 mg, Oral, Daily         Stop These Medications    aspirin 81 MG EC tablet     famotidine 20 MG tablet  Commonly known as:  PEPCID     ranolazine 500 MG 12 hr tablet  Commonly known as:  RANEXA            No Known Allergies      Discharge Disposition:  Home-Health Care [facility]    Diet:  Hospital:  Diet Order   Procedures   • Diet Regular; Cardiac     Discharge:     Discharge Activity:   Activity Instructions     Activity as Tolerated              CODE STATUS:    Code Status and Medical Interventions:   Ordered at: 09/26/19 2015     Code Status:    CPR     Medical Interventions (Level of Support Prior to Arrest):    Full         Future Appointments   Date Time Provider Department Center   11/21/2019 10:00 AM Rohan Boyer MD [facility] None   12/13/2019  3:15 PM Genaro Connor MD [facility] [place] None       Additional Instructions for the Follow-ups that You Need to Schedule     Ambulatory Referral to Home Health   As directed      Face to Face Visit Date:  9/30/2019    Follow-up provider for Plan of Care?:  I treated the patient in an acute care facility and will not continue treatment after discharge.    Follow-up provider:  DANNA DEAL [[id number]]    Reason/Clinical Findings:  GI Bleed, Acute on chronic respiratory failure with hypoxia, oxygen dependent, a-fib    Describe mobility limitations that make leaving home difficult:  Acute on chronic respiratory failure with hypoxia, oxygen dependent, impaired endurance and mobility,    Nursing/Therapeutic Services Requested:  Skilled Nursing Physical Therapy Occupational Therapy    Skilled nursing orders:  Medication education O2 instruction Cardiopulmonary assessments    PT orders:  Total joint  pathway Therapeutic exercise Gait Training Transfer training Home safety assessment Strengthening    Weight Bearing Status:  As Tolerated    Occupational orders:  Activities of daily living Energy conservation Strengthening Cognition Home safety assessment         Discharge Follow-up with PCP   As directed       Currently Documented PCP:    Dana Isidro DO    PCP Phone Number:    [phone]     Follow Up Details:  3-5 days         Discharge Follow-up with Specified Provider: Dr. Genaro Connor; 1 Month   As directed      To:  Dr. Genaro Connor    Follow Up:  1 Month    Follow Up Details:  4-6 weeks               Time Spent on Discharge:  45 minutes, 30 minutes spent face to face with patient and counseling, remaining time spent coordinating care.    Electronically signed by Candice Segura DO, 10/02/19, 10:58 AM.

## 2019-10-02 NOTE — SIGNIFICANT NOTE
D/c teaching and paperwork provided to pt and spouse. Verbalized understanding. No s/sx distress. Pt with home O2 tank at bedside. Wife to  meds from retail pharmacy on way out to car. Pt belongings taken with him from bedside. Transported out to car via w/c and transport. Tele box cleaned and returned to med room.

## 2019-10-03 NOTE — OUTREACH NOTE
Prep Survey      Responses   Facility patient discharged from?  [facility]   Is patient eligible?  Yes   Discharge diagnosis  GI bleed   Does the patient have one of the following disease processes/diagnoses(primary or secondary)?  Other   Does the patient have Home health ordered?  Yes   What is the Home health agency?   [facility] for skilled nsg, PT, and OT   Is there a DME ordered?  No   Prep survey completed?  Yes          Katy Alfonso RN

## 2019-10-04 NOTE — OUTREACH NOTE
Medical Week 1 Survey      Responses   Facility patient discharged from?  [facility] [facility]   Does the patient have one of the following disease processes/diagnoses(primary or secondary)?  Other   Is there a successful TCM telephone encounter documented?  No   Week 1 attempt successful?  Yes   Call start time  1101   Call end time  1104   Discharge diagnosis  GI bleed   Meds reviewed with patient/caregiver?  Yes   Is the patient having any side effects they believe may be caused by any medication additions or changes?  No   Does the patient have all medications ordered at discharge?  Yes   Is the patient taking all medications as directed (includes completed medication regime)?  Yes   Does the patient have a primary care provider?   Yes   Does the patient have an appointment with their PCP within 7 days of discharge?  Yes   Has the patient kept scheduled appointments due by today?  N/A   What is the Home health agency?   [facility] for skilled nsg, PT, and OT   Has home health visited the patient within 72 hours of discharge?  Yes   Psychosocial issues?  No   Did the patient receive a copy of their discharge instructions?  Yes   Nursing interventions  Reviewed instructions with patient   What is the patient's perception of their health status since discharge?  Improving   Is the patient/caregiver able to teach back signs and symptoms related to disease process for when to call PCP?  Yes   Is the patient/caregiver able to teach back signs and symptoms related to disease process for when to call 911?  Yes   Is the patient/caregiver able to teach back the hierarchy of who to call/visit for symptoms/problems? PCP, Specialist, Home health nurse, Urgent Care, ED, 911  Yes   Additional teach back comments  No further bleeding, has meds and is aware of appts.   Week 1 call completed?  Yes          Jammie Rendon RN

## 2019-10-14 NOTE — OUTREACH NOTE
Medical Week 2 Survey      Responses   Facility patient discharged from?  [place]   Does the patient have one of the following disease processes/diagnoses(primary or secondary)?  Other   Week 2 attempt successful?  No   Unsuccessful attempts  Attempt 1          Talia Street RN

## 2019-10-16 NOTE — TELEPHONE ENCOUNTER
CALLED PATIENT'S WIFE BACK. NO ANSWER; LEFT VOICE MESSAGE. DISCHARGE SUMMARY STATED FOR PATIENT TO FOLLOW UP WITH PCP. HE WILL MAKE THE DECISION IF HE WANTS PATIENT TO STAY AND PROTONIX OR NOT. PATIENT MAY NEED A FOLLOW UP WITH GI DOCTOR IF NEEDED.

## 2019-10-18 NOTE — OUTREACH NOTE
"Medical Week 2 Survey      Responses   Facility patient discharged from?  [facility]   Does the patient have one of the following disease processes/diagnoses(primary or secondary)?  Other   Week 2 attempt successful?  Yes   Call start time  1237   Discharge diagnosis  GI bleed   Call end time  1247   Person spoke with today (if not patient) and relationship  Bhargavi-spouse   Meds reviewed with patient/caregiver?  Yes   Is the patient having any side effects they believe may be caused by any medication additions or changes?  No   Does the patient have all medications ordered at discharge?  Yes   Is the patient taking all medications as directed (includes completed medication regime)?  Yes   Has the patient kept scheduled appointments due by today?  Yes   What is the Home health agency?   [facility] is who the patient is using   Psychosocial issues?  No   What is the patient's perception of their health status since discharge?  Same [Wife reports, \"he struggles when we have to leave the house. He's on portable oxygen.\"]   Is the patient/caregiver able to teach back signs and symptoms related to disease process for when to call PCP?  Yes   Is the patient/caregiver able to teach back signs and symptoms related to disease process for when to call 911?  Yes   Additional teach back comments  Advised Mrs. Looney to call PCP and cardiology for refills on patients protonix and digoxin.   Week 2 Call Completed?  Yes          Zee Cash RN  "

## 2019-10-28 NOTE — OUTREACH NOTE
Medical Week 3 Survey      Responses   Facility patient discharged from?  [facility]   Does the patient have one of the following disease processes/diagnoses(primary or secondary)?  Other   Week 3 attempt successful?  No   Unsuccessful attempts  Attempt 1          Frederick Shaikh RN

## 2019-10-30 NOTE — OUTREACH NOTE
Medical Week 3 Survey      Responses   Facility patient discharged from?  [facility]   Does the patient have one of the following disease processes/diagnoses(primary or secondary)?  Other   Week 3 attempt successful?  Yes   Call start time  1628   Rescheduled  Rescheduled-pt requested [Out eating, call rescheduled.]   Discharge diagnosis  GI bleed          Tina Martinez RN

## 2019-10-31 NOTE — OUTREACH NOTE
Medical Week 3 Survey      Responses   Facility patient discharged from?  [facility]   Does the patient have one of the following disease processes/diagnoses(primary or secondary)?  Other   Week 3 attempt successful?  Yes   Call start time  1622   Call end time  1626   Discharge diagnosis  GI bleed   Person spoke with today (if not patient) and relationship  Bhargavi-spouse   Meds reviewed with patient/caregiver?  Yes   Is the patient taking all medications as directed (includes completed medication regime)?  Yes   Has the patient kept scheduled appointments due by today?  Yes   What is the Home health agency?   [facility] is who the patient is using   What DME was ordered?  O2   Psychosocial issues?  No   What is the patient's perception of their health status since discharge?  Improving   If the patient is a current smoker, are they able to teach back resources for cessation?  -- [Pt doesn't smoke]   Additional teach back comments  flu vaccine-done   Week 3 Call Completed?  Yes          Zee Cash RN

## 2019-11-08 NOTE — OUTREACH NOTE
Medical Week 4 Survey      Responses   Facility patient discharged from?  [facility]   Does the patient have one of the following disease processes/diagnoses(primary or secondary)?  Other   Week 4 attempt successful?  Yes   Call start time  1514   Call end time  1530   Discharge diagnosis  GI bleed   Is patient permission given to speak with other caregiver?  Yes   List who call center can speak with  wife   Person spoke with today (if not patient) and relationship  Bhargavi-spouse   Meds reviewed with patient/caregiver?  Yes   Is the patient having any side effects they believe may be caused by any medication additions or changes?  No   Is the patient taking all medications as directed (includes completed medication regime)?  Yes   Has the patient kept scheduled appointments due by today?  Yes   Is the patient still receiving Home Health Services?  Yes   Home health comments  PT/OT and nursing still coming. Wally RN comes every 2wks.    Psychosocial issues?  No   Comments  Pt has good appetite but decreased energy. Fatigue, does not want to get up and walk without wife being prompted. Pt is no longer having dizziness per wife. DOes have SOB even on O2. He uses home O2 continuously. pt still having extensive SOA with exertion.    What is the patient's perception of their health status since discharge?  Improving   Is the patient/caregiver able to teach back signs and symptoms related to disease process for when to call 911?  Yes   Is the patient/caregiver able to teach back the hierarchy of who to call/visit for symptoms/problems? PCP, Specialist, Home health nurse, Urgent Care, ED, 911  Yes   Week 4 Call Completed?  Yes   Would the patient like one additional call?  No   Graduated  Yes   Did the patient feel the follow up calls were helpful during their recovery period?  Yes   Was the number of calls appropriate?  Yes          Mrata Parker RN

## 2019-11-21 NOTE — PROGRESS NOTES
11/21/2019  Patient Information  Rodney Looney                                                                                          [id number]  [place] [address]   1934  'PCP/Referring Physician'  Dana Isidro, DO  [phone]  No ref. provider found    Chief Complaint   Patient presents with   • Pleural Effusion     3 month follow-up with chest xray for right pleural effusion. Patient is short of breath.      CC: I am still having difficulty breathing    History of Present Illness: 85-year-old  male being seen in follow-up.  This gentleman underwent a right lower lobectomy in May 2019.  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  In July 2019 he was seen at [facility] for pacemaker and lead removal.  Unfortunately the lead could not be removed through the pacemaker pocket.  The patient subsequently underwent median sternotomy with removal of the pacing lead.  Continued follow-up for respiratory insufficiency and failure to improve revealed a persistent right effusion with a trapped right lung.  A right VATS with subsequent pleurodesis was then performed.  Patient's postoperative course was prolonged as expected however ultimately he improved to the point of discharge and rehabilitation.  He returns to the office today for follow-up (now 4 months).  He still has some shortness of breath.  He is on 2 L nasal cannula oxygen full-time.  His O2 saturation today is 94%.  Blood pressure is 104/61 pulse is 62.  He states that his energy levels are low and that he gets short of breath fairly easily.  He denies fever, chills, and night sweats.  He has not had wound erythema or wound drainage.  He has not had hemoptysis he has not had weight loss.  He does have some edema of his lower extremities.    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   • History of lobectomy of lung   • Hypoxia   • Stage III CKD, GFR 30-59 ml/min    • Coronary artery disease involving native coronary artery of native heart   • Hypercholesterolemia   • Essential hypertension   • Paroxysmal atrial fibrillation (CMS/HCC)   • GERD (gastroesophageal reflux disease)   • Fall   • Postprocedural seroma of skin and subcutaneous tissue following other procedure   • Acute renal failure superimposed on chronic kidney disease (CMS/HCC)   • Buttock wound   • Edema of both lower extremities   • Elevated brain natriuretic peptide (BNP) level   • Chronic respiratory failure with hypoxia (CMS/HCC)   • 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)   • Emphysema (CMS/HCC)   • Volume overload   • Duodenal ulcer     Past Medical History:   Diagnosis Date   • Atrial fibrillation (CMS/HCC)    • Buttock wound 7/2/2019    Bilateral buttock ulcerations/POA   • Cancer (CMS/HCC)    • Chronic respiratory failure with hypoxia, on home oxygen therapy (CMS/HCC) 7/2/2019   • Coronary artery disease    • Elevated cholesterol    • GERD (gastroesophageal reflux disease)    • History of BPH 2 yrs   • Hypertension    • Kidney disease    • Smoking      Past Surgical History:   Procedure Laterality Date   • BRONCHOSCOPY Right 7/10/2019    Procedure: BRONCHOSCOPY;  Surgeon: Rohan Boyer MD;  Location:  [facility];  Service: Cardiothoracic   • CARDIAC SURGERY     • CORONARY ARTERY BYPASS GRAFT N/A 6/13/2019    Procedure: MEDIANSTERNOTOMY, REMOVAL OF RETAINED PACING LEAD;  Surgeon: Rohan Boyer MD;  Location:  [facility];  Service: Cardiothoracic   • ENDOSCOPY N/A 7/23/2019    Procedure: ESOPHAGOGASTRODUODENOSCOPY;  Surgeon: Rafael Maldonado MD;  Location:  [facility];  Service: Gastroenterology   • ENDOSCOPY N/A 9/28/2019    Procedure: ESOPHAGOGASTRODUODENOSCOPY;  Surgeon: Brunner, Mark I, MD;  Location:  [facility];  Service: Gastroenterology   • HERNIA REPAIR     • LUNG LOBECTOMY      RIGHT LOWER   • THORACOSCOPY Right 7/10/2019    Procedure: THORACOSCOPY VIDEO ASSISTED, CHEST TUBE PLACEMENT, EVACUATION OF RIGHT CHEST WALL HEMATOMA;  Surgeon: Rohan Boyer MD;  Location: [facility];  Service: Cardiothoracic       Current Outpatient Medications:   •  aspirin 81 MG tablet, Take 1 tablet by mouth Daily., Disp: , Rfl:   •  Calcium Carbonate (CALCIUM 600 PO), Take 1 tablet by mouth Daily., Disp: , Rfl:   •  cholecalciferol (VITAMIN D3) 1000 units tablet, Take 500 Units by mouth Daily., Disp: , Rfl:   •  digoxin (LANOXIN) 125 MCG tablet, Take 125 mcg by mouth Daily., Disp: , Rfl:   •  dutasteride (AVODART) 0.5 MG capsule, Take 0.5 mg by mouth Every Night., Disp: , Rfl:   •  famotidine (PEPCID) 20 MG tablet, Take 1 tablet by mouth 2 (Two) Times a Day., Disp: , Rfl:   •  ferrous sulfate 325 (65 FE) MG EC tablet, Take 1 tablet by mouth Daily., Disp: , Rfl: 0  •  folic acid (FOLVITE) 1 MG tablet, Take 1 mg by mouth Every Night., Disp: , Rfl:   •  ipratropium-albuterol (DUO-NEB) 0.5-2.5 mg/3 ml nebulizer, Take 3 mL by nebulization Every 4 (Four) Hours As Needed for Wheezing., Disp: , Rfl:   •  metoprolol tartrate (LOPRESSOR) 50 MG tablet, Take 1 tablet by mouth 2 (Two) Times a Day., Disp: , Rfl:   •  pantoprazole (PROTONIX) 40 MG EC tablet, Take 40 mg by mouth Daily., Disp: , Rfl:   •  polyethylene glycol (MIRALAX) packet, Take 17 g by mouth Daily., Disp: , Rfl:   •  pravastatin (PRAVACHOL) 80 MG tablet, Take 80 mg by mouth Daily., Disp: , Rfl:   •  ranolazine (RANEXA) 500 MG 12 hr tablet, Take 1 tablet by mouth Daily., Disp: , Rfl:   •  rivaroxaban (XARELTO) 15 MG tablet, Take 15 mg by mouth Every Night., Disp: , Rfl:   •  tamsulosin (FLOMAX) 0.4 MG capsule 24 hr capsule, Take 1 capsule by mouth Every Night., Disp: , Rfl:   •  vitamin B-12 (CYANOCOBALAMIN) 100 MCG tablet, Take 100 mcg by mouth Daily., Disp: , Rfl:   •   vitamin C (ASCORBIC ACID) 500 MG tablet, Take 500 mg by mouth Daily., Disp: , Rfl:   No Known Allergies  Social History     Socioeconomic History   • Marital status:      Spouse name: Not on file   • Number of children: 3   • Years of education: Not on file   • Highest education level: Not on file   Occupational History   • Occupation: retired/farmer   Tobacco Use   • Smoking status: Former Smoker     Packs/day: 0.75     Years: 65.00     Pack years: 48.75     Types: Cigarettes     Last attempt to quit: 2019     Years since quittin.5   • Smokeless tobacco: Never Used   Substance and Sexual Activity   • Alcohol use: No     Frequency: Never   • Drug use: No   • Sexual activity: Defer   Social History Narrative    .  Lives with wife.  Up with walker since recent surgeries.  Uses 2LNC at all times now. Lives in [facility]     Family History   Problem Relation Age of Onset   • Colon cancer Mother    • Heart failure Father    • Dementia Father      Review of Systems   Constitution: Positive for malaise/fatigue. Negative for chills, fever, night sweats and weight loss.   HENT: Negative for hearing loss, odynophagia and sore throat.    Cardiovascular: Positive for dyspnea on exertion and leg swelling. Negative for chest pain, orthopnea and palpitations.   Respiratory: Positive for cough, shortness of breath and wheezing. Negative for hemoptysis.    Endocrine: Negative for cold intolerance, heat intolerance, polydipsia, polyphagia and polyuria.   Hematologic/Lymphatic: Bruises/bleeds easily.   Skin: Negative for itching and rash.   Musculoskeletal: Negative for joint pain, joint swelling and myalgias.   Gastrointestinal: Positive for constipation. Negative for abdominal pain, diarrhea, hematemesis, hematochezia, melena, nausea and vomiting.   Genitourinary: Negative for dysuria, frequency and hematuria.   Neurological: Positive for loss of balance. Negative for focal weakness, headaches, numbness and  "seizures.   Psychiatric/Behavioral: Positive for depression. Negative for suicidal ideas.   All other systems reviewed and are negative.    Vitals:    11/21/19 0944   BP: 104/61   BP Location: Right arm   Patient Position: Sitting   Pulse: 62   Temp: 97.9 °F (36.6 °C)   TempSrc: Temporal   SpO2: 94%   Weight: 79.4 kg (175 lb)   Height: 182.9 cm (72\")      Physical Exam   Constitutional: He is oriented to person, place, and time. He appears well-developed and well-nourished. No distress.   Thin elderly rather fragile appearing  male in a wheelchair with nasal O2.   HENT:   Head: Normocephalic.   Right Ear: External ear normal.   Left Ear: External ear normal.   Nose: Nose normal.   Head eyes ears nose and throat are normal dentition is consistent with the patient's age.   Eyes: Pupils are equal, round, and reactive to light.   Neck: Normal range of motion. Neck supple. No tracheal deviation present. No thyromegaly present.   Cardiovascular: Normal rate and regular rhythm.   Murmur heard.  1/6 systolic murmur left sternal border S1 and S2 normal   Pulmonary/Chest: Effort normal and breath sounds normal. No respiratory distress. He has no wheezes. He has no rales. He exhibits no tenderness.   Decreased breath sounds at the right base with scattered rhonchi posteriorly bilateral.  No wheezes no rales.   Abdominal: Soft. Bowel sounds are normal. He exhibits no distension and no mass. There is no tenderness.   Musculoskeletal: Normal range of motion. He exhibits no edema.   Both lower extremities are warm and pink with 2-second capillary refill pedal pulses are not palpable.  Skin and nails are consistent with mild chronic ischemic changes.  On questioning the patient denies claudication and leg pain.   Lymphadenopathy:     He has no cervical adenopathy.   Neurological: He is alert and oriented to person, place, and time. He has normal reflexes. No cranial nerve deficit.   Skin: Skin is warm and dry. No rash " noted. No erythema.   Psychiatric: He has a normal mood and affect.       Labs/Imaging: PA and lateral chest x-ray reviewed.  Right lung volume is decreased consistent with the history of lobectomy.  There is no significant amount of pleural fluid.  There are increased markings bilaterally consistent with COPD and interstitial fibrosis.    Assessment: Elderly  male making a slow recovery from multiple major surgical interventions    Plan: Return to this clinic as needed.  Continue close follow-up with PCP and with cardiology.  Resume diuresis as noted by PCP.  Continue follow-up with Dr. Quiros and with PCP in regard to non-small cell carcinoma of right lung.    Patient Active Problem List   Diagnosis   • Right mid-lower lobe infiltrate    • Serratia bacteremia    • Pacemaker infection s/p lead extraction    • H/O Stage [id number] NSC lung cancer s/p RL lobectomy 5/17/19   • History of lobectomy of lung   • Hypoxia   • Stage III CKD, GFR 30-59 ml/min    • Coronary artery disease involving native coronary artery of native heart   • Hypercholesterolemia   • Essential hypertension   • Paroxysmal atrial fibrillation (CMS/HCC)   • GERD (gastroesophageal reflux disease)   • Fall   • Postprocedural seroma of skin and subcutaneous tissue following other procedure   • Acute renal failure superimposed on chronic kidney disease (CMS/HCC)   • Buttock wound   • Edema of both lower extremities   • Elevated brain natriuretic peptide (BNP) level   • Chronic respiratory failure with hypoxia (CMS/HCC)   • 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

## 2020-01-01 ENCOUNTER — READMISSION MANAGEMENT (OUTPATIENT)
Dept: CALL CENTER | Facility: HOSPITAL | Age: 85
End: 2020-01-01

## 2020-01-01 ENCOUNTER — APPOINTMENT (OUTPATIENT)
Dept: CT IMAGING | Facility: HOSPITAL | Age: 85
End: 2020-01-01

## 2020-01-01 ENCOUNTER — APPOINTMENT (OUTPATIENT)
Dept: GENERAL RADIOLOGY | Facility: HOSPITAL | Age: 85
End: 2020-01-01

## 2020-01-01 ENCOUNTER — OFFICE VISIT (OUTPATIENT)
Dept: NEUROSURGERY | Facility: CLINIC | Age: 85
End: 2020-01-01

## 2020-01-01 ENCOUNTER — HOSPITAL ENCOUNTER (INPATIENT)
Facility: HOSPITAL | Age: 85
LOS: 7 days | Discharge: HOME HEALTH | End: 2020-02-03
Attending: EMERGENCY MEDICINE | Admitting: INTERNAL MEDICINE

## 2020-01-01 ENCOUNTER — TELEPHONE (OUTPATIENT)
Dept: GASTROENTEROLOGY | Facility: CLINIC | Age: 85
End: 2020-01-01

## 2020-01-01 ENCOUNTER — NURSE TRIAGE (OUTPATIENT)
Dept: CALL CENTER | Facility: HOSPITAL | Age: 85
End: 2020-01-01

## 2020-01-01 VITALS
SYSTOLIC BLOOD PRESSURE: 120 MMHG | DIASTOLIC BLOOD PRESSURE: 70 MMHG | HEIGHT: 72 IN | HEART RATE: 76 BPM | WEIGHT: 182 LBS | OXYGEN SATURATION: 92 % | BODY MASS INDEX: 24.65 KG/M2 | RESPIRATION RATE: 16 BRPM

## 2020-01-01 VITALS
RESPIRATION RATE: 14 BRPM | DIASTOLIC BLOOD PRESSURE: 63 MMHG | WEIGHT: 182.8 LBS | OXYGEN SATURATION: 98 % | HEART RATE: 72 BPM | HEIGHT: 72 IN | TEMPERATURE: 97.3 F | BODY MASS INDEX: 24.76 KG/M2 | SYSTOLIC BLOOD PRESSURE: 113 MMHG

## 2020-01-01 DIAGNOSIS — S12.491A OTHER CLOSED NONDISPLACED FRACTURE OF FIFTH CERVICAL VERTEBRA, INITIAL ENCOUNTER (HCC): ICD-10-CM

## 2020-01-01 DIAGNOSIS — Z74.09 IMPAIRED MOBILITY AND ADLS: ICD-10-CM

## 2020-01-01 DIAGNOSIS — K92.1 MELENA: ICD-10-CM

## 2020-01-01 DIAGNOSIS — R77.8 ELEVATED TROPONIN: ICD-10-CM

## 2020-01-01 DIAGNOSIS — S12.9XXD: Primary | ICD-10-CM

## 2020-01-01 DIAGNOSIS — Z78.9 IMPAIRED MOBILITY AND ADLS: ICD-10-CM

## 2020-01-01 DIAGNOSIS — D64.9 ANEMIA, UNSPECIFIED TYPE: Primary | ICD-10-CM

## 2020-01-01 LAB
ABO + RH BLD: NORMAL
ABO GROUP BLD: NORMAL
ALBUMIN SERPL-MCNC: 2.8 G/DL (ref 3.5–5.2)
ALBUMIN SERPL-MCNC: 3.1 G/DL (ref 3.5–5.2)
ALBUMIN/GLOB SERPL: 0.9 G/DL
ALBUMIN/GLOB SERPL: 1 G/DL
ALP SERPL-CCNC: 82 U/L (ref 39–117)
ALP SERPL-CCNC: 89 U/L (ref 39–117)
ALT SERPL W P-5'-P-CCNC: 9 U/L (ref 1–41)
ALT SERPL W P-5'-P-CCNC: 9 U/L (ref 1–41)
ANION GAP SERPL CALCULATED.3IONS-SCNC: 10 MMOL/L (ref 5–15)
ANION GAP SERPL CALCULATED.3IONS-SCNC: 10 MMOL/L (ref 5–15)
ANION GAP SERPL CALCULATED.3IONS-SCNC: 11 MMOL/L (ref 5–15)
ANION GAP SERPL CALCULATED.3IONS-SCNC: 13 MMOL/L (ref 5–15)
ANION GAP SERPL CALCULATED.3IONS-SCNC: 8 MMOL/L (ref 5–15)
ARTERIAL PATENCY WRIST A: ABNORMAL
AST SERPL-CCNC: 12 U/L (ref 1–40)
AST SERPL-CCNC: 15 U/L (ref 1–40)
ATMOSPHERIC PRESS: ABNORMAL MM[HG]
BACTERIA SPEC AEROBE CULT: NORMAL
BACTERIA SPEC AEROBE CULT: NORMAL
BASE EXCESS BLDA CALC-SCNC: -2 MMOL/L (ref 0–2)
BASOPHILS # BLD AUTO: 0.01 10*3/MM3 (ref 0–0.2)
BASOPHILS # BLD AUTO: 0.02 10*3/MM3 (ref 0–0.2)
BASOPHILS # BLD AUTO: 0.03 10*3/MM3 (ref 0–0.2)
BASOPHILS NFR BLD AUTO: 0.2 % (ref 0–1.5)
BASOPHILS NFR BLD AUTO: 0.4 % (ref 0–1.5)
BASOPHILS NFR BLD AUTO: 0.6 % (ref 0–1.5)
BDY SITE: ABNORMAL
BH BB BLOOD EXPIRATION DATE: NORMAL
BH BB BLOOD TYPE BARCODE: 5100
BH BB DISPENSE STATUS: NORMAL
BH BB PRODUCT CODE: NORMAL
BH BB UNIT NUMBER: NORMAL
BILIRUB SERPL-MCNC: 0.3 MG/DL (ref 0.2–1.2)
BILIRUB SERPL-MCNC: 0.4 MG/DL (ref 0.2–1.2)
BILIRUB UR QL STRIP: NEGATIVE
BLD GP AB SCN SERPL QL: NEGATIVE
BODY TEMPERATURE: 37 C
BUN BLD-MCNC: 44 MG/DL (ref 8–23)
BUN BLD-MCNC: 47 MG/DL (ref 8–23)
BUN BLD-MCNC: 62 MG/DL (ref 8–23)
BUN BLD-MCNC: 76 MG/DL (ref 8–23)
BUN BLD-MCNC: 83 MG/DL (ref 8–23)
BUN BLD-MCNC: 87 MG/DL (ref 8–23)
BUN BLD-MCNC: 87 MG/DL (ref 8–23)
BUN BLD-MCNC: 90 MG/DL (ref 8–23)
BUN/CREAT SERPL: 20.5 (ref 7–25)
BUN/CREAT SERPL: 23.3 (ref 7–25)
BUN/CREAT SERPL: 27.8 (ref 7–25)
BUN/CREAT SERPL: 33.5 (ref 7–25)
BUN/CREAT SERPL: 35.4 (ref 7–25)
BUN/CREAT SERPL: 35.8 (ref 7–25)
BUN/CREAT SERPL: 37.1 (ref 7–25)
BUN/CREAT SERPL: 39.3 (ref 7–25)
CALCIUM SPEC-SCNC: 8.6 MG/DL (ref 8.6–10.5)
CALCIUM SPEC-SCNC: 8.8 MG/DL (ref 8.6–10.5)
CALCIUM SPEC-SCNC: 9 MG/DL (ref 8.6–10.5)
CALCIUM SPEC-SCNC: 9.2 MG/DL (ref 8.6–10.5)
CALCIUM SPEC-SCNC: 9.3 MG/DL (ref 8.6–10.5)
CALCIUM SPEC-SCNC: 9.4 MG/DL (ref 8.6–10.5)
CALCIUM SPEC-SCNC: 9.5 MG/DL (ref 8.6–10.5)
CALCIUM SPEC-SCNC: 9.8 MG/DL (ref 8.6–10.5)
CHLORIDE SERPL-SCNC: 103 MMOL/L (ref 98–107)
CHLORIDE SERPL-SCNC: 103 MMOL/L (ref 98–107)
CHLORIDE SERPL-SCNC: 104 MMOL/L (ref 98–107)
CHLORIDE SERPL-SCNC: 106 MMOL/L (ref 98–107)
CHLORIDE SERPL-SCNC: 107 MMOL/L (ref 98–107)
CHLORIDE SERPL-SCNC: 109 MMOL/L (ref 98–107)
CLARITY UR: CLEAR
CO2 BLDA-SCNC: 22.8 MMOL/L (ref 22–33)
CO2 SERPL-SCNC: 20 MMOL/L (ref 22–29)
CO2 SERPL-SCNC: 22 MMOL/L (ref 22–29)
CO2 SERPL-SCNC: 23 MMOL/L (ref 22–29)
CO2 SERPL-SCNC: 25 MMOL/L (ref 22–29)
COHGB MFR BLD: 2.6 % (ref 0–2)
COLOR UR: YELLOW
CREAT BLD-MCNC: 2.02 MG/DL (ref 0.76–1.27)
CREAT BLD-MCNC: 2.15 MG/DL (ref 0.76–1.27)
CREAT BLD-MCNC: 2.23 MG/DL (ref 0.76–1.27)
CREAT BLD-MCNC: 2.24 MG/DL (ref 0.76–1.27)
CREAT BLD-MCNC: 2.27 MG/DL (ref 0.76–1.27)
CREAT BLD-MCNC: 2.29 MG/DL (ref 0.76–1.27)
CREAT BLD-MCNC: 2.43 MG/DL (ref 0.76–1.27)
CREAT BLD-MCNC: 2.46 MG/DL (ref 0.76–1.27)
D-LACTATE SERPL-SCNC: 1.9 MMOL/L (ref 0.5–2)
DEPRECATED RDW RBC AUTO: 83.8 FL (ref 37–54)
DEPRECATED RDW RBC AUTO: 92.3 FL (ref 37–54)
DEPRECATED RDW RBC AUTO: 96.3 FL (ref 37–54)
DEPRECATED RDW RBC AUTO: 98.1 FL (ref 37–54)
DEPRECATED RDW RBC AUTO: 98.5 FL (ref 37–54)
DEVELOPER EXPIRATION DATE: ABNORMAL
DEVELOPER LOT NUMBER: ABNORMAL
DIGOXIN SERPL-MCNC: 0.6 NG/ML (ref 0.6–1.2)
DIGOXIN SERPL-MCNC: 1.39 NG/ML (ref 0.6–1.2)
DIGOXIN SERPL-MCNC: 1.64 NG/ML (ref 0.6–1.2)
DIGOXIN SERPL-MCNC: 1.94 NG/ML (ref 0.6–1.2)
EOSINOPHIL # BLD AUTO: 0 10*3/MM3 (ref 0–0.4)
EOSINOPHIL # BLD AUTO: 0.07 10*3/MM3 (ref 0–0.4)
EOSINOPHIL # BLD AUTO: 0.13 10*3/MM3 (ref 0–0.4)
EOSINOPHIL NFR BLD AUTO: 0 % (ref 0.3–6.2)
EOSINOPHIL NFR BLD AUTO: 1.3 % (ref 0.3–6.2)
EOSINOPHIL NFR BLD AUTO: 2.8 % (ref 0.3–6.2)
ERYTHROCYTE [DISTWIDTH] IN BLOOD BY AUTOMATED COUNT: 23.3 % (ref 12.3–15.4)
ERYTHROCYTE [DISTWIDTH] IN BLOOD BY AUTOMATED COUNT: 23.9 % (ref 12.3–15.4)
ERYTHROCYTE [DISTWIDTH] IN BLOOD BY AUTOMATED COUNT: 24.4 % (ref 12.3–15.4)
ERYTHROCYTE [DISTWIDTH] IN BLOOD BY AUTOMATED COUNT: 24.5 % (ref 12.3–15.4)
ERYTHROCYTE [DISTWIDTH] IN BLOOD BY AUTOMATED COUNT: 25.1 % (ref 12.3–15.4)
EXPIRATION DATE: ABNORMAL
FECAL OCCULT BLOOD SCREEN, POC: POSITIVE
FERRITIN SERPL-MCNC: 117.5 NG/ML (ref 30–400)
FOLATE SERPL-MCNC: >20 NG/ML (ref 4.78–24.2)
GFR SERPL CREATININE-BSD FRML MDRD: 25 ML/MIN/1.73
GFR SERPL CREATININE-BSD FRML MDRD: 25 ML/MIN/1.73
GFR SERPL CREATININE-BSD FRML MDRD: 27 ML/MIN/1.73
GFR SERPL CREATININE-BSD FRML MDRD: 28 ML/MIN/1.73
GFR SERPL CREATININE-BSD FRML MDRD: 29 ML/MIN/1.73
GFR SERPL CREATININE-BSD FRML MDRD: 32 ML/MIN/1.73
GLOBULIN UR ELPH-MCNC: 3.1 GM/DL
GLOBULIN UR ELPH-MCNC: 3.1 GM/DL
GLUCOSE BLD-MCNC: 111 MG/DL (ref 65–99)
GLUCOSE BLD-MCNC: 116 MG/DL (ref 65–99)
GLUCOSE BLD-MCNC: 160 MG/DL (ref 65–99)
GLUCOSE BLD-MCNC: 179 MG/DL (ref 65–99)
GLUCOSE BLD-MCNC: 78 MG/DL (ref 65–99)
GLUCOSE BLD-MCNC: 82 MG/DL (ref 65–99)
GLUCOSE BLD-MCNC: 93 MG/DL (ref 65–99)
GLUCOSE BLD-MCNC: 93 MG/DL (ref 65–99)
GLUCOSE BLDC GLUCOMTR-MCNC: 147 MG/DL (ref 70–130)
GLUCOSE BLDC GLUCOMTR-MCNC: 192 MG/DL (ref 70–130)
GLUCOSE BLDC GLUCOMTR-MCNC: 94 MG/DL (ref 70–130)
GLUCOSE UR STRIP-MCNC: NEGATIVE MG/DL
HCO3 BLDA-SCNC: 21.9 MMOL/L (ref 20–26)
HCT VFR BLD AUTO: 18.5 % (ref 37.5–51)
HCT VFR BLD AUTO: 20.5 % (ref 37.5–51)
HCT VFR BLD AUTO: 21.9 % (ref 37.5–51)
HCT VFR BLD AUTO: 25 % (ref 37.5–51)
HCT VFR BLD AUTO: 25.2 % (ref 37.5–51)
HCT VFR BLD AUTO: 25.7 % (ref 37.5–51)
HCT VFR BLD AUTO: 26 % (ref 37.5–51)
HCT VFR BLD AUTO: 26.3 % (ref 37.5–51)
HCT VFR BLD AUTO: 26.3 % (ref 37.5–51)
HCT VFR BLD AUTO: 26.6 % (ref 37.5–51)
HCT VFR BLD AUTO: 27 % (ref 37.5–51)
HCT VFR BLD AUTO: 27.2 % (ref 37.5–51)
HCT VFR BLD AUTO: 27.3 % (ref 37.5–51)
HCT VFR BLD AUTO: 27.5 % (ref 37.5–51)
HCT VFR BLD AUTO: 28.1 % (ref 37.5–51)
HCT VFR BLD AUTO: 28.1 % (ref 37.5–51)
HCT VFR BLD AUTO: 28.4 % (ref 37.5–51)
HCT VFR BLD CALC: 16.7 %
HGB BLD-MCNC: 5.6 G/DL (ref 13–17.7)
HGB BLD-MCNC: 6.2 G/DL (ref 13–17.7)
HGB BLD-MCNC: 6.9 G/DL (ref 13–17.7)
HGB BLD-MCNC: 7.7 G/DL (ref 13–17.7)
HGB BLD-MCNC: 7.9 G/DL (ref 13–17.7)
HGB BLD-MCNC: 7.9 G/DL (ref 13–17.7)
HGB BLD-MCNC: 8 G/DL (ref 13–17.7)
HGB BLD-MCNC: 8.1 G/DL (ref 13–17.7)
HGB BLD-MCNC: 8.1 G/DL (ref 13–17.7)
HGB BLD-MCNC: 8.2 G/DL (ref 13–17.7)
HGB BLD-MCNC: 8.4 G/DL (ref 13–17.7)
HGB BLD-MCNC: 8.5 G/DL (ref 13–17.7)
HGB BLDA-MCNC: 5.5 G/DL (ref 13.5–17.5)
HGB UR QL STRIP.AUTO: NEGATIVE
HOLD SPECIMEN: NORMAL
HOLD SPECIMEN: NORMAL
HOROWITZ INDEX BLD+IHG-RTO: 28 %
IMM GRANULOCYTES # BLD AUTO: 0.04 10*3/MM3 (ref 0–0.05)
IMM GRANULOCYTES # BLD AUTO: 0.05 10*3/MM3 (ref 0–0.05)
IMM GRANULOCYTES # BLD AUTO: 0.05 10*3/MM3 (ref 0–0.05)
IMM GRANULOCYTES NFR BLD AUTO: 0.7 % (ref 0–0.5)
IMM GRANULOCYTES NFR BLD AUTO: 1 % (ref 0–0.5)
IMM GRANULOCYTES NFR BLD AUTO: 1.1 % (ref 0–0.5)
IRON 24H UR-MRATE: 25 MCG/DL (ref 59–158)
IRON 24H UR-MRATE: 33 MCG/DL (ref 59–158)
IRON SATN MFR SERPL: 6 % (ref 20–50)
IRON SATN MFR SERPL: 9 % (ref 20–50)
KETONES UR QL STRIP: NEGATIVE
LEUKOCYTE ESTERASE UR QL STRIP.AUTO: NEGATIVE
LYMPHOCYTES # BLD AUTO: 0.55 10*3/MM3 (ref 0.7–3.1)
LYMPHOCYTES # BLD AUTO: 0.73 10*3/MM3 (ref 0.7–3.1)
LYMPHOCYTES # BLD AUTO: 0.8 10*3/MM3 (ref 0.7–3.1)
LYMPHOCYTES NFR BLD AUTO: 10.6 % (ref 19.6–45.3)
LYMPHOCYTES NFR BLD AUTO: 14.4 % (ref 19.6–45.3)
LYMPHOCYTES NFR BLD AUTO: 15.7 % (ref 19.6–45.3)
Lab: ABNORMAL
Lab: ABNORMAL
MACROCYTES BLD QL SMEAR: NORMAL
MAGNESIUM SERPL-MCNC: 2.1 MG/DL (ref 1.6–2.4)
MCH RBC QN AUTO: 32.2 PG (ref 26.6–33)
MCH RBC QN AUTO: 32.9 PG (ref 26.6–33)
MCH RBC QN AUTO: 33 PG (ref 26.6–33)
MCH RBC QN AUTO: 33.2 PG (ref 26.6–33)
MCH RBC QN AUTO: 34.8 PG (ref 26.6–33)
MCHC RBC AUTO-ENTMCNC: 29.2 G/DL (ref 31.5–35.7)
MCHC RBC AUTO-ENTMCNC: 29.6 G/DL (ref 31.5–35.7)
MCHC RBC AUTO-ENTMCNC: 30 G/DL (ref 31.5–35.7)
MCHC RBC AUTO-ENTMCNC: 30.3 G/DL (ref 31.5–35.7)
MCHC RBC AUTO-ENTMCNC: 31.5 G/DL (ref 31.5–35.7)
MCV RBC AUTO: 104.8 FL (ref 79–97)
MCV RBC AUTO: 109.6 FL (ref 79–97)
MCV RBC AUTO: 110.2 FL (ref 79–97)
MCV RBC AUTO: 112.3 FL (ref 79–97)
MCV RBC AUTO: 114.9 FL (ref 79–97)
METHGB BLD QL: 0.6 % (ref 0–1.5)
MODALITY: ABNORMAL
MONOCYTES # BLD AUTO: 0.07 10*3/MM3 (ref 0.1–0.9)
MONOCYTES # BLD AUTO: 0.24 10*3/MM3 (ref 0.1–0.9)
MONOCYTES # BLD AUTO: 0.32 10*3/MM3 (ref 0.1–0.9)
MONOCYTES NFR BLD AUTO: 1.3 % (ref 5–12)
MONOCYTES NFR BLD AUTO: 4.3 % (ref 5–12)
MONOCYTES NFR BLD AUTO: 6.9 % (ref 5–12)
NEGATIVE CONTROL: NEGATIVE
NEUTROPHILS # BLD AUTO: 3.38 10*3/MM3 (ref 1.7–7)
NEUTROPHILS # BLD AUTO: 4.37 10*3/MM3 (ref 1.7–7)
NEUTROPHILS # BLD AUTO: 4.52 10*3/MM3 (ref 1.7–7)
NEUTROPHILS NFR BLD AUTO: 72.9 % (ref 42.7–76)
NEUTROPHILS NFR BLD AUTO: 78.9 % (ref 42.7–76)
NEUTROPHILS NFR BLD AUTO: 86.9 % (ref 42.7–76)
NITRITE UR QL STRIP: NEGATIVE
NOTE: ABNORMAL
NOTIFIED BY: ABNORMAL
NOTIFIED WHO: ABNORMAL
NRBC BLD AUTO-RTO: 0.4 /100 WBC (ref 0–0.2)
NRBC BLD AUTO-RTO: 0.6 /100 WBC (ref 0–0.2)
NRBC BLD AUTO-RTO: 0.7 /100 WBC (ref 0–0.2)
NT-PROBNP SERPL-MCNC: 4379 PG/ML (ref 5–1800)
OXYHGB MFR BLDV: 92.7 % (ref 94–99)
PCO2 BLDA: 31.3 MM HG
PCO2 TEMP ADJ BLD: 31.3 MM HG (ref 35–48)
PH BLDA: 7.45 PH UNITS (ref 7.35–7.45)
PH UR STRIP.AUTO: <=5 [PH] (ref 5–8)
PH, TEMP CORRECTED: 7.45 PH UNITS
PHOSPHATE SERPL-MCNC: 3.2 MG/DL (ref 2.5–4.5)
PHOSPHATE SERPL-MCNC: 3.3 MG/DL (ref 2.5–4.5)
PLAT MORPH BLD: NORMAL
PLATELET # BLD AUTO: 110 10*3/MM3 (ref 140–450)
PLATELET # BLD AUTO: 60 10*3/MM3 (ref 140–450)
PLATELET # BLD AUTO: 69 10*3/MM3 (ref 140–450)
PLATELET # BLD AUTO: 76 10*3/MM3 (ref 140–450)
PLATELET # BLD AUTO: 90 10*3/MM3 (ref 140–450)
PMV BLD AUTO: 10.8 FL (ref 6–12)
PMV BLD AUTO: 11.1 FL (ref 6–12)
PMV BLD AUTO: 11.2 FL (ref 6–12)
PMV BLD AUTO: 11.2 FL (ref 6–12)
PMV BLD AUTO: 11.5 FL (ref 6–12)
PO2 BLDA: 69 MM HG (ref 83–108)
PO2 TEMP ADJ BLD: 69 MM HG (ref 83–108)
POLYCHROMASIA BLD QL SMEAR: NORMAL
POSITIVE CONTROL: POSITIVE
POTASSIUM BLD-SCNC: 4.3 MMOL/L (ref 3.5–5.2)
POTASSIUM BLD-SCNC: 4.4 MMOL/L (ref 3.5–5.2)
POTASSIUM BLD-SCNC: 4.6 MMOL/L (ref 3.5–5.2)
POTASSIUM BLD-SCNC: 4.7 MMOL/L (ref 3.5–5.2)
POTASSIUM BLD-SCNC: 4.7 MMOL/L (ref 3.5–5.2)
POTASSIUM BLD-SCNC: 5.2 MMOL/L (ref 3.5–5.2)
PROT SERPL-MCNC: 5.9 G/DL (ref 6–8.5)
PROT SERPL-MCNC: 6.2 G/DL (ref 6–8.5)
PROT UR QL STRIP: NEGATIVE
RBC # BLD AUTO: 1.61 10*6/MM3 (ref 4.14–5.8)
RBC # BLD AUTO: 2.09 10*6/MM3 (ref 4.14–5.8)
RBC # BLD AUTO: 2.49 10*6/MM3 (ref 4.14–5.8)
RBC # BLD AUTO: 2.53 10*6/MM3 (ref 4.14–5.8)
RBC # BLD AUTO: 2.55 10*6/MM3 (ref 4.14–5.8)
RETICS # AUTO: 0.15 10*6/MM3 (ref 0.02–0.13)
RETICS/RBC NFR AUTO: 9.36 % (ref 0.7–1.9)
RH BLD: POSITIVE
SODIUM BLD-SCNC: 135 MMOL/L (ref 136–145)
SODIUM BLD-SCNC: 136 MMOL/L (ref 136–145)
SODIUM BLD-SCNC: 136 MMOL/L (ref 136–145)
SODIUM BLD-SCNC: 137 MMOL/L (ref 136–145)
SODIUM BLD-SCNC: 138 MMOL/L (ref 136–145)
SODIUM BLD-SCNC: 140 MMOL/L (ref 136–145)
SODIUM BLD-SCNC: 141 MMOL/L (ref 136–145)
SODIUM BLD-SCNC: 142 MMOL/L (ref 136–145)
SP GR UR STRIP: 1.01 (ref 1–1.03)
T&S EXPIRATION DATE: NORMAL
T4 FREE SERPL-MCNC: 0.42 NG/DL (ref 0.93–1.7)
TARGETS BLD QL SMEAR: NORMAL
TIBC SERPL-MCNC: 381 MCG/DL (ref 298–536)
TIBC SERPL-MCNC: 389 MCG/DL (ref 298–536)
TRANSFERRIN SERPL-MCNC: 256 MG/DL (ref 200–360)
TRANSFERRIN SERPL-MCNC: 261 MG/DL (ref 200–360)
TROPONIN T SERPL-MCNC: 0.03 NG/ML (ref 0–0.03)
TROPONIN T SERPL-MCNC: 0.03 NG/ML (ref 0–0.03)
TROPONIN T SERPL-MCNC: 0.04 NG/ML (ref 0–0.03)
TSH SERPL DL<=0.05 MIU/L-ACNC: 53.98 UIU/ML (ref 0.27–4.2)
UNIT  ABO: NORMAL
UNIT  RH: NORMAL
UROBILINOGEN UR QL STRIP: NORMAL
VENTILATOR MODE: ABNORMAL
VIT B12 BLD-MCNC: >2000 PG/ML (ref 211–946)
WBC MORPH BLD: NORMAL
WBC NRBC COR # BLD: 4.12 10*3/MM3 (ref 3.4–10.8)
WBC NRBC COR # BLD: 4.29 10*3/MM3 (ref 3.4–10.8)
WBC NRBC COR # BLD: 4.64 10*3/MM3 (ref 3.4–10.8)
WBC NRBC COR # BLD: 5.2 10*3/MM3 (ref 3.4–10.8)
WBC NRBC COR # BLD: 5.54 10*3/MM3 (ref 3.4–10.8)
WHOLE BLOOD HOLD SPECIMEN: NORMAL
WHOLE BLOOD HOLD SPECIMEN: NORMAL

## 2020-01-01 PROCEDURE — 97116 GAIT TRAINING THERAPY: CPT

## 2020-01-01 PROCEDURE — 86850 RBC ANTIBODY SCREEN: CPT | Performed by: PHYSICIAN ASSISTANT

## 2020-01-01 PROCEDURE — 25010000002 TDAP 5-2.5-18.5 LF-MCG/0.5 SUSPENSION: Performed by: PHYSICIAN ASSISTANT

## 2020-01-01 PROCEDURE — 84466 ASSAY OF TRANSFERRIN: CPT | Performed by: NURSE PRACTITIONER

## 2020-01-01 PROCEDURE — 85014 HEMATOCRIT: CPT | Performed by: INTERNAL MEDICINE

## 2020-01-01 PROCEDURE — 71045 X-RAY EXAM CHEST 1 VIEW: CPT

## 2020-01-01 PROCEDURE — 97110 THERAPEUTIC EXERCISES: CPT

## 2020-01-01 PROCEDURE — 80053 COMPREHEN METABOLIC PANEL: CPT | Performed by: EMERGENCY MEDICINE

## 2020-01-01 PROCEDURE — 84466 ASSAY OF TRANSFERRIN: CPT | Performed by: INTERNAL MEDICINE

## 2020-01-01 PROCEDURE — 94799 UNLISTED PULMONARY SVC/PX: CPT

## 2020-01-01 PROCEDURE — 85014 HEMATOCRIT: CPT | Performed by: NURSE PRACTITIONER

## 2020-01-01 PROCEDURE — 25010000002 NA FERRIC GLUC CPLX PER 12.5 MG: Performed by: INTERNAL MEDICINE

## 2020-01-01 PROCEDURE — 80048 BASIC METABOLIC PNL TOTAL CA: CPT | Performed by: INTERNAL MEDICINE

## 2020-01-01 PROCEDURE — 80162 ASSAY OF DIGOXIN TOTAL: CPT | Performed by: INTERNAL MEDICINE

## 2020-01-01 PROCEDURE — 84484 ASSAY OF TROPONIN QUANT: CPT | Performed by: FAMILY MEDICINE

## 2020-01-01 PROCEDURE — 99221 1ST HOSP IP/OBS SF/LOW 40: CPT | Performed by: PHYSICIAN ASSISTANT

## 2020-01-01 PROCEDURE — 82962 GLUCOSE BLOOD TEST: CPT

## 2020-01-01 PROCEDURE — 99233 SBSQ HOSP IP/OBS HIGH 50: CPT | Performed by: INTERNAL MEDICINE

## 2020-01-01 PROCEDURE — 80069 RENAL FUNCTION PANEL: CPT | Performed by: INTERNAL MEDICINE

## 2020-01-01 PROCEDURE — 85018 HEMOGLOBIN: CPT | Performed by: INTERNAL MEDICINE

## 2020-01-01 PROCEDURE — 36430 TRANSFUSION BLD/BLD COMPNT: CPT

## 2020-01-01 PROCEDURE — 99211 OFF/OP EST MAY X REQ PHY/QHP: CPT | Performed by: PHYSICIAN ASSISTANT

## 2020-01-01 PROCEDURE — 71046 X-RAY EXAM CHEST 2 VIEWS: CPT

## 2020-01-01 PROCEDURE — 93005 ELECTROCARDIOGRAM TRACING: CPT | Performed by: EMERGENCY MEDICINE

## 2020-01-01 PROCEDURE — 80162 ASSAY OF DIGOXIN TOTAL: CPT | Performed by: PHYSICIAN ASSISTANT

## 2020-01-01 PROCEDURE — 99231 SBSQ HOSP IP/OBS SF/LOW 25: CPT | Performed by: PHYSICIAN ASSISTANT

## 2020-01-01 PROCEDURE — 70450 CT HEAD/BRAIN W/O DYE: CPT

## 2020-01-01 PROCEDURE — 86923 COMPATIBILITY TEST ELECTRIC: CPT

## 2020-01-01 PROCEDURE — 83605 ASSAY OF LACTIC ACID: CPT | Performed by: PHYSICIAN ASSISTANT

## 2020-01-01 PROCEDURE — 85025 COMPLETE CBC W/AUTO DIFF WBC: CPT | Performed by: EMERGENCY MEDICINE

## 2020-01-01 PROCEDURE — 86901 BLOOD TYPING SEROLOGIC RH(D): CPT | Performed by: PHYSICIAN ASSISTANT

## 2020-01-01 PROCEDURE — 72100 X-RAY EXAM L-S SPINE 2/3 VWS: CPT

## 2020-01-01 PROCEDURE — 84484 ASSAY OF TROPONIN QUANT: CPT | Performed by: EMERGENCY MEDICINE

## 2020-01-01 PROCEDURE — 93005 ELECTROCARDIOGRAM TRACING: CPT | Performed by: FAMILY MEDICINE

## 2020-01-01 PROCEDURE — 85025 COMPLETE CBC W/AUTO DIFF WBC: CPT | Performed by: INTERNAL MEDICINE

## 2020-01-01 PROCEDURE — 86900 BLOOD TYPING SEROLOGIC ABO: CPT

## 2020-01-01 PROCEDURE — 72125 CT NECK SPINE W/O DYE: CPT

## 2020-01-01 PROCEDURE — 85027 COMPLETE CBC AUTOMATED: CPT | Performed by: INTERNAL MEDICINE

## 2020-01-01 PROCEDURE — 87040 BLOOD CULTURE FOR BACTERIA: CPT | Performed by: PHYSICIAN ASSISTANT

## 2020-01-01 PROCEDURE — 85018 HEMOGLOBIN: CPT | Performed by: NURSE PRACTITIONER

## 2020-01-01 PROCEDURE — 93010 ELECTROCARDIOGRAM REPORT: CPT | Performed by: INTERNAL MEDICINE

## 2020-01-01 PROCEDURE — 99232 SBSQ HOSP IP/OBS MODERATE 35: CPT | Performed by: INTERNAL MEDICINE

## 2020-01-01 PROCEDURE — 97166 OT EVAL MOD COMPLEX 45 MIN: CPT

## 2020-01-01 PROCEDURE — 93005 ELECTROCARDIOGRAM TRACING: CPT

## 2020-01-01 PROCEDURE — 84439 ASSAY OF FREE THYROXINE: CPT | Performed by: PHYSICIAN ASSISTANT

## 2020-01-01 PROCEDURE — P9016 RBC LEUKOCYTES REDUCED: HCPCS

## 2020-01-01 PROCEDURE — 82270 OCCULT BLOOD FECES: CPT | Performed by: EMERGENCY MEDICINE

## 2020-01-01 PROCEDURE — 83735 ASSAY OF MAGNESIUM: CPT | Performed by: EMERGENCY MEDICINE

## 2020-01-01 PROCEDURE — 85025 COMPLETE CBC W/AUTO DIFF WBC: CPT | Performed by: NURSE PRACTITIONER

## 2020-01-01 PROCEDURE — 72220 X-RAY EXAM SACRUM TAILBONE: CPT

## 2020-01-01 PROCEDURE — 97530 THERAPEUTIC ACTIVITIES: CPT

## 2020-01-01 PROCEDURE — 84443 ASSAY THYROID STIM HORMONE: CPT | Performed by: NURSE PRACTITIONER

## 2020-01-01 PROCEDURE — 97162 PT EVAL MOD COMPLEX 30 MIN: CPT

## 2020-01-01 PROCEDURE — 70486 CT MAXILLOFACIAL W/O DYE: CPT

## 2020-01-01 PROCEDURE — 82746 ASSAY OF FOLIC ACID SERUM: CPT | Performed by: NURSE PRACTITIONER

## 2020-01-01 PROCEDURE — 80053 COMPREHEN METABOLIC PANEL: CPT | Performed by: NURSE PRACTITIONER

## 2020-01-01 PROCEDURE — 82805 BLOOD GASES W/O2 SATURATION: CPT

## 2020-01-01 PROCEDURE — 90715 TDAP VACCINE 7 YRS/> IM: CPT | Performed by: PHYSICIAN ASSISTANT

## 2020-01-01 PROCEDURE — 71250 CT THORAX DX C-: CPT

## 2020-01-01 PROCEDURE — 36600 WITHDRAWAL OF ARTERIAL BLOOD: CPT

## 2020-01-01 PROCEDURE — 82607 VITAMIN B-12: CPT | Performed by: NURSE PRACTITIONER

## 2020-01-01 PROCEDURE — 99239 HOSP IP/OBS DSCHRG MGMT >30: CPT | Performed by: INTERNAL MEDICINE

## 2020-01-01 PROCEDURE — 94640 AIRWAY INHALATION TREATMENT: CPT

## 2020-01-01 PROCEDURE — 99285 EMERGENCY DEPT VISIT HI MDM: CPT

## 2020-01-01 PROCEDURE — 99223 1ST HOSP IP/OBS HIGH 75: CPT | Performed by: FAMILY MEDICINE

## 2020-01-01 PROCEDURE — 83540 ASSAY OF IRON: CPT | Performed by: NURSE PRACTITIONER

## 2020-01-01 PROCEDURE — 83540 ASSAY OF IRON: CPT | Performed by: INTERNAL MEDICINE

## 2020-01-01 PROCEDURE — 72170 X-RAY EXAM OF PELVIS: CPT

## 2020-01-01 PROCEDURE — 81003 URINALYSIS AUTO W/O SCOPE: CPT | Performed by: NURSE PRACTITIONER

## 2020-01-01 PROCEDURE — 82728 ASSAY OF FERRITIN: CPT | Performed by: INTERNAL MEDICINE

## 2020-01-01 PROCEDURE — 25010000002 FUROSEMIDE PER 20 MG: Performed by: PHYSICIAN ASSISTANT

## 2020-01-01 PROCEDURE — 85007 BL SMEAR W/DIFF WBC COUNT: CPT | Performed by: EMERGENCY MEDICINE

## 2020-01-01 PROCEDURE — 83880 ASSAY OF NATRIURETIC PEPTIDE: CPT | Performed by: NURSE PRACTITIONER

## 2020-01-01 PROCEDURE — 90471 IMMUNIZATION ADMIN: CPT | Performed by: PHYSICIAN ASSISTANT

## 2020-01-01 PROCEDURE — 86900 BLOOD TYPING SEROLOGIC ABO: CPT | Performed by: PHYSICIAN ASSISTANT

## 2020-01-01 PROCEDURE — 85045 AUTOMATED RETICULOCYTE COUNT: CPT | Performed by: NURSE PRACTITIONER

## 2020-01-01 RX ORDER — FOLIC ACID 1 MG/1
1 TABLET ORAL NIGHTLY
Status: DISCONTINUED | OUTPATIENT
Start: 2020-01-01 | End: 2020-01-01 | Stop reason: HOSPADM

## 2020-01-01 RX ORDER — PANTOPRAZOLE SODIUM 40 MG/1
40 TABLET, DELAYED RELEASE ORAL
Qty: 60 TABLET | Refills: 0 | Status: SHIPPED | OUTPATIENT
Start: 2020-01-01 | End: 2020-03-04

## 2020-01-01 RX ORDER — SODIUM CHLORIDE 0.9 % (FLUSH) 0.9 %
10 SYRINGE (ML) INJECTION AS NEEDED
Status: DISCONTINUED | OUTPATIENT
Start: 2020-01-01 | End: 2020-01-01 | Stop reason: HOSPADM

## 2020-01-01 RX ORDER — FLUTICASONE PROPIONATE 50 MCG
2 SPRAY, SUSPENSION (ML) NASAL DAILY
COMMUNITY

## 2020-01-01 RX ORDER — HYDROCODONE BITARTRATE AND ACETAMINOPHEN 5; 325 MG/1; MG/1
1 TABLET ORAL EVERY 4 HOURS PRN
Status: DISCONTINUED | OUTPATIENT
Start: 2020-01-01 | End: 2020-01-01 | Stop reason: HOSPADM

## 2020-01-01 RX ORDER — METOPROLOL TARTRATE 50 MG/1
50 TABLET, FILM COATED ORAL 2 TIMES DAILY
Status: DISCONTINUED | OUTPATIENT
Start: 2020-01-01 | End: 2020-01-01

## 2020-01-01 RX ORDER — BISACODYL 5 MG/1
10 TABLET, DELAYED RELEASE ORAL DAILY PRN
Status: DISCONTINUED | OUTPATIENT
Start: 2020-01-01 | End: 2020-01-01 | Stop reason: HOSPADM

## 2020-01-01 RX ORDER — BISACODYL 10 MG
10 SUPPOSITORY, RECTAL RECTAL DAILY PRN
Status: DISCONTINUED | OUTPATIENT
Start: 2020-01-01 | End: 2020-01-01 | Stop reason: HOSPADM

## 2020-01-01 RX ORDER — LEVOTHYROXINE SODIUM 0.05 MG/1
50 TABLET ORAL
Status: DISCONTINUED | OUTPATIENT
Start: 2020-01-01 | End: 2020-01-01 | Stop reason: HOSPADM

## 2020-01-01 RX ORDER — TAMSULOSIN HYDROCHLORIDE 0.4 MG/1
0.4 CAPSULE ORAL NIGHTLY
Status: DISCONTINUED | OUTPATIENT
Start: 2020-01-01 | End: 2020-01-01 | Stop reason: HOSPADM

## 2020-01-01 RX ORDER — FUROSEMIDE 10 MG/ML
20 INJECTION INTRAMUSCULAR; INTRAVENOUS ONCE
Status: COMPLETED | OUTPATIENT
Start: 2020-01-01 | End: 2020-01-01

## 2020-01-01 RX ORDER — LEVOTHYROXINE SODIUM 0.05 MG/1
50 TABLET ORAL DAILY
Qty: 30 TABLET | Refills: 0 | Status: SHIPPED | OUTPATIENT
Start: 2020-01-01

## 2020-01-01 RX ORDER — ATORVASTATIN CALCIUM 40 MG/1
20 TABLET, FILM COATED ORAL NIGHTLY
Status: DISCONTINUED | OUTPATIENT
Start: 2020-01-01 | End: 2020-01-01 | Stop reason: HOSPADM

## 2020-01-01 RX ORDER — SODIUM CHLORIDE 0.9 % (FLUSH) 0.9 %
10 SYRINGE (ML) INJECTION EVERY 12 HOURS SCHEDULED
Status: DISCONTINUED | OUTPATIENT
Start: 2020-01-01 | End: 2020-01-01 | Stop reason: HOSPADM

## 2020-01-01 RX ORDER — UBIDECARENONE 75 MG
100 CAPSULE ORAL DAILY
Status: DISCONTINUED | OUTPATIENT
Start: 2020-01-01 | End: 2020-01-01 | Stop reason: HOSPADM

## 2020-01-01 RX ORDER — IPRATROPIUM BROMIDE AND ALBUTEROL SULFATE 2.5; .5 MG/3ML; MG/3ML
3 SOLUTION RESPIRATORY (INHALATION) EVERY 4 HOURS PRN
Status: DISCONTINUED | OUTPATIENT
Start: 2020-01-01 | End: 2020-01-01 | Stop reason: HOSPADM

## 2020-01-01 RX ORDER — BUMETANIDE 0.25 MG/ML
1 INJECTION INTRAMUSCULAR; INTRAVENOUS ONCE
Status: COMPLETED | OUTPATIENT
Start: 2020-01-01 | End: 2020-01-01

## 2020-01-01 RX ORDER — DOCUSATE SODIUM 100 MG/1
100 CAPSULE, LIQUID FILLED ORAL 2 TIMES DAILY PRN
Status: DISCONTINUED | OUTPATIENT
Start: 2020-01-01 | End: 2020-01-01 | Stop reason: HOSPADM

## 2020-01-01 RX ORDER — FERROUS SULFATE 325(65) MG
325 TABLET ORAL
Status: DISCONTINUED | OUTPATIENT
Start: 2020-01-01 | End: 2020-01-01 | Stop reason: HOSPADM

## 2020-01-01 RX ORDER — PANTOPRAZOLE SODIUM 40 MG/1
40 TABLET, DELAYED RELEASE ORAL
Status: DISCONTINUED | OUTPATIENT
Start: 2020-01-01 | End: 2020-01-01 | Stop reason: HOSPADM

## 2020-01-01 RX ORDER — RANOLAZINE 500 MG/1
500 TABLET, EXTENDED RELEASE ORAL DAILY
Status: DISCONTINUED | OUTPATIENT
Start: 2020-01-01 | End: 2020-01-01 | Stop reason: HOSPADM

## 2020-01-01 RX ORDER — BUMETANIDE 0.5 MG/1
0.25 TABLET ORAL DAILY
COMMUNITY

## 2020-01-01 RX ORDER — BUMETANIDE 0.5 MG/1
0.25 TABLET ORAL DAILY
Status: DISCONTINUED | OUTPATIENT
Start: 2020-01-01 | End: 2020-01-01 | Stop reason: HOSPADM

## 2020-01-01 RX ORDER — ACETAMINOPHEN 325 MG/1
650 TABLET ORAL EVERY 4 HOURS PRN
Status: DISCONTINUED | OUTPATIENT
Start: 2020-01-01 | End: 2020-01-01 | Stop reason: HOSPADM

## 2020-01-01 RX ORDER — ONDANSETRON 2 MG/ML
4 INJECTION INTRAMUSCULAR; INTRAVENOUS EVERY 6 HOURS PRN
Status: DISCONTINUED | OUTPATIENT
Start: 2020-01-01 | End: 2020-01-01 | Stop reason: HOSPADM

## 2020-01-01 RX ORDER — SODIUM CHLORIDE 9 MG/ML
75 INJECTION, SOLUTION INTRAVENOUS CONTINUOUS
Status: DISCONTINUED | OUTPATIENT
Start: 2020-01-01 | End: 2020-01-01

## 2020-01-01 RX ORDER — PANTOPRAZOLE SODIUM 40 MG/10ML
80 INJECTION, POWDER, LYOPHILIZED, FOR SOLUTION INTRAVENOUS ONCE
Status: COMPLETED | OUTPATIENT
Start: 2020-01-01 | End: 2020-01-01

## 2020-01-01 RX ORDER — PANTOPRAZOLE SODIUM 40 MG/10ML
40 INJECTION, POWDER, LYOPHILIZED, FOR SOLUTION INTRAVENOUS
Status: DISCONTINUED | OUTPATIENT
Start: 2020-01-01 | End: 2020-01-01

## 2020-01-01 RX ADMIN — LEVOTHYROXINE SODIUM 50 MCG: 50 TABLET ORAL at 05:46

## 2020-01-01 RX ADMIN — METOPROLOL TARTRATE 50 MG: 50 TABLET, FILM COATED ORAL at 20:48

## 2020-01-01 RX ADMIN — RANOLAZINE 500 MG: 500 TABLET, FILM COATED, EXTENDED RELEASE ORAL at 09:57

## 2020-01-01 RX ADMIN — LEVOTHYROXINE SODIUM 50 MCG: 50 TABLET ORAL at 05:29

## 2020-01-01 RX ADMIN — FERROUS SULFATE TAB 325 MG (65 MG ELEMENTAL FE) 325 MG: 325 (65 FE) TAB at 08:53

## 2020-01-01 RX ADMIN — IPRATROPIUM BROMIDE AND ALBUTEROL SULFATE 3 ML: 2.5; .5 SOLUTION RESPIRATORY (INHALATION) at 13:25

## 2020-01-01 RX ADMIN — ATORVASTATIN CALCIUM 20 MG: 40 TABLET, FILM COATED ORAL at 20:42

## 2020-01-01 RX ADMIN — METOPROLOL TARTRATE 12.5 MG: 25 TABLET, FILM COATED ORAL at 10:45

## 2020-01-01 RX ADMIN — ATORVASTATIN CALCIUM 20 MG: 40 TABLET, FILM COATED ORAL at 20:56

## 2020-01-01 RX ADMIN — SODIUM CHLORIDE, PRESERVATIVE FREE 10 ML: 5 INJECTION INTRAVENOUS at 08:03

## 2020-01-01 RX ADMIN — LEVOTHYROXINE SODIUM 50 MCG: 50 TABLET ORAL at 09:00

## 2020-01-01 RX ADMIN — IPRATROPIUM BROMIDE AND ALBUTEROL SULFATE 3 ML: 2.5; .5 SOLUTION RESPIRATORY (INHALATION) at 02:22

## 2020-01-01 RX ADMIN — FERROUS SULFATE TAB 325 MG (65 MG ELEMENTAL FE) 325 MG: 325 (65 FE) TAB at 09:57

## 2020-01-01 RX ADMIN — TAMSULOSIN HYDROCHLORIDE 0.4 MG: 0.4 CAPSULE ORAL at 20:57

## 2020-01-01 RX ADMIN — SODIUM CHLORIDE, PRESERVATIVE FREE 10 ML: 5 INJECTION INTRAVENOUS at 20:57

## 2020-01-01 RX ADMIN — PANTOPRAZOLE SODIUM 40 MG: 40 INJECTION, POWDER, FOR SOLUTION INTRAVENOUS at 18:47

## 2020-01-01 RX ADMIN — TAMSULOSIN HYDROCHLORIDE 0.4 MG: 0.4 CAPSULE ORAL at 21:06

## 2020-01-01 RX ADMIN — METOPROLOL TARTRATE 12.5 MG: 25 TABLET, FILM COATED ORAL at 21:09

## 2020-01-01 RX ADMIN — FOLIC ACID 1 MG: 1 TABLET ORAL at 20:57

## 2020-01-01 RX ADMIN — SODIUM CHLORIDE 125 MG: 9 INJECTION, SOLUTION INTRAVENOUS at 17:03

## 2020-01-01 RX ADMIN — FERROUS SULFATE TAB 325 MG (65 MG ELEMENTAL FE) 325 MG: 325 (65 FE) TAB at 08:40

## 2020-01-01 RX ADMIN — TETANUS TOXOID, REDUCED DIPHTHERIA TOXOID AND ACELLULAR PERTUSSIS VACCINE, ADSORBED 0.5 ML: 5; 2.5; 8; 8; 2.5 SUSPENSION INTRAMUSCULAR at 00:51

## 2020-01-01 RX ADMIN — PANTOPRAZOLE SODIUM 40 MG: 40 TABLET, DELAYED RELEASE ORAL at 05:21

## 2020-01-01 RX ADMIN — IPRATROPIUM BROMIDE AND ALBUTEROL SULFATE 3 ML: 2.5; .5 SOLUTION RESPIRATORY (INHALATION) at 17:14

## 2020-01-01 RX ADMIN — METOPROLOL TARTRATE 12.5 MG: 25 TABLET, FILM COATED ORAL at 21:06

## 2020-01-01 RX ADMIN — VITAM B12 100 MCG: 100 TAB at 09:57

## 2020-01-01 RX ADMIN — VITAM B12 100 MCG: 100 TAB at 10:45

## 2020-01-01 RX ADMIN — TAMSULOSIN HYDROCHLORIDE 0.4 MG: 0.4 CAPSULE ORAL at 20:48

## 2020-01-01 RX ADMIN — BUMETANIDE 0.25 MG: 0.5 TABLET ORAL at 09:00

## 2020-01-01 RX ADMIN — PANTOPRAZOLE SODIUM 40 MG: 40 INJECTION, POWDER, FOR SOLUTION INTRAVENOUS at 17:24

## 2020-01-01 RX ADMIN — DOCUSATE SODIUM 100 MG: 100 CAPSULE, LIQUID FILLED ORAL at 05:28

## 2020-01-01 RX ADMIN — BUMETANIDE 1 MG: 0.25 INJECTION INTRAMUSCULAR; INTRAVENOUS at 14:36

## 2020-01-01 RX ADMIN — DOCUSATE SODIUM 100 MG: 100 CAPSULE, LIQUID FILLED ORAL at 08:53

## 2020-01-01 RX ADMIN — PANTOPRAZOLE SODIUM 40 MG: 40 TABLET, DELAYED RELEASE ORAL at 17:36

## 2020-01-01 RX ADMIN — PANTOPRAZOLE SODIUM 40 MG: 40 TABLET, DELAYED RELEASE ORAL at 05:30

## 2020-01-01 RX ADMIN — DOCUSATE SODIUM 100 MG: 100 CAPSULE, LIQUID FILLED ORAL at 08:36

## 2020-01-01 RX ADMIN — LEVOTHYROXINE SODIUM 50 MCG: 50 TABLET ORAL at 05:21

## 2020-01-01 RX ADMIN — RANOLAZINE 500 MG: 500 TABLET, FILM COATED, EXTENDED RELEASE ORAL at 08:04

## 2020-01-01 RX ADMIN — RANOLAZINE 500 MG: 500 TABLET, FILM COATED, EXTENDED RELEASE ORAL at 08:05

## 2020-01-01 RX ADMIN — FERROUS SULFATE TAB 325 MG (65 MG ELEMENTAL FE) 325 MG: 325 (65 FE) TAB at 08:05

## 2020-01-01 RX ADMIN — ATORVASTATIN CALCIUM 20 MG: 40 TABLET, FILM COATED ORAL at 21:06

## 2020-01-01 RX ADMIN — POLYETHYLENE GLYCOL 3350 17 G: 17 POWDER, FOR SOLUTION ORAL at 10:45

## 2020-01-01 RX ADMIN — PANTOPRAZOLE SODIUM 40 MG: 40 INJECTION, POWDER, FOR SOLUTION INTRAVENOUS at 08:40

## 2020-01-01 RX ADMIN — RANOLAZINE 500 MG: 500 TABLET, FILM COATED, EXTENDED RELEASE ORAL at 08:52

## 2020-01-01 RX ADMIN — BUMETANIDE 0.25 MG: 0.5 TABLET ORAL at 10:45

## 2020-01-01 RX ADMIN — RANOLAZINE 500 MG: 500 TABLET, FILM COATED, EXTENDED RELEASE ORAL at 08:36

## 2020-01-01 RX ADMIN — ATORVASTATIN CALCIUM 20 MG: 40 TABLET, FILM COATED ORAL at 20:53

## 2020-01-01 RX ADMIN — FERROUS SULFATE TAB 325 MG (65 MG ELEMENTAL FE) 325 MG: 325 (65 FE) TAB at 08:04

## 2020-01-01 RX ADMIN — RANOLAZINE 500 MG: 500 TABLET, FILM COATED, EXTENDED RELEASE ORAL at 10:45

## 2020-01-01 RX ADMIN — FOLIC ACID 1 MG: 1 TABLET ORAL at 20:48

## 2020-01-01 RX ADMIN — DOCUSATE SODIUM 100 MG: 100 CAPSULE, LIQUID FILLED ORAL at 10:45

## 2020-01-01 RX ADMIN — PANTOPRAZOLE SODIUM 40 MG: 40 INJECTION, POWDER, FOR SOLUTION INTRAVENOUS at 08:36

## 2020-01-01 RX ADMIN — METOPROLOL TARTRATE 12.5 MG: 25 TABLET, FILM COATED ORAL at 09:57

## 2020-01-01 RX ADMIN — DOCUSATE SODIUM 100 MG: 100 CAPSULE, LIQUID FILLED ORAL at 22:37

## 2020-01-01 RX ADMIN — BUMETANIDE 0.25 MG: 0.5 TABLET ORAL at 09:57

## 2020-01-01 RX ADMIN — FOLIC ACID 1 MG: 1 TABLET ORAL at 21:06

## 2020-01-01 RX ADMIN — PANTOPRAZOLE SODIUM 40 MG: 40 TABLET, DELAYED RELEASE ORAL at 17:03

## 2020-01-01 RX ADMIN — TAMSULOSIN HYDROCHLORIDE 0.4 MG: 0.4 CAPSULE ORAL at 21:09

## 2020-01-01 RX ADMIN — FERROUS SULFATE TAB 325 MG (65 MG ELEMENTAL FE) 325 MG: 325 (65 FE) TAB at 08:36

## 2020-01-01 RX ADMIN — PANTOPRAZOLE SODIUM 40 MG: 40 TABLET, DELAYED RELEASE ORAL at 18:54

## 2020-01-01 RX ADMIN — POLYETHYLENE GLYCOL 3350 17 G: 17 POWDER, FOR SOLUTION ORAL at 08:03

## 2020-01-01 RX ADMIN — SODIUM CHLORIDE 500 ML: 9 INJECTION, SOLUTION INTRAVENOUS at 17:39

## 2020-01-01 RX ADMIN — SODIUM CHLORIDE 125 MG: 9 INJECTION, SOLUTION INTRAVENOUS at 18:54

## 2020-01-01 RX ADMIN — IPRATROPIUM BROMIDE AND ALBUTEROL SULFATE 3 ML: 2.5; .5 SOLUTION RESPIRATORY (INHALATION) at 16:07

## 2020-01-01 RX ADMIN — METOPROLOL TARTRATE 12.5 MG: 25 TABLET, FILM COATED ORAL at 08:53

## 2020-01-01 RX ADMIN — IPRATROPIUM BROMIDE AND ALBUTEROL SULFATE 3 ML: 2.5; .5 SOLUTION RESPIRATORY (INHALATION) at 06:58

## 2020-01-01 RX ADMIN — DOCUSATE SODIUM 100 MG: 100 CAPSULE, LIQUID FILLED ORAL at 09:57

## 2020-01-01 RX ADMIN — SODIUM CHLORIDE, PRESERVATIVE FREE 10 ML: 5 INJECTION INTRAVENOUS at 19:50

## 2020-01-01 RX ADMIN — VITAM B12 100 MCG: 100 TAB at 08:05

## 2020-01-01 RX ADMIN — VITAM B12 100 MCG: 100 TAB at 08:40

## 2020-01-01 RX ADMIN — IPRATROPIUM BROMIDE AND ALBUTEROL SULFATE 3 ML: 2.5; .5 SOLUTION RESPIRATORY (INHALATION) at 08:21

## 2020-01-01 RX ADMIN — METOPROLOL TARTRATE 50 MG: 50 TABLET, FILM COATED ORAL at 08:40

## 2020-01-01 RX ADMIN — IPRATROPIUM BROMIDE AND ALBUTEROL SULFATE 3 ML: 2.5; .5 SOLUTION RESPIRATORY (INHALATION) at 21:01

## 2020-01-01 RX ADMIN — ATORVASTATIN CALCIUM 20 MG: 40 TABLET, FILM COATED ORAL at 20:47

## 2020-01-01 RX ADMIN — FOLIC ACID 1 MG: 1 TABLET ORAL at 20:53

## 2020-01-01 RX ADMIN — PANTOPRAZOLE SODIUM 80 MG: 40 INJECTION, POWDER, FOR SOLUTION INTRAVENOUS at 17:40

## 2020-01-01 RX ADMIN — SODIUM CHLORIDE, PRESERVATIVE FREE 10 ML: 5 INJECTION INTRAVENOUS at 08:46

## 2020-01-01 RX ADMIN — POLYETHYLENE GLYCOL 3350 17 G: 17 POWDER, FOR SOLUTION ORAL at 09:57

## 2020-01-01 RX ADMIN — METOPROLOL TARTRATE 50 MG: 50 TABLET, FILM COATED ORAL at 08:04

## 2020-01-01 RX ADMIN — VITAM B12 100 MCG: 100 TAB at 08:36

## 2020-01-01 RX ADMIN — METOPROLOL TARTRATE 12.5 MG: 25 TABLET, FILM COATED ORAL at 08:04

## 2020-01-01 RX ADMIN — PANTOPRAZOLE SODIUM 40 MG: 40 INJECTION, POWDER, FOR SOLUTION INTRAVENOUS at 08:53

## 2020-01-01 RX ADMIN — FERROUS SULFATE TAB 325 MG (65 MG ELEMENTAL FE) 325 MG: 325 (65 FE) TAB at 10:45

## 2020-01-01 RX ADMIN — FOLIC ACID 1 MG: 1 TABLET ORAL at 20:42

## 2020-01-01 RX ADMIN — SODIUM CHLORIDE 125 MG: 9 INJECTION, SOLUTION INTRAVENOUS at 17:36

## 2020-01-01 RX ADMIN — RANOLAZINE 500 MG: 500 TABLET, FILM COATED, EXTENDED RELEASE ORAL at 08:40

## 2020-01-01 RX ADMIN — BISACODYL 10 MG: 5 TABLET, COATED ORAL at 05:26

## 2020-01-01 RX ADMIN — METOPROLOL TARTRATE 50 MG: 50 TABLET, FILM COATED ORAL at 20:53

## 2020-01-01 RX ADMIN — SODIUM CHLORIDE, PRESERVATIVE FREE 10 ML: 5 INJECTION INTRAVENOUS at 08:44

## 2020-01-01 RX ADMIN — TAMSULOSIN HYDROCHLORIDE 0.4 MG: 0.4 CAPSULE ORAL at 19:49

## 2020-01-01 RX ADMIN — ATORVASTATIN CALCIUM 20 MG: 40 TABLET, FILM COATED ORAL at 21:09

## 2020-01-01 RX ADMIN — METOPROLOL TARTRATE 50 MG: 50 TABLET, FILM COATED ORAL at 20:42

## 2020-01-01 RX ADMIN — METOPROLOL TARTRATE 50 MG: 50 TABLET, FILM COATED ORAL at 08:36

## 2020-01-01 RX ADMIN — FOLIC ACID 1 MG: 1 TABLET ORAL at 19:49

## 2020-01-01 RX ADMIN — DOCUSATE SODIUM 100 MG: 100 CAPSULE, LIQUID FILLED ORAL at 08:04

## 2020-01-01 RX ADMIN — TAMSULOSIN HYDROCHLORIDE 0.4 MG: 0.4 CAPSULE ORAL at 20:41

## 2020-01-01 RX ADMIN — SODIUM CHLORIDE, PRESERVATIVE FREE 10 ML: 5 INJECTION INTRAVENOUS at 09:02

## 2020-01-01 RX ADMIN — VITAM B12 100 MCG: 100 TAB at 08:04

## 2020-01-01 RX ADMIN — FOLIC ACID 1 MG: 1 TABLET ORAL at 21:09

## 2020-01-01 RX ADMIN — SODIUM CHLORIDE, PRESERVATIVE FREE 10 ML: 5 INJECTION INTRAVENOUS at 21:10

## 2020-01-01 RX ADMIN — VITAM B12 100 MCG: 100 TAB at 08:53

## 2020-01-01 RX ADMIN — TAMSULOSIN HYDROCHLORIDE 0.4 MG: 0.4 CAPSULE ORAL at 20:53

## 2020-01-01 RX ADMIN — PANTOPRAZOLE SODIUM 40 MG: 40 INJECTION, POWDER, FOR SOLUTION INTRAVENOUS at 17:37

## 2020-01-01 RX ADMIN — BUMETANIDE 1 MG: 0.25 INJECTION INTRAMUSCULAR; INTRAVENOUS at 16:09

## 2020-01-01 RX ADMIN — FUROSEMIDE 20 MG: 10 INJECTION, SOLUTION INTRAMUSCULAR; INTRAVENOUS at 06:39

## 2020-01-01 RX ADMIN — ATORVASTATIN CALCIUM 20 MG: 40 TABLET, FILM COATED ORAL at 19:49

## 2020-01-01 RX ADMIN — PANTOPRAZOLE SODIUM 40 MG: 40 TABLET, DELAYED RELEASE ORAL at 05:29

## 2020-01-01 RX ADMIN — BUMETANIDE 0.25 MG: 0.5 TABLET ORAL at 08:04

## 2020-01-01 RX ADMIN — SODIUM CHLORIDE, PRESERVATIVE FREE 10 ML: 5 INJECTION INTRAVENOUS at 10:55

## 2020-01-01 RX ADMIN — IPRATROPIUM BROMIDE AND ALBUTEROL SULFATE 3 ML: 2.5; .5 SOLUTION RESPIRATORY (INHALATION) at 01:12

## 2020-01-01 RX ADMIN — LEVOTHYROXINE SODIUM 50 MCG: 50 TABLET ORAL at 05:30

## 2020-01-27 PROBLEM — K92.2 UPPER GI BLEED: Status: ACTIVE | Noted: 2020-01-01

## 2020-01-27 PROBLEM — J96.11 CHRONIC RESPIRATORY FAILURE WITH HYPOXIA (HCC): Status: ACTIVE | Noted: 2020-01-01

## 2020-01-27 PROBLEM — I48.0 PAROXYSMAL ATRIAL FIBRILLATION (HCC): Status: ACTIVE | Noted: 2020-01-01

## 2020-01-27 PROBLEM — R55 SYNCOPE: Status: ACTIVE | Noted: 2020-01-01

## 2020-01-27 PROBLEM — N18.30 ACUTE RENAL FAILURE SUPERIMPOSED ON STAGE 3 CHRONIC KIDNEY DISEASE (HCC): Status: ACTIVE | Noted: 2019-01-01

## 2020-01-27 PROBLEM — S12.9XXA: Status: ACTIVE | Noted: 2020-01-01

## 2020-01-29 PROBLEM — E03.9 HYPOTHYROIDISM: Status: ACTIVE | Noted: 2020-01-01

## 2020-02-01 NOTE — PROGRESS NOTES
"                  Clinical Nutrition     Reason for Visit:   NPO/Clear liquid > 5 days    Patient Name: Rodney Looney  YOB: 1934  MRN: [id number]  Date of Encounter: 02/01/20 9:08 AM  Admission date: 1/27/2020    Nutrition Assessment   Assessment     Admission diagnosis  Upper GI bleed, stable    Additional diagnosis/conditions/procedures this admission  Teardrop fracture C-spine - cervical collar  HFrEF  Acute/chronic respiratory failure with hypoxia, improving  Anemia, acute blood loss  Deconditioning  CARINA / CKD3  Hypothyroidism, new dx    GI - defer EGD d/t respiratory status    Additional PMH/procedures:  Afib  HTN  HLP  CAD  GI bleed  Large cratered duodenal ulcer (7/2019)  GERD  Ulcers  Tobacco  Chronic respiratory failure, home O2  BPH  CKD3      CABG (6/2019)  Hernia repair  RLL lobectomy  VATS, chest tube (7/2019)    Reported/Observed/Food/Nutrition Related History:      RD notes EGD deferred d/t worsening respiratory status. Patient tolerating clear liquid diet. Multiple attempts to speak with patient on diet tolerance, pt sleeping soundly, no family present. Spoke with RN who reports patient did not sleep well last night but ate 100% of breakfast and is tolerating clear liquids without issues. Contacted hospitalist who confirms OK to advance patient diet to solids.      Anthropometrics     Height: 182.9 cm (72\")  Last filed wt: Weight: 82.9 kg (182 lb 12.8 oz) (01/30/20 0343)  Weight Method: Bed scale    BMI: BMI (Calculated): 25.1  Overweight: 25.0-29.9kg/m2     Ideal Body Weight (IBW) (kg): 82.07  Admission wt: 185 lb  Method obtained: weight per charting 1/27 no method listed    Labs reviewed     Results from last 7 days   Lab Units 02/01/20  0742 01/31/20  0801 01/30/20  0755  01/28/20  0514 01/27/20  1513   GLUCOSE mg/dL 82 93 93   < > 179* 160*   BUN mg/dL 62* 76* 83*   < > 87* 87*   CREATININE mg/dL 2.23* 2.27* 2.24*   < > 2.46* 2.43*   SODIUM mmol/L 135* 136 138   < > 142 141 "   CHLORIDE mmol/L 103 103 104   < > 109* 107   POTASSIUM mmol/L 4.4 4.4 4.4   < > 5.2 4.7   PHOSPHORUS mg/dL 3.2  --   --   --   --   --    MAGNESIUM mg/dL  --   --   --   --   --  2.1   ALT (SGPT) U/L  --   --   --   --  9 9    < > = values in this interval not displayed.     Results from last 7 days   Lab Units 02/01/20  0742 01/28/20  0514 01/27/20  1513   ALBUMIN g/dL 2.80* 2.80* 3.10*       Medications reviewed   Pertinent: bumex, iron IV, folic acid, synthroid, protonix, miralax, ranexa, VIT B12      Current Nutrition Prescription     PO: Diet Clear Liquid   Oral Nutrition Supplement: Boost Breeze x3/d  Intake: 96% x 6 meals on clear liquids (1/29 - 2/1)    Nutrition Diagnosis     1/30 updated 2/1  Problem Inadequate oral intake   Etiology GI status   Signs/Symptoms Clear liquid diet >72 hours   Status: resolved, OK per MD to advance diet to solids    Nutrition Intervention   1.  Follow treatment progress, Care plan reviewed  2.  RD spoke with MD who OK'd diet advancement to solids  3. Menu adjusted- Regular, GI soft, Cardiac  4. RD to continue to monitor patient PO intake and tolerance with diet advancement    Goal:   General: Nutrition support treatment  PO: Tolerate PO      Monitoring/Evaluation:   Per protocol, I&O, PO intake, Supplement intake, GI status, Symptoms    Will Continue to follow per protocol    Sima Wagner RDN, LD  Time Spent: 30 minutes

## 2020-02-01 NOTE — PROGRESS NOTES
[facility] Medicine Services  PROGRESS NOTE    Patient Name: Rodney Looney  : 1934  MRN: [id number]    Date of Admission: 2020  Primary Care Physician: Dana Isidro DO    Subjective   Subjective     CC:f/u GI bleed and SOA    HPI: No acute events overnight.    :Review of Systems  Unable to obtain as patient sleeping    All systems reviewed and negative except as per HPI above  Objective   Objective     Vital Signs:   Temp:  [96.2 °F (35.7 °C)-97.8 °F (36.6 °C)] 97.4 °F (36.3 °C)  Heart Rate:  [55-82] 82  Resp:  [16-20] 16  BP: ()/(51-72) 121/71        Physical Exam:  Constitutional: Chronically ill-appearing male in no acute distress, asleep  Respiratory: No respiratory distress, diffuse coarse BS on 4L NC   Cardiovascular: RRR, no murmurs, rubs, or gallops, palpable pedal pulses bilaterally  Gastrointestinal: Positive bowel sounds, soft, nontender, nondistended  Musculoskeletal: No bilateral ankle edema  Psychiatric: Appropriate affect, cooperative  Skin: No rashes, bilateral upper extremity bruising    Results Reviewed:  Results from last 7 days   Lab Units 20  0742 20  2004 20  0820  0520  1513   WBC 10*3/mm3  --   --  4.64  --  5.20  --  5.54   HEMOGLOBIN g/dL 7.9* 7.7* 8.2*   < > 6.9*   < > 5.6*   HEMATOCRIT % 26.0* 25.0* 28.1*   < > 21.9*   < > 18.5*   PLATELETS 10*3/mm3  --   --  69*  --  90*  --  110*    < > = values in this interval not displayed.     Results from last 7 days   Lab Units 20  0742 20  0820  07520  0514 201 20  1513   SODIUM mmol/L 135* 136 138   < > 142  --  141   POTASSIUM mmol/L 4.4 4.4 4.4   < > 5.2  --  4.7   CHLORIDE mmol/L 103 103 104   < > 109*  --  107   CO2 mmol/L 22.0 22.0 23.0   < > 20.0*  --  23.0   BUN mg/dL 62* 76* 83*   < > 87*  --  87*   CREATININE mg/dL 2.23* 2.27* 2.24*   < > 2.46*  --  2.43*   GLUCOSE mg/dL 82 93 93   < > 179*  --   160*   CALCIUM mg/dL 8.6 9.2 9.3   < > 9.5  --  9.8   ALT (SGPT) U/L  --   --   --   --  9  --  9   AST (SGOT) U/L  --   --   --   --  15  --  12   TROPONIN T ng/mL  --   --   --   --  0.030 0.038* 0.031*   PROBNP pg/mL  --   --   --   --   --   --  4,379.0*    < > = values in this interval not displayed.     Estimated Creatinine Clearance: 28.4 mL/min (A) (by C-G formula based on SCr of 2.23 mg/dL (H)).    Microbiology Results Abnormal     Procedure Component Value - Date/Time    Blood Culture - Blood, Arm, Left [[id number]] Collected:  01/27/20 1746    Lab Status:  Preliminary result Specimen:  Blood from Arm, Left Updated:  01/31/20 1900     Blood Culture No growth at 4 days    Blood Culture - Blood, Hand, Left [[id number]] Collected:  01/27/20 1750    Lab Status:  Preliminary result Specimen:  Blood from Hand, Left Updated:  01/31/20 1900     Blood Culture No growth at 4 days          Imaging Results (Last 24 Hours)     ** No results found for the last 24 hours. **          Results for orders placed during the hospital encounter of 06/07/19   Adult Transthoracic Echo Complete W/ Cont if Necessary Per Protocol    Narrative · Estimated EF appears to be in the range of 61 - 65%.  · Left ventricular systolic function is normal.  · Right ventricular cavity is mildly dilated.  · Left atrial cavity size is mild-to-moderately dilated.  · Mild aortic valve regurgitation is present.  · Calculated right ventricular systolic pressure from tricuspid   regurgitation is 69 mmHg.  · Moderate to severe tricuspid valve regurgitation is present.  · Thin fibrinous mobile echogenic structures possibly attached to the   right atrial lead, versus prominent Chiari network. ZI recommended for   follow-up if clinically appropriate..          I have reviewed the medications:  Scheduled Meds:    atorvastatin 20 mg Oral Nightly   bumetanide 0.25 mg Oral Daily   ferric gluconate 125 mg Intravenous Daily Before Supper   ferrous sulfate 325 mg  Oral Daily With Breakfast   folic acid 1 mg Oral Nightly   levothyroxine 50 mcg Oral Q AM   metoprolol tartrate 12.5 mg Oral BID   pantoprazole 40 mg Oral BID AC   polyethylene glycol 17 g Oral Daily   ranolazine 500 mg Oral Daily   sodium chloride 10 mL Intravenous Q12H   tamsulosin 0.4 mg Oral Nightly   vitamin B-12 100 mcg Oral Daily     Continuous Infusions:   PRN Meds:.•  acetaminophen  •  bisacodyl  •  bisacodyl  •  docusate sodium  •  HYDROcodone-acetaminophen  •  ipratropium-albuterol  •  ondansetron  •  sodium chloride  •  sodium chloride    Assessment/Plan   Assessment & Plan     Active Hospital Problems    Diagnosis  POA   • Hypothyroidism [E03.9]  Yes   • Upper GI bleed [K92.2]  Yes   • Paroxysmal atrial fibrillation (CMS/HCC) [I48.0]  Yes   • Teardrop fracture of cervical vertebra (CMS/HCC) [S12.9XXA]  Yes   • Chronic respiratory failure with hypoxia (CMS/[facility]) [J96.11]  Yes   • Syncope [R55]  Yes   • GERD without esophagitis [K21.9]  Yes   • Acute renal failure superimposed on stage 3 chronic kidney disease (CMS/HCC) [N17.9, N18.3]  Yes   • Symptomatic anemia [D64.9]  Yes      Resolved Hospital Problems   No resolved problems to display.        Brief Hospital Course to date:  Rodney Looney is a 85 y.o. male with past medical history of GI bleed, paroxysmal atrial fibrillation, CKD stage III, CAD, chronic respiratory failure on O2, lung cancer, tobacco abuse.  Who presented to the ED status post fall.  He has been admitted with likely GI bleed and anterior C5 vertebral body fracture       GI bleed, stable  -Suspected to be upper, this is his third episode since July 2019  -s/p 3 units PRBC, H&H currently stable, GI following, suspect PUD, defer EGD for now, rec to continue BID PPI for one month then q daily thereafter.  -EGD deferred due to respiratory distress     Acute on chronic respiratory failure with hypoxia, improved, currently back at his baseline  - repeat cxr shows right basilar, atelectatic  change, patchy airspace disease, CT chest does show advanced bullous changes of the lungs unchanged from prior scan,, has some peribronchial thickening with focal atelectasis or pneumonia and small effusion in the left lower lung  - continue O2 supp, duo nebs, continue home Bumex     Anemia, likely sec to acute blood loss  - Secondary to UGI bleed while on chronic anticoagulation  - H&H currently stable, continue to trend and transfuse as indicated,  - Continue B12 and folate and iron     CARINA on CKD stage III, marginal improvement  -Baseline Cr approximately 1.4, peaked to 2.46.  Now stable at 2.23.  -Suspect likely prerenal, elevated BUN likely secondary to GI bleed  -Renal following     Paroxysmal atrial fibrillation  -Rates are currently well controlled, continue beta-blocker, digoxin level  remains high, continue to hold   -Patient previously on Xarelto, however, with ongoing recurrent bleeding, risks currently outweigh benefits for resumption of anticoagulation.  This was by prior provider discussed this with patient and spouse and they were in agreement.     Teardrop fracture of C5 s/p fall likely secondary to syncope  -Suspect etiology secondary to above  -Neurosurgery consulted in the ED, recommend c-collar with outpatient follow-up at discharge  -PT/OT     Hypothyroidism (new diagnosis)  -Significantly elevated TSH, with subsequent low free T4  -We will start Synthroid replacement at 50 mcg QD, he will need PCP follow-up in 4 weeks for repeat labs     CAD-stable continue statin     DVT Prophylaxis: Mechanical     Disposition: anticipate discharge to rehab on Monday 2/3/2020.  CM following    CODE STATUS:   Code Status and Medical Interventions:   Ordered at: 01/27/20 1825     Level Of Support Discussed With:    Patient     Code Status:    CPR     Medical Interventions (Level of Support Prior to Arrest):    Full       Electronically signed by Kaycee Boateng MD, 02/01/20, 9:29 AM.

## 2020-02-01 NOTE — PLAN OF CARE
Problem: Patient Care Overview  Goal: Plan of Care Review  Outcome: Ongoing (interventions implemented as appropriate)  Flowsheets (Taken 2/1/2020 1635)  Progress: improving  Plan of Care Reviewed With: patient; daughter  Note:   Vitals WNL. Pt remains on 4L O2 via NC. A. Fib on telemetry. Pt has slept the majority of the shift but easily arouses to voice. Oriented X 4. Pt tolerates ambulation with assist X 1-2 and a walker. Pt denies pain. Reported numbness in bilateral feet. Pt voids spontaneously. Large BM this morning. Pt on specialty bed. Buttocks and heels noted to be red but blanchable. Pt on specialty bed. Turned Q 2 hrs. Cervical collar in place at all times. Possible discharge to [facility] Monday.

## 2020-02-01 NOTE — PLAN OF CARE
Pt remains on o2 at 4liters per nc. Denies any sob this shift.  Vs noted.  Tele noted sinus arrythmia.  Denies pain .  Remains on clear liquids.  Pt on specialty bed.  Redness noted to buttocks.  Cont to turn every 2 hours however pt wiggles himself back over after few minutes of each turn.  Wound coated with z guard liberally.  Soft collar on.

## 2020-02-01 NOTE — PROGRESS NOTES
"   LOS: 5 days    Patient Care Team:  Dana Isidro DO as PCP - General (Family Medicine)    Reason For Visit: F/U CARINA ON CKD  Subjective           Review of Systems:    Pulm: No soa   CV:  No CP      Objective       atorvastatin 20 mg Oral Nightly   bumetanide 0.25 mg Oral Daily   ferric gluconate 125 mg Intravenous Daily Before Supper   ferrous sulfate 325 mg Oral Daily With Breakfast   folic acid 1 mg Oral Nightly   levothyroxine 50 mcg Oral Q AM   metoprolol tartrate 12.5 mg Oral BID   pantoprazole 40 mg Oral BID AC   polyethylene glycol 17 g Oral Daily   ranolazine 500 mg Oral Daily   sodium chloride 10 mL Intravenous Q12H   tamsulosin 0.4 mg Oral Nightly   vitamin B-12 100 mcg Oral Daily            Vital Signs:  Blood pressure 102/64, pulse 77, temperature 97.8 °F (36.6 °C), temperature source Axillary, resp. rate 18, height 182.9 cm (72\"), weight 82.9 kg (182 lb 12.8 oz), SpO2 100 %.    Flowsheet Rows      First Filed Value   Admission Height  182.9 cm (72\") Documented at 01/27/2020 1510   Admission Weight  83.9 kg (185 lb) Documented at 01/27/2020 1510          01/31 0701 - 02/01 0700  In: -   Out: 1050 [Urine:1050]    Physical Exam:    General Appearance: NAD, alert and cooperative, Ox3  Eyes: PER, conjunctivae and sclerae normal, no icterus  Lungs: respirations regular and unlabored, no crepitus, clear to auscultation  Heart/CV: regular rhythm & normal rate, no murmur, no gallop, no rub and no edema  Abdomen: not distended, soft, non-tender, no masses,  bowel sounds present  Skin: No rash, Warm and dry    Radiology:            Labs:  Results from last 7 days   Lab Units 02/01/20  0742 01/31/20  2004 01/31/20  0802  01/28/20  0514  01/27/20  1513   WBC 10*3/mm3  --   --  4.64  --  5.20  --  5.54   HEMOGLOBIN g/dL 7.9* 7.7* 8.2*   < > 6.9*   < > 5.6*   HEMATOCRIT % 26.0* 25.0* 28.1*   < > 21.9*   < > 18.5*   PLATELETS 10*3/mm3  --   --  69*  --  90*  --  110*    < > = values in this interval not " displayed.     Results from last 7 days   Lab Units 02/01/20  0742 01/31/20  0801 01/30/20  0755 01/29/20  0757 01/28/20  0514 01/27/20  1513   SODIUM mmol/L 135* 136 138 140 142 141   POTASSIUM mmol/L 4.4 4.4 4.4 4.6 5.2 4.7   CHLORIDE mmol/L 103 103 104 106 109* 107   CO2 mmol/L 22.0 22.0 23.0 23.0 20.0* 23.0   BUN mg/dL 62* 76* 83* 90* 87* 87*   CREATININE mg/dL 2.23* 2.27* 2.24* 2.29* 2.46* 2.43*   CALCIUM mg/dL 8.6 9.2 9.3 9.4 9.5 9.8   PHOSPHORUS mg/dL 3.2  --   --   --   --   --    MAGNESIUM mg/dL  --   --   --   --   --  2.1   ALBUMIN g/dL 2.80*  --   --   --  2.80* 3.10*     Results from last 7 days   Lab Units 02/01/20  0742   GLUCOSE mg/dL 82       Results from last 7 days   Lab Units 01/28/20  0514   ALK PHOS U/L 82   BILIRUBIN mg/dL 0.3   ALT (SGPT) U/L 9   AST (SGOT) U/L 15     Results from last 7 days   Lab Units 01/27/20  1803   PH, ARTERIAL pH units 7.452*   PO2 ART mm Hg 69.0*   PCO2, ARTERIAL mm Hg 31.3   HCO3 ART mmol/L 21.9       Results from last 7 days   Lab Units 01/28/20  1033   COLOR UA  Yellow   CLARITY UA  Clear   PH, URINE  <=5.0   SPECIFIC GRAVITY, URINE  1.015   GLUCOSE UA  Negative   KETONES UA  Negative   BILIRUBIN UA  Negative   PROTEIN UA  Negative   BLOOD UA  Negative   LEUKOCYTES UA  Negative   NITRITE UA  Negative       Estimated Creatinine Clearance: 28.4 mL/min (A) (by C-G formula based on SCr of 2.23 mg/dL (H)).      Assessment       GERD without esophagitis    Acute renal failure superimposed on stage 3 chronic kidney disease (CMS/HCC)    Symptomatic anemia    Upper GI bleed    Paroxysmal atrial fibrillation (CMS/HCC)    Teardrop fracture of cervical vertebra (CMS/HCC)    Chronic respiratory failure with hypoxia (CMS/HCC)    Syncope    Hypothyroidism            Impression: NONOLIGURIC CARINA ON CKD. GFR IS A LITTLE BETTER. ANEMIA.            Recommendations: LAB AM.      Duke Melo MD  02/01/20  2:29 PM

## 2020-02-01 NOTE — PLAN OF CARE
Problem: Patient Care Overview  Goal: Plan of Care Review  Outcome: Ongoing (interventions implemented as appropriate)  Flowsheets (Taken 2/1/2020 1510)  Progress: improving  Plan of Care Reviewed With: patient  Outcome Summary: Patient ambulated 140 feet with RW and step through gait pattern, limited by fatigue and SOA. O2 sats remained 85-90% with gait and seated ther ex. O2 sats in high 90's with supine ther ex. Required less rest breaks with ther ex today. Will continue to progress mobility training, strengthening, and cardiovascular endurance training.

## 2020-02-01 NOTE — THERAPY TREATMENT NOTE
Patient Name: Rodney Looney  : 1934    MRN: [id number]                              Today's Date: 2020       Admit Date: 2020    Visit Dx:     ICD-10-CM ICD-9-CM   1. Anemia, unspecified type D64.9 285.9   2. Melena K92.1 578.1   3. Elevated troponin R79.89 790.6   4. Other closed nondisplaced fracture of fifth cervical vertebra, initial encounter (CMS/[facility]) S12.491A 805.05   5. Impaired mobility and ADLs Z74.09 799.89     Patient Active Problem List   Diagnosis   • Right mid-lower lobe infiltrate    • Serratia bacteremia    • Pacemaker infection s/p lead extraction    • H/O Stage [id number] NSC lung cancer s/p RL lobectomy 19   • History of lobectomy of lung   • Hypoxia   • Stage III CKD, GFR 30-59 ml/min    • Coronary artery disease involving native coronary artery of native heart   • Hypercholesterolemia   • Essential hypertension   • Paroxysmal atrial fibrillation (CMS/[facility])   • GERD without esophagitis   • Fall   • Postprocedural seroma of skin and subcutaneous tissue following other procedure   • Acute renal failure superimposed on stage 3 chronic kidney disease (CMS/[facility])   • Buttock wound   • Edema of both lower extremities   • Elevated brain natriuretic peptide (BNP) level   • Chronic respiratory failure with hypoxia (CMS/[facility])   • 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)   • Emphysema (CMS/[facility])   • Volume overload   • Symptomatic anemia   • Duodenal ulcer   • Upper GI bleed   • Paroxysmal atrial fibrillation (CMS/[facility])   • Teardrop fracture of cervical vertebra (CMS/HCC)   • Chronic respiratory failure with hypoxia (CMS/[facility])   • Syncope   • Hypothyroidism     Past Medical History:   Diagnosis Date   • Atrial fibrillation (CMS/[facility])    • Buttock wound 2019    Bilateral buttock ulcerations/POA   • Cancer (CMS/HCC)    • Chronic respiratory failure with hypoxia, on home oxygen therapy (CMS/[facility]) 2019   • Coronary artery disease    • Elevated cholesterol    • GERD  (gastroesophageal reflux disease)    • History of BPH 2 yrs   • Hypertension    • Kidney disease    • Smoking      Past Surgical History:   Procedure Laterality Date   • BRONCHOSCOPY Right 7/10/2019    Procedure: BRONCHOSCOPY;  Surgeon: Rohan Boyer MD;  Location:  [facility];  Service: Cardiothoracic   • CARDIAC SURGERY     • CORONARY ARTERY BYPASS GRAFT N/A 6/13/2019    Procedure: MEDIANSTERNOTOMY, REMOVAL OF RETAINED PACING LEAD;  Surgeon: Rohan Boyer MD;  Location:  [facility];  Service: Cardiothoracic   • ENDOSCOPY N/A 7/23/2019    Procedure: ESOPHAGOGASTRODUODENOSCOPY;  Surgeon: Rafael Maldonado MD;  Location:  [facility];  Service: Gastroenterology   • ENDOSCOPY N/A 9/28/2019    Procedure: ESOPHAGOGASTRODUODENOSCOPY;  Surgeon: Brunner, Mark I, MD;  Location:  [facility];  Service: Gastroenterology   • HERNIA REPAIR     • LUNG LOBECTOMY      RIGHT LOWER   • THORACOSCOPY Right 7/10/2019    Procedure: THORACOSCOPY VIDEO ASSISTED, CHEST TUBE PLACEMENT, EVACUATION OF RIGHT CHEST WALL HEMATOMA;  Surgeon: Rohan Boyer MD;  Location:  [facility];  Service: Cardiothoracic     General Information     Row Name 02/01/20 1510          PT Evaluation Time/Intention    Document Type  therapy note (daily note)  -LR     Mode of Treatment  physical therapy;individual therapy  -LR     Row Name 02/01/20 1510          General Information    Patient Profile Reviewed?  yes  -LR     Existing Precautions/Restrictions  fall;oxygen therapy device and L/min;other (see comments) monitor O2 sats, soft cervical collar, [place]  -LR     Row Name 02/01/20 1510          Cognitive Assessment/Intervention- PT/OT    Orientation Status (Cognition)  oriented x 4  -LR     Row Name 02/01/20 1510          Safety Issues, Functional Mobility    Safety Issues Affecting Function (Mobility)  awareness of need for assistance;insight into deficits/self awareness;positioning of assistive device;safety precautions follow-through/compliance;steps too  close to assistive device  -LR     Impairments Affecting Function (Mobility)  pain;strength;range of motion (ROM)  -LR       User Key  (r) = Recorded By, (t) = Taken By, (c) = Cosigned By    Initials Name Provider Type    LR Sima Bedoya, PT Physical Therapist        Mobility     Row Name 02/01/20 1510          Bed Mobility Assessment/Treatment    Bed Mobility Assessment/Treatment  supine-sit;sit-supine  -LR     Supine-Sit [place] (Bed Mobility)  verbal cues;contact guard  -LR     Sit-Supine [place] (Bed Mobility)  verbal cues;supervision  -LR     Assistive Device (Bed Mobility)  head of bed elevated  -LR     Comment (Bed Mobility)  Verbal cues for correct sequencing of t/f in and out of bed. CGA to pull trunk into sitting, no assist for return to bed. Denied dizziness upon sitting up.   -LR     Row Name 02/01/20 1510          Transfer Assessment/Treatment    Comment (Transfers)  Verbal cues for correct hand placement with t/f. Performed x2.   -LR     Row Name 02/01/20 1510          Sit-Stand Transfer    Sit-Stand [place] (Transfers)  verbal cues;contact guard  -LR     Assistive Device (Sit-Stand Transfers)  walker, front-wheeled  -LR     Row Name 02/01/20 1510          Gait/Stairs Assessment/Training    99799 - Gait Training Minutes   10  -LR     [place] Level (Gait)  verbal cues;contact guard  -LR     Assistive Device (Gait)  walker, front-wheeled  -LR     Distance in Feet (Gait)  140  -LR     Pattern (Gait)  step-through  -LR     Deviations/Abnormal Patterns (Gait)  bilateral deviations;reza decreased;gait speed decreased;stride length decreased  -LR     Bilateral Gait Deviations  forward flexed posture;heel strike decreased  -LR     Comment (Gait/Stairs)  Patient ambulated with step through gait pattern at slow pace with forward flexed posture. Cues to keep RW closer and for upright posture. Improved briefly and then would revert back. Gait limited by fatigue and SOA.   -LR        User Key  (r) = Recorded By, (t) = Taken By, (c) = Cosigned By    Initials Name Provider Type    LR Sima Bedoya, PT Physical Therapist        Obj/Interventions     Row Name 02/01/20 1510          Therapeutic Exercise    Lower Extremity (Therapeutic Exercise)  gluteal sets;heel slides, bilateral;LAQ (long arc quad), bilateral;marching while seated;quad sets, bilateral;SAQ (short arc quad), bilateral;SLR (straight leg raise), bilateral;other (see comments) hip adduction pillow squeezes  -LR     Lower Extremity Range of Motion (Therapeutic Exercise)  hip abduction/adduction, bilateral;ankle dorsiflexion/plantar flexion, bilateral  -LR     Exercise Type (Therapeutic Exercise)  AROM (active range of motion);isotonic contraction, concentric;isometric contraction, static  -LR     Position (Therapeutic Exercise)  seated;supine  -LR     Sets/Reps (Therapeutic Exercise)  x10 reps each  -LR     Comment (Therapeutic Exercise)  cues for technique; no assist required. less rest breaks required today. O2 sats 85-90% with seated ther ex. O2 sats high 90's with supine ther ex  -LR     Row Name 02/01/20 1510          Static Sitting Balance    Level of [place] (Unsupported Sitting, Static Balance)  supervision  -LR     Sitting Position (Unsupported Sitting, Static Balance)  sitting on edge of bed  -LR     Time Able to Maintain Position (Unsupported Sitting, Static Balance)  more than 5 minutes  -LR     Row Name 02/01/20 1510          Dynamic Sitting Balance    Level of [place], Reaches Outside Midline (Sitting, Dynamic Balance)  supervision  -LR     Sitting Position, Reaches Outside Midline (Sitting, Dynamic Balance)  sitting on edge of bed  -LR     Row Name 02/01/20 1510          Static Standing Balance    Level of [place] (Supported Standing, Static Balance)  contact guard assist  -LR     Time Able to Maintain Position (Supported Standing, Static Balance)  30 to 45 seconds  -LR     Assistive Device  Utilized (Supported Standing, Static Balance)  walker, rolling  -LR     Row Name 02/01/20 1510          Dynamic Standing Balance    Level of [place], Reaches Outside Midline (Standing, Dynamic Balance)  contact guard assist  -LR     Time Able to Maintain Position, Reaches Outside Midline (Standing, Dynamic Balance)  more than 5 minutes  -LR     Assistive Device Utilized (Supported Standing, Dynamic Balance)  walker, rolling  -LR       User Key  (r) = Recorded By, (t) = Taken By, (c) = Cosigned By    Initials Name Provider Type    LR Sima Bedoya, PT Physical Therapist        Goals/Plan     Row Name 02/01/20 1510          Bed Mobility Goal 1 (PT)    Activity/Assistive Device (Bed Mobility Goal 1, PT)  bed mobility activities, all  -LR     [place] Level/Cues Needed (Bed Mobility Goal 1, PT)  conditional independence  -LR     Time Frame (Bed Mobility Goal 1, PT)  long term goal (LTG);10 days  -LR     Progress/Outcomes (Bed Mobility Goal 1, PT)  good progress toward goal;goal ongoing  -     Row Name 02/01/20 1510          Transfer Goal 1 (PT)    Activity/Assistive Device (Transfer Goal 1, PT)  sit-to-stand/stand-to-sit;walker, rolling  -LR     [place] Level/Cues Needed (Transfer Goal 1, PT)  supervision required  -LR     Time Frame (Transfer Goal 1, PT)  long term goal (LTG);10 days  -LR     Progress/Outcome (Transfer Goal 1, PT)  good progress toward goal;goal ongoing  -     Row Name 02/01/20 1510          Gait Training Goal 1 (PT)    Activity/Assistive Device (Gait Training Goal 1, PT)  gait (walking locomotion);walker, rolling  -LR     [place] Level (Gait Training Goal 1, PT)  supervision required  -LR     Time Frame (Gait Training Goal 1, PT)  long term goal (LTG);10 days  -LR     Progress/Outcome (Gait Training Goal 1, PT)  good progress toward goal;goal ongoing  -     Row Name 02/01/20 1510          Patient Education Goal (PT)    Activity (Patient Education Goal, PT)  brace use   -LR     [place]/Cues/Accuracy (Memory Goal 2, PT)  verbalizes understanding;demonstrates adequately  -LR     Time Frame (Patient Education Goal, PT)  long term goal (LTG);10 days  -LR       User Key  (r) = Recorded By, (t) = Taken By, (c) = Cosigned By    Initials Name Provider Type    LR Sima Bedoya, PT Physical Therapist        Clinical Impression     Row Name 02/01/20 1547          Pain Assessment    Additional Documentation  Pain Scale: Numbers Pre/Post-Treatment (Group)  -LR     Row Name 02/01/20 1547          Pain Scale: Numbers Pre/Post-Treatment    Pain Scale: Numbers, Pretreatment  0/10 - no pain  -LR     Pain Scale: Numbers, Post-Treatment  0/10 - no pain  -LR     Pain Intervention(s)  Ambulation/increased activity;Repositioned  -LR     Row Name 02/01/20 1547          Plan of Care Review    Plan of Care Reviewed With  patient  -LR     Progress  improving  -LR     Row Name 02/01/20 1547          Physical Therapy Clinical Impression    Criteria for Skilled Interventions Met (PT Clinical Impression)  yes;treatment indicated  -LR     Rehab Potential (PT Clinical Summary)  good, to achieve stated therapy goals  -LR     Row Name 02/01/20 1547          Vital Signs    Pre SpO2 (%)  92  -LR     O2 Delivery Pre Treatment  supplemental O2  -LR     Intra SpO2 (%)  85  -LR     O2 Delivery Intra Treatment  supplemental O2  -LR     Post SpO2 (%)  97  -LR     O2 Delivery Post Treatment  supplemental O2  -LR     Pre Patient Position  Supine  -LR     Intra Patient Position  Sitting  -LR     Post Patient Position  Supine  -LR     Row Name 02/01/20 1547          Positioning and Restraints    Pre-Treatment Position  in bed  -LR     Post Treatment Position  bed  -LR     In Bed  notified nsg;side lying left;call light within reach;encouraged to call for assist;exit alarm on;with nsg;side rails up x2;pillow between legs  -LR       User Key  (r) = Recorded By, (t) = Taken By, (c) = Cosigned By    Initials Name  Provider Type    LR Sima Bedoya, PT Physical Therapist        Outcome Measures     Row Name 02/01/20 1510          How much help from another person do you currently need...    Turning from your back to your side while in flat bed without using bedrails?  4  -LR     Moving from lying on back to sitting on the side of a flat bed without bedrails?  3  -LR     Moving to and from a bed to a chair (including a wheelchair)?  3  -LR     Standing up from a chair using your arms (e.g., wheelchair, bedside chair)?  3  -LR     Climbing 3-5 steps with a railing?  3  -LR     To walk in hospital room?  3  -LR     AM-PAC 6 Clicks Score (PT)  19  -LR     Row Name 02/01/20 1510          Functional Assessment    Outcome Measure Options  AM-PAC 6 Clicks Basic Mobility (PT)  -LR       User Key  (r) = Recorded By, (t) = Taken By, (c) = Cosigned By    Initials Name Provider Type    LR Sima Bedoya, PT Physical Therapist          PT Recommendation and Plan     Outcome Summary/Treatment Plan (PT)  Anticipated Discharge Disposition (PT): skilled nursing facility  Plan of Care Reviewed With: patient  Progress: improving  Outcome Summary: Patient ambulated 140 feet with RW and step through gait pattern, limited by fatigue and SOA. O2 sats remained 85-90% with gait and seated ther ex. O2 sats in high 90's with supine ther ex. Required less rest breaks with ther ex today. Will continue to progress mobility training, strengthening, and cardiovascular endurance training.     Time Calculation:   PT Charges     Row Name 02/01/20 1510             Time Calculation    Start Time  1510  -LR      PT Received On  02/01/20  -LR      PT Goal Re-Cert Due Date  02/07/20  -LR         Time Calculation- PT    Total Timed Code Minutes- PT  24 minute(s)  -LR         Timed Charges    27931 - PT Therapeutic Exercise Minutes  11  -LR      39589 - Gait Training Minutes   10  -LR      91259 - PT Therapeutic Activity Minutes  3  -LR        User Key   (r) = Recorded By, (t) = Taken By, (c) = Cosigned By    Initials Name Provider Type    Sima Frey, PT Physical Therapist        Therapy Charges for Today     Code Description Service Date Service Provider Modifiers Qty    77788768254 HC PT THER PROC EA 15 MIN 1/31/2020 Sima Bedoya, PT GP 1    81051848785 HC GAIT TRAINING EA 15 MIN 1/31/2020 Sima Bedoya, PT GP 1    [id number] HC PT THER PROC EA 15 MIN 2/1/2020 Sima Bedoya, PT GP 1    32433472350 HC GAIT TRAINING EA 15 MIN 2/1/2020 Sima Bedoya, PT GP 1          PT G-Codes  Outcome Measure Options: AM-PAC 6 Clicks Basic Mobility (PT)  AM-PAC 6 Clicks Score (PT): 19  AM-PAC 6 Clicks Score (OT): 15    Sima Bedoya, PT  2/1/2020

## 2020-02-02 NOTE — THERAPY TREATMENT NOTE
Patient Name: Rodney Looney  : 1934    MRN: [id number]                              Today's Date: 2020       Admit Date: 2020    Visit Dx:     ICD-10-CM ICD-9-CM   1. Anemia, unspecified type D64.9 285.9   2. Melena K92.1 578.1   3. Elevated troponin R79.89 790.6   4. Other closed nondisplaced fracture of fifth cervical vertebra, initial encounter (CMS/[facility]) S12.491A 805.05   5. Impaired mobility and ADLs Z74.09 799.89     Patient Active Problem List   Diagnosis   • Right mid-lower lobe infiltrate    • Serratia bacteremia    • Pacemaker infection s/p lead extraction    • H/O Stage [id number] NSC lung cancer s/p RL lobectomy 19   • History of lobectomy of lung   • Hypoxia   • Stage III CKD, GFR 30-59 ml/min    • Coronary artery disease involving native coronary artery of native heart   • Hypercholesterolemia   • Essential hypertension   • Paroxysmal atrial fibrillation (CMS/[facility])   • GERD without esophagitis   • Fall   • Postprocedural seroma of skin and subcutaneous tissue following other procedure   • Acute renal failure superimposed on stage 3 chronic kidney disease (CMS/[facility])   • Buttock wound   • Edema of both lower extremities   • Elevated brain natriuretic peptide (BNP) level   • Chronic respiratory failure with hypoxia (CMS/[facility])   • 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)   • Emphysema (CMS/[facility])   • Volume overload   • Symptomatic anemia   • Duodenal ulcer   • Upper GI bleed   • Paroxysmal atrial fibrillation (CMS/[facility])   • Teardrop fracture of cervical vertebra (CMS/HCC)   • Chronic respiratory failure with hypoxia (CMS/[facility])   • Syncope   • Hypothyroidism     Past Medical History:   Diagnosis Date   • Atrial fibrillation (CMS/[facility])    • Buttock wound 2019    Bilateral buttock ulcerations/POA   • Cancer (CMS/HCC)    • Chronic respiratory failure with hypoxia, on home oxygen therapy (CMS/[facility]) 2019   • Coronary artery disease    • Elevated cholesterol    • GERD  (gastroesophageal reflux disease)    • History of BPH 2 yrs   • Hypertension    • Kidney disease    • Smoking      Past Surgical History:   Procedure Laterality Date   • BRONCHOSCOPY Right 7/10/2019    Procedure: BRONCHOSCOPY;  Surgeon: Rohan Boyer MD;  Location:  [facility];  Service: Cardiothoracic   • CARDIAC SURGERY     • CORONARY ARTERY BYPASS GRAFT N/A 6/13/2019    Procedure: MEDIANSTERNOTOMY, REMOVAL OF RETAINED PACING LEAD;  Surgeon: Rohan Boyer MD;  Location:  [facility];  Service: Cardiothoracic   • ENDOSCOPY N/A 7/23/2019    Procedure: ESOPHAGOGASTRODUODENOSCOPY;  Surgeon: Rafael Maldonado MD;  Location:  [facility];  Service: Gastroenterology   • ENDOSCOPY N/A 9/28/2019    Procedure: ESOPHAGOGASTRODUODENOSCOPY;  Surgeon: Brunner, Mark I, MD;  Location:  [facility];  Service: Gastroenterology   • HERNIA REPAIR     • LUNG LOBECTOMY      RIGHT LOWER   • THORACOSCOPY Right 7/10/2019    Procedure: THORACOSCOPY VIDEO ASSISTED, CHEST TUBE PLACEMENT, EVACUATION OF RIGHT CHEST WALL HEMATOMA;  Surgeon: Rohan Boyer MD;  Location:  [facility];  Service: Cardiothoracic     General Information     Row Name 02/02/20 1618          PT Evaluation Time/Intention    Document Type  therapy note (daily note)  -SC     Mode of Treatment  occupational therapy  -SC     Row Name 02/02/20 1618          General Information    Patient Profile Reviewed?  yes  -SC     Existing Precautions/Restrictions  fall;oxygen therapy device and L/min;other (see comments) needs frequent rest stops  -SC     Row Name 02/02/20 1618          Cognitive Assessment/Intervention- PT/OT    Orientation Status (Cognition)  oriented x 4  -SC     Cognitive Assessment/Intervention Comment  alert, following commands  -SC       User Key  (r) = Recorded By, (t) = Taken By, (c) = Cosigned By    Initials Name Provider Type    SC Carolina Marquez, PT Physical Therapist        Mobility     Row Name 02/02/20 1618          Bed Mobility Assessment/Treatment     Comment (Bed Mobility)  uic  -SC     Row Name 02/02/20 1618          Transfer Assessment/Treatment    Comment (Transfers)  cues for safety -- needs to reach back  -SC     Row Name 02/02/20 1618          Sit-Stand Transfer    Sit-Stand [place] (Transfers)  contact guard;verbal cues  -SC     Assistive Device (Sit-Stand Transfers)  walker, front-wheeled  -SC     Row Name 02/02/20 1618          Gait/Stairs Assessment/Training    Gait/Stairs Assessment/Training  gait/ambulation independence  -SC     [place] Level (Gait)  verbal cues;contact guard  -SC     Assistive Device (Gait)  walker, front-wheeled  -SC     Distance in Feet (Gait)  350 x3 standing breaks  -SC     Pattern (Gait)  step-through  -SC     Deviations/Abnormal Patterns (Gait)  gait speed decreased  -SC     Bilateral Gait Deviations  forward flexed posture;heel strike decreased  -SC     Comment (Gait/Stairs)  Gt training focused on controling walker on turns. Needs safety cues to stay inside walker.  Need frequent standing breaks and cues to breath  -SC       User Key  (r) = Recorded By, (t) = Taken By, (c) = Cosigned By    Initials Name Provider Type    SC Carolina Marquez, PT Physical Therapist        Obj/Interventions     Row Name 02/02/20 1622          Dynamic Standing Balance    Level of [place], Reaches Outside Midline (Standing, Dynamic Balance)  contact guard assist  -SC     Time Able to Maintain Position, Reaches Outside Midline (Standing, Dynamic Balance)  more than 5 minutes  -SC     Assistive Device Utilized (Supported Standing, Dynamic Balance)  walker, rolling  -SC       User Key  (r) = Recorded By, (t) = Taken By, (c) = Cosigned By    Initials Name Provider Type    SC Carolina Marquez, PT Physical Therapist        Goals/Plan    No documentation.       Clinical Impression     Row Name 02/02/20 1623          Pain Assessment    Additional Documentation  Pain Scale: FACES Pre/Post-Treatment (Group)  -SC     Row Name 02/02/20 1627           Pain Scale: Numbers Pre/Post-Treatment    Pain Location  knee  -SC     Pain Intervention(s)  Repositioned  -SC     Row Name 02/02/20 1623          Pain Scale: FACES Pre/Post-Treatment    Pain: FACES Scale, Pretreatment  2-->hurts little bit  -SC     Pain: FACES Scale, Post-Treatment  2-->hurts little bit  -SC     Row Name 02/02/20 1623          Plan of Care Review    Plan of Care Reviewed With  patient  -SC     Progress  improving  -SC     Row Name 02/02/20 1623          Physical Therapy Clinical Impression    Criteria for Skilled Interventions Met (PT Clinical Impression)  yes;treatment indicated  -SC     Rehab Potential (PT Clinical Summary)  good, to achieve stated therapy goals  -SC     Row Name 02/02/20 1623          Vital Signs    Pre SpO2 (%)  91  -SC     O2 Delivery Pre Treatment  supplemental O2  -SC     Intra SpO2 (%)  81  -SC     O2 Delivery Intra Treatment  supplemental O2  -SC     Post SpO2 (%)  95  -SC     O2 Delivery Post Treatment  supplemental O2  -SC     Row Name 02/02/20 1623          Positioning and Restraints    Pre-Treatment Position  sitting in chair/recliner  -SC     Post Treatment Position  chair  -SC     In Chair  reclined;sitting;call light within reach;exit alarm on;notified [facility]       User Key  (r) = Recorded By, (t) = Taken By, (c) = Cosigned By    Initials Name Provider Type    Carolina Devine, PT Physical Therapist        Outcome Measures     Row Name 02/02/20 1625          How much help from another person do you currently need...    Turning from your back to your side while in flat bed without using bedrails?  4  -SC     Moving from lying on back to sitting on the side of a flat bed without bedrails?  3  -SC     Moving to and from a bed to a chair (including a wheelchair)?  3  -SC     Standing up from a chair using your arms (e.g., wheelchair, bedside chair)?  3  -SC     Climbing 3-5 steps with a railing?  3  -SC     To walk in hospital room?  3  -SC     AM-PAC 6 Clicks  Score (PT)  19  -SC     Row Name 02/02/20 1625          Functional Assessment    Outcome Measure Options  AM-PAC 6 Clicks Basic Mobility (PT)  -SC       User Key  (r) = Recorded By, (t) = Taken By, (c) = Cosigned By    Initials Name Provider Type    SC Carolina Marquez PT Physical Therapist          PT Recommendation and Plan  Planned Therapy Interventions (PT Eval): bed mobility training, gait training, home exercise program, stair training, patient/family education, postural re-education, transfer training  Outcome Summary/Treatment Plan (PT)  Anticipated Discharge Disposition (PT): skilled nursing facility  Plan of Care Reviewed With: patient  Progress: improving  Outcome Summary: Patient ambulation 350 with walker. Gait unsteady. He required frequent rest breaks to sustain is oxygen sats     Time Calculation:   PT Charges     Row Name 02/02/20 1551             Time Calculation    Start Time  1551  -SC      PT Received On  02/02/20  -SC      PT Goal Re-Cert Due Date  02/07/20  -SC         Time Calculation- PT    Total Timed Code Minutes- PT  23 minute(s)  -SC         Timed Charges    85553 - Gait Training Minutes   23  -SC        User Key  (r) = Recorded By, (t) = Taken By, (c) = Cosigned By    Initials Name Provider Type    SC Carolina Marquez PT Physical Therapist        Therapy Charges for Today     Code Description Service Date Service Provider Modifiers Qty    [id number] HC GAIT TRAINING EA 15 MIN 2/2/2020 Carolina Marquez, PT GP 2          PT G-Codes  Outcome Measure Options: AM-PAC 6 Clicks Basic Mobility (PT)  AM-PAC 6 Clicks Score (PT): 19  AM-PAC 6 Clicks Score (OT): 16    Carolina Marquez, PT  2/2/2020

## 2020-02-02 NOTE — PROGRESS NOTES
[facility] Medicine Services  PROGRESS NOTE    Patient Name: Rodney Looney  : 1934  MRN: [id number]    Date of Admission: 2020  Primary Care Physician: Dana Isidro,     Subjective   Subjective     CC:f/u GI bleed and SOA    HPI: No acute events overnight.  Has no complaints this morning.    :Review of Systems  General: denies fevers or chills  CV: denies chest pain  Resp: denies shortness of breath  Abd: denies abd pain, nausea      All systems reviewed and negative except as per HPI above  Objective   Objective     Vital Signs:   Temp:  [97.1 °F (36.2 °C)-97.8 °F (36.6 °C)] 97.8 °F (36.6 °C)  Heart Rate:  [58-83] 65  Resp:  [16-18] 18  BP: ()/(56-75) 123/75        Physical Exam:  Constitutional: Chronically ill-appearing male in no acute distress, pleasant  Respiratory: No respiratory distress, diffuse coarse BS on 4L NC   Cardiovascular: RRR, no murmurs, rubs, or gallops, palpable pedal pulses bilaterally  Gastrointestinal: Positive bowel sounds, soft, nontender, nondistended  Musculoskeletal: No bilateral ankle edema  Psychiatric: Appropriate affect, cooperative  Skin: No rashes, bilateral upper extremity bruising, bruises on face    Results Reviewed:  Results from last 7 days   Lab Units 20  0742 20  0820  0520  1513   WBC 10*3/mm3  --   --   --  4.64  --  5.20  --  5.54   HEMOGLOBIN g/dL 8.0* 7.9* 7.7* 8.2*   < > 6.9*   < > 5.6*   HEMATOCRIT % 25.7* 26.0* 25.0* 28.1*   < > 21.9*   < > 18.5*   PLATELETS 10*3/mm3  --   --   --  69*  --  90*  --  110*    < > = values in this interval not displayed.     Results from last 7 days   Lab Units 20  0742 20  0820/20  2251 20  1513   SODIUM mmol/L 135* 136 138   < > 142  --  141   POTASSIUM mmol/L 4.4 4.4 4.4   < > 5.2  --  4.7   CHLORIDE mmol/L 103 103 104   < > 109*  --  107   CO2 mmol/L 22.0  22.0 23.0   < > 20.0*  --  23.0   BUN mg/dL 62* 76* 83*   < > 87*  --  87*   CREATININE mg/dL 2.23* 2.27* 2.24*   < > 2.46*  --  2.43*   GLUCOSE mg/dL 82 93 93   < > 179*  --  160*   CALCIUM mg/dL 8.6 9.2 9.3   < > 9.5  --  9.8   ALT (SGPT) U/L  --   --   --   --  9  --  9   AST (SGOT) U/L  --   --   --   --  15  --  12   TROPONIN T ng/mL  --   --   --   --  0.030 0.038* 0.031*   PROBNP pg/mL  --   --   --   --   --   --  4,379.0*    < > = values in this interval not displayed.     Estimated Creatinine Clearance: 28.4 mL/min (A) (by C-G formula based on SCr of 2.23 mg/dL (H)).    Microbiology Results Abnormal     Procedure Component Value - Date/Time    Blood Culture - Blood, Arm, Left [[id number]] Collected:  01/27/20 1746    Lab Status:  Final result Specimen:  Blood from Arm, Left Updated:  02/01/20 1900     Blood Culture No growth at 5 days    Blood Culture - Blood, Hand, Left [[id number]] Collected:  01/27/20 1750    Lab Status:  Final result Specimen:  Blood from Hand, Left Updated:  02/01/20 1900     Blood Culture No growth at 5 days          Imaging Results (Last 24 Hours)     ** No results found for the last 24 hours. **          Results for orders placed during the hospital encounter of 06/07/19   Adult Transthoracic Echo Complete W/ Cont if Necessary Per Protocol    Narrative · Estimated EF appears to be in the range of 61 - 65%.  · Left ventricular systolic function is normal.  · Right ventricular cavity is mildly dilated.  · Left atrial cavity size is mild-to-moderately dilated.  · Mild aortic valve regurgitation is present.  · Calculated right ventricular systolic pressure from tricuspid   regurgitation is 69 mmHg.  · Moderate to severe tricuspid valve regurgitation is present.  · Thin fibrinous mobile echogenic structures possibly attached to the   right atrial lead, versus prominent Chiari network. ZI recommended for   follow-up if clinically appropriate..          I have reviewed the  medications:  Scheduled Meds:    atorvastatin 20 mg Oral Nightly   bumetanide 0.25 mg Oral Daily   ferric gluconate 125 mg Intravenous Daily Before Supper   ferrous sulfate 325 mg Oral Daily With Breakfast   folic acid 1 mg Oral Nightly   levothyroxine 50 mcg Oral Q AM   metoprolol tartrate 12.5 mg Oral BID   pantoprazole 40 mg Oral BID AC   polyethylene glycol 17 g Oral Daily   ranolazine 500 mg Oral Daily   sodium chloride 10 mL Intravenous Q12H   tamsulosin 0.4 mg Oral Nightly   vitamin B-12 100 mcg Oral Daily     Continuous Infusions:   PRN Meds:.•  acetaminophen  •  bisacodyl  •  bisacodyl  •  docusate sodium  •  HYDROcodone-acetaminophen  •  ipratropium-albuterol  •  ondansetron  •  sodium chloride  •  sodium chloride    Assessment/Plan   Assessment & Plan     Active Hospital Problems    Diagnosis  POA   • Hypothyroidism [E03.9]  Yes   • Upper GI bleed [K92.2]  Yes   • Paroxysmal atrial fibrillation (CMS/HCC) [I48.0]  Yes   • Teardrop fracture of cervical vertebra (CMS/HCC) [S12.9XXA]  Yes   • Chronic respiratory failure with hypoxia (CMS/HCC) [J96.11]  Yes   • Syncope [R55]  Yes   • GERD without esophagitis [K21.9]  Yes   • Acute renal failure superimposed on stage 3 chronic kidney disease (CMS/HCC) [N17.9, N18.3]  Yes   • Symptomatic anemia [D64.9]  Yes      Resolved Hospital Problems   No resolved problems to display.        Brief Hospital Course to date:  Rodney Looney is a 85 y.o. male with past medical history of GI bleed, paroxysmal atrial fibrillation, CKD stage III, CAD, chronic respiratory failure on O2, lung cancer, tobacco abuse.  Who presented to the ED status post fall.  He has been admitted with likely GI bleed and anterior C5 vertebral body fracture       GI bleed, stable  -Suspected to be upper, this is his third episode since July 2019  -s/p 3 units PRBC, H&H currently stable, GI following, suspect PUD, defer EGD for now, rec to continue BID PPI for one month then q daily thereafter.  -EGD  deferred due to respiratory distress     Acute on chronic respiratory failure with hypoxia, improved, currently back at his baseline  - repeat cxr shows right basilar, atelectatic change, patchy airspace disease, CT chest does show advanced bullous changes of the lungs unchanged from prior scan,, has some peribronchial thickening with focal atelectasis or pneumonia and small effusion in the left lower lung  - continue O2 supp, duo nebs, continue home Bumex     Anemia, likely sec to acute blood loss  - Secondary to UGI bleed while on chronic anticoagulation  - H&H currently stable, continue to trend and transfuse as indicated,  - Continue B12 and folate and iron     CARINA on CKD stage III, marginal improvement  -Baseline Cr approximately 1.4, peaked to 2.46.  Now stable.  -Suspect likely prerenal, elevated BUN likely secondary to GI bleed  -Renal following     Paroxysmal atrial fibrillation  -Rates are currently well controlled, continue beta-blocker, digoxin level  remains high, continue to hold   -Patient previously on Xarelto, however, with ongoing recurrent bleeding, risks currently outweigh benefits for resumption of anticoagulation.  This was by prior provider discussed this with patient and spouse and they were in agreement.     Teardrop fracture of C5 s/p fall likely secondary to syncope  -Suspect etiology secondary to above  -Neurosurgery consulted in the ED, recommend c-collar with outpatient follow-up at discharge  -PT/OT     Hypothyroidism (new diagnosis)  -Significantly elevated TSH, with subsequent low free T4  -We will start Synthroid replacement at 50 mcg QD, he will need PCP follow-up in 4 weeks for repeat labs     CAD-stable continue statin     DVT Prophylaxis: Mechanical     Disposition: anticipate discharge to rehab on Monday 2/3/2020.  CM following    CODE STATUS:   Code Status and Medical Interventions:   Ordered at: 01/27/20 4832     Level Of Support Discussed With:    Patient     Code Status:     CPR     Medical Interventions (Level of Support Prior to Arrest):    Full       Electronically signed by Kaycee Boateng MD, 02/02/20, 8:39 AM.

## 2020-02-02 NOTE — THERAPY TREATMENT NOTE
Acute Care - Occupational Therapy Treatment Note  [facility]     Patient Name: Rodney Looney  : 1934  MRN: [id number]  Today's Date: 2020     Date of Referral to OT: 20  Referring Physician: LINDA Pollard    Admit Date: 2020       ICD-10-CM ICD-9-CM   1. Anemia, unspecified type D64.9 285.9   2. Melena K92.1 578.1   3. Elevated troponin R79.89 790.6   4. Other closed nondisplaced fracture of fifth cervical vertebra, initial encounter (CMS/HCC) S12.491A 805.05   5. 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   • History of lobectomy of lung   • Hypoxia   • Stage III CKD, GFR 30-59 ml/min    • Coronary artery disease involving native coronary artery of native heart   • Hypercholesterolemia   • Essential hypertension   • Paroxysmal atrial fibrillation (CMS/HCC)   • GERD without esophagitis   • Fall   • Postprocedural seroma of skin and subcutaneous tissue following other procedure   • Acute renal failure superimposed on stage 3 chronic kidney disease (CMS/HCC)   • Buttock wound   • Edema of both lower extremities   • Elevated brain natriuretic peptide (BNP) level   • Chronic respiratory failure with hypoxia (CMS/HCC)   • 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)   • Emphysema (CMS/HCC)   • Volume overload   • Symptomatic anemia   • Duodenal ulcer   • Upper GI bleed   • Paroxysmal atrial fibrillation (CMS/HCC)   • Teardrop fracture of cervical vertebra (CMS/HCC)   • Chronic respiratory failure with hypoxia (CMS/HCC)   • Syncope   • Hypothyroidism     Past Medical History:   Diagnosis Date   • Atrial fibrillation (CMS/HCC)    • Buttock wound 2019    Bilateral buttock ulcerations/POA   • Cancer (CMS/HCC)    • Chronic respiratory failure with hypoxia, on home oxygen  therapy (CMS/[facility]) 7/2/2019   • Coronary artery disease    • Elevated cholesterol    • GERD (gastroesophageal reflux disease)    • History of BPH 2 yrs   • Hypertension    • Kidney disease    • Smoking      Past Surgical History:   Procedure Laterality Date   • BRONCHOSCOPY Right 7/10/2019    Procedure: BRONCHOSCOPY;  Surgeon: Rohan Boyer MD;  Location:  [facility];  Service: Cardiothoracic   • CARDIAC SURGERY     • CORONARY ARTERY BYPASS GRAFT N/A 6/13/2019    Procedure: MEDIANSTERNOTOMY, REMOVAL OF RETAINED PACING LEAD;  Surgeon: Rohan Boyer MD;  Location:  [facility];  Service: Cardiothoracic   • ENDOSCOPY N/A 7/23/2019    Procedure: ESOPHAGOGASTRODUODENOSCOPY;  Surgeon: Rafael Maldonado MD;  Location:  [facility];  Service: Gastroenterology   • ENDOSCOPY N/A 9/28/2019    Procedure: ESOPHAGOGASTRODUODENOSCOPY;  Surgeon: Brunner, Mark I, MD;  Location:  [facility];  Service: Gastroenterology   • HERNIA REPAIR     • LUNG LOBECTOMY      RIGHT LOWER   • THORACOSCOPY Right 7/10/2019    Procedure: THORACOSCOPY VIDEO ASSISTED, CHEST TUBE PLACEMENT, EVACUATION OF RIGHT CHEST WALL HEMATOMA;  Surgeon: Rohan Boyer MD;  Location:  [facility];  Service: Cardiothoracic       Therapy Treatment    Rehabilitation Treatment Summary     Row Name 02/02/20 1135             Treatment Time/Intention    Discipline  occupational therapist  -TA      Document Type  therapy note (daily note)  -TA      Subjective Information  no complaints  -TA      Mode of Treatment  occupational therapy  -TA      Existing Precautions/Restrictions  fall;oxygen therapy device and L/min;other (see comments) Cervical collar on AAT's, monitor O2, [place]  -TA2      Treatment Considerations/Comments  soft collar brace on  -TA2      Patient Response to Treatment  tolerated  -TA2      Recorded by [TA] Wesley Caceres OT 02/02/20 1309  [TA2] Wesley Caceres OT 02/02/20 1354      Row Name 02/02/20 3628             Vital Signs    Pre Systolic BP  Rehab  -- RN cleared pt for tx  -TA      Pre SpO2 (%)  91  -TA      O2 Delivery Pre Treatment  supplemental O2  -TA      Intra SpO2 (%)  84  -TA      O2 Delivery Intra Treatment  supplemental O2  -TA      Post SpO2 (%)  98  -TA      O2 Delivery Post Treatment  supplemental O2  -TA      Pre Patient Position  Supine  -TA      Intra Patient Position  Standing  -TA      Post Patient Position  Sitting  -TA      Recorded by [TA] Wesley Caceres, OT 02/02/20 1354      Row Name 02/02/20 1135             Cognitive Assessment/Intervention- PT/OT    Orientation Status (Cognition)  oriented x 4  -TA      Follows Commands (Cognition)  WFL  -TA      Safety Deficit (Cognitive)  mild deficit;awareness of need for assistance;insight into deficits/self awareness;safety precautions awareness;safety precautions follow-through/compliance  -TA      Recorded by [TA] Wesley Caceres, OT 02/02/20 1354      Row Name 02/02/20 1135             Safety Issues, Functional Mobility    Safety Issues Affecting Function (Mobility)  awareness of need for assistance;insight into deficits/self awareness;safety precaution awareness;safety precautions follow-through/compliance  -TA      Impairments Affecting Function (Mobility)  balance;endurance/activity tolerance;shortness of breath;strength  -TA      Recorded by [TA] Wesley Caceres, OT 02/02/20 1354      Row Name 02/02/20 1135             Bed Mobility Assessment/Treatment    Bed Mobility Assessment/Treatment  supine-sit  -TA      Supine-Sit [place] (Bed Mobility)  contact guard;verbal cues  -TA      Assistive Device (Bed Mobility)  bed rails;head of bed elevated  -TA      Comment (Bed Mobility)  VCs for sequencing transition  -TA      Recorded by [TA] Wesley Caceres, OT 02/02/20 1354      Row Name 02/02/20 1135             Functional Mobility    Functional Mobility- Ind. Level  contact guard assist;verbal cues required  -TA      Functional Mobility- Device  rolling walker  -TA       Functional Mobility-Distance (Feet)  -- Steps from bed>chair  -TA      Functional Mobility- Safety Issues  balance decreased during turns;step length decreased  -TA      Recorded by [TA] Wesley Caceres, OT 02/02/20 1354      Row Name 02/02/20 1135             Transfer Assessment/Treatment    Transfer Assessment/Treatment  sit-stand transfer;stand-sit transfer  -TA      Comment (Transfers)  STS x 10 reps  -TA      Recorded by [TA] Wesley Caceres OT 02/02/20 1354      Row Name 02/02/20 1135             Sit-Stand Transfer    Sit-Stand [place] (Transfers)  contact guard;verbal cues  -TA      Assistive Device (Sit-Stand Transfers)  walker, front-wheeled  -TA      Recorded by [TA] Wesley Caceres OT 02/02/20 1354      Row Name 02/02/20 1135             Stand-Sit Transfer    Stand-Sit [place] (Transfers)  contact guard;verbal cues  -TA      Assistive Device (Stand-Sit Transfers)  walker, front-wheeled  -TA      Recorded by [TA] Wesley Caceres OT 02/02/20 1354      Row Name 02/02/20 1135             ADL Assessment/Intervention    32931 - OT Self Care/Mgmt Minutes  5  -TA      BADL Assessment/Intervention  upper body dressing  -TA      Recorded by [TA] Wesley Caceres OT 02/02/20 1354      Row Name 02/02/20 1135             Upper Body Dressing Assessment/Training    Upper Body Dressing Wright Level  don;front opening garment;minimum assist (75% patient effort)  -TA      Upper Body Dressing Position  edge of bed sitting  -TA      Recorded by [TA] Wesley Caceres OT 02/02/20 1354      Row Name 02/02/20 1135             BADL Safety/Performance    Impairments, BADL Safety/Performance  balance;endurance/activity tolerance;shortness of breath;strength  -TA      Skilled BADL Treatment/Intervention  BADL process/adaptation training  -TA      Recorded by [TA] Wesley Caceres OT 02/02/20 1354      Row Name 02/02/20 1135             Motor Skills Assessment/Interventions     Additional Documentation  Balance (Group);Balance Interventions (Group);Therapeutic Exercise (Group);Therapeutic Exercise Interventions (Group)  -TA      Recorded by [TA] Wesley Caceres, OT 02/02/20 1354      Row Name 02/02/20 1135             Therapeutic Exercise    Therapeutic Exercise  seated, upper extremities  -TA      Additional Documentation  Therapeutic Exercise (Row)  -TA      10453 - OT Therapeutic Exercise Minutes  8  -TA      62580 - OT Therapeutic Activity Minutes  10  -TA      Recorded by [TA] Wesley Caceres, OT 02/02/20 1354      Row Name 02/02/20 1135             Upper Extremity Seated Therapeutic Exercise    Performed, Seated Upper Extremity (Therapeutic Exercise)  shoulder flexion/extension;shoulder abduction/adduction;shoulder horizontal abduction/adduction;scapular stabilization;elbow flexion/extension;digit flexion/extension  -TA      Exercise Type, Seated Upper Extremity (Therapeutic Exercise)  AROM (active range of motion)  -TA      Sets/Reps Detail, Seated Upper Extremity (Therapeutic Exercise)  1/10  -TA      Recorded by [TA] Wesley Caceres, OT 02/02/20 1354      Row Name 02/02/20 1135             Balance    Balance  dynamic sitting balance;static standing balance  -TA      Recorded by [TA] Wesley Caceres, OT 02/02/20 1354      Row Name 02/02/20 1135             Dynamic Sitting Balance    Level of [place], Reaches Outside Midline (Sitting, Dynamic Balance)  supervision  -TA      Sitting Position, Reaches Outside Midline (Sitting, Dynamic Balance)  sitting on edge of bed;sitting in chair  -TA      Comment, Reaches Outside Midline (Sitting, Dynamic Balance)  ADL, TE  -TA      Recorded by [TA] Wesley Caceres, OT 02/02/20 1354      Row Name 02/02/20 1135             Static Standing Balance    Level of [place] (Supported Standing, Static Balance)  contact guard assist  -TA      Time Able to Maintain Position (Supported Standing, Static Balance)  45 to 60 seconds   -TA      Assistive Device Utilized (Supported Standing, Static Balance)  walker, rolling  -TA      Recorded by [TA] Wesley Caceres, OT 02/02/20 1354      Row Name 02/02/20 1135             Positioning and Restraints    Pre-Treatment Position  in bed  -TA      Post Treatment Position  chair  -TA      In Chair  notified nsg;reclined;call light within reach;encouraged to call for assist;exit alarm on;waffle cushion;legs elevated  -TA      Recorded by [TA] Wesley Caceres, OT 02/02/20 1354      Row Name 02/02/20 1135             Pain Assessment    Additional Documentation  Pain Scale: Numbers Pre/Post-Treatment (Group)  -TA      Recorded by [TA] Wesley Caceres, OT 02/02/20 1354      Row Name 02/02/20 1135             Pain Scale: Numbers Pre/Post-Treatment    Pain Scale: Numbers, Pretreatment  0/10 - no pain  -TA      Pain Scale: Numbers, Post-Treatment  0/10 - no pain  -TA      Pre/Post Treatment Pain Comment  Pt denied pain, tolerated  -TA      Pain Intervention(s)  Ambulation/increased activity;Repositioned  -TA      Recorded by [TA] Wesley Caceres, OT 02/02/20 1354      Row Name 02/02/20 1135             Pain Scale: FACES Pre/Post-Treatment    Pain: FACES Scale, Pretreatment  0-->no hurt  -TA      Pain: FACES Scale, Post-Treatment  0-->no hurt  -TA      Recorded by [TA] Wesley Caceres, OT 02/02/20 1354      Row Name 02/02/20 1135             Coping    Observed Emotional State  cooperative;pleasant  -TA      Verbalized Emotional State  acceptance  -TA      Recorded by [TA] Wesley Caceres, OT 02/02/20 1354      Row Name 02/02/20 1135             Plan of Care Review    Plan of Care Reviewed With  patient  -TA      Progress  improving  -TA      Recorded by [TA] Wesley Caceres, OT 02/02/20 1354      Row Name 02/02/20 1135             Outcome Summary/Treatment Plan (OT)    Daily Summary of Progress (OT)  progress toward functional goals is good  -TA      Plan for Continued Treatment (OT)   Continue per OT POC  -TA      Anticipated Discharge Disposition (OT)  skilled nursing facility  -TA      Recorded by [TA] Wesley Caceres OT 02/02/20 0167        User Key  (r) = Recorded By, (t) = Taken By, (c) = Cosigned By    Initials Name Effective Dates Discipline    Wesley Clarke OT 03/14/16 -  OT                 OT Recommendation and Plan  Outcome Summary/Treatment Plan (OT)  Daily Summary of Progress (OT): progress toward functional goals is good  Plan for Continued Treatment (OT): Continue per OT POC  Anticipated Discharge Disposition (OT): skilled nursing facility  Daily Summary of Progress (OT): progress toward functional goals is good  Plan of Care Review  Plan of Care Reviewed With: patient  Plan of Care Reviewed With: patient  Outcome Summary: Pt progressed fxl mobility requiring CGA bed mobitlity, CGA STS, CGA/RW to take steps from bed>chair. Pt engaged in fxl transfer training, completed 10 reps STS with CGA from chair; Min A UBD; engaged in BUE strengthening ex's, 10 reps all ex's to support fxl I with ADLs. O2 sats dropped to 86% with fxl mobility. Progressing toward fxl goals. Continue per OT POC.  Outcome Measures     Row Name 02/02/20 1135             How much help from another is currently needed...    Putting on and taking off regular lower body clothing?  2  -TA      Bathing (including washing, rinsing, and drying)  2  -TA      Toileting (which includes using toilet bed pan or urinal)  3  -TA      Putting on and taking off regular upper body clothing  3  -TA      Taking care of personal grooming (such as brushing teeth)  3  -TA      Eating meals  3  -TA      AM-PAC 6 Clicks Score (OT)  16  -TA         Functional Assessment    Outcome Measure Options  AM-PAC 6 Clicks Daily Activity (OT)  -TA        User Key  (r) = Recorded By, (t) = Taken By, (c) = Cosigned By    Initials Name Provider Type    Wesley Clarke OT Occupational Therapist           Time Calculation:   Time  Calculation- OT     Row Name 02/02/20 1358 02/02/20 1135          Time Calculation- OT    OT Start Time  1135 ttc 25 minutes  -TA  --     Total Timed Code Minutes- OT  25 minute(s)  -TA  --     OT Received On  02/02/20  -TA  --     OT Goal Re-Cert Due Date  02/07/20  -TA  --        Timed Charges    15856 - OT Therapeutic Exercise Minutes  --  8  -TA     98147 - OT Therapeutic Activity Minutes  --  10  -TA     20218 - OT Self Care/Mgmt Minutes  --  5  -TA       User Key  (r) = Recorded By, (t) = Taken By, (c) = Cosigned By    Initials Name Provider Type    TA Wesley Caceres OT Occupational Therapist        Therapy Charges for Today     Code Description Service Date Service Provider Modifiers Qty    11019854624 HC OT THER PROC EA 15 MIN 2/2/2020 Wesley Caceres OT GO 1    60744509033 HC OT THERAPEUTIC ACT EA 15 MIN 2/2/2020 Wesley Caceres OT GO 1               Wesley Caceres OT  2/2/2020

## 2020-02-02 NOTE — PLAN OF CARE
Pt dozing beginning of shift but then remained awake most of rest of shift.  Denies any c/o.  Cont to turn as pt allows however pt cont to shift back onto back shortly after repositioning.  Cont to apply z guard to buttocks liberally due to redness but blanchable.  Heels red/boggy and remain in heel protectors.  Afib on monitor.  No c/o pain.  Remains on o2 at 4liters per nc.  No c/o sob.

## 2020-02-02 NOTE — PROGRESS NOTES
"   LOS: 6 days    Patient Care Team:  Dana Isidro DO as PCP - General (Family Medicine)    Reason For Visit:  F/U [facility] ON CKD  Subjective           Review of Systems:    Pulm: No soa   CV:  No CP      Objective       atorvastatin 20 mg Oral Nightly   bumetanide 0.25 mg Oral Daily   ferric gluconate 125 mg Intravenous Daily Before Supper   ferrous sulfate 325 mg Oral Daily With Breakfast   folic acid 1 mg Oral Nightly   levothyroxine 50 mcg Oral Q AM   metoprolol tartrate 12.5 mg Oral BID   pantoprazole 40 mg Oral BID AC   polyethylene glycol 17 g Oral Daily   ranolazine 500 mg Oral Daily   sodium chloride 10 mL Intravenous Q12H   tamsulosin 0.4 mg Oral Nightly   vitamin B-12 100 mcg Oral Daily            Vital Signs:  Blood pressure 117/67, pulse 81, temperature 97.8 °F (36.6 °C), temperature source Oral, resp. rate 18, height 182.9 cm (72\"), weight 82.9 kg (182 lb 12.8 oz), SpO2 100 %.    Flowsheet Rows      First Filed Value   Admission Height  182.9 cm (72\") Documented at 01/27/2020 1510   Admission Weight  83.9 kg (185 lb) Documented at 01/27/2020 1510          02/01 0701 - 02/02 0700  In: 680 [P.O.:680]  Out: 1575 [Urine:1575]    Physical Exam:    General Appearance: NAD, alert and cooperative, Ox3  Eyes: PER, conjunctivae and sclerae normal, no icterus  Lungs: respirations regular and unlabored, no crepitus, clear to auscultation  Heart/CV: regular rhythm & normal rate, no murmur, no gallop, no rub and no edema  Abdomen: not distended, soft, non-tender, no masses,  bowel sounds present  Skin: No rash, Warm and dry    Radiology:            Labs:  Results from last 7 days   Lab Units 02/02/20  0953 02/01/20  1952 02/01/20  0742  01/31/20  0802  01/28/20  0514   WBC 10*3/mm3 4.29  --   --   --  4.64  --  5.20   HEMOGLOBIN g/dL 8.4* 8.0* 7.9*   < > 8.2*   < > 6.9*   HEMATOCRIT % 28.4* 25.7* 26.0*   < > 28.1*   < > 21.9*   PLATELETS 10*3/mm3 60*  --   --   --  69*  --  90*    < > = values in this interval " not displayed.     Results from last 7 days   Lab Units 02/02/20  0953 02/01/20  0742 01/31/20  0801 01/30/20  0755  01/28/20  0514 01/27/20  1513   SODIUM mmol/L 136 135* 136 138   < > 142 141   POTASSIUM mmol/L 4.3 4.4 4.4 4.4   < > 5.2 4.7   CHLORIDE mmol/L 104 103 103 104   < > 109* 107   CO2 mmol/L 22.0 22.0 22.0 23.0   < > 20.0* 23.0   BUN mg/dL 47* 62* 76* 83*   < > 87* 87*   CREATININE mg/dL 2.02* 2.23* 2.27* 2.24*   < > 2.46* 2.43*   CALCIUM mg/dL 8.8 8.6 9.2 9.3   < > 9.5 9.8   PHOSPHORUS mg/dL 3.3 3.2  --   --   --   --   --    MAGNESIUM mg/dL  --   --   --   --   --   --  2.1   ALBUMIN g/dL 2.80* 2.80*  --   --   --  2.80* 3.10*    < > = values in this interval not displayed.     Results from last 7 days   Lab Units 02/02/20  0953   GLUCOSE mg/dL 116*       Results from last 7 days   Lab Units 01/28/20  0514   ALK PHOS U/L 82   BILIRUBIN mg/dL 0.3   ALT (SGPT) U/L 9   AST (SGOT) U/L 15     Results from last 7 days   Lab Units 01/27/20  1803   PH, ARTERIAL pH units 7.452*   PO2 ART mm Hg 69.0*   PCO2, ARTERIAL mm Hg 31.3   HCO3 ART mmol/L 21.9       Results from last 7 days   Lab Units 01/28/20  1033   COLOR UA  Yellow   CLARITY UA  Clear   PH, URINE  <=5.0   SPECIFIC GRAVITY, URINE  1.015   GLUCOSE UA  Negative   KETONES UA  Negative   BILIRUBIN UA  Negative   PROTEIN UA  Negative   BLOOD UA  Negative   LEUKOCYTES UA  Negative   NITRITE UA  Negative       Estimated Creatinine Clearance: 31.3 mL/min (A) (by C-G formula based on SCr of 2.02 mg/dL (H)).      Assessment       GERD without esophagitis    Acute renal failure superimposed on stage 3 chronic kidney disease (CMS/HCC)    Symptomatic anemia    Upper GI bleed    Paroxysmal atrial fibrillation (CMS/HCC)    Teardrop fracture of cervical vertebra (CMS/HCC)    Chronic respiratory failure with hypoxia (CMS/HCC)    Syncope    Hypothyroidism            Impression: [facility] ON CKD. GFR IMPROVING. ANEMIA.            Recommendations: LAB MARCELLA Winchester  MD Kameron  02/02/20  11:17 AM

## 2020-02-02 NOTE — PLAN OF CARE
Problem: Patient Care Overview  Goal: Plan of Care Review  Flowsheets (Taken 2/2/2020 2200)  Plan of Care Reviewed With: patient  Outcome Summary: Patient ambulation 350 with walker. Gait unsteady. He required frequent rest breaks to sustain is oxygen SATs

## 2020-02-02 NOTE — PLAN OF CARE
Problem: Patient Care Overview  Goal: Plan of Care Review  Outcome: Ongoing (interventions implemented as appropriate)  Flowsheets (Taken 2/2/2020 1130)  Outcome Summary: Pt progressed fxl mobility requiring CGA bed mobitlity, CGA STS, CGA/RW to take steps from bed>chair. Pt engaged in fxl transfer training, completed 10 reps STS with CGA from chair; Min A UBD; engaged in BUE strengthening ex's, 10 reps all ex's to support fxl I with ADLs. O2 sats dropped to 86% with fxl mobility. Progressing toward fxl goals. Continue per OT POC.

## 2020-02-03 NOTE — PLAN OF CARE
Problem: Patient Care Overview  Goal: Plan of Care Review  2/3/2020 1509 by Marilyn Beck, PT  Outcome: Ongoing (interventions implemented as appropriate)  Flowsheets (Taken 2/3/2020 1505)  Progress: no change  Plan of Care Reviewed With: patient  Outcome Summary: Pt able to ambulate 300's using FWW with CGA, but required 3 standing rest breaks to keep oxygen saturation up.  Limited by SOA, decreased activity tolerance, and weakness.  Continue per IPPT POC.

## 2020-02-03 NOTE — DISCHARGE PLACEMENT REQUEST
Rodney Looney (85 y.o. Male)       CHARLIE Pendleton , [phone]    [address] [address]  Phone:  [phone]  Fax:   Date: Feb 3, 2020      Ambulatory Referral to Home Health     Patient:  Rodney Looney MRN:  [id number]   [address] [facility] [address] :  1934  SSN:    Phone: [phone] Sex:  M      INSURANCE PAYOR PLAN GROUP # SUBSCRIBER ID   Primary:    HUMANA MEDICARE REPLACEMENT [id number]      Referring Provider Information:  THERESA DAWN Phone: [phone] Fax:       Referral Information:   # Visits:  1 Referral Type: Home Health [42]   Urgency:  Routine Referral Reason: Specialty Services Required   Start Date: Feb 3, 2020 End Date:  To be determined by Insurer   Diagnosis: Impaired mobility and ADLs (Z74.09 [ICD-10-CM] 799.89 [ICD-9-CM])      Refer to Dept:   Refer to Provider:   Refer to Facility:  [facility]     Face to Face Visit Date: 2/3/2020  Follow-up provider for Plan of Care? I treated the patient in an acute care facility and will not continue treatment after discharge.  Follow-up provider: DANNA DEAL [[id number]]  Reason/Clinical Findings: s/p UGIB  Describe mobility limitations that make leaving home difficult: impaired functional mobility, balance, gait, and endurance  Nursing/Therapeutic Services Requested: Skilled Nursing  Nursing/Therapeutic Services Requested: Physical Therapy  Nursing/Therapeutic Services Requested: Occupational Therapy  Skilled nursing orders: Medication education  PT orders: Strengthening  Occupational orders: Strengthening  Occupational orders: Activities of daily living     This document serves as a request of services and does not constitute Insurance authorization or approval of services.  To determine eligibility, please contact the members Insurance carrier to verify and review coverage.     If you have medical questions regarding this  "request for services. Please contact [address] at [phone] during normal business hours.       Verbal Order Mode: Per protocol: cosign required  Authorizing Provider: Kaycee Boateng MD  Authorizing Provider's NPI: [id number]     Order Entered By: Keerthi Tellez RN 2/3/2020 11:05 AM     Electronically signed by: Kaycee Boateng MD 2/3/2020 11:09 AM                    Date of Birth Social Security Number Address Home Phone MRN    1934  [address] [facility] [address] [phone] [id number]    [employer]ism Marital Status          Evangelical        Admission Date Admission Type Admitting Provider Attending Provider Department, Room/Bed    1/27/20 Emergency Kaycee Boateng MD Seward, Kathryn L, MD [address], [address]/1    Discharge Date Discharge Disposition Discharge Destination         [place] [facility]              Attending Provider:  Kaycee Boateng MD    Allergies:  No Known Allergies    Isolation:  None   Infection:  None   Code Status:  CPR    Ht:  182.9 cm (72\")   Wt:  82.9 kg (182 lb 12.8 oz)    Admission Cmt:  None   Principal Problem:  None                Active Insurance as of 1/27/2020     Primary Coverage     Payor Plan Insurance Group Employer/Plan Group    HUMANA MEDICARE REPLACEMENT HUMANA MEDICARE REPLACEMENT [id number]     Payor Plan Address Payor Plan Phone Number Payor Plan Fax Number Effective Dates    [address] [address] [phone]  1/1/2018 - None Entered    [facility] [address]       Subscriber Name Subscriber Birth Date Member ID       JEFF HANNA 1934 [id number]                 Emergency Contacts      (Rel.) Home Phone Work Phone Mobile Phone    DICK HANNA (Spouse) -- -- [phone]            Insurance Information                HUMANA MEDICARE REPLACEMENT/HUMANA MEDICARE REPLACEMENT Phone: [phone]    Subscriber: Jeff Hanna Subscriber#: [id number]    Group#: [id number] Precert#:    "             History & Physical      Bhavya Swann MD at 20 1832              [facility] Medicine Services  HISTORY AND PHYSICAL    Patient Name: Rodney Looney  : 1934  MRN: [id number]  Primary Care Physician: Dana Isidro DO  Date of admission: 2020      Subjective   Subjective     Chief Complaint:  Rodney Harding is an 84 y/o male who fell at approximately 2:15 pm as he was trying ambulate to the bathroom.    HPI:  Rodney Looney is a 85 y.o. male with history of GI bleed, atrial fibrillation, CAD, hyperlipidemia, hypertension, paroxysmal trial fibrillation, lung cancer, smoker, chronic respiratory failure with hypoxia, dependent on oxygen, colic heart failure, GERD, history of ulcers, presenting to the ER after he fell as he tried to ambulate to the bathroom approximately 215 this afternoon.  The patient's wife, Bhargavi, could not wake the patient.  He hit his head on the coffee table.  States he was getting tired on his way to the bathroom, felt weak and fell.      Clinical findings: H&H 5.6/18.5, Plt 110, (elevated) digoxin 1.94, troponin 0.031, at noon 2.43, BUN 87, glucose 160.  CT of the head negative for acute intracranial process.  Findings concerning for a small teardrop type fracture at the anterior inferior C5 vertebral body.  Neurology consulted from the ER.  Spoke with Dr. Hunt who recommends wearing c-collar.    Review of Systems   Gen- No fevers, chills  CV- No chest pain, palpitations  Resp- No cough, dyspnea  GI- No N/V/D, abd pain  MS- (+) contusion to the left eye, right cheek  Neuro- (-) headache, (+) dizziness  Skin- discoloration to the left ear that has been there for 6 months.    All other systems reviewed and are negative.     Personal History     Past Medical History:   Diagnosis Date   • Atrial fibrillation (CMS/HCC)    • Buttock wound 2019    Bilateral buttock ulcerations/POA   • Cancer (CMS/HCC)    • Chronic respiratory  failure with hypoxia, on home oxygen therapy (CMS/[facility]) 7/2/2019   • Coronary artery disease    • Elevated cholesterol    • GERD (gastroesophageal reflux disease)    • History of BPH 2 yrs   • Hypertension    • Kidney disease    • Smoking        Past Surgical History:   Procedure Laterality Date   • BRONCHOSCOPY Right 7/10/2019    Procedure: BRONCHOSCOPY;  Surgeon: Rohan Boyer MD;  Location:  [facility];  Service: Cardiothoracic   • CARDIAC SURGERY     • CORONARY ARTERY BYPASS GRAFT N/A 6/13/2019    Procedure: MEDIANSTERNOTOMY, REMOVAL OF RETAINED PACING LEAD;  Surgeon: Rohan Boyer MD;  Location:  [facility];  Service: Cardiothoracic   • ENDOSCOPY N/A 7/23/2019    Procedure: ESOPHAGOGASTRODUODENOSCOPY;  Surgeon: Rafael Maldonado MD;  Location:  [facility];  Service: Gastroenterology   • ENDOSCOPY N/A 9/28/2019    Procedure: ESOPHAGOGASTRODUODENOSCOPY;  Surgeon: Brunner, Mark I, MD;  Location:  [facility];  Service: Gastroenterology   • HERNIA REPAIR     • LUNG LOBECTOMY      RIGHT LOWER   • THORACOSCOPY Right 7/10/2019    Procedure: THORACOSCOPY VIDEO ASSISTED, CHEST TUBE PLACEMENT, EVACUATION OF RIGHT CHEST WALL HEMATOMA;  Surgeon: Rohan Boyer MD;  Location:  [facility];  Service: Cardiothoracic       Family History: family history includes Colon cancer in his mother; Dementia in his father; Heart failure in his father. Otherwise pertinent FHx was reviewed and unremarkable.     Social History:  reports that he quit smoking about 8 months ago. His smoking use included cigarettes. He has a 48.75 pack-year smoking history. He has never used smokeless tobacco. He reports that he does not drink alcohol or use drugs.  Social History     Social History Narrative    .  Lives with wife.  Up with walker since recent surgeries.  Uses 2LNC at all times now. Lives in [facility]       Medications:    Available home medication information reviewed.    (Not in a hospital admission)    No Known  Allergies    Objective   Objective     Vital Signs:   Temp:  [94.4 °F (34.7 °C)] 94.4 °F (34.7 °C)  Heart Rate:  [54-70] 60  Resp:  [24] 24  BP: (102-121)/(50-69) 112/51        Physical Exam   Constitutional: Awake, alert & oriented x3, pleasant, talkative, NAD  Eyes: PERRLA, sclerae anicteric, no conjunctival injection  HENT: NCAT, mucous membranes dry  Neck: Supple, no thyromegaly, no lymphadenopathy, trachea midline  Respiratory: Clear to auscultation bilaterally, nonlabored respirations   Cardiovascular: irregular,  no murmurs, rubs, or gallops, palpable pedal pulses bilaterally  Gastrointestinal: Positive bowel sounds, soft, nontender, nondistended  Musculoskeletal: No bilateral ankle edema, no clubbing or cyanosis to extremities  Psychiatric: Appropriate affect, cooperative  Neurologic: Oriented x 3, strength symmetric in all extremities, Cranial Nerves grossly intact to confrontation, speech clear  Skin: contusion to left eye with bruising to the right cheek and old bruising to the left ear. Skin tear and scrape to the left side of head.       Results Reviewed:  I have personally reviewed current lab and radiology data.    Results from last 7 days   Lab Units 01/27/20  1513   WBC 10*3/mm3 5.54   HEMOGLOBIN g/dL 5.6*   HEMATOCRIT % 18.5*   PLATELETS 10*3/mm3 110*     Results from last 7 days   Lab Units 01/27/20  1752 01/27/20  1513   SODIUM mmol/L  --  141   POTASSIUM mmol/L  --  4.7   CHLORIDE mmol/L  --  107   CO2 mmol/L  --  23.0   BUN mg/dL  --  87*   CREATININE mg/dL  --  2.43*   GLUCOSE mg/dL  --  160*   CALCIUM mg/dL  --  9.8   ALT (SGPT) U/L  --  9   AST (SGOT) U/L  --  12   TROPONIN T ng/mL  --  0.031*   LACTATE mmol/L 1.9  --      Estimated Creatinine Clearance: 26.4 mL/min (A) (by C-G formula based on SCr of 2.43 mg/dL (H)).  Brief Urine Lab Results  (Last result in the past 365 days)      Color   Clarity   Blood   Leuk Est   Nitrite   Protein   CREAT   Urine HCG        07/03/19 1121 Yellow Clear  Negative Negative Negative 100 mg/dL (2+)             Imaging Results (Last 24 Hours)     Procedure Component Value Units Date/Time    XR Pelvis 1 or 2 View [[id number]] Collected:  01/27/20 1812     Updated:  01/27/20 1813    Narrative:          EXAMINATION: XR PELVIS 1 OR 2 VW-      INDICATION: fall      COMPARISON: NONE     FINDINGS: Imaging the pelvis reveals degenerative changes seen within  the sequelae joints bilaterally and lower lumbar spine. Osteopenia the  bony structures. Vascular calcification seen within the soft tissues.  The cortex is intact. No fracture or dislocation.           Impression:       No acute bony abnormality with degenerative changes seen  within the sequelae joints bilaterally and lower lumbar spine.          XR Spine Lumbar 2 or 3 View [[id number]] Resulted:  01/27/20 1710     Updated:  01/27/20 1713    XR Sacrum & Coccyx [[id number]] Resulted:  01/27/20 1710     Updated:  01/27/20 1713    CT Head Without Contrast [[id number]] Collected:  01/27/20 1548     Updated:  01/27/20 1558    Narrative:       EXAMINATION: CT HEAD WO CONTRAST-, CT CERVICAL SPINE WO CONTRAST-, CT  FACIAL BONES WO CONTRAST-      INDICATION: fall injury     TECHNIQUE: Unenhanced CT imaging of the head was performed with  additional coronal reformatted imaging provided for review. Additional  unenhanced CT imaging of the cervical spine was performed with  additional coronal sagittal reformatted imaging of the cervical spine.  CT imaging of the facial bones without IV contrast was also performed  with additional multiplanar reformatted imaging submitted for review.     The radiation dose reduction device was turned on for each scan per the  ALARA (As Low as Reasonably Achievable) protocol.     COMPARISON: NONE     FINDINGS:      Head: No evidence of midline shift or mass effect. No hydrocephalus  identified. Coarse calcification is the falx identified. No extra-axial  fluid collections or hemorrhage appreciated. No  intracranial hemorrhage  is identified. Hypoattenuation within the periventricular subcortical  white matters identified most consistent chronic microvascular ischemic  change. There is no convincing evidence for transcortical infarct  identified at this time. Calvarium appears intact. There is no evidence  of depressed skull fracture appreciated. Nonspecific mucosal thickening  of the ethmoid sinus identified. The surrounding soft tissues  demonstrate mild left frontal soft tissue swelling identified. Correlate  physical exam. The globes and retro-orbital soft tissues are  unremarkable.     Cervical spine: The cervical vertebral bodies maintain height and  alignment. Identified involving the C5 anterior vertebral body is a  small corner fracture (series 5/image 37). No additional fractures in  her dislocation appreciated. There is multilevel degenerative changes as  demonstrated by anterior and posterior osteophytosis, endplate  sclerosis, facet arthropathy, and vacuum disc phenomena which is very  mild. Cervical spine degenerative changes identified. Aphthous chronic  constipation of the carotid arteries are identified. Spinal canal  appears patent however there are multilevel disc osteophyte complex and  questionable disc protrusion most pronounced at C3-C4.     Facial bones: Unenhanced CT imaging of the facial bones was performed.  The frontal bone appears intact. The frontal sinuses are well aerated.  The zygomatic arches appear intact. The pterygoid plates appear intact.  The mandible is nondisplaced without evidence for mandibular fracture.  Dental artifact/density structures identified. Visualized skull base  appears intact. Anterior nasal bone appears intact. The maxilla appears  intact. There is no convincing evidence for orbital wall fracture.  Specifically no inferior orbital wall fracture is definitively  identified. Small mucus retention cysts are noted within the left  maxillary sinus. There is mild  soft tissue swelling of the left frontal  forehead. The globes appear intact. No evidence for entrapped  extraocular muscle or globe abnormality appreciated.     These findings were conveyed to ordering clinician with Miss Freire at  3:55 PM on 1/27/2020       Impression:          No acute intracranial process appreciated.      Findings concerning for a small teardrop type fracture at the anterior  inferior C5 vertebral body. Follow-up MRI imaging may provide more  detailed evaluation, particularly to evaluate ligamentous injury if  clinically appropriate. Additional osteoarthritic changes of the  cervical spine as described above, with additional multilevel disc  degeneration and disc osteophyte complex with likely a small disc  protrusion at C3-C4.     No evidence of facial fracture. There is mild soft tissue swelling of  the left forehead. Correlate physical exam.          CT Cervical Spine Without Contrast [[id number]] Collected:  01/27/20 1548     Updated:  01/27/20 1558    Narrative:       EXAMINATION: CT HEAD WO CONTRAST-, CT CERVICAL SPINE WO CONTRAST-, CT  FACIAL BONES WO CONTRAST-      INDICATION: fall injury     TECHNIQUE: Unenhanced CT imaging of the head was performed with  additional coronal reformatted imaging provided for review. Additional  unenhanced CT imaging of the cervical spine was performed with  additional coronal sagittal reformatted imaging of the cervical spine.  CT imaging of the facial bones without IV contrast was also performed  with additional multiplanar reformatted imaging submitted for review.     The radiation dose reduction device was turned on for each scan per the  ALARA (As Low as Reasonably Achievable) protocol.     COMPARISON: NONE     FINDINGS:      Head: No evidence of midline shift or mass effect. No hydrocephalus  identified. Coarse calcification is the falx identified. No extra-axial  fluid collections or hemorrhage appreciated. No intracranial hemorrhage  is  identified. Hypoattenuation within the periventricular subcortical  white matters identified most consistent chronic microvascular ischemic  change. There is no convincing evidence for transcortical infarct  identified at this time. Calvarium appears intact. There is no evidence  of depressed skull fracture appreciated. Nonspecific mucosal thickening  of the ethmoid sinus identified. The surrounding soft tissues  demonstrate mild left frontal soft tissue swelling identified. Correlate  physical exam. The globes and retro-orbital soft tissues are  unremarkable.     Cervical spine: The cervical vertebral bodies maintain height and  alignment. Identified involving the C5 anterior vertebral body is a  small corner fracture (series 5/image 37). No additional fractures in  her dislocation appreciated. There is multilevel degenerative changes as  demonstrated by anterior and posterior osteophytosis, endplate  sclerosis, facet arthropathy, and vacuum disc phenomena which is very  mild. Cervical spine degenerative changes identified. Aphthous chronic  constipation of the carotid arteries are identified. Spinal canal  appears patent however there are multilevel disc osteophyte complex and  questionable disc protrusion most pronounced at C3-C4.     Facial bones: Unenhanced CT imaging of the facial bones was performed.  The frontal bone appears intact. The frontal sinuses are well aerated.  The zygomatic arches appear intact. The pterygoid plates appear intact.  The mandible is nondisplaced without evidence for mandibular fracture.  Dental artifact/density structures identified. Visualized skull base  appears intact. Anterior nasal bone appears intact. The maxilla appears  intact. There is no convincing evidence for orbital wall fracture.  Specifically no inferior orbital wall fracture is definitively  identified. Small mucus retention cysts are noted within the left  maxillary sinus. There is mild soft tissue swelling of the  left frontal  forehead. The globes appear intact. No evidence for entrapped  extraocular muscle or globe abnormality appreciated.     These findings were conveyed to ordering clinician with Miss Freire at  3:55 PM on 1/27/2020       Impression:          No acute intracranial process appreciated.      Findings concerning for a small teardrop type fracture at the anterior  inferior C5 vertebral body. Follow-up MRI imaging may provide more  detailed evaluation, particularly to evaluate ligamentous injury if  clinically appropriate. Additional osteoarthritic changes of the  cervical spine as described above, with additional multilevel disc  degeneration and disc osteophyte complex with likely a small disc  protrusion at C3-C4.     No evidence of facial fracture. There is mild soft tissue swelling of  the left forehead. Correlate physical exam.          CT Facial Bones Without Contrast [[id number]] Collected:  01/27/20 1548     Updated:  01/27/20 1558    Narrative:       EXAMINATION: CT HEAD WO CONTRAST-, CT CERVICAL SPINE WO CONTRAST-, CT  FACIAL BONES WO CONTRAST-      INDICATION: fall injury     TECHNIQUE: Unenhanced CT imaging of the head was performed with  additional coronal reformatted imaging provided for review. Additional  unenhanced CT imaging of the cervical spine was performed with  additional coronal sagittal reformatted imaging of the cervical spine.  CT imaging of the facial bones without IV contrast was also performed  with additional multiplanar reformatted imaging submitted for review.     The radiation dose reduction device was turned on for each scan per the  ALARA (As Low as Reasonably Achievable) protocol.     COMPARISON: NONE     FINDINGS:      Head: No evidence of midline shift or mass effect. No hydrocephalus  identified. Coarse calcification is the falx identified. No extra-axial  fluid collections or hemorrhage appreciated. No intracranial hemorrhage  is identified. Hypoattenuation within the  periventricular subcortical  white matters identified most consistent chronic microvascular ischemic  change. There is no convincing evidence for transcortical infarct  identified at this time. Calvarium appears intact. There is no evidence  of depressed skull fracture appreciated. Nonspecific mucosal thickening  of the ethmoid sinus identified. The surrounding soft tissues  demonstrate mild left frontal soft tissue swelling identified. Correlate  physical exam. The globes and retro-orbital soft tissues are  unremarkable.     Cervical spine: The cervical vertebral bodies maintain height and  alignment. Identified involving the C5 anterior vertebral body is a  small corner fracture (series 5/image 37). No additional fractures in  her dislocation appreciated. There is multilevel degenerative changes as  demonstrated by anterior and posterior osteophytosis, endplate  sclerosis, facet arthropathy, and vacuum disc phenomena which is very  mild. Cervical spine degenerative changes identified. Aphthous chronic  constipation of the carotid arteries are identified. Spinal canal  appears patent however there are multilevel disc osteophyte complex and  questionable disc protrusion most pronounced at C3-C4.     Facial bones: Unenhanced CT imaging of the facial bones was performed.  The frontal bone appears intact. The frontal sinuses are well aerated.  The zygomatic arches appear intact. The pterygoid plates appear intact.  The mandible is nondisplaced without evidence for mandibular fracture.  Dental artifact/density structures identified. Visualized skull base  appears intact. Anterior nasal bone appears intact. The maxilla appears  intact. There is no convincing evidence for orbital wall fracture.  Specifically no inferior orbital wall fracture is definitively  identified. Small mucus retention cysts are noted within the left  maxillary sinus. There is mild soft tissue swelling of the left frontal  forehead. The globes  appear intact. No evidence for entrapped  extraocular muscle or globe abnormality appreciated.     These findings were conveyed to ordering clinician with Miss Freire at  3:55 PM on 1/27/2020       Impression:          No acute intracranial process appreciated.      Findings concerning for a small teardrop type fracture at the anterior  inferior C5 vertebral body. Follow-up MRI imaging may provide more  detailed evaluation, particularly to evaluate ligamentous injury if  clinically appropriate. Additional osteoarthritic changes of the  cervical spine as described above, with additional multilevel disc  degeneration and disc osteophyte complex with likely a small disc  protrusion at C3-C4.     No evidence of facial fracture. There is mild soft tissue swelling of  the left forehead. Correlate physical exam.          XR Chest 1 View [[id number]] Collected:  01/27/20 1527     Updated:  01/27/20 1528    Narrative:          EXAMINATION: XR CHEST 1 VW-      INDICATION: Weak/Dizzy/AMS triage protocol      COMPARISON: Chest radiograph 11/21/2019     FINDINGS: Single portable chest radiograph is submitted for review. The  heart is enlarged similar to prior. On vasculature congestion and  diffuse interstitial edema is also noted similar to prior. Post surgical  changes in the mediastinum identified. There is persistent right mid and  lower lung airspace changes with atelectasis and scarring noted with  associated volume loss similar to prior exam. Patchy left midlung and  lingular airspace changes are identified which appear new from  11/21/2019.           Impression:       Continued abnormality the right hemithorax with volume loss,  right mid and lower lung airspace changes and scarring identified.     Interval development of patchy left mid lung and lingular airspace  changes new from 11/21/2019.              Results for orders placed during the hospital encounter of 06/07/19   Adult Transthoracic Echo Complete W/ Cont if  Necessary Per Protocol    Narrative · Estimated EF appears to be in the range of 61 - 65%.  · Left ventricular systolic function is normal.  · Right ventricular cavity is mildly dilated.  · Left atrial cavity size is mild-to-moderately dilated.  · Mild aortic valve regurgitation is present.  · Calculated right ventricular systolic pressure from tricuspid   regurgitation is 69 mmHg.  · Moderate to severe tricuspid valve regurgitation is present.  · Thin fibrinous mobile echogenic structures possibly attached to the   right atrial lead, versus prominent Chiari network. ZI recommended for   follow-up if clinically appropriate..          Assessment/Plan   Assessment / Plan     Active Hospital Problems    Diagnosis POA   • Upper GI bleed [K92.2] Yes   • Paroxysmal atrial fibrillation (CMS/HCC) [I48.0] Unknown   • Teardrop fracture of cervical vertebra (CMS/HCC) [S12.9XXA] Unknown   • Chronic respiratory failure with hypoxia (CMS/HCC) [J96.11] Unknown   • GERD without esophagitis [K21.9] Unknown   • Acute renal failure superimposed on stage 3 chronic kidney disease (CMS/HCC) [N17.9, N18.3] Unknown     Hospital Course:          Upper GI bleed  - trend H&H  - NPO after midnight  - consult GI for AM  - protonix 40 mg IV bid  -type and screen and transfuse 2 units of PRBC  -Hold Xarelto.  Is the third bleed of this type since July 2019.  May need to D/C Xarelto permanently.    Teardrop Fx C-spine  - ED spoke with Neuro Dr Hunt- recommendation to wear Cervical collar and schedule outpatient follow up please    Elevated Dig level  - holding digoxin    HFrEF  -daily weight with strict I&O  -May need Lasix PRN after/during transfusion    PAF/CAD/Hyperlipidemia  -cardiac monitor  -no anticoagulation due to GI bleed, consider permanent discontinuation  - hold digoxin due to elevated dig level  - continue atrovastatin, metoprolol tartrate, ranexa     Chronic resp failure with hypoxia  - continuous O2 monitor  - continue duo nebs  as needed  - symbicort     Anemia  -continue b12 , folate and ferrous sulfate.    Deconditioning  -PT/OT eval and treat    DVT prophylaxis: compression device    CODE STATUS:    Code Status and Medical Interventions:   Ordered at: 01/27/20 1826     Level Of Support Discussed With:    Patient     Code Status:    CPR     Medical Interventions (Level of Support Prior to Arrest):    Full       INPATIENT status due to profound anemia due to significant UGIB and anticoagulation status requiring transfusion and specialty consultation.  I feel patient’s risk for adverse outcomes and need for care warrant INPATIENT evaluation and I predict the patient’s care encounter to likely last beyond 2 midnights.      Electronically signed by LINDA Kimbrough, 01/27/20, 6:32 PM.      Attending   Admission Attestation       I have seen and examined the patient, performing an independent face-to-face diagnostic evaluation with plan of care reviewed and developed with the advanced practice clinician (APC).      Brief Summary Statement:   Rodney Looney is a 85 y.o. male with PMH significant for persistent atrial fib (on Xarelto), duodenal ulcer with associated GI bleeding and transfusion X 2 (upper endoscopies here 7/23/19 and 9/28/19), previous GI bleed, HTN, history of right lower lobe lobectomy at [facility] 5/2019 with complicated postop course including Serration bacteremia and infected pacemaker lead ultimately removed by Dr. Boyer.     Today, the patient got up to go to the bathroom without his walker or his O2 and felt weak.  He suffered a syncopal episode and hit his head on the coffee table.  O2 sat was measured at 76% at the patient's house.    Here at the ER, H/H 5.6/18.5.  FOBT positive.  Digoxin 1.94.  Troponin 0.031.   Creatinine 2.43 (baseline 1.5-1.75).  CT head negative for bleed, however there is a small teardrop fracture at the anterior inferior C5 vertebral body.  Neurosurgery was called from the ER, who  recommended outpatient follow up and c-collar.    Remainder of detailed HPI is as noted by APC and has been reviewed and/or edited by me for completeness.    Attending Physical Exam:  Constitutional: Awake, alert, very pleasant, [place], wearing c collar  Eyes: PERRLA, sclerae anicteric, no conjunctival injection  HENT: NCAT, mucous membranes moist  Neck: Supple, no thyromegaly, no lymphadenopathy, trachea midline  Respiratory: Clear to auscultation bilaterally, nonlabored respirations   Cardiovascular: irreg/irreg, palpable pedal pulses bilaterally  Gastrointestinal: Positive bowel sounds, soft, nontender, nondistended  Musculoskeletal: No bilateral ankle edema, no clubbing or cyanosis to extremities  Psychiatric: Appropriate affect, cooperative  Neurologic: Oriented x 3, strength symmetric in all extremities, Cranial Nerves grossly intact to confrontation, speech clear  Skin: contusion left side of face.    Brief Assessment/Plan :  See detailed assessment and plan developed with APC which I have reviewed and/or edited for completeness.    Electronically signed by Bhavya Swann MD, 01/27/20, 10:16 PM.                Electronically signed by Bhavya Swann MD at 01/27/20 6653

## 2020-02-03 NOTE — PROGRESS NOTES
[facility] Medicine Services  PROGRESS NOTE    Patient Name: Rodney Looney  : 1934  MRN: [id number]    Date of Admission: 2020  Primary Care Physician: Dana Isidro,     Subjective   Subjective     CC:f/u GI bleed and SOA    HPI: No acute events overnight.  Patient has no complaints.    :Review of Systems  General: denies fevers or chills  CV: denies chest pain  Resp: denies shortness of breath  Abd: denies abd pain, nausea      All systems reviewed and negative except as per HPI above  Objective   Objective     Vital Signs:   Temp:  [97.7 °F (36.5 °C)-97.8 °F (36.6 °C)] 97.8 °F (36.6 °C)  Heart Rate:  [71-81] 79  Resp:  [16-22] 18  BP: (102-125)/(54-92) 125/72        Physical Exam:  Constitutional: Chronically ill-appearing male in no acute distress, pleasant  Respiratory: No respiratory distress, diffuse coarse BS on 4L NC   Cardiovascular: RRR, no murmurs, rubs, or gallops, palpable pedal pulses bilaterally  Gastrointestinal: Positive bowel sounds, soft, nontender, nondistended  Musculoskeletal: No bilateral ankle edema  Psychiatric: Appropriate affect, cooperative  Skin: No rashes, bilateral upper extremity bruising, bruises on face  -Exam unchanged from 2020    Results Reviewed:  Results from last 7 days   Lab Units 20  0742 20  19520  0953  20  0802   WBC 10*3/mm3 4.12  --  4.29  --  4.64   HEMOGLOBIN g/dL 8.2* 8.1* 8.4*   < > 8.2*   HEMATOCRIT % 27.3* 26.3* 28.4*   < > 28.1*   PLATELETS 10*3/mm3 76*  --  60*  --  69*    < > = values in this interval not displayed.     Results from last 7 days   Lab Units 20  0742 20  0953 20  0742  20  0514 20  2251 20  1513   SODIUM mmol/L 137 136 135*   < > 142  --  141   POTASSIUM mmol/L 4.7 4.3 4.4   < > 5.2  --  4.7   CHLORIDE mmol/L 104 104 103   < > 109*  --  107   CO2 mmol/L 25.0 22.0 22.0   < > 20.0*  --  23.0   BUN mg/dL 44* 47* 62*   < > 87*  --   87*   CREATININE mg/dL 2.15* 2.02* 2.23*   < > 2.46*  --  2.43*   GLUCOSE mg/dL 78 116* 82   < > 179*  --  160*   CALCIUM mg/dL 9.0 8.8 8.6   < > 9.5  --  9.8   ALT (SGPT) U/L  --   --   --   --  9  --  9   AST (SGOT) U/L  --   --   --   --  15  --  12   TROPONIN T ng/mL  --   --   --   --  0.030 0.038* 0.031*   PROBNP pg/mL  --   --   --   --   --   --  4,379.0*    < > = values in this interval not displayed.     Estimated Creatinine Clearance: 29.5 mL/min (A) (by C-G formula based on SCr of 2.15 mg/dL (H)).    Microbiology Results Abnormal     Procedure Component Value - Date/Time    Blood Culture - Blood, Arm, Left [[id number]] Collected:  01/27/20 1746    Lab Status:  Final result Specimen:  Blood from Arm, Left Updated:  02/01/20 1900     Blood Culture No growth at 5 days    Blood Culture - Blood, Hand, Left [[id number]] Collected:  01/27/20 1750    Lab Status:  Final result Specimen:  Blood from Hand, Left Updated:  02/01/20 1900     Blood Culture No growth at 5 days          Imaging Results (Last 24 Hours)     Procedure Component Value Units Date/Time    CT Chest Without Contrast [[id number]] Collected:  01/30/20 2041     Updated:  02/02/20 2159    Narrative:       EXAMINATION: CT CHEST WO CONTRAST - 01/30/2020     INDICATION: D64.9-Anemia, unspecified; K92.1-Melena; R79.89-Other  specified abnormal findings of blood chemistry; S12.491A-Other  nondisplaced fracture of fifth cervical vertebra, initial encounter for  closed fracture; Z74.09-Other reduced mobility.     TECHNIQUE: 5 mm unenhanced images of the chest and upper abdomen     The radiation dose reduction device was turned on for each scan per the  ALARA (As Low as Reasonably Achievable) protocol.     COMPARISON: 07/19/2019.     FINDINGS: There is extensive upper lobe bullous change as on the  07/19/2019 study. Today's exam again shows right basilar pleural  scarring and subpleural interstitial change, actually significantly  decreased from the prior  exam, questionably the patient's baseline  appearance. Mild diffuse interstitial changes in the right lung  elsewhere also appear to be stable. There is no evidence of any new left  upper lobe disease. There is some left lower lobe volume loss, however,  with peribronchial thickening, interstitial change and very small  effusion, new from the prior study. Small right effusion, by contrast  appears stable. Combination of calcified and noncalcified mediastinal  lymph nodes appears unchanged including the largest, approximately 2.4  cm anterior mediastinal node.     Included images of the upper abdomen show no significant abnormalities  of the visualized portions of the liver, spleen, pancreatic tail,  adrenal glands, or left upper renal pole. Right upper renal pole appears  atrophic. Bony structures appear intact except for a compression  deformity which appears to be at L1, present in 2019 but increased on  today's study.       Impression:       1. Advanced bullous change of the lungs, similar to prior 2019 scan.   2. Right basilar pleural and parenchymal scarring also similar to 2019.  Previously noted superimposed diffuse right lung disease has resolved.  3. New peribronchial thickening, focal atelectasis or pneumonia, and  small effusion in the left lower lung.  4. Stable calcified and noncalcified mediastinal adenopathy.  5. Atrophic right kidney and old L1 compression deformity noted.     DICTATED:   01/30/2020  EDITED/ls :   01/30/2020         This report was finalized on 2/2/2020 9:56 PM by Dr. Sinan Dixon MD.             Results for orders placed during the hospital encounter of 06/07/19   Adult Transthoracic Echo Complete W/ Cont if Necessary Per Protocol    Narrative · Estimated EF appears to be in the range of 61 - 65%.  · Left ventricular systolic function is normal.  · Right ventricular cavity is mildly dilated.  · Left atrial cavity size is mild-to-moderately dilated.  · Mild aortic valve regurgitation  is present.  · Calculated right ventricular systolic pressure from tricuspid   regurgitation is 69 mmHg.  · Moderate to severe tricuspid valve regurgitation is present.  · Thin fibrinous mobile echogenic structures possibly attached to the   right atrial lead, versus prominent Chiari network. ZI recommended for   follow-up if clinically appropriate..          I have reviewed the medications:  Scheduled Meds:    atorvastatin 20 mg Oral Nightly   bumetanide 0.25 mg Oral Daily   ferric gluconate 125 mg Intravenous Daily Before Supper   ferrous sulfate 325 mg Oral Daily With Breakfast   folic acid 1 mg Oral Nightly   levothyroxine 50 mcg Oral Q AM   metoprolol tartrate 12.5 mg Oral BID   pantoprazole 40 mg Oral BID AC   polyethylene glycol 17 g Oral Daily   ranolazine 500 mg Oral Daily   sodium chloride 10 mL Intravenous Q12H   tamsulosin 0.4 mg Oral Nightly   vitamin B-12 100 mcg Oral Daily     Continuous Infusions:   PRN Meds:.•  acetaminophen  •  bisacodyl  •  bisacodyl  •  docusate sodium  •  HYDROcodone-acetaminophen  •  ipratropium-albuterol  •  ondansetron  •  sodium chloride  •  sodium chloride    Assessment/Plan   Assessment & Plan     Active Hospital Problems    Diagnosis  POA   • Hypothyroidism [E03.9]  Yes   • Upper GI bleed [K92.2]  Yes   • Paroxysmal atrial fibrillation (CMS/HCC) [I48.0]  Yes   • Teardrop fracture of cervical vertebra (CMS/HCC) [S12.9XXA]  Yes   • Chronic respiratory failure with hypoxia (CMS/HCC) [J96.11]  Yes   • Syncope [R55]  Yes   • GERD without esophagitis [K21.9]  Yes   • Acute renal failure superimposed on stage 3 chronic kidney disease (CMS/HCC) [N17.9, N18.3]  Yes   • Symptomatic anemia [D64.9]  Yes      Resolved Hospital Problems   No resolved problems to display.        Brief Hospital Course to date:  Rodney Looney is a 85 y.o. male with past medical history of GI bleed, paroxysmal atrial fibrillation, CKD stage III, CAD, chronic respiratory failure on O2, lung cancer,  tobacco abuse.  Who presented to the ED status post fall.  He has been admitted with likely GI bleed and anterior C5 vertebral body fracture       GI bleed, stable  -Suspected to be upper, this is his third episode since July 2019  -s/p 3 units PRBC, H&H currently stable, GI following, suspect PUD, defer EGD for now, rec to continue BID PPI for one month then q daily thereafter.  -EGD deferred due to respiratory distress     Acute on chronic respiratory failure with hypoxia, improved, currently back at his baseline  - repeat cxr shows right basilar, atelectatic change, patchy airspace disease, CT chest does show advanced bullous changes of the lungs unchanged from prior scan,, has some peribronchial thickening with focal atelectasis or pneumonia and small effusion in the left lower lung  - continue O2 supp, duo nebs, continue home Bumex     Anemia, likely sec to acute blood loss  - Secondary to UGI bleed while on chronic anticoagulation  - H&H currently stable, continue to trend and transfuse as indicated,  - Continue B12 and folate and iron     CARINA on CKD stage III, marginal improvement  -Baseline Cr approximately 1.4, peaked to 2.46.  Now stable.  -Suspect likely prerenal, elevated BUN likely secondary to GI bleed  -Renal following     Paroxysmal atrial fibrillation  -Rates are currently well controlled, continue beta-blocker, digoxin level  remains high, continue to hold   -Patient previously on Xarelto, however, with ongoing recurrent bleeding, risks currently outweigh benefits for resumption of anticoagulation.  This was by prior provider discussed this with patient and spouse and they were in agreement.     Teardrop fracture of C5 s/p fall likely secondary to syncope  -Suspect etiology secondary to above  -Neurosurgery consulted in the ED, recommend c-collar with outpatient follow-up at discharge  -PT/OT     Hypothyroidism (new diagnosis)  -Significantly elevated TSH, with subsequent low free T4  -We will  start Synthroid replacement at 50 mcg QD, he will need PCP follow-up in 4 weeks for repeat labs     CAD-stable continue statin     DVT Prophylaxis: Mechanical     Disposition: anticipate discharge to rehab on Monday 2/3/2020.  CM following    CODE STATUS:   Code Status and Medical Interventions:   Ordered at: 01/27/20 1826     Level Of Support Discussed With:    Patient     Code Status:    CPR     Medical Interventions (Level of Support Prior to Arrest):    Full       Electronically signed by Kaycee Boateng MD, 02/03/20, 9:34 AM.

## 2020-02-03 NOTE — DISCHARGE SUMMARY
[facility] Medicine Services  DISCHARGE SUMMARY    Patient Name: Rodney Looney  : 1934  MRN: [id number]    Date of Admission: 2020  2:59 PM  Date of Discharge:  2/3/2020  Primary Care Physician: Dana Isidro DO    Consults     Date and Time Order Name Status Description    2020 1122 Inpatient Nephrology Consult Completed     2020 1921 Inpatient Gastroenterology Consult Completed           Hospital Course     Presenting Problem:   Upper GI bleed [K92.2]    Active Hospital Problems    Diagnosis  POA   • Hypothyroidism [E03.9]  Yes   • Upper GI bleed [K92.2]  Yes   • Paroxysmal atrial fibrillation (CMS/HCC) [I48.0]  Yes   • Teardrop fracture of cervical vertebra (CMS/HCC) [S12.9XXA]  Yes   • Chronic respiratory failure with hypoxia (CMS/HCC) [J96.11]  Yes   • Syncope [R55]  Yes   • GERD without esophagitis [K21.9]  Yes   • Acute renal failure superimposed on stage 3 chronic kidney disease (CMS/HCC) [N17.9, N18.3]  Yes   • Symptomatic anemia [D64.9]  Yes      Resolved Hospital Problems   No resolved problems to display.          Hospital Course:  Rodney Looney is a 85 y.o. male with past medical history of GI bleed, paroxysmal atrial fibrillation, CKD stage III, CAD, chronic respiratory failure on O2, lung cancer, tobacco abuse.  Who presented to the ED status post fall.  He has been admitted with likely GI bleed and anterior C5 vertebral body fracture.     Acute upper GI bleeding  Anemia, likely sec to acute blood loss  Suspected to be upper, this is his third episode since 2019.  Required 3 units of PRBCs during admission.  GI was consulted and suspected peptic ulcer disease.  Given his respiratory failure, recommend deferring EGD.  He will continue PPI twice daily for 1 month and then daily thereafter.  At discharge, patient has made hemodynamically stable without any further episodes of bleeding.  Hemoglobin responded appropriately to transfusion  was monitored and has remained stable for several days.  Patient also received 3 doses of IV iron during admission per nephrology recommendations.  Will have patient follow-up with GI in 1 month.      Of note, he had been previously on Xarelto but given his ongoing bleeding this was discontinued.  This was discussed with patient and spouse during admission.  He will continue B12 and folate on discharge.     Acute on chronic respiratory failure with hypoxia, improved, currently back at his baseline  Cxr showed right basilar, atelectatic change with patchy airspace disease, CT chest was obtained for further characterization and showed advanced bullous changes of the lungs unchanged from prior scan with some peribronchial thickening with focal atelectasis vs. pneumonia and small effusion in the left lower lung.  He was started on Bumex with improvement and is at baseline respiratory status of 4 L on discharge.     CARINA on CKD stage III, marginal improvement  Patient with notably elevated creatinine on presentation.  During admission this trended up to 2.46.  Suspect this is prerenal secondary to GI bleeding as well as episode of hypotension earlier in stay. Neurology was consulted and evaluated patient during admission.  Creatinine improved and stabilized around creatinine at 2.  Will have patient follow-up with nephrology in 2 to 4 weeks.     Paroxysmal atrial fibrillation  Patient was noted to have a history of atrial fibrillation.  Digoxin level was checked on presentation and was elevated so we will continue to hold.  He remained controlled during admission.  Suspect his elevated digoxin level likely secondary to renal failure.  Will discontinue digoxin on discharge and have him follow-up with his cardiologist in 2 weeks.  He will continue metoprolol discharge but dose will be decreased to 12.5 mg twice daily secondary to bradycardia.  His Xarelto was also discontinued given his multiple episodes of GI  bleeding.     Teardrop fracture of C5 s/p fall likely secondary to syncope  Suspect etiology secondary to fall.  Neurosurgery was consulted in the ED and recommended c-collar with outpatient follow-up at discharge.  Neurosurgery follow-up arranged prior to discharge in 1 to 2 weeks.     Hypothyroidism (new diagnosis)  Significantly elevated TSH at 53 and Free T4 of 0.42.  He was started on Synthroid 50 mg daily and will follow-up with his PCP for repeat labs in 6 to 8 weeks.    Prior to discharge had a long discussion with wife regarding all the medication changes as well as multiple follow-ups.  He states understanding.    Discharge Follow Up Recommendations for outpatient labs/diagnostics:  Follow-up with GI in month 1 month  Follow-up with PCP within 1 week.  He will need repeat TSH checked in 6 to 8 weeks.  Also recommend checking creatinine and electrolytes.  Follow-up with neurosurgery in 1 to 2 weeks   follow-up with nephrology in 2 to 4 weeks      Day of Discharge     HPI:   HPI: No acute events overnight.  Patient has no complaints. Ambulating well.        Temp:  [97.7 °F (36.5 °C)-97.8 °F (36.6 °C)] 97.8 °F (36.6 °C)  Heart Rate:  [71-81] 79  Resp:  [16-22] 18  BP: (102-125)/(54-92) 125/72     Physical Exam:  Constitutional: Chronically ill-appearing male in no acute distress, pleasant  Respiratory: No respiratory distress, diffuse coarse BS on 4L NC   Cardiovascular: RRR, no murmurs, rubs, or gallops, palpable pedal pulses bilaterally  Gastrointestinal: Positive bowel sounds, soft, nontender, nondistended  Musculoskeletal: No bilateral ankle edema  Psychiatric: Appropriate affect, cooperative  Skin: No rashes, bilateral upper extremity bruising, bruises on face    Pertinent  and/or Most Recent Results     Results from last 7 days   Lab Units 02/03/20  0742 02/02/20 1959 02/02/20  0953 02/01/20 1952 02/01/20  0742 01/31/20 2004 01/31/20  0802 01/31/20  0801  01/30/20  0755  01/29/20  0757   01/28/20  0514  01/27/20  1513   WBC 10*3/mm3 4.12  --  4.29  --   --   --  4.64  --   --   --   --   --   --  5.20  --  5.54   HEMOGLOBIN g/dL 8.2* 8.1* 8.4* 8.0* 7.9* 7.7* 8.2*  --    < >  --    < > 8.5*   < > 6.9*   < > 5.6*   HEMATOCRIT % 27.3* 26.3* 28.4* 25.7* 26.0* 25.0* 28.1*  --    < >  --    < > 27.0*   < > 21.9*   < > 18.5*   PLATELETS 10*3/mm3 76*  --  60*  --   --   --  69*  --   --   --   --   --   --  90*  --  110*   SODIUM mmol/L 137  --  136  --  135*  --   --  136  --  138  --  140  --  142  --  141   POTASSIUM mmol/L 4.7  --  4.3  --  4.4  --   --  4.4  --  4.4  --  4.6  --  5.2  --  4.7   CHLORIDE mmol/L 104  --  104  --  103  --   --  103  --  104  --  106  --  109*  --  107   CO2 mmol/L 25.0  --  22.0  --  22.0  --   --  22.0  --  23.0  --  23.0  --  20.0*  --  23.0   BUN mg/dL 44*  --  47*  --  62*  --   --  76*  --  83*  --  90*  --  87*  --  87*   CREATININE mg/dL 2.15*  --  2.02*  --  2.23*  --   --  2.27*  --  2.24*  --  2.29*  --  2.46*  --  2.43*   GLUCOSE mg/dL 78  --  116*  --  82  --   --  93  --  93  --  111*  --  179*  --  160*   CALCIUM mg/dL 9.0  --  8.8  --  8.6  --   --  9.2  --  9.3  --  9.4  --  9.5  --  9.8    < > = values in this interval not displayed.     Results from last 7 days   Lab Units 01/28/20  0514 01/27/20  1513   BILIRUBIN mg/dL 0.3 0.4   ALK PHOS U/L 82 89   ALT (SGPT) U/L 9 9   AST (SGOT) U/L 15 12           Invalid input(s): TG, LDLCALC, LDLREALC  Results from last 7 days   Lab Units 01/28/20  0514 01/27/20  2251 01/27/20  1752 01/27/20  1513   TSH uIU/mL 53.980*  --   --   --    PROBNP pg/mL  --   --   --  4,379.0*   TROPONIN T ng/mL 0.030 0.038*  --  0.031*   LACTATE mmol/L  --   --  1.9  --        Brief Urine Lab Results  (Last result in the past 365 days)      Color   Clarity   Blood   Leuk Est   Nitrite   Protein   CREAT   Urine HCG        01/28/20 1033 Yellow Clear Negative Negative Negative Negative               Microbiology Results Abnormal      Procedure Component Value - Date/Time    Blood Culture - Blood, Arm, Left [[id number]] Collected:  01/27/20 1746    Lab Status:  Final result Specimen:  Blood from Arm, Left Updated:  02/01/20 1900     Blood Culture No growth at 5 days    Blood Culture - Blood, Hand, Left [[id number]] Collected:  01/27/20 1750    Lab Status:  Final result Specimen:  Blood from Hand, Left Updated:  02/01/20 1900     Blood Culture No growth at 5 days          Imaging Results (All)     Procedure Component Value Units Date/Time    CT Chest Without Contrast [[id number]] Collected:  01/30/20 2041     Updated:  02/02/20 2159    Narrative:       EXAMINATION: CT CHEST WO CONTRAST - 01/30/2020     INDICATION: D64.9-Anemia, unspecified; K92.1-Melena; R79.89-Other  specified abnormal findings of blood chemistry; S12.491A-Other  nondisplaced fracture of fifth cervical vertebra, initial encounter for  closed fracture; Z74.09-Other reduced mobility.     TECHNIQUE: 5 mm unenhanced images of the chest and upper abdomen     The radiation dose reduction device was turned on for each scan per the  ALARA (As Low as Reasonably Achievable) protocol.     COMPARISON: 07/19/2019.     FINDINGS: There is extensive upper lobe bullous change as on the  07/19/2019 study. Today's exam again shows right basilar pleural  scarring and subpleural interstitial change, actually significantly  decreased from the prior exam, questionably the patient's baseline  appearance. Mild diffuse interstitial changes in the right lung  elsewhere also appear to be stable. There is no evidence of any new left  upper lobe disease. There is some left lower lobe volume loss, however,  with peribronchial thickening, interstitial change and very small  effusion, new from the prior study. Small right effusion, by contrast  appears stable. Combination of calcified and noncalcified mediastinal  lymph nodes appears unchanged including the largest, approximately 2.4  cm anterior mediastinal  node.     Included images of the upper abdomen show no significant abnormalities  of the visualized portions of the liver, spleen, pancreatic tail,  adrenal glands, or left upper renal pole. Right upper renal pole appears  atrophic. Bony structures appear intact except for a compression  deformity which appears to be at L1, present in 2019 but increased on  today's study.       Impression:       1. Advanced bullous change of the lungs, similar to prior 2019 scan.   2. Right basilar pleural and parenchymal scarring also similar to 2019.  Previously noted superimposed diffuse right lung disease has resolved.  3. New peribronchial thickening, focal atelectasis or pneumonia, and  small effusion in the left lower lung.  4. Stable calcified and noncalcified mediastinal adenopathy.  5. Atrophic right kidney and old L1 compression deformity noted.     DICTATED:   01/30/2020  EDITED/ls :   01/30/2020         This report was finalized on 2/2/2020 9:56 PM by Dr. Sinan Dixon MD.       XR Spine Lumbar 2 or 3 View [[id number]] Collected:  01/29/20 1033     Updated:  01/29/20 1852    Narrative:       EXAMINATION: XR SPINE LUMBAR 2 OR 3 VW- 01/29/2020     INDICATION: D64.9-Anemia, unspecified; K92.1-Melena; R79.89-Other  specified abnormal findings of blood chemistry; S12.491A-Other  nondisplaced fracture of fifth cervical vertebra, initial encounter for  closed fracture; Z74.09-Other reduced mobility      COMPARISON: NONE     FINDINGS: There is marked compression of the L1 vertebral body with mild  anterior and posterior displacement of bony structures. Otherwise there  is diffuse osteopenia, degenerative change as well as mild lumbar  scoliosis.           Impression:       There is marked compression of L1 as described above.  Although there are no prior images of the lumbar spine, a CT scan of the  chest dated 07/09/2019 includes L1 in the field-of-view. That  examination also demonstrates compression of L1 but the degree  of  compression has progressed since that time.     D:  01/29/2020  E:  01/29/2020     This report was finalized on 1/29/2020 6:49 PM by Dr. Wesley Santamaria MD.       XR Sacrum & Coccyx [[id number]] Collected:  01/29/20 1038     Updated:  01/29/20 1852    Narrative:       EXAMINATION: XR SACRUM AND COCCYX- 01/29/2020     INDICATION: log roll, pain; D64.9-Anemia, unspecified; K92.1-Melena;  R79.89-Other specified abnormal findings of blood chemistry;  S12.491A-Other nondisplaced fracture of fifth cervical vertebra, initial  encounter for closed fracture; Z74.09-Other reduced mobility      COMPARISON: NONE     FINDINGS: AP and lateral views of the sacrum and coccyx are displayed.  The bones are demineralized making interpretation more difficult.  However the SI joints are intact and there is no evidence of  sacrococcygeal fracture.           Impression:       There is no sacrococcygeal fracture.     D:  01/29/2020  E:  01/29/2020     This report was finalized on 1/29/2020 6:49 PM by Dr. Wesley Santamaria MD.       XR Chest PA & Lateral [[id number]] Collected:  01/29/20 1546     Updated:  01/29/20 1847    Narrative:       EXAMINATION: XR CHEST, PA AND LATERAL-01/29/2020:      INDICATION: SOA; D64.9-Anemia, unspecified; K92.1-Melena; R79.89-Other  specified abnormal findings of blood chemistry; S12.491A-Other  nondisplaced fracture of fifth cervical vertebra, initial encounter for  closed fracture; Z74.09-Other reduced mobility.      COMPARISON: 01/27/2020.     FINDINGS:  There is volume loss on the right with right basilar  atelectatic change, patchy airspace disease in the right mid lung region  and left basilar atelectasis. The heart is slightly large. The overall  appearance has not changed since the previous examination.           Impression:       Abnormalities as described above. There has been no  significant change since the previous examination 01/27/2020.     D:  01/29/2020  E:  01/29/2020     This report was finalized  on 1/29/2020 6:44 PM by Dr. Wesley Santamaria MD.       XR Pelvis 1 or 2 View [[id number]] Collected:  01/27/20 1812     Updated:  01/29/20 0945    Narrative:       EXAMINATION: XR PELVIS 1 OR 2 VW-01/27/2020:      INDICATION: Fall.      COMPARISON: NONE.     FINDINGS: Imaging of the pelvis reveals degenerative changes seen within  the sacroiliac joints bilaterally and lower lumbar spine. Osteopenia of  the bony structures. Vascular calcification seen within the soft  tissues. The cortex is intact. No fracture or dislocation.           Impression:       No acute bony abnormality with degenerative changes seen  within the sacroiliac joints bilaterally and lower lumbar spine.     D:  01/27/2020  E:  01/28/2020     This report was finalized on 1/29/2020 9:42 AM by Dr. Jo Ann Galarza MD.       CT Head Without Contrast [[id number]] Collected:  01/27/20 1548     Updated:  01/29/20 0824    Narrative:       EXAMINATION: CT HEAD WO CONTRAST-, CT CERVICAL SPINE WO CONTRAST-, CT  FACIAL BONES WO CONTRAST-01/27/2020:      INDICATION: Fall, injury.     TECHNIQUE: Unenhanced CT imaging of the head was performed with  additional coronal reformatted imaging provided for review. Additional  unenhanced CT imaging of the cervical spine was performed with  additional coronal and sagittal reformatted imaging of the cervical  spine. CT imaging of the facial bones without IV contrast was also  performed with additional multiplanar reformatted imaging submitted for  review.     The radiation dose reduction device was turned on for each scan per the  ALARA (As Low as Reasonably Achievable) protocol.     COMPARISON: NONE.     FINDINGS:      HEAD: No evidence of midline shift or mass effect. No hydrocephalus  identified. Coarse calcifications of the falx identified. No extra-axial  fluid collections or hemorrhage appreciated. No intracranial hemorrhage  is identified. Hypoattenuation within the periventricular and  subcortical white matter is  identified most consistent with chronic  microvascular ischemic change. There is no convincing evidence for  transcortical infarct identified at this time. The calvarium appears  intact. There is no evidence of depressed skull fracture appreciated.  Nonspecific mucosal thickening of the ethmoid sinus identified. The  surrounding soft tissues demonstrate mild left frontal soft tissue  swelling identified. Correlate with physical exam. The globes and  retro-orbital soft tissues are unremarkable.     CERVICAL SPINE: The cervical vertebral bodies maintain height and  alignment. Identified involving the C5 anterior vertebral body is a  small corner fracture (series 5/image 37). No additional fractures  and/or dislocation appreciated. There is multilevel degenerative changes  as demonstrated by anterior and posterior osteophytosis, endplate  sclerosis, facet arthropathy, and vacuum disc phenomena which is very  mild. Cervical spine degenerative changes identified. Atherosclerotic  calcification of the carotid arteries are identified. The spinal canal  appears patent, however, there are multilevel disc osteophyte complex  and questionable disc protrusion most pronounced at C3-C4.     FACIAL BONES:  Unenhanced CT imaging of the facial bones was performed.  The frontal bone appears intact. The frontal sinuses are well aerated.  The zygomatic arches appear intact. The pterygoid plates appear intact.  The mandible is nondisplaced without evidence for mandibular fracture.  Dental artifact/dentures identified. The visualized skull base appears  intact. Anterior nasal bone appears intact. The maxilla appears intact.  There is no convincing evidence for orbital wall fracture. Specifically,  no inferior orbital wall fracture is definitively identified. Small  mucus retention cysts are noted within the left maxillary sinus. There  is mild soft tissue swelling of the left frontal forehead. The globes  appear intact. No evidence for  entrapped extraocular muscle or globe  abnormality appreciated.     These findings were conveyed to ordering clinician with Nancy Freire  at 3:55 PM on 01/27/2020.       Impression:       1.  No acute intracranial process appreciated.      2.  Findings concerning for a small teardrop-type fracture at the  anterior-inferior C5 vertebral body. Follow-up MRI imaging may provide a  more detailed evaluation if clinically appropriate, particularly to  evaluate ligamentous injury if clinically appropriate. Additional  osteoarthritic changes of the cervical spine as described above, with  additional multilevel disc degeneration and disc osteophyte complex with  likely a small disc protrusion at C3-C4.     3.  No evidence of facial fracture. There is mild soft tissue swelling  of the left forehead. Correlate with physical exam.     D:  01/27/2020  E:  01/28/2020     This report was finalized on 1/29/2020 8:21 AM by Dr. Aroldo Adams MD.       CT Cervical Spine Without Contrast [[id number]] Collected:  01/27/20 1548     Updated:  01/29/20 0824    Narrative:       EXAMINATION: CT HEAD WO CONTRAST-, CT CERVICAL SPINE WO CONTRAST-, CT  FACIAL BONES WO CONTRAST-01/27/2020:      INDICATION: Fall, injury.     TECHNIQUE: Unenhanced CT imaging of the head was performed with  additional coronal reformatted imaging provided for review. Additional  unenhanced CT imaging of the cervical spine was performed with  additional coronal and sagittal reformatted imaging of the cervical  spine. CT imaging of the facial bones without IV contrast was also  performed with additional multiplanar reformatted imaging submitted for  review.     The radiation dose reduction device was turned on for each scan per the  ALARA (As Low as Reasonably Achievable) protocol.     COMPARISON: NONE.     FINDINGS:      HEAD: No evidence of midline shift or mass effect. No hydrocephalus  identified. Coarse calcifications of the falx identified. No  extra-axial  fluid collections or hemorrhage appreciated. No intracranial hemorrhage  is identified. Hypoattenuation within the periventricular and  subcortical white matter is identified most consistent with chronic  microvascular ischemic change. There is no convincing evidence for  transcortical infarct identified at this time. The calvarium appears  intact. There is no evidence of depressed skull fracture appreciated.  Nonspecific mucosal thickening of the ethmoid sinus identified. The  surrounding soft tissues demonstrate mild left frontal soft tissue  swelling identified. Correlate with physical exam. The globes and  retro-orbital soft tissues are unremarkable.     CERVICAL SPINE: The cervical vertebral bodies maintain height and  alignment. Identified involving the C5 anterior vertebral body is a  small corner fracture (series 5/image 37). No additional fractures  and/or dislocation appreciated. There is multilevel degenerative changes  as demonstrated by anterior and posterior osteophytosis, endplate  sclerosis, facet arthropathy, and vacuum disc phenomena which is very  mild. Cervical spine degenerative changes identified. Atherosclerotic  calcification of the carotid arteries are identified. The spinal canal  appears patent, however, there are multilevel disc osteophyte complex  and questionable disc protrusion most pronounced at C3-C4.     FACIAL BONES:  Unenhanced CT imaging of the facial bones was performed.  The frontal bone appears intact. The frontal sinuses are well aerated.  The zygomatic arches appear intact. The pterygoid plates appear intact.  The mandible is nondisplaced without evidence for mandibular fracture.  Dental artifact/dentures identified. The visualized skull base appears  intact. Anterior nasal bone appears intact. The maxilla appears intact.  There is no convincing evidence for orbital wall fracture. Specifically,  no inferior orbital wall fracture is definitively identified.  Small  mucus retention cysts are noted within the left maxillary sinus. There  is mild soft tissue swelling of the left frontal forehead. The globes  appear intact. No evidence for entrapped extraocular muscle or globe  abnormality appreciated.     These findings were conveyed to ordering clinician with Nancy Freire  at 3:55 PM on 01/27/2020.       Impression:       1.  No acute intracranial process appreciated.      2.  Findings concerning for a small teardrop-type fracture at the  anterior-inferior C5 vertebral body. Follow-up MRI imaging may provide a  more detailed evaluation if clinically appropriate, particularly to  evaluate ligamentous injury if clinically appropriate. Additional  osteoarthritic changes of the cervical spine as described above, with  additional multilevel disc degeneration and disc osteophyte complex with  likely a small disc protrusion at C3-C4.     3.  No evidence of facial fracture. There is mild soft tissue swelling  of the left forehead. Correlate with physical exam.     D:  01/27/2020  E:  01/28/2020     This report was finalized on 1/29/2020 8:21 AM by Dr. Aroldo Adams MD.       CT Facial Bones Without Contrast [[id number]] Collected:  01/27/20 1548     Updated:  01/29/20 0824    Narrative:       EXAMINATION: CT HEAD WO CONTRAST-, CT CERVICAL SPINE WO CONTRAST-, CT  FACIAL BONES WO CONTRAST-01/27/2020:      INDICATION: Fall, injury.     TECHNIQUE: Unenhanced CT imaging of the head was performed with  additional coronal reformatted imaging provided for review. Additional  unenhanced CT imaging of the cervical spine was performed with  additional coronal and sagittal reformatted imaging of the cervical  spine. CT imaging of the facial bones without IV contrast was also  performed with additional multiplanar reformatted imaging submitted for  review.     The radiation dose reduction device was turned on for each scan per the  ALARA (As Low as Reasonably Achievable) protocol.      COMPARISON: NONE.     FINDINGS:      HEAD: No evidence of midline shift or mass effect. No hydrocephalus  identified. Coarse calcifications of the falx identified. No extra-axial  fluid collections or hemorrhage appreciated. No intracranial hemorrhage  is identified. Hypoattenuation within the periventricular and  subcortical white matter is identified most consistent with chronic  microvascular ischemic change. There is no convincing evidence for  transcortical infarct identified at this time. The calvarium appears  intact. There is no evidence of depressed skull fracture appreciated.  Nonspecific mucosal thickening of the ethmoid sinus identified. The  surrounding soft tissues demonstrate mild left frontal soft tissue  swelling identified. Correlate with physical exam. The globes and  retro-orbital soft tissues are unremarkable.     CERVICAL SPINE: The cervical vertebral bodies maintain height and  alignment. Identified involving the C5 anterior vertebral body is a  small corner fracture (series 5/image 37). No additional fractures  and/or dislocation appreciated. There is multilevel degenerative changes  as demonstrated by anterior and posterior osteophytosis, endplate  sclerosis, facet arthropathy, and vacuum disc phenomena which is very  mild. Cervical spine degenerative changes identified. Atherosclerotic  calcification of the carotid arteries are identified. The spinal canal  appears patent, however, there are multilevel disc osteophyte complex  and questionable disc protrusion most pronounced at C3-C4.     FACIAL BONES:  Unenhanced CT imaging of the facial bones was performed.  The frontal bone appears intact. The frontal sinuses are well aerated.  The zygomatic arches appear intact. The pterygoid plates appear intact.  The mandible is nondisplaced without evidence for mandibular fracture.  Dental artifact/dentures identified. The visualized skull base appears  intact. Anterior nasal bone appears intact.  The maxilla appears intact.  There is no convincing evidence for orbital wall fracture. Specifically,  no inferior orbital wall fracture is definitively identified. Small  mucus retention cysts are noted within the left maxillary sinus. There  is mild soft tissue swelling of the left frontal forehead. The globes  appear intact. No evidence for entrapped extraocular muscle or globe  abnormality appreciated.     These findings were conveyed to ordering clinician with Nancy Freire  at 3:55 PM on 01/27/2020.       Impression:       1.  No acute intracranial process appreciated.      2.  Findings concerning for a small teardrop-type fracture at the  anterior-inferior C5 vertebral body. Follow-up MRI imaging may provide a  more detailed evaluation if clinically appropriate, particularly to  evaluate ligamentous injury if clinically appropriate. Additional  osteoarthritic changes of the cervical spine as described above, with  additional multilevel disc degeneration and disc osteophyte complex with  likely a small disc protrusion at C3-C4.     3.  No evidence of facial fracture. There is mild soft tissue swelling  of the left forehead. Correlate with physical exam.     D:  01/27/2020  E:  01/28/2020     This report was finalized on 1/29/2020 8:21 AM by Dr. Aroldo Adams MD.       XR Chest 1 View [[id number]] Collected:  01/27/20 1527     Updated:  01/28/20 0831    Narrative:       EXAMINATION: XR CHEST 1 VW- 01/27/2020     INDICATION: Weak/Dizzy/AMS triage protocol      COMPARISON: Chest radiograph 11/21/2019     FINDINGS: Single portable chest radiograph was submitted for review. The  heart is enlarged similar to prior. Pulmonary vascular congestion and  diffuse interstitial edema is also noted similar to prior. Post surgical  changes in the mediastinum are identified. There is persistent right mid  and lower lung airspace changes with atelectasis and scarring noted with  associated volume loss similar to prior  exam. Patchy left midlung and  lingular airspace changes are identified which appear new from  11/21/2019.           Impression:       1. Continued abnormality of the right hemithorax with volume loss, right  mid and lower lung airspace changes and scarring identified.     2. Interval development of patchy left mid lung and lingular airspace  changes new from 11/21/2019.     D:  01/27/2020  E:  01/28/2020     This report was finalized on 1/28/2020 8:28 AM by Dr. Aroldo Adams MD.             Results for orders placed during the hospital encounter of 07/02/19   Duplex Venous Upper Extremity - Left CAR    Narrative · Normal left upper extremity venous duplex scan.          Results for orders placed during the hospital encounter of 07/02/19   Duplex Venous Upper Extremity - Left CAR    Narrative · Normal left upper extremity venous duplex scan.          Results for orders placed during the hospital encounter of 06/07/19   Adult Transthoracic Echo Complete W/ Cont if Necessary Per Protocol    Narrative · Estimated EF appears to be in the range of 61 - 65%.  · Left ventricular systolic function is normal.  · Right ventricular cavity is mildly dilated.  · Left atrial cavity size is mild-to-moderately dilated.  · Mild aortic valve regurgitation is present.  · Calculated right ventricular systolic pressure from tricuspid   regurgitation is 69 mmHg.  · Moderate to severe tricuspid valve regurgitation is present.  · Thin fibrinous mobile echogenic structures possibly attached to the   right atrial lead, versus prominent Chiari network. ZI recommended for   follow-up if clinically appropriate..            Discharge Details        Discharge Medications      ASK your doctor about these medications      Instructions Start Date   bumetanide 0.5 MG tablet  Commonly known as:  BUMEX   0.25 mg, Oral, Daily      CALCIUM 600 PO   1 tablet, Oral, Daily      cholecalciferol 25 MCG (1000 UT) tablet  Commonly known as:  VITAMIN D3    500 Units, Oral, Daily      digoxin 125 MCG tablet  Commonly known as:  LANOXIN   125 mcg, Oral, Daily Digoxin      dutasteride 0.5 MG capsule  Commonly known as:  AVODART   0.5 mg, Oral, Nightly      ferrous sulfate 325 (65 FE) MG EC tablet   1 tablet, Oral, Daily      fluticasone 50 MCG/ACT nasal spray  Commonly known as:  FLONASE   2 sprays, Nasal, Daily      folic acid 1 MG tablet  Commonly known as:  FOLVITE   1 mg, Oral, Nightly      ipratropium-albuterol 0.5-2.5 mg/3 ml nebulizer  Commonly known as:  DUO-NEB   3 mL, Nebulization, Every 4 Hours PRN      metoprolol tartrate 50 MG tablet  Commonly known as:  LOPRESSOR   1 tablet, Oral, 2 Times Daily      pantoprazole 40 MG EC tablet  Commonly known as:  PROTONIX   40 mg, Oral, Daily      polyethylene glycol packet  Commonly known as:  MIRALAX   17 g, Oral, Daily      pravastatin 80 MG tablet  Commonly known as:  PRAVACHOL   80 mg, Oral, Daily      RANEXA 500 MG 12 hr tablet  Generic drug:  ranolazine   1 tablet, Oral, Daily      tamsulosin 0.4 MG capsule 24 hr capsule  Commonly known as:  FLOMAX   1 capsule, Oral, Nightly      vitamin B-12 100 MCG tablet  Commonly known as:  CYANOCOBALAMIN   100 mcg, Oral, Daily      vitamin C 500 MG tablet  Commonly known as:  ASCORBIC ACID   500 mg, Oral, Daily             No Known Allergies      Discharge Disposition:      Diet:  Hospital:  Diet Order   Procedures   • Diet Regular; GI Soft, Cardiac       Activity:  Activity Instructions     Ambulate as tolerated with assistance;  Soft collar to neck                   CODE STATUS:    Code Status and Medical Interventions:   Ordered at: 01/27/20 1824     Level Of Support Discussed With:    Patient     Code Status:    CPR     Medical Interventions (Level of Support Prior to Arrest):    Full       No future appointments.    Additional Instructions for the Follow-ups that You Need to Schedule     Ambulatory Referral to Home Health   As directed      Face to Face Visit Date:   2/3/2020    Follow-up provider for Plan of Care?:  I treated the patient in an acute care facility and will not continue treatment after discharge.    Follow-up provider:  DANNA DEAL [[id number]]    Reason/Clinical Findings:  s/p UGIB    Describe mobility limitations that make leaving home difficult:  impaired functional mobility, balance, gait, and endurance    Nursing/Therapeutic Services Requested:  Skilled Nursing Physical Therapy Occupational Therapy    Skilled nursing orders:  Medication education    PT orders:  Strengthening    Occupational orders:  Strengthening Activities of daily living               Time Spent on Discharge:  45 minutes    Electronically signed by Kaycee Boateng MD, 02/03/20, 11:09 AM.

## 2020-02-03 NOTE — PLAN OF CARE
Problem: Fall Risk (Adult)  Goal: Absence of Fall  2/2/2020 1918 by Mikayla Arthur RN  Outcome: Ongoing (interventions implemented as appropriate)  Flowsheets (Taken 2/2/2020 1918)  Absence of Fall: making progress toward outcome  2/2/2020 1904 by Mikayla Arthur RN  Outcome: Ongoing (interventions implemented as appropriate)  Flowsheets (Taken 2/2/2020 1904)  Absence of Fall: making progress toward outcome     Problem: Fall Risk (Adult)  Goal: Absence of Fall  2/2/2020 1918 by Mikayla Arthur RN  Outcome: Ongoing (interventions implemented as appropriate)  Flowsheets (Taken 2/2/2020 1918)  Absence of Fall: making progress toward outcome     Problem: Patient Care Overview  Goal: Plan of Care Review  2/2/2020 1918 by Mikayla Arthur RN  Outcome: Ongoing (interventions implemented as appropriate)  Flowsheets  Taken 2/2/2020 1918  Progress: improving  Taken 2/2/2020 1810  Plan of Care Reviewed With: patient  Note:   VSS, SOA noted that increases with activity.  Pt. Was able to ambulate in hallway with PT today with assistance.  02@5L NC at this time.  No s/s of bleeding and labs monitored closely.  Specialty bed in use and asssitance with turn q2h.  Barrier cream to buttocks.   Afib on tele.   Plan to d[facility] when medically ready.    2/2/2020 1904 by Mikayla Arthur RN  Outcome: Ongoing (interventions implemented as appropriate)  Flowsheets  Taken 2/2/2020 1904  Progress: improving  Taken 2/2/2020 1810  Plan of Care Reviewed With: patient  Note:   VSS, SOA with activity noted; 02 at 5L NC, and no c/o pain today.  Specialty bed in use and pt. Repositioned/turned q2h.  Redness to buttocks noted;  barrier applied.   No bleeding noted today and labs monitored closely.   Afib on tele.  Plan to d/c to [place].  Will continue to monitor and will notify MD CHAN .   Mikayla Carmona RN      Problem: Patient Care Overview  Goal: Plan of Care Review  2/2/2020 1918 by  Mikayla Arthur RN  Outcome: Ongoing (interventions implemented as appropriate)  Flowsheets  Taken 2/2/2020 1918  Progress: improving  Taken 2/2/2020 1810  Plan of Care Reviewed With: patient  Note:   VSS, SOA noted that increases with activity.  Pt. Was able to ambulate in hallway with PT today with assistance.  02@5L NC at this time.  No s/s of bleeding and labs monitored closely.  Specialty bed in use and asssitance with turn q2h.  Barrier cream to buttocks.   Afib on tele.   Plan to d/c to [facility] when medically ready.       Problem: Patient Care Overview  Goal: Plan of Care Review  2/2/2020 1904 by Mikayla Arthur RN  Outcome: Ongoing (interventions implemented as appropriate)  Flowsheets  Taken 2/2/2020 1904  Progress: improving  Taken 2/2/2020 1810  Plan of Care Reviewed With: patient  Note:   VSS, SOA with activity noted; 02 at 5L NC, and no c/o pain today.  Specialty bed in use and pt. Repositioned/turned q2h.  Redness to buttocks noted;  barrier applied.   No bleeding noted today and labs monitored closely.   Afib on tele.  Plan to d/c to [facility].  Will continue to monitor and will notify MD CHAN .   Mikayla Carmona RN      Problem: Patient Care Overview  Goal: Discharge Needs Assessment  2/2/2020 1918 by Mikayla Arthur RN  Outcome: Ongoing (interventions implemented as appropriate)  Flowsheets  Taken 2/2/2020 1904 by Mikayla Arthur RN  Concerns to be Addressed: no discharge needs identified  Taken 1/28/2020 1625 by Kellerman, Sonja C, RN  Readmission Within the Last 30 Days: no previous admission in last 30 days  2/2/2020 1904 by Mikayla Arthur RN  Outcome: Ongoing (interventions implemented as appropriate)  Flowsheets  Taken 1/28/2020 1625 by Kellerman, Sonja C, RN  Equipment Currently Used at Home: walker, rolling;oxygen  Anticipated Changes Related to Illness: inability to care for self  Transportation Anticipated: family or friend will  provide  Transportation Concerns: car, none  Readmission Within the Last 30 Days: no previous admission in last 30 days  Taken 2/2/2020 1904 by Brendon Arthur RN  Concerns to be Addressed: no discharge needs identified     Problem: Skin Injury Risk (Adult)  Goal: Skin Health and Integrity  2/2/2020 1918 by Brendon Arthur RN  Outcome: Ongoing (interventions implemented as appropriate)  Flowsheets (Taken 2/2/2020 1918)  Skin Health and Integrity: making progress toward outcome  2/2/2020 1904 by Brendon Arthur RN  Outcome: Ongoing (interventions implemented as appropriate)  Flowsheets (Taken 2/2/2020 1904)  Skin Health and Integrity: making progress toward outcome     Problem: Skin Injury Risk (Adult)  Goal: Skin Health and Integrity  2/2/2020 1918 by Brendon Arthur RN  Outcome: Ongoing (interventions implemented as appropriate)  Flowsheets (Taken 2/2/2020 1918)  Skin Health and Integrity: making progress toward outcome     Problem: Skin Injury Risk (Adult)  Goal: Skin Health and Integrity  2/2/2020 1918 by Brendon Arthur RN  Outcome: Ongoing (interventions implemented as appropriate)  Flowsheets (Taken 2/2/2020 1918)  Skin Health and Integrity: making progress toward outcome     Problem: Gastrointestinal Bleeding (Adult)  Goal: Signs and Symptoms of Listed Potential Problems Will be Absent, Minimized or Managed (Gastrointestinal Bleeding)  2/2/2020 1918 by Brendon Arthur RN  Outcome: Ongoing (interventions implemented as appropriate)  Flowsheets (Taken 2/1/2020 1635 by Catrina Avendaño RN)  Problems Assessed (GI Bleeding): all  Problems Present (GI Bleeding): hypoxia/hypoxemia  2/2/2020 1904 by Brendon Arthur RN  Outcome: Ongoing (interventions implemented as appropriate)  Flowsheets (Taken 2/1/2020 1635 by Catrina Avendaño RN)  Problems Assessed (GI Bleeding): all  Problems Present (GI Bleeding): hypoxia/hypoxemia     Problem: Gastrointestinal Bleeding (Adult)  Goal:  Signs and Symptoms of Listed Potential Problems Will be Absent, Minimized or Managed (Gastrointestinal Bleeding)  2/2/2020 1918 by Brendon Arthur, RN  Outcome: Ongoing (interventions implemented as appropriate)  Flowsheets (Taken 2/1/2020 1635 by Catrina Avendaño, RN)  Problems Assessed (GI Bleeding): all  Problems Present (GI Bleeding): hypoxia/hypoxemia

## 2020-02-03 NOTE — THERAPY TREATMENT NOTE
Patient Name: Rodney Looney  : 1934    MRN: [id number]                              Today's Date: 2/3/2020       Admit Date: 2020    Visit Dx:     ICD-10-CM ICD-9-CM   1. Anemia, unspecified type D64.9 285.9   2. Melena K92.1 578.1   3. Elevated troponin R79.89 790.6   4. Other closed nondisplaced fracture of fifth cervical vertebra, initial encounter (CMS/[facility]) S12.491A 805.05   5. Impaired mobility and ADLs Z74.09 799.89     Patient Active Problem List   Diagnosis   • Right mid-lower lobe infiltrate    • Serratia bacteremia    • Pacemaker infection s/p lead extraction    • H/O Stage [id number] NSC lung cancer s/p RL lobectomy 19   • History of lobectomy of lung   • Hypoxia   • Stage III CKD, GFR 30-59 ml/min    • Coronary artery disease involving native coronary artery of native heart   • Hypercholesterolemia   • Essential hypertension   • Paroxysmal atrial fibrillation (CMS/[facility])   • GERD without esophagitis   • Fall   • Postprocedural seroma of skin and subcutaneous tissue following other procedure   • Acute renal failure superimposed on stage 3 chronic kidney disease (CMS/[facility])   • Buttock wound   • Edema of both lower extremities   • Elevated brain natriuretic peptide (BNP) level   • Chronic respiratory failure with hypoxia (CMS/[facility])   • 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)   • Emphysema (CMS/[facility])   • Volume overload   • Symptomatic anemia   • Duodenal ulcer   • Upper GI bleed   • Paroxysmal atrial fibrillation (CMS/[facility])   • Teardrop fracture of cervical vertebra (CMS/HCC)   • Chronic respiratory failure with hypoxia (CMS/[facility])   • Syncope   • Hypothyroidism     Past Medical History:   Diagnosis Date   • Atrial fibrillation (CMS/[facility])    • Buttock wound 2019    Bilateral buttock ulcerations/POA   • Cancer (CMS/HCC)    • Chronic respiratory failure with hypoxia, on home oxygen therapy (CMS/[facility]) 2019   • Coronary artery disease    • Elevated cholesterol    • GERD  (gastroesophageal reflux disease)    • History of BPH 2 yrs   • Hypertension    • Kidney disease    • Smoking      Past Surgical History:   Procedure Laterality Date   • BRONCHOSCOPY Right 7/10/2019    Procedure: BRONCHOSCOPY;  Surgeon: Rohan Boyer MD;  Location:  [facility];  Service: Cardiothoracic   • CARDIAC SURGERY     • CORONARY ARTERY BYPASS GRAFT N/A 6/13/2019    Procedure: MEDIANSTERNOTOMY, REMOVAL OF RETAINED PACING LEAD;  Surgeon: Rohan Boyer MD;  Location:  [facility];  Service: Cardiothoracic   • ENDOSCOPY N/A 7/23/2019    Procedure: ESOPHAGOGASTRODUODENOSCOPY;  Surgeon: Rafael Maldonado MD;  Location:  [facility];  Service: Gastroenterology   • ENDOSCOPY N/A 9/28/2019    Procedure: ESOPHAGOGASTRODUODENOSCOPY;  Surgeon: Brunner, Mark I, MD;  Location:  [facility];  Service: Gastroenterology   • HERNIA REPAIR     • LUNG LOBECTOMY      RIGHT LOWER   • THORACOSCOPY Right 7/10/2019    Procedure: THORACOSCOPY VIDEO ASSISTED, CHEST TUBE PLACEMENT, EVACUATION OF RIGHT CHEST WALL HEMATOMA;  Surgeon: Rohan Boyer MD;  Location:  [facility];  Service: Cardiothoracic     General Information     Row Name 02/03/20 1503          PT Evaluation Time/Intention    Document Type  therapy note (daily note)  -ES     Mode of Treatment  physical therapy;individual therapy  -ES     Row Name 02/03/20 1503          General Information    Patient Profile Reviewed?  yes  -ES     Existing Precautions/Restrictions  fall;oxygen therapy device and L/min;other (see comments);spinal cervical soft collar  -ES     Row Name 02/03/20 1505          Cognitive Assessment/Intervention- PT/OT    Orientation Status (Cognition)  oriented x 3  -ES     Row Name 02/03/20 1502          Safety Issues, Functional Mobility    Impairments Affecting Function (Mobility)  balance;endurance/activity tolerance;strength;shortness of breath  -ES       User Key  (r) = Recorded By, (t) = Taken By, (c) = Cosigned By    Initials Name Provider Type     ES Marilyn Beck, PT Physical Therapist        Mobility     Row Name 02/03/20 1503          Bed Mobility Assessment/Treatment    Bed Mobility Assessment/Treatment  supine-sit  -ES     Supine-Sit [place] (Bed Mobility)  verbal cues;contact guard  -ES     Assistive Device (Bed Mobility)  bed rails;head of bed elevated  -ES     Comment (Bed Mobility)  v/c's for log roll technique  -ES     Row Name 02/03/20 1503          Transfer Assessment/Treatment    Comment (Transfers)  v/c's for hand placement  -ES     Row Name 02/03/20 1503          Sit-Stand Transfer    Sit-Stand [place] (Transfers)  contact guard;verbal cues  -ES     Assistive Device (Sit-Stand Transfers)  walker, front-wheeled  -ES     Row Name 02/03/20 1503          Gait/Stairs Assessment/Training    Gait/Stairs Assessment/Training  gait/ambulation assistive device  -ES     [place] Level (Gait)  contact guard;verbal cues  -ES     Assistive Device (Gait)  walker, front-wheeled  -ES     Distance in Feet (Gait)  300  -ES     Pattern (Gait)  step-through  -ES     Deviations/Abnormal Patterns (Gait)  gait speed decreased  -ES     Bilateral Gait Deviations  forward flexed posture  -ES     [place] Level (Stairs)  not tested  -ES     Comment (Gait/Stairs)  Required 3 standing rest breaks due to drop in O2 saturation to mid 70's.  Returns to 90's with rest and cues for pursed lip breathing.   -ES       User Key  (r) = Recorded By, (t) = Taken By, (c) = Cosigned By    Initials Name Provider Type    ES Marilyn Beck PT Physical Therapist        Obj/Interventions     Row Name 02/03/20 1505          Static Sitting Balance    Level of [place] (Unsupported Sitting, Static Balance)  supervision  -ES     Sitting Position (Unsupported Sitting, Static Balance)  sitting on edge of bed  -ES     Time Able to Maintain Position (Unsupported Sitting, Static Balance)  2 to 3 minutes  -ES     Row Name 02/03/20 1505          Static Standing Balance    Level  of [place] (Supported Standing, Static Balance)  contact guard assist  -ES     Time Able to Maintain Position (Supported Standing, Static Balance)  1 to 2 minutes  -ES       User Key  (r) = Recorded By, (t) = Taken By, (c) = Cosigned By    Initials Name Provider Type    Marilyn Ivy, PT Physical Therapist        Goals/Plan    No documentation.       Clinical Impression     Row Name 02/03/20 1505          Pain Scale: Numbers Pre/Post-Treatment    Pain Scale: Numbers, Pretreatment  0/10 - no pain  -ES     Pain Scale: Numbers, Post-Treatment  0/10 - no pain  -ES     Row Name 02/03/20 1505          Plan of Care Review    Plan of Care Reviewed With  patient  -ES     Progress  no change  -ES     Outcome Summary  Pt able to ambulated 300's using FWW with CGA, but required 3 standing rest breaks to keep oxygen saturation up.  Limited by SOA, decreased activity tolerance, and weakness.  Continue per IPPT POC.  -ES     Row Name 02/03/20 1505          Vital Signs    Pre Systolic BP Rehab  124  -ES     Pre Treatment Diastolic BP  65  -ES     Pretreatment Heart Rate (beats/min)  80  -ES     Posttreatment Heart Rate (beats/min)  73  -ES     Pre SpO2 (%)  98  -ES     O2 Delivery Pre Treatment  supplemental O2  -ES     Intra SpO2 (%)  75 standing rest break to return to 90's  -ES     O2 Delivery Intra Treatment  supplemental O2  -ES     Post SpO2 (%)  94  -ES     Pre Patient Position  Supine  -ES     Intra Patient Position  Standing  -ES     Post Patient Position  Sitting  -ES     Row Name 02/03/20 1500          Positioning and Restraints    Pre-Treatment Position  in bed  -ES     Post Treatment Position  chair  -ES     In Chair  notified nsg;reclined;call light within reach;encouraged to call for assist;exit alarm on;legs elevated cervical collar on  -ES       User Key  (r) = Recorded By, (t) = Taken By, (c) = Cosigned By    Initials Name Provider Type    Marilyn Ivy, PT Physical Therapist        Outcome Measures      Row Name 02/03/20 1508          How much help from another person do you currently need...    Turning from your back to your side while in flat bed without using bedrails?  3  -ES     Moving from lying on back to sitting on the side of a flat bed without bedrails?  3  -ES     Moving to and from a bed to a chair (including a wheelchair)?  3  -ES     Standing up from a chair using your arms (e.g., wheelchair, bedside chair)?  3  -ES     Climbing 3-5 steps with a railing?  2  -ES     To walk in hospital room?  3  -ES     AM-PAC 6 Clicks Score (PT)  17  -ES     Row Name 02/03/20 1508          Functional Assessment    Outcome Measure Options  AM-PAC 6 Clicks Basic Mobility (PT)  -ES       User Key  (r) = Recorded By, (t) = Taken By, (c) = Cosigned By    Initials Name Provider Type    Marilyn Ivy PT Physical Therapist          PT Recommendation and Plan     Plan of Care Reviewed With: patient  Progress: no change  Outcome Summary: Pt able to ambulated 300's using FWW with CGA, but required 3 standing rest breaks to keep oxygen saturation up.  Limited by SOA, decreased activity tolerance, and weakness.  Continue per IPPT POC.     Time Calculation:   PT Charges     Row Name 02/03/20 1510             Time Calculation    Start Time  0950  -ES      PT Received On  02/03/20  -ES      PT Goal Re-Cert Due Date  02/07/20  -ES         Timed Charges    72293 - Gait Training Minutes   25  -ES        User Key  (r) = Recorded By, (t) = Taken By, (c) = Cosigned By    Initials Name Provider Type    Marilyn Ivy PT Physical Therapist        Therapy Charges for Today     Code Description Service Date Service Provider Modifiers Qty    [id number] HC GAIT TRAINING EA 15 MIN 2/3/2020 Marilyn Beck PT GP 2          PT G-Codes  Outcome Measure Options: AM-PAC 6 Clicks Basic Mobility (PT)  AM-PAC 6 Clicks Score (PT): 17  AM-PAC 6 Clicks Score (OT): 16    Marilyn Beck PT  2/3/2020

## 2020-02-03 NOTE — PROGRESS NOTES
Continued Stay Note  [facility]     Patient Name: Rodney Looney  MRN: [id number]  Today's Date: 2/3/2020    Admit Date: 1/27/2020    Discharge Plan     Row Name 02/03/20 1059       Plan    Plan  Home w/ HH    Provided post acute provider list?  Yes    Post Acute Provider List  Home Health    Post Acute Provider Quality & Resource List  Yes    Delivered To  Patient    Method of Delivery  In person    Patient/Family in Agreement with Plan  yes    Plan Comments  Per therapy note, patient currently walking approx 350 w/ RW. D/w patient at bedside and he would like to return home w/ HH. Referral made to [facility] for SN/PT/OT. Notified Sola busby/ Candi Diaz that patient is returning home. No other needs noted. CM following.     Final Discharge Disposition Code  06 - home with home health care        Discharge Codes    No documentation.       Expected Discharge Date and Time     Expected Discharge Date Expected Discharge Time    Feb 3, 2020             Keerthi Tellez RN

## 2020-02-03 NOTE — PROGRESS NOTES
"   LOS: 7 days    Patient Care Team:  Dana Isidro DO as PCP - General (Family Medicine)    Reason For Visit:  F/U CARINA ON CKD.  Subjective           Review of Systems:    Pulm: No soa   CV:  No CP      Objective       atorvastatin 20 mg Oral Nightly   bumetanide 0.25 mg Oral Daily   ferric gluconate 125 mg Intravenous Daily Before Supper   ferrous sulfate 325 mg Oral Daily With Breakfast   folic acid 1 mg Oral Nightly   levothyroxine 50 mcg Oral Q AM   metoprolol tartrate 12.5 mg Oral BID   pantoprazole 40 mg Oral BID AC   polyethylene glycol 17 g Oral Daily   ranolazine 500 mg Oral Daily   sodium chloride 10 mL Intravenous Q12H   tamsulosin 0.4 mg Oral Nightly   vitamin B-12 100 mcg Oral Daily            Vital Signs:  Blood pressure 113/63, pulse 72, temperature 97.3 °F (36.3 °C), temperature source Oral, resp. rate 14, height 182.9 cm (72\"), weight 82.9 kg (182 lb 12.8 oz), SpO2 98 %.    Flowsheet Rows      First Filed Value   Admission Height  182.9 cm (72\") Documented at 01/27/2020 1510   Admission Weight  83.9 kg (185 lb) Documented at 01/27/2020 1510          02/02 0701 - 02/03 0700  In: 780 [P.O.:780]  Out: 1300 [Urine:1300]    Physical Exam:    General Appearance: NAD, alert and cooperative, Ox3  Eyes: PER, conjunctivae and sclerae normal, no icterus  Lungs: respirations regular and unlabored, no crepitus, clear to auscultation  Heart/CV: regular rhythm & normal rate, no murmur, no gallop, no rub and no edema  Abdomen: not distended, soft, non-tender, no masses,  bowel sounds present  Skin: No rash, Warm and dry    Radiology:            Labs:  Results from last 7 days   Lab Units 02/03/20  0742 02/02/20  1959 02/02/20  0953  01/31/20  0802   WBC 10*3/mm3 4.12  --  4.29  --  4.64   HEMOGLOBIN g/dL 8.2* 8.1* 8.4*   < > 8.2*   HEMATOCRIT % 27.3* 26.3* 28.4*   < > 28.1*   PLATELETS 10*3/mm3 76*  --  60*  --  69*    < > = values in this interval not displayed.     Results from last 7 days   Lab Units " 02/03/20  0742 02/02/20  0953 02/01/20  0742 01/31/20  0801  01/28/20  0514 01/27/20  1513   SODIUM mmol/L 137 136 135* 136   < > 142 141   POTASSIUM mmol/L 4.7 4.3 4.4 4.4   < > 5.2 4.7   CHLORIDE mmol/L 104 104 103 103   < > 109* 107   CO2 mmol/L 25.0 22.0 22.0 22.0   < > 20.0* 23.0   BUN mg/dL 44* 47* 62* 76*   < > 87* 87*   CREATININE mg/dL 2.15* 2.02* 2.23* 2.27*   < > 2.46* 2.43*   CALCIUM mg/dL 9.0 8.8 8.6 9.2   < > 9.5 9.8   PHOSPHORUS mg/dL  --  3.3 3.2  --   --   --   --    MAGNESIUM mg/dL  --   --   --   --   --   --  2.1   ALBUMIN g/dL  --  2.80* 2.80*  --   --  2.80* 3.10*    < > = values in this interval not displayed.     Results from last 7 days   Lab Units 02/03/20  0742   GLUCOSE mg/dL 78       Results from last 7 days   Lab Units 01/28/20  0514   ALK PHOS U/L 82   BILIRUBIN mg/dL 0.3   ALT (SGPT) U/L 9   AST (SGOT) U/L 15     Results from last 7 days   Lab Units 01/27/20  1803   PH, ARTERIAL pH units 7.452*   PO2 ART mm Hg 69.0*   PCO2, ARTERIAL mm Hg 31.3   HCO3 ART mmol/L 21.9       Results from last 7 days   Lab Units 01/28/20  1033   COLOR UA  Yellow   CLARITY UA  Clear   PH, URINE  <=5.0   SPECIFIC GRAVITY, URINE  1.015   GLUCOSE UA  Negative   KETONES UA  Negative   BILIRUBIN UA  Negative   PROTEIN UA  Negative   BLOOD UA  Negative   LEUKOCYTES UA  Negative   NITRITE UA  Negative       Estimated Creatinine Clearance: 29.5 mL/min (A) (by C-G formula based on SCr of 2.15 mg/dL (H)).      Assessment       GERD without esophagitis    Acute renal failure superimposed on stage 3 chronic kidney disease (CMS/HCC)    Symptomatic anemia    Upper GI bleed    Paroxysmal atrial fibrillation (CMS/HCC)    Teardrop fracture of cervical vertebra (CMS/HCC)    Chronic respiratory failure with hypoxia (CMS/HCC)    Syncope    Hypothyroidism            Impression: CARINA ON CKD. STABLE. ANEMIA.             Recommendations: DISCHARGE OK WITH RENAL. PATIENT WILL F/U WITH HIS NEPHROLOGIST ( DR BRICE ) AT  HOME.      Duke Melo MD  02/03/20  2:39 PM

## 2020-02-03 NOTE — PLAN OF CARE
Pt has slept through the night and has not complained of pain.  Pt on 5L O2 NC.  Pt on specialty bed.

## 2020-02-03 NOTE — PROGRESS NOTES
"                  Clinical Nutrition     Reason for Visit:   Follow-up protocol    Patient Name: Rodney Looney  YOB: 1934  MRN: [id number]  Date of Encounter: 02/03/20 10:50 AM  Admission date: 1/27/2020    Nutrition Assessment   Assessment     Admission diagnosis  Upper GI bleed, stable    Additional diagnosis/conditions/procedures this admission  Teardrop fracture C-spine - cervical collar  HFrEF  Acute/chronic respiratory failure with hypoxia, improving  Anemia, acute blood loss  Deconditioning  CARINA / CKD3  Hypothyroidism, new dx    GI - defer EGD d/t respiratory status    Additional PMH/procedures:  Afib  HTN  HLP  CAD  GI bleed  Large cratered duodenal ulcer (7/2019)  GERD  Ulcers  Tobacco  Chronic respiratory failure, home O2  BPH  CKD3      CABG (6/2019)  Hernia repair  RLL lobectomy  VATS, chest tube (7/2019)    Reported/Observed/Food/Nutrition Related History:      Patient tolerating diet advancement well. Eating >75% of past meals since 2/1.      Anthropometrics     Height: 182.9 cm (72\")  Last filed wt: Weight: 82.9 kg (182 lb 12.8 oz)(182.8) (02/02/20 0658)  Weight Method: Bed scale    BMI: BMI (Calculated): 25.1  Overweight: 25.0-29.9kg/m2     Ideal Body Weight (IBW) (kg): 82.07  Admission wt: 185 lb  Method obtained: weight per charting 1/27 no method listed    Labs reviewed     Results from last 7 days   Lab Units 02/03/20  0742 02/02/20  0953 02/01/20  0742  01/28/20  0514 01/27/20  1513   GLUCOSE mg/dL 78 116* 82   < > 179* 160*   BUN mg/dL 44* 47* 62*   < > 87* 87*   CREATININE mg/dL 2.15* 2.02* 2.23*   < > 2.46* 2.43*   SODIUM mmol/L 137 136 135*   < > 142 141   CHLORIDE mmol/L 104 104 103   < > 109* 107   POTASSIUM mmol/L 4.7 4.3 4.4   < > 5.2 4.7   PHOSPHORUS mg/dL  --  3.3 3.2  --   --   --    MAGNESIUM mg/dL  --   --   --   --   --  2.1   ALT (SGPT) U/L  --   --   --   --  9 9    < > = values in this interval not displayed.     Results from last 7 days   Lab Units " 02/02/20  0953 02/01/20  0742 01/28/20  0514   ALBUMIN g/dL 2.80* 2.80* 2.80*       Medications reviewed   Pertinent: bumex, iron IV, folic acid, synthroid, protonix, miralax, ranexa, VIT B12      Current Nutrition Prescription     PO: Diet Regular; GI Soft, Cardiac   Oral Nutrition Supplement: Boost Breeze x3/d  Intake: 95% x 5 meals  (2/1 - 2/3)    Nutrition Diagnosis     1/30 updated 2/1  Problem Inadequate oral intake   Etiology GI status   Signs/Symptoms Clear liquid diet >72 hours   Status: resolved, OK per MD to advance diet to solids    Nutrition Intervention   1.  Follow treatment progress, Care plan reviewed  2.  Supplement adjusted - d/c Boost Breeze x3/d    Goal:   General: Nutrition support treatment  PO: Continue positive trend      Monitoring/Evaluation:   Per protocol, I&O, PO intake, Supplement intake, GI status, Symptoms    Will Continue to follow per protocol    Sima Wagner RDN, LD  Time Spent: 25 minutes

## 2020-02-04 NOTE — OUTREACH NOTE
Prep Survey      Responses   Facility patient discharged from?  [place]   Is patient eligible?  Yes   Discharge diagnosis  Upper GIB, hypothyroidism, PAF, teardrop fracture of cervical vertebra, Chronic resp. failure with hypoxia, syncope, GERD w/o esophagitis, ARF on CKD III, symptomatic anemia   Does the patient have one of the following disease processes/diagnoses(primary or secondary)?  Other   Does the patient have Home health ordered?  Yes   What is the Home health agency?   [facility]  for SN, PT, OT   Is there a DME ordered?  No   Comments regarding appointments  See AVS   Prep survey completed?  Yes          Selin Gaona RN

## 2020-02-05 NOTE — TELEPHONE ENCOUNTER
"    Reason for Disposition  • Caller has URGENT medication question about med that PCP prescribed and triager unable to answer question    Additional Information  • Negative: Drug overdose and nurse unable to answer question  • Negative: Caller requesting information not related to medicine  • Negative: Caller requesting a prescription for Strep throat and has a positive culture result  • Negative: Rash while taking a medication or within 3 days of stopping it  • Negative: Immunization reaction suspected  • Negative: [1] Asthma and [2] having symptoms of asthma (cough, wheezing, etc)  • Negative: MORE THAN A DOUBLE DOSE of a prescription or over-the-counter (OTC) drug  • Negative: [1] DOUBLE DOSE (an extra dose or lesser amount) of over-the-counter (OTC) drug AND [2] any symptoms (e.g., dizziness, nausea, pain, sleepiness)  • Negative: [1] DOUBLE DOSE (an extra dose or lesser amount) of prescription drug AND [2] any symptoms (e.g., dizziness, nausea, pain, sleepiness)  • Negative: Took another person's prescription drug  • Negative: [1] DOUBLE DOSE (an extra dose or lesser amount) of prescription drug AND [2] NO symptoms (Exception: a double dose of antibiotics)  • Negative: Diabetes drug error or overdose (e.g., insulin or extra dose)  • Negative: [1] Request for URGENT new prescription or refill of \"essential\" medication (i.e., likelihood of harm to patient if not taken) AND [2] triager unable to fill per unit policy  • Negative: [1] Prescription not at pharmacy AND [2] was prescribed today by PCP  • Negative: Pharmacy calling with prescription questions and triager unable to answer question    Answer Assessment - Initial Assessment Questions  1. SYMPTOMS: \"Do you have any symptoms?\"      Is he supposed to take ranexa?   2. SEVERITY: If symptoms are present, ask \"Are they mild, moderate or severe?\"    Protocols used: MEDICATION QUESTION CALL-ADULT-      "

## 2020-02-05 NOTE — OUTREACH NOTE
Medical Week 1 Survey      Responses   Facility patient discharged from?  [place]   Does the patient have one of the following disease processes/diagnoses(primary or secondary)?  Other   Is there a successful TCM telephone encounter documented?  No   Week 1 attempt successful?  No   Unsuccessful attempts  Attempt 1          Frederick Shaikh RN

## 2020-02-06 NOTE — OUTREACH NOTE
Medical Week 1 Survey      Responses   Facility patient discharged from?  [place]   Does the patient have one of the following disease processes/diagnoses(primary or secondary)?  Other   Is there a successful TCM telephone encounter documented?  No   Week 1 attempt successful?  No   Unsuccessful attempts  Attempt 2          Susannah Soto RN

## 2020-02-07 NOTE — TELEPHONE ENCOUNTER
PATIENT SPOUSE CALLED [facility] REGARDING HOSPITAL F/U APPT WITH ROCAEL. SHE WAS CONFUSED SINCE HE SEEN DR. BRUNNER IN HOSPITAL. RETURNED CALL TO PATIENT, NO ANSWER. LEFT APPT DETAIL ON VOICEMAIL. WILL SEND APPT. REMINDER IN MAIL 2/7/2020

## 2020-02-11 NOTE — OUTREACH NOTE
Medical Week 2 Survey      Responses   Facility patient discharged from?  [place]   Does the patient have one of the following disease processes/diagnoses(primary or secondary)?  Other   Week 2 attempt successful?  No   Unsuccessful attempts  Attempt 1          Cullen Mathis RN

## 2020-02-18 NOTE — OUTREACH NOTE
Medical Week 3 Survey      Responses   Facility patient discharged from?  [place]   Does the patient have one of the following disease processes/diagnoses(primary or secondary)?  Other   Week 3 attempt successful?  Yes   Call start time  1431   Rescheduled  Rescheduled-pt requested   Call end time  1432          Ladonna Benitez RN

## 2020-02-18 NOTE — PROGRESS NOTES
Subjective   Patient: Rodney Looney  : 1934  Chart #: [id number]    Date of Service: 2020    Chief Complaint   Patient presents with   • Hospital follow up     due to fall--Teardrop fracture of C5 s/p fall likely secondary to syncope per ER note       HPI  Patient presented to the neurosurgery office for follow-up of a cervical fracture, he is wearing oxygen and appears very ill.  He was taken to the hospital for evaluation.    Past Medical History:   Diagnosis Date   • Atrial fibrillation (CMS/HCC)    • Buttock wound 2019    Bilateral buttock ulcerations/POA   • Cancer (CMS/HCC)    • Chronic respiratory failure with hypoxia, on home oxygen therapy (CMS/HCC) 2019   • Coronary artery disease    • Elevated cholesterol    • GERD (gastroesophageal reflux disease)    • History of BPH 2 yrs   • Hypertension    • Kidney disease    • Smoking        No Known Allergies      Current Outpatient Medications:   •  bumetanide (BUMEX) 0.5 MG tablet, Take 0.25 mg by mouth Daily., Disp: , Rfl:   •  Calcium Carbonate (CALCIUM 600 PO), Take 1 tablet by mouth Daily., Disp: , Rfl:   •  cholecalciferol (VITAMIN D3) 1000 units tablet, Take 500 Units by mouth Daily., Disp: , Rfl:   •  dutasteride (AVODART) 0.5 MG capsule, Take 0.5 mg by mouth Every Night., Disp: , Rfl:   •  ferrous sulfate 325 (65 FE) MG EC tablet, Take 1 tablet by mouth Daily., Disp: , Rfl: 0  •  fluticasone (FLONASE) 50 MCG/ACT nasal spray, 2 sprays into the nostril(s) as directed by provider Daily., Disp: , Rfl:   •  folic acid (FOLVITE) 1 MG tablet, Take 1 mg by mouth Every Night., Disp: , Rfl:   •  ipratropium-albuterol (DUO-NEB) 0.5-2.5 mg/3 ml nebulizer, Take 3 mL by nebulization Every 4 (Four) Hours As Needed for Wheezing., Disp: , Rfl:   •  levothyroxine (SYNTHROID, LEVOTHROID) 50 MCG tablet, Take 1 tablet by mouth Daily., Disp: 30 tablet, Rfl: 0  •  metoprolol tartrate (LOPRESSOR) 25 MG tablet, Take 0.5 tablets by mouth 2 (Two) Times a  Day., Disp: 30 tablet, Rfl: 0  •  pantoprazole (PROTONIX) 40 MG EC tablet, Take 1 tablet by mouth 2 (Two) Times a Day Before Meals for 30 days., Disp: 60 tablet, Rfl: 0  •  polyethylene glycol (MIRALAX) packet, Take 17 g by mouth Daily., Disp: , Rfl:   •  pravastatin (PRAVACHOL) 80 MG tablet, Take 80 mg by mouth Daily., Disp: , Rfl:   •  ranolazine (RANEXA) 500 MG 12 hr tablet, Take 1 tablet by mouth Daily., Disp: , Rfl:   •  rivaroxaban (XARELTO) 15 MG tablet, Take 15 mg by mouth Daily., Disp: , Rfl:   •  tamsulosin (FLOMAX) 0.4 MG capsule 24 hr capsule, Take 1 capsule by mouth Every Night., Disp: , Rfl:   •  vitamin B-12 (CYANOCOBALAMIN) 100 MCG tablet, Take 100 mcg by mouth Daily., Disp: , Rfl:   •  vitamin C (ASCORBIC ACID) 500 MG tablet, Take 500 mg by mouth Daily., Disp: , Rfl:     Social History     Socioeconomic History   • Marital status:      Spouse name: Not on file   • Number of children: 3   • Years of education: Not on file   • Highest education level: Not on file   Occupational History   • Occupation: retired/farmer   Tobacco Use   • Smoking status: Former Smoker     Packs/day: 0.75     Years: 65.00     Pack years: 48.75     Types: Cigarettes     Last attempt to quit: 2019     Years since quittin.7   • Smokeless tobacco: Never Used   Substance and Sexual Activity   • Alcohol use: No     Frequency: Never   • Drug use: No   • Sexual activity: Defer   Social History Narrative    .  Lives with wife.  Up with walker since recent surgeries.  Uses 2LNC at all times now. Lives in [facility]       Family History   Problem Relation Age of Onset   • Colon cancer Mother    • Heart failure Father    • Dementia Father        Review of Systems   Constitutional: Negative.    HENT: Positive for hearing loss.    Eyes: Positive for visual disturbance.   Respiratory: Positive for shortness of breath.    Cardiovascular: Positive for leg swelling.   Gastrointestinal: Positive for blood in stool and  "constipation.   Endocrine: Positive for cold intolerance.        Mostly in extremities   Genitourinary: Negative.    Musculoskeletal: Positive for gait problem.   Skin: Negative.    Allergic/Immunologic: Negative.    Hematological: Bruises/bleeds easily.   Psychiatric/Behavioral: Negative.          Physical examination:  Blood pressure 120/70, pulse 76, resp. rate 16, height 182.9 cm (72\"), weight 82.6 kg (182 lb), SpO2 92 %.      Medical Decision Making:   Acutely ill appearing 85-year-old gentleman, wearing oxygen.  Patient was transferred to the hospital for medical evaluation.    Iesha Zuniga PA-C      Patient Care Team:  Dana Isidro DO as PCP - General (Family Medicine)        "

## 2020-02-21 NOTE — OUTREACH NOTE
Medical Week 3 Survey      Responses   Facility patient discharged from?  [place]   Does the patient have one of the following disease processes/diagnoses(primary or secondary)?  Other   Week 3 attempt successful?  Yes   Call start time  0843   Call end time  0846   Discharge diagnosis  Upper GIB, hypothyroidism, PAF, teardrop fracture of cervical vertebra, Chronic resp. failure with hypoxia, syncope, GERD w/o esophagitis, ARF on CKD III, symptomatic anemia   Person spoke with today (if not patient) and relationship  Bhargavi-spouse   Meds reviewed with patient/caregiver?  Yes   Is the patient having any side effects they believe may be caused by any medication additions or changes?  No   Does the patient have all medications ordered at discharge?  Yes   Is the patient taking all medications as directed (includes completed medication regime)?  Yes   Does the patient have a primary care provider?   Yes   Comments regarding PCP  Appt with PCP 2/10/20   Does the patient have an appointment with their PCP within 7 days of discharge?  Yes   Has the patient kept scheduled appointments due by today?  Yes   What is the Home health agency?   [facility]  for SN, PT, OT   Has home health visited the patient within 72 hours of discharge?  Unsure   DME comments  Pt wears O2 all the time   Psychosocial issues?  No   Did the patient receive a copy of their discharge instructions?  Yes   Nursing interventions  Reviewed instructions with patient   What is the patient's perception of their health status since discharge?  Improving [Bhargavi reports pt feels good. He has swelling in feet. Pt is wearing compression stockings. ]   Is the patient/caregiver able to teach back signs and symptoms related to disease process for when to call PCP?  Yes   Is the patient/caregiver able to teach back signs and symptoms related to disease process for when to call 911?  Yes   Is the patient/caregiver able to teach back the hierarchy of who to call/visit for  symptoms/problems? PCP, Specialist, Home health nurse, Urgent Care, ED, 911  Yes   Week 3 Call Completed?  Yes          Zee Cash RN

## 2020-02-28 ENCOUNTER — READMISSION MANAGEMENT (OUTPATIENT)
Dept: CALL CENTER | Facility: HOSPITAL | Age: 85
End: 2020-02-28

## 2020-02-28 NOTE — OUTREACH NOTE
Medical Week 4 Survey      Responses   Facility patient discharged from?  [place]   Does the patient have one of the following disease processes/diagnoses(primary or secondary)?  Other   Week 4 attempt successful?  No   Revoke  Patient           Marta Parker RN

## (undated) DEVICE — 32 FR STRAIGHT – SOFT PVC CATHETER: Brand: PVC THORACIC CATHETERS

## (undated) DEVICE — KT CATH DRN PLEURL PLEURX/SAFE/T SIL 15.5F 66CM 4BT/1000ML

## (undated) DEVICE — PAD ARMBRD SURG CONVOL 7.5X20X2IN

## (undated) DEVICE — DR ROGERS OH: Brand: MEDLINE INDUSTRIES, INC.

## (undated) DEVICE — JP PERF DRN SIL FLT 10MM FULL: Brand: CARDINAL HEALTH

## (undated) DEVICE — HYBRID CO2 TUBING/CAP SET FOR OLYMPUS® SCOPES & CO2 SOURCE: Brand: ERBE

## (undated) DEVICE — SYR SLP TP 10ML DISP

## (undated) DEVICE — GLV SURG SENSICARE MICRO PF LF 8 STRL

## (undated) DEVICE — TTL1LYR 16FR10ML 100%SIL TMPST TR: Brand: MEDLINE

## (undated) DEVICE — SPNG GZ WOVN 4X4IN 12PLY 10/BX STRL

## (undated) DEVICE — CANN NASL CO2 DIVIDED A/

## (undated) DEVICE — CLTH CLENS READYCLEANSE PERI CARE PK/5

## (undated) DEVICE — ANTIBACTERIAL UNDYED BRAIDED (POLYGLACTIN 910), SYNTHETIC ABSORBABLE SURGICAL SUTURE: Brand: COATED VICRYL

## (undated) DEVICE — Device: Brand: MEDEX

## (undated) DEVICE — [HIGH FLOW INSUFFLATOR,  DO NOT USE IF PACKAGE IS DAMAGED,  KEEP DRY,  KEEP AWAY FROM SUNLIGHT,  PROTECT FROM HEAT AND RADIOACTIVE SOURCES.]: Brand: PNEUMOSURE

## (undated) DEVICE — JACKSON-PRATT 100CC BULB RESERVOIR: Brand: CARDINAL HEALTH

## (undated) DEVICE — DECANT BG O JET

## (undated) DEVICE — SYR LL TP 10ML STRL

## (undated) DEVICE — CONTN GRAD MEAS TRIANG 32OZ BLK

## (undated) DEVICE — SYR SLPTP 20CC

## (undated) DEVICE — PK MINOR SPLT 10

## (undated) DEVICE — INTRO ACCSR BLNT TP

## (undated) DEVICE — THE BITE BLOCK MAXI, LATEX FREE STRAP IS USED TO PROTECT THE ENDOSCOPE INSERTION TUBE FROM BEING BITTEN BY THE PATIENT.

## (undated) DEVICE — GLIDELIGHT™ LASER SHEATH: Brand: GLIDELIGHT™

## (undated) DEVICE — SUT SILK 2/0 CT1 CR8 18IN C022D

## (undated) DEVICE — SOL NACL 0.9PCT 1000ML

## (undated) DEVICE — ADULT, W/LG. BACK PAD, RADIOTRANSPARENT ELEMENT AND LEAD WIRE: Brand: DEFIBRILLATION ELECTRODES

## (undated) DEVICE — PRESSURE MONITORING SET: Brand: TRUWAVE

## (undated) DEVICE — SUT MNCRYL 3/0 SH 27 IN Y416H

## (undated) DEVICE — SUT PROLN 6/0 C1 D/A 30IN 8706H

## (undated) DEVICE — SINGLE-USE BIOPSY FORCEPS: Brand: RADIAL JAW 4

## (undated) DEVICE — CATHETER,URETHRAL,REDRUBBER,STERILE,22FR: Brand: MEDLINE

## (undated) DEVICE — ULTRACLEAN ACCESSORY ELECTRODE 6" (15.24 CM) COATED BLADE: Brand: ULTRACLEAN

## (undated) DEVICE — KT BIOGUARD SXN VLV AIR/H20 4PC DISP

## (undated) DEVICE — CVR HNDL LT SURG ACCSSRY BLU STRL

## (undated) DEVICE — CABL PACE ATRIAL PT BLU

## (undated) DEVICE — BLD SCLPL BEAVR MINI STR 2BVL 180D LF

## (undated) DEVICE — SYR LUER/TIP MONOJECT RGD/PK 60CC STRL

## (undated) DEVICE — CAUTERY TIP POLISHER: Brand: DEVON

## (undated) DEVICE — GLV SURG PREMIERPRO MIC LTX PF SZ7.5 BRN

## (undated) DEVICE — SUT SILK 2 SUTUPAK TIE 60IN SA8H 2STRAND

## (undated) DEVICE — LEAD LOCKING DEVICE (LLD EZ): Brand: LLD EZ™

## (undated) DEVICE — SUT PROLN 7/0 BV1 D/A 24IN 8702H

## (undated) DEVICE — FLOTRAC SENSOR WITH VAMP SYSTEM 60" / 152CM: Brand: FLOTRAC, VAMP

## (undated) DEVICE — COVADERM PLUS: Brand: DEROYAL

## (undated) DEVICE — SPNG CVR STR 2S 4X4

## (undated) DEVICE — NDL HYPO ECLPS SFTY 22G 1 1/2IN

## (undated) DEVICE — DRAPE,SPLIT,CV,CLR ANES,STERILE: Brand: MEDLINE

## (undated) DEVICE — VASOVIEW HEMOPRO: Brand: VASOVIEW HEMOPRO

## (undated) DEVICE — DECANTER: Brand: UNBRANDED

## (undated) DEVICE — SUT ETHIB 2/0 SH SH 36IN X523H

## (undated) DEVICE — DRSNG WND BORDR/ADHS NONADHR/GZ LF 2X2IN STRL

## (undated) DEVICE — DEFOGGER!" ANTI FOG KIT: Brand: DEROYAL

## (undated) DEVICE — TEMP PACING WIRE: Brand: MYO/WIRE

## (undated) DEVICE — MEDI-VAC YANKAUER SUCTION HANDLE W/BULBOUS TIP: Brand: CARDINAL HEALTH

## (undated) DEVICE — ANTIBACTERIAL UNDYED BRAIDED (POLYGLACTIN 910), SYNTHETIC ABSORBABLE SUTURE: Brand: COATED VICRYL

## (undated) DEVICE — SUT SILK 4/0 TIES 18IN A183H

## (undated) DEVICE — SCOPE WARMER THAT PRE-WARMS MULTIPLE LAPAROSCOPES.: Brand: JOSNOE MEDICAL INC

## (undated) DEVICE — MAGNETIC DRAPE: Brand: DEVON

## (undated) DEVICE — LIMB HOLDER, WRIST/ANKLE: Brand: DEROYAL

## (undated) DEVICE — SKIN AFFIX SURG ADHESIVE 72/CS 0.55ML: Brand: MEDLINE

## (undated) DEVICE — 28 FR RIGHT ANGLE – SOFT PVC CATHETER: Brand: PVC THORACIC CATHETERS

## (undated) DEVICE — SUT PROLN 4/0 V5 36IN 8935H

## (undated) DEVICE — SUCTION CANISTER, 2500CC, RIGID: Brand: DEROYAL

## (undated) DEVICE — TRAP,MUCUS SPECIMEN,40CC: Brand: MEDLINE

## (undated) DEVICE — ST EXT IV SMARTSITE 2VLV SP M LL 5ML IV1

## (undated) DEVICE — SAFESECURE,SECUREMENT,FOLEY CATH,STERILE: Brand: MEDLINE

## (undated) DEVICE — SIDESTREAM™ HIGH-EFFICIENCY NEBULIZER: Brand: AIRLIFE™

## (undated) DEVICE — TUBING, SUCTION, 1/4" X 10', STRAIGHT: Brand: MEDLINE

## (undated) DEVICE — SUT PROLN 4/0 V7 36IN 8975H

## (undated) DEVICE — BOWL UTIL STRL 32OZ

## (undated) DEVICE — DRP SLUSH MACH

## (undated) DEVICE — SUT VIC 2/0 CT2 27IN J269H

## (undated) DEVICE — SPNG ENDO BEDSIDE TUB ENZYM

## (undated) DEVICE — 2, DISPOSABLE SUCTION/IRRIGATOR WITHOUT DISPOSABLE TIP: Brand: STRYKEFLOW

## (undated) DEVICE — JELLY,LUBE,STERILE,FLIP TOP,TUBE,2-OZ: Brand: MEDLINE

## (undated) DEVICE — LEAD LOCKING DEVICE (LLD #2): Brand: LLD™ #2

## (undated) DEVICE — INTENDED FOR TISSUE SEPARATION, AND OTHER PROCEDURES THAT REQUIRE A SHARP SURGICAL BLADE TO PUNCTURE OR CUT.: Brand: BARD-PARKER ® STAINLESS STEEL BLADES

## (undated) DEVICE — TIGHTRAIL™ ROTATING DILATOR SHEATH: Brand: TIGHTRAIL™

## (undated) DEVICE — PENCL E/S HNDSWCH ROCKRBTN HOLSTR 10FT

## (undated) DEVICE — TAPE MICROFM 2IN LF

## (undated) DEVICE — SPEC CONT WIDE MOUTH 3OZ 400/CS 20 CS/SK: Brand: MEDEGEN MEDICAL PRODUCTS, LLC

## (undated) DEVICE — INTRO SHEATH ENGAGE W/50 GW .038 7F12

## (undated) DEVICE — 2963 MEDIPORE SOFT CLOTH TAPE 3 IN X 10 YD 12 RLS/CS: Brand: 3M™ MEDIPORE™

## (undated) DEVICE — CATH EP SUPRM QUADPOLAR JSN 5F 5MM 120CM

## (undated) DEVICE — SOL IRR NACL 0.9PCT BT 1000ML

## (undated) DEVICE — DRSNG SURESITE123 4X4.8IN

## (undated) DEVICE — CATH PACE PACEL BIPOL 5F

## (undated) DEVICE — SOL NS 500ML

## (undated) DEVICE — SOL IRR H2O BG 1000ML STRL

## (undated) DEVICE — PLASMABLADE PS210-030S 3.0S LOCK: Brand: PLASMABLADE™

## (undated) DEVICE — SUT SILK 0/0 CT2 18IN C027D

## (undated) DEVICE — SUCTION CANISTER, 1000CC,SAFELINER: Brand: DEROYAL

## (undated) DEVICE — IRRIGATOR BULB ASEPTO 60CC STRL

## (undated) DEVICE — CANNULA,ADULT,SOFT-TOUCH,7'TUBE,UC: Brand: PENDING

## (undated) DEVICE — LEX ELECTRO PHYSIOLOGY: Brand: MEDLINE INDUSTRIES, INC.

## (undated) DEVICE — 12 FOOT DISPOSABLE EXTENSION CABLE WITH SAFE CONNECT / SCREW-DOWN

## (undated) DEVICE — INTRO SHEATH ART/FEM ENGAGE .035 4F12CM

## (undated) DEVICE — SINGLE USE SUCTION VALVE MAJ-209: Brand: SINGLE USE SUCTION VALVE (STERILE)

## (undated) DEVICE — SET PRIMARY GRVTY 10DP MALE LL 104IN

## (undated) DEVICE — PK HEART OPN 10

## (undated) DEVICE — ENCORE® LATEX MICRO SIZE 7.5, STERILE LATEX POWDER-FREE SURGICAL GLOVE: Brand: ENCORE

## (undated) DEVICE — SYR LUERLOK 50ML

## (undated) DEVICE — PK THORACOTOMY 10

## (undated) DEVICE — ST INF PRI SMRTSTE 20DRP 2VLV 24ML 117

## (undated) DEVICE — SINGLE USE BIOPSY VALVE MAJ-210: Brand: SINGLE USE BIOPSY VALVE (STERILE)